# Patient Record
Sex: FEMALE | Race: WHITE | NOT HISPANIC OR LATINO | Employment: FULL TIME | URBAN - METROPOLITAN AREA
[De-identification: names, ages, dates, MRNs, and addresses within clinical notes are randomized per-mention and may not be internally consistent; named-entity substitution may affect disease eponyms.]

---

## 2017-01-20 ENCOUNTER — TELEPHONE (OUTPATIENT)
Dept: OBSTETRICS AND GYNECOLOGY | Facility: CLINIC | Age: 52
End: 2017-01-20

## 2017-01-23 ENCOUNTER — OFFICE VISIT (OUTPATIENT)
Dept: OBSTETRICS AND GYNECOLOGY | Facility: CLINIC | Age: 52
End: 2017-01-23

## 2017-01-23 VITALS
BODY MASS INDEX: 24.24 KG/M2 | WEIGHT: 142 LBS | SYSTOLIC BLOOD PRESSURE: 104 MMHG | HEIGHT: 64 IN | DIASTOLIC BLOOD PRESSURE: 72 MMHG

## 2017-01-23 DIAGNOSIS — N95.1 MENOPAUSAL SYMPTOMS: Primary | ICD-10-CM

## 2017-01-23 DIAGNOSIS — Z13.9 SCREENING: ICD-10-CM

## 2017-01-23 LAB
BILIRUB BLD-MCNC: NEGATIVE MG/DL
CLARITY, POC: CLEAR
COLOR UR: YELLOW
GLUCOSE UR STRIP-MCNC: NEGATIVE MG/DL
KETONES UR QL: NEGATIVE
LEUKOCYTE EST, POC: NEGATIVE
NITRITE UR-MCNC: NEGATIVE MG/ML
PH UR: 5 [PH] (ref 5–8)
PROT UR STRIP-MCNC: NEGATIVE MG/DL
RBC # UR STRIP: ABNORMAL /UL
SP GR UR: 1.02 (ref 1–1.03)
UROBILINOGEN UR QL: NORMAL

## 2017-01-23 PROCEDURE — 99213 OFFICE O/P EST LOW 20 MIN: CPT | Performed by: OBSTETRICS & GYNECOLOGY

## 2017-01-23 PROCEDURE — 81002 URINALYSIS NONAUTO W/O SCOPE: CPT | Performed by: OBSTETRICS & GYNECOLOGY

## 2017-01-23 NOTE — MR AVS SNAPSHOT
Desire Hernandez   2017 1:00 PM   Office Visit    Dept Phone:  [phone]   Encounter #:  [id number]    Provider:  Payal Cleary MD   Department:  [facility]                Your Full Care Plan              Your Updated Medication List          This list is accurate as of: 17  2:36 PM.  Always use your most recent med list.                BEYAZ 3-0.02-0.451 MG tablet   Generic drug:  Drospiren-Eth Estrad-Levomefol   Take 1 tablet by mouth daily.       HYDROcodone-acetaminophen 5-325 MG per tablet   Commonly known as:  NORCO       oxyCODONE-acetaminophen 5-325 MG per tablet   Commonly known as:  PERCOCET       tiZANidine 4 MG tablet   Commonly known as:  ZANAFLEX               We Performed the Following     POC Urinalysis Dipstick       You Were Diagnosed With        Codes Comments    Menopausal symptoms    -  Primary ICD-10-CM: N95.1  ICD-9-CM: 627.2     Screening     ICD-10-CM: Z13.9  ICD-9-CM: V82.9       Instructions     None    Patient Instructions History      Upcoming Appointments     Visit Type Date Time Department    GYN FOLLOW UP 2017  1:00 PM SARAH PATIÑO      [employer] Signup     [facility] [employer] allows you to send messages to your doctor, view your test results, renew your prescriptions, schedule appointments, and more. To sign up, go to [employer] and click on the Sign Up Now link in the New User? box. Enter your [employer] Activation Code exactly as it appears below along with the last four digits of your Social Security Number and your Date of Birth () to complete the sign-up process. If you do not sign up before the expiration date, you must request a new code.    [employer] Activation Code: [email or web address]  Expires: 2017  2:36 PM    If you have questions, you can email [email or web address] or call [phone] to talk to our [employer] staff. Remember, [employer] is NOT to be used for  "urgent needs. For medical emergencies, dial 911.               Other Info from Your Visit           Allergies     Sporanos [Itraconazole]        Reason for Visit     Follow-up pt states having rage, sadness, uncontrollable emotions, hot flashes, and nightsweats      Vital Signs     Blood Pressure Height Weight Last Menstrual Period Breastfeeding? Body Mass Index    104/72 64\" (162.6 cm) 142 lb (64.4 kg) 01/20/2017 (Exact Date) No 24.37 kg/m2    Smoking Status                   Never Smoker           Problems and Diagnoses Noted     Menopausal symptoms    -  Primary    Screening          Results     POC Urinalysis Dipstick      Component Value Standard Range & Units    Color Yellow Yellow, Straw, Dark Yellow, Marivel    Clarity, UA Clear Clear    Glucose, UA Negative Negative, 1000 mg/dL (3+) mg/dL    Bilirubin Negative Negative    Ketones, UA Negative Negative    Specific Gravity  1.020 1.005 - 1.030    Blood, UA Small Negative    pH, Urine 5.0 5.0 - 8.0    Protein, POC Negative Negative mg/dL    Urobilinogen, UA Normal Normal    Leukocytes Negative Negative    Nitrite, UA Negative Negative                    "

## 2017-01-23 NOTE — PROGRESS NOTES
Subjective   Desire Hernandez is a 51 y.o. female.     History of Present Illness  OB History      Para Term  AB TAB SAB Ectopic Multiple Living    3 2 2  1  1   2            Desire has been experiencing some fairly severe menopausal symptoms.  She is now 51 years old.  She's has night sweats.  She has hot flashes mainly at night as well.  She's having trouble sleeping.  She has menstrual headaches that start before her period begins and into the first few days of her new pack of oral contraceptives.  She's been on Beyaz for quite some time for heavy painful mensesas well as several other oral contraceptives including Tri-Cyclen Lo.  Her paper chart is not available and her epic chart only goes back to .  So I don't have the ability today to review all of her former oral contraceptives.  I'm managing this because her current [facility] [place] [facility] insurance will not cover the BeYaz without a prior authorization.  Her  has had a vasectomy.  She's had some episodes where she couldn't control her anger and had rage.  She felt depressed for a couple of weeks after that episode.  That has since resolved.  We discussed the menopausal transition symptoms and various treatments.  Right now her symptoms are manageable without any additional therapy.  We discussed stopping oral contraceptives and letting her naturally regulate for a time.  I'm in hopes that her menstrual headaches will stop when she stopped the oral contraceptives.  She may go back on them in a month or so.  We discussed hormone replacement therapy use, duration of therapy, and briefly types of therapy.  At this time her choosing not to begin hormone replacement therapy that I would have her monitor her menses and keep track of her symptoms.  Her other big complaint is weight gain.  She has gained 7 pounds since September.  The weight is all in her mid abdomen.  She is very frustrated by this.  We discussed eating plan such as trim healthy  "mama and whole .  I encouraged her to limit carbs and to exercise.      The following portions of the patient's history were reviewed and updated as appropriate: allergies, current medications, past family history, past medical history, past social history, past surgical history and problem list.  Past Medical History   Diagnosis Date   • ASCUS favor dysplasia      07; 08; 6/25/10; 12; 13; 8/21/15 (HPV negative)   • Blood type, Rh negative    • Bunion 2016     surgery   • Chlamydia      as a teen   • H/O nipple discharge      Bilateral diagnostic mammogram negative   • HSV-1 (herpes simplex virus 1) infection    • HSV-2 (herpes simplex virus 2) infection    • LGSIL (low grade squamous intraepithelial dysplasia) 2006   • Ovarian cyst      Left     Past Surgical History   Procedure Laterality Date   • Cervical conization, leep     • Colposcopy w/ biopsy / curettage  2006     benign   • Bunionectomy Right      Dr. Durbin   • D&c with suction  1998     10 week SAB   • Induced        years ago     Family History   Problem Relation Age of Onset   • No Known Problems Brother    • No Known Problems Sister    • No Known Problems Son        Review of Systems   Constitutional: Positive for unexpected weight change. Negative for fatigue.   Genitourinary: Positive for menstrual problem.   Psychiatric/Behavioral: Positive for agitation, dysphoric mood and sleep disturbance. The patient is nervous/anxious.      Vitals:    17 1329   BP: 104/72   Weight: 142 lb (64.4 kg)   Height: 64\" (162.6 cm)     Objective   Physical Exam   Constitutional: She is oriented to person, place, and time. She appears well-developed and well-nourished. No distress.   HENT:   Head: Atraumatic.   Pulmonary/Chest: Effort normal.   Neurological: She is alert and oriented to person, place, and time.   Skin: No pallor.   Psychiatric: She has a normal mood and affect. Her behavior is normal. "   Nursing note and vitals reviewed.          Ref Range & Units 2mo ago     TSH 0.270 - 4.200 mIU/mL 4.010   Resulting Agency  [facility]   Narrative   Performed at:  [address] [place] [address]  [address] [place], [place]  [id number]  : Wyatt Pierre MD, Phone:  [phone]      Specimen Collected: 11/04/16  1:50 PM Last Resulted: 11/07/16  8:09 PM                 Ref Range & Units 2mo ago     Free T4 0.93 - 1.70 ng/dL 1.28   Resulting Agency  [facility]   Narrative   Performed at:  [address] [place] [address]  [address] [place], [place]  [id number]  : Wyatt Pierre MD, Phone:  [phone]      Specimen Collected: 11/04/16  1:50 PM Last Resulted: 11/07/16  8:09 PM              Assessment/Plan   Desire was seen today for follow-up.    Diagnoses and all orders for this visit:    Menopausal symptoms    Screening  -     POC Urinalysis Dipstick    Discontinue oral contraceptives for the next month.  Pre-certify Joel, will need to request patient's paper chart from storage  Monitor menopausal symptoms and headaches  Consider hormone replacement therapy in the near future  I spent 15 minutes with this patient 90% of that time in counseling, answering questions, giving reassurance, and discussing therapy.    Payal Cleary MD

## 2017-09-06 ENCOUNTER — OFFICE VISIT (OUTPATIENT)
Dept: OBSTETRICS AND GYNECOLOGY | Facility: CLINIC | Age: 52
End: 2017-09-06

## 2017-09-06 VITALS
WEIGHT: 130 LBS | BODY MASS INDEX: 22.2 KG/M2 | DIASTOLIC BLOOD PRESSURE: 70 MMHG | SYSTOLIC BLOOD PRESSURE: 110 MMHG | HEIGHT: 64 IN

## 2017-09-06 DIAGNOSIS — Z01.419 WELL WOMAN EXAM WITH ROUTINE GYNECOLOGICAL EXAM: ICD-10-CM

## 2017-09-06 DIAGNOSIS — N63.0 BREAST LUMP: Primary | ICD-10-CM

## 2017-09-06 PROCEDURE — 99396 PREV VISIT EST AGE 40-64: CPT | Performed by: NURSE PRACTITIONER

## 2017-09-06 NOTE — PROGRESS NOTES
[facility]  Routine Annual Visit    2017    Patient: Desire Hernandez          MR#:[id number]      History of Present Illness    52 y.o. female  who presents for annual exam. Last annual with Dr. Cleary 2016. Pap negative. Pt doing well on beyaz with no complaints- has been on for years. Light menses on the OCP. Last mammogram 2016. She has not yet had screening colonoscopy but plans to discuss with PCP at her annual physical later this month.       Patient's last menstrual period was 2017 (exact date).  Obstetric History:  OB History      Para Term  AB TAB SAB Ectopic Multiple Living    3 2 2  1  1   2         Menstrual History:     Patient's last menstrual period was 2017 (exact date).       Sexual History:       ________________________________________  Patient Active Problem List   Diagnosis   • Achilles tendinitis of left lower extremity   • Strain of lumbar region   • Plantar fasciitis   • Trochanteric bursitis of right hip   • Menopausal symptoms       Past Medical History:   Diagnosis Date   • ASCUS favor dysplasia     07; 08; 6/25/10; 12; 13; 8/21/15 (HPV negative)   • Blood type, Rh negative    • ion     surgery   • Chlamydia     as a teen   • H/O nipple discharge     Bilateral diagnostic mammogram negative   • HSV-1 (herpes simplex virus 1) infection    • HSV-2 (herpes simplex virus 2) infection    • LGSIL (low grade squamous intraepithelial dysplasia) 2006   • Ovarian cyst     Left       Past Surgical History:   Procedure Laterality Date   • BUNIONECTOMY Right 2016    Dr. Durbin   • CERVICAL CONIZATION, LEEP     • COLPOSCOPY W/ BIOPSY / CURETTAGE  2006    benign   • D&C WITH SUCTION  1998    10 week SAB   • INDUCED       years ago       History   Smoking Status   • Never Smoker   Smokeless Tobacco   • Never Used       Family History   Problem Relation Age of Onset   • No  Known Problems Brother    • No Known Problems Sister    • No Known Problems Son    • Breast cancer Neg Hx    • Ovarian cancer Neg Hx    • Uterine cancer Neg Hx    • Colon cancer Neg Hx    • Melanoma Neg Hx        Prior to Admission medications    Medication Sig Start Date End Date Taking? Authorizing Provider   BEYAZ 3-0.02-0.451 MG tablet Take 1 tablet by mouth daily. 8/26/16  Yes Payal Cleary MD   HYDROcodone-acetaminophen (NORCO) 5-325 MG per tablet TK 1 TO 2 TS PO Q 4 TO 6 H PRN P 7/15/16 9/6/17  Historical Provider, MD   oxyCODONE-acetaminophen (PERCOCET) 5-325 MG per tablet TK 1 TO 2 TS PO Q 4 H PRN FOR PAIN 7/14/16 9/6/17  Historical Provider, MD   tiZANidine (ZANAFLEX) 4 MG tablet Take  by mouth Every 6 (Six) Hours As Needed. 7/17/14 9/6/17  Historical Provider, MD     ________________________________________      The following portions of the patient's history were reviewed and updated as appropriate: allergies, current medications, past family history, past medical history, past social history, past surgical history and problem list.    Review of Systems   Constitutional: Negative.    HENT: Negative.    Eyes: Negative for visual disturbance.   Respiratory: Negative for cough, shortness of breath and wheezing.    Cardiovascular: Negative for chest pain, palpitations and leg swelling.   Gastrointestinal: Negative for abdominal distention, abdominal pain, blood in stool, constipation, diarrhea, nausea and vomiting.   Endocrine: Negative for cold intolerance and heat intolerance.   Genitourinary: Negative for difficulty urinating, dyspareunia, dysuria, frequency, genital sores, hematuria, menstrual problem, pelvic pain, urgency, vaginal bleeding, vaginal discharge and vaginal pain.   Musculoskeletal: Negative.    Skin: Negative.    Neurological: Negative for dizziness, weakness, light-headedness, numbness and headaches.   Hematological: Negative.    Psychiatric/Behavioral: Negative.    Breasts:  "negative for lumps skin changes, dimpling, swelling, nipple changes/discharge bilaterally       Objective   Physical Exam    /70  Ht 64\" (162.6 cm)  Wt 130 lb (59 kg)  LMP 08/29/2017 (Exact Date)  Breastfeeding? No  BMI 22.31 kg/m2   BP Readings from Last 3 Encounters:   09/06/17 110/70   01/23/17 104/72   09/23/16 118/70      Wt Readings from Last 3 Encounters:   09/06/17 130 lb (59 kg)   01/23/17 142 lb (64.4 kg)   09/23/16 135 lb (61.2 kg)        BMI: Estimated body mass index is 22.31 kg/(m^2) as calculated from the following:    Height as of this encounter: 64\" (162.6 cm).    Weight as of this encounter: 130 lb (59 kg).      General:   alert, appears stated age and cooperative   Heart: regular rate and rhythm, S1, S2 normal, no murmur, click, rub or gallop   Lungs: clear to auscultation bilaterally   Abdomen: soft, non-tender, without masses or organomegaly   Breast: negative findings: normal in size and symmetry, normal contour with no evidence of flattening or dimpling, skin normal, nipples everted without rashes or discharge, no palpable axillary lymphadenopathy and positive findings: round, rubbery, mobile and non-tender nodule located on the left upper outer quadrant left breast   Vulva: normal   Vagina: normal mucosa   Cervix: no bleeding following Pap, no cervical motion tenderness and no lesions   Uterus: normal size, mobile, non-tender or normal shape and consistency   Adnexa: no mass, fullness, tenderness     Assessment:    1. Normal annual exam   2. Left breast lump- bilateral diagnostic mammogram arranged for pt  3. Perimenopause- counseled on risks vs benefits OCPs with increasing age. Recommend consult with Preethi Iverson to discuss transitioning off OCPs in near future. Desire is agreeable to this. Name and office number given to Desire.  4. Healthy lifestyle modifications discussed, counseled on self breast exams and bone health      Plan:    []  Rx:   [x]  Mammogram request made  [x]  " PAP done  []  Occult fecal blood test (Ins[facility])  []  Labs:   []  GC/Chl/TV  []  DEXA scan   []  Referral for colonoscopy:           ONOFRE Christina  9/6/2017 1:52 PM

## 2017-09-12 ENCOUNTER — TELEPHONE (OUTPATIENT)
Dept: OBSTETRICS AND GYNECOLOGY | Facility: CLINIC | Age: 52
End: 2017-09-12

## 2017-09-12 LAB
CYTOLOGIST CVX/VAG CYTO: NORMAL
CYTOLOGY CVX/VAG DOC THIN PREP: NORMAL
DX ICD CODE: NORMAL
HIV 1 & 2 AB SER-IMP: NORMAL
HPV I/H RISK 4 DNA CVX QL PROBE+SIG AMP: NEGATIVE
OTHER STN SPEC: NORMAL
PATH REPORT.FINAL DX SPEC: NORMAL
STAT OF ADQ CVX/VAG CYTO-IMP: NORMAL

## 2017-09-12 NOTE — TELEPHONE ENCOUNTER
----- Message from ONOFRE Jarvis sent at 9/12/2017  2:41 PM EDT -----  Please inform patient her pap smear returned negative (normal). Thank you.

## 2017-09-15 ENCOUNTER — HOSPITAL ENCOUNTER (OUTPATIENT)
Dept: MAMMOGRAPHY | Facility: HOSPITAL | Age: 52
Discharge: HOME | End: 2017-09-15
Admitting: NURSE PRACTITIONER

## 2017-09-15 ENCOUNTER — HOSPITAL ENCOUNTER (OUTPATIENT)
Dept: ULTRASOUND IMAGING | Facility: HOSPITAL | Age: 52
Discharge: HOME | End: 2017-09-15

## 2017-09-15 DIAGNOSIS — N63.0 BREAST LUMP: ICD-10-CM

## 2017-09-15 PROCEDURE — G0279 TOMOSYNTHESIS, MAMMO: HCPCS

## 2017-09-15 PROCEDURE — G0204 DX MAMMO INCL CAD BI: HCPCS

## 2017-09-15 PROCEDURE — 76642 ULTRASOUND BREAST LIMITED: CPT

## 2017-09-19 ENCOUNTER — TELEPHONE (OUTPATIENT)
Dept: OBSTETRICS AND GYNECOLOGY | Facility: CLINIC | Age: 52
End: 2017-09-19

## 2017-09-19 NOTE — TELEPHONE ENCOUNTER
Discussed denys's recent negative diagnostic mammogram and U/S results with her. She declines a referral to breast surgery for further evaluation. She denies feeling the area of concern and denies any pain/tenderness or associated symptoms. She knows she can call at any time for further evaluation.

## 2017-09-29 ENCOUNTER — OFFICE VISIT (OUTPATIENT)
Dept: SPORTS MEDICINE | Facility: CLINIC | Age: 52
End: 2017-09-29

## 2017-09-29 VITALS
WEIGHT: 130 LBS | TEMPERATURE: 98.6 F | OXYGEN SATURATION: 100 % | HEART RATE: 66 BPM | SYSTOLIC BLOOD PRESSURE: 114 MMHG | HEIGHT: 64 IN | BODY MASS INDEX: 22.2 KG/M2 | DIASTOLIC BLOOD PRESSURE: 70 MMHG

## 2017-09-29 DIAGNOSIS — Z00.00 ANNUAL PHYSICAL EXAM: Primary | ICD-10-CM

## 2017-09-29 PROCEDURE — 90715 TDAP VACCINE 7 YRS/> IM: CPT | Performed by: FAMILY MEDICINE

## 2017-09-29 PROCEDURE — 99396 PREV VISIT EST AGE 40-64: CPT | Performed by: FAMILY MEDICINE

## 2017-09-29 PROCEDURE — 90471 IMMUNIZATION ADMIN: CPT | Performed by: FAMILY MEDICINE

## 2017-09-29 NOTE — PROGRESS NOTES
"Desire Hernandez is here today for an annual physical exam.     Eating a healthy diet. Exercising routinely.     I have reviewed the patient's medical, family, and social history in detail and updated the computerized patient record.    Screening history:  Colonoscopy - due this year  Breast cancer, Pap/pelvic - per GYN. This past year had a mammo/US for palpable irregularity in L breast that resolved (testing normal). On OCPs for about 10 years due to premenopausal symptoms (more frequent and heavier menses) - initially did well but concern about climbing age and ocps.   Metabolic - last year    Health Maintenance   Topic Date Due   • TDAP/TD VACCINES (1 - Tdap) 06/09/1986   • PAP SMEAR  08/25/2016   • COLONOSCOPY  09/06/2017   • INFLUENZA VACCINE  11/01/2017 (Originally 8/1/2017)   • MAMMOGRAM  09/15/2019       Review of Systems   Constitutional: Negative for chills and fever.   Respiratory: Negative for shortness of breath.    Cardiovascular: Negative for chest pain.   Endocrine:        Hot flashes   Genitourinary: Positive for menstrual problem.   Psychiatric/Behavioral: Negative.        /70  Pulse 66  Temp 98.6 °F (37 °C)  Ht 64\" (162.6 cm)  Wt 130 lb (59 kg)  SpO2 100%  BMI 22.31 kg/m2     Physical Exam    Vital signs reviewed.  General appearance: No acute distress  Eyes: conjunctiva clear without erythema; pupils equally round and reactive  ENT: external ears and nose normal; hearing normal, oropharynx clear  Neck: supple; no thyromegaly  CV: normal rate and rhythm; no peripheral edema  Respiratory: normal respiratory effort; lungs clear to auscultation bilaterally  MSK: normal gait and station; no focal joint deformity or swelling  Skin: no rash or wounds; normal turgor  Neuro: cranial nerves 2-12 grossly intact; normal sensation to light touch  Psych: mood and affect normal; recent and remote memory intact    Desire was seen today for annual exam and breast problem.    Diagnoses and all orders " for this visit:    Annual physical exam  -     Cancel: Ambulatory Referral For Screening Colonoscopy  -     CBC & Differential  -     Comprehensive Metabolic Panel  -     Lipid Panel  -     Thyroid Cascade Profile  -     Vitamin D 25 Hydroxy  -     Ambulatory Referral For Screening Colonoscopy    Other orders  -     Tdap Vaccine Greater Than or Equal To 6yo IM    Encourage healthy diet and exercise.  Encourage patient to stay up to date on screening examinations as indicated based on age and risk factors.  Continue eval with GYN regarding perimenopausal symptoms, may need to consider endometrial ablation.   Will get flu shot through work.    EMR Dragon/Transcription disclaimer:    Much of this encounter note is an electronic transcription/translation of spoken language to printed text.  The electronic translation of spoken language may permit erroneous, or at times, nonsensical words or phrases to be inadvertently transcribed.  Although I have reviewed the note for such errors some may still exist.

## 2017-09-30 LAB
25(OH)D3+25(OH)D2 SERPL-MCNC: 57 NG/ML (ref 30–100)
ALBUMIN SERPL-MCNC: 4.4 G/DL (ref 3.5–5.2)
ALBUMIN/GLOB SERPL: 1.4 G/DL
ALP SERPL-CCNC: 62 U/L (ref 39–117)
ALT SERPL-CCNC: 11 U/L (ref 1–33)
AST SERPL-CCNC: 12 U/L (ref 1–32)
BASOPHILS # BLD AUTO: 0.03 10*3/MM3 (ref 0–0.2)
BASOPHILS NFR BLD AUTO: 0.4 % (ref 0–1.5)
BILIRUB SERPL-MCNC: 0.4 MG/DL (ref 0.1–1.2)
BUN SERPL-MCNC: 17 MG/DL (ref 6–20)
BUN/CREAT SERPL: 18.5 (ref 7–25)
CALCIUM SERPL-MCNC: 10 MG/DL (ref 8.6–10.5)
CHLORIDE SERPL-SCNC: 101 MMOL/L (ref 98–107)
CHOLEST SERPL-MCNC: 214 MG/DL (ref 0–200)
CO2 SERPL-SCNC: 25.9 MMOL/L (ref 22–29)
CREAT SERPL-MCNC: 0.92 MG/DL (ref 0.57–1)
EOSINOPHIL # BLD AUTO: 0.18 10*3/MM3 (ref 0–0.7)
EOSINOPHIL NFR BLD AUTO: 2.1 % (ref 0.3–6.2)
ERYTHROCYTE [DISTWIDTH] IN BLOOD BY AUTOMATED COUNT: 12.7 % (ref 11.7–13)
GLOBULIN SER CALC-MCNC: 3.1 GM/DL
GLUCOSE SERPL-MCNC: 82 MG/DL (ref 65–99)
HCT VFR BLD AUTO: 42.8 % (ref 35.6–45.5)
HDLC SERPL-MCNC: 105 MG/DL (ref 40–60)
HGB BLD-MCNC: 14.2 G/DL (ref 11.9–15.5)
IMM GRANULOCYTES # BLD: 0.02 10*3/MM3 (ref 0–0.03)
IMM GRANULOCYTES NFR BLD: 0.2 % (ref 0–0.5)
LDLC SERPL CALC-MCNC: 91 MG/DL (ref 0–100)
LYMPHOCYTES # BLD AUTO: 2.47 10*3/MM3 (ref 0.9–4.8)
LYMPHOCYTES NFR BLD AUTO: 29.1 % (ref 19.6–45.3)
MCH RBC QN AUTO: 31.6 PG (ref 26.9–32)
MCHC RBC AUTO-ENTMCNC: 33.2 G/DL (ref 32.4–36.3)
MCV RBC AUTO: 95.3 FL (ref 80.5–98.2)
MONOCYTES # BLD AUTO: 0.58 10*3/MM3 (ref 0.2–1.2)
MONOCYTES NFR BLD AUTO: 6.8 % (ref 5–12)
NEUTROPHILS # BLD AUTO: 5.22 10*3/MM3 (ref 1.9–8.1)
NEUTROPHILS NFR BLD AUTO: 61.4 % (ref 42.7–76)
PLATELET # BLD AUTO: 249 10*3/MM3 (ref 140–500)
POTASSIUM SERPL-SCNC: 4.7 MMOL/L (ref 3.5–5.2)
PROT SERPL-MCNC: 7.5 G/DL (ref 6–8.5)
RBC # BLD AUTO: 4.49 10*6/MM3 (ref 3.9–5.2)
SODIUM SERPL-SCNC: 140 MMOL/L (ref 136–145)
TRIGL SERPL-MCNC: 90 MG/DL (ref 0–150)
TSH SERPL DL<=0.005 MIU/L-ACNC: 2.85 UIU/ML (ref 0.45–4.5)
VLDLC SERPL CALC-MCNC: 18 MG/DL (ref 5–40)
WBC # BLD AUTO: 8.5 10*3/MM3 (ref 4.5–10.7)

## 2017-10-03 ENCOUNTER — TELEPHONE (OUTPATIENT)
Dept: SPORTS MEDICINE | Facility: CLINIC | Age: 52
End: 2017-10-03

## 2017-10-03 NOTE — TELEPHONE ENCOUNTER
----- Message from Osei Forrest MD sent at 10/2/2017  8:58 AM EDT -----  Physical labs all look good.  Keep up the good work.

## 2017-10-15 DIAGNOSIS — Z12.11 SCREENING FOR COLON CANCER: Primary | ICD-10-CM

## 2017-10-16 NOTE — H&P
Date: 10/15/2017     Patient: Desire Hernandez    : 1965   [id number]     CC:  Screening Colonoscopy    History:   The patient is a 52 y.o. female of Osei Forrest MD  who presents to discuss screening colonoscopy. The patient currently has no complaints.  Patient denies any history of nausea, abdominal pain, weight loss, change in bowel habits or rectal bleeding.  Patient denies melena, hematochezia or BRBPR.  No family history of ulcerative colitis, Crohn's disease, familial polyposis or colon cancer.    The following portions of the patient's history were reviewed and updated as appropriate: allergies, current medications, past family history, past medical history, past social history, past surgical history and problem list.    Past Medical History:   Diagnosis Date   • ASCUS favor dysplasia     07; 08; 6/25/10; 12; 13; 8/21/15 (HPV negative)   • Blood type, Rh negative    • Bunion 2016    surgery   • Chlamydia     as a teen   • H/O nipple discharge     Bilateral diagnostic mammogram negative   • HSV-1 (herpes simplex virus 1) infection    • HSV-2 (herpes simplex virus 2) infection    • LGSIL (low grade squamous intraepithelial dysplasia) 2006   • Ovarian cyst     Left      Past Surgical History:   Procedure Laterality Date   • BUNIONECTOMY Right 2016    Dr. Durbin   • CERVICAL CONIZATION, LEEP     • COLPOSCOPY W/ BIOPSY / CURETTAGE  2006    benign   • D&C WITH SUCTION  1998    10 week SAB   • INDUCED       years ago     Medications:   (Not in a hospital admission)  Allergies: Sporanos [itraconazole]   Social History:  Social History     Social History   • Marital status:      Spouse name: Lawrence   • Number of children: 2   • Years of education: N/A     Occupational History   •  [employer]     Social History Main Topics   • Smoking status: Never Smoker   • Smokeless tobacco: Never Used   • Alcohol use Yes      Comment: occ   •  Drug use: No   • Sexual activity: Yes     Partners: Male     Birth control/ protection: OCP     Other Topics Concern   • None     Social History Narrative    OB/GYN Patient since 1998 with Dr Cleary     to Lawrence Hernandez ion 7/4/14    Has a [place] terrier    Boys are Parrish and Jesus. Their dad is Gil Herzog.          Family History   Problem Relation Age of Onset   • No Known Problems Brother    • No Known Problems Sister    • No Known Problems Son    • Breast cancer Neg Hx    • Ovarian cancer Neg Hx    • Uterine cancer Neg Hx    • Colon cancer Neg Hx    • Melanoma Neg Hx         Review of Systems:   General: Patient reports good health  Eyes: No eye problems  Ears, nose, mouth and throat: No rhinitis, no hearing problems, no chronic cough  Cardiovascular/heart: Denies palpitations, syncope or chest pain  Respiratory/lung: Denies shortness of breath, hemoptysis, or dyspnea on exertion   Genital/urinary: No frequency, hematuria or dysuria  Hematological/lymphatic: Denies anemia or other problems  Musculoskeletal: No joint pain, no defects  Skin: No psoriasis or other skin issues  Neurological: No seizures or other neurological problems  Psychiatric: None  Endocrine: Negative  Gastro-intestinal: No constipation, no diarrhea, no melena, no hematochezia    Physical Examination:  General: Alert and oriented x3 in no acute distress  HEENT: Normal cephalic, atraumatic, PERRLA, EOMI, sclera anicteric, moist mucous membranes, neck is supple, no JVD, no carotid bruits, no thyromegaly no adenopathy  Chest: CTA and percussion  CVA: RRR, normal S1-S2, no murmurs, no gallops or rubs  Abdomen: Positive BS, soft, nondistended, nontender, no rebound, no guarding, no hernias, no organomegaly and no masses  Extremities: Full range of motion, no clubbing, no cyanosis or edema  Neurovascular: Grossly intact    Impression:  52 y.o. female for screening colonoscopy.    Plan:  Patient is presenting for screening colonoscopy.  I have  recommended that the patient undergo a screening colonoscopy in accordance of American Cancer Society's guidelines.  I have discussed this procedure in detail with the patient.  I have discussed the risks, benefits and alternatives.  I have discussed the risk of anesthesia, bleeding and perforation.  Patient understands these risks, benefits and alternatives and wishes to proceed.      Elizabeth Gray MD  General, Minimally Invasive and Endoscopic Surgery  [facility]    [address], [address]  [place], [place], [address]  P: [phone]  F: [phone]    CC: Osei Forrest MD

## 2017-11-08 RX ORDER — DROSPIRENONE/ETHINYL ESTRADIOL/LEVOMEFOLATE CALCIUM AND LEVOMEFOLATE CALCIUM 3-0.02(24)
1 KIT ORAL DAILY
Qty: 28 TABLET | Refills: 1 | Status: SHIPPED | OUTPATIENT
Start: 2017-11-08 | End: 2017-12-20 | Stop reason: HOSPADM

## 2017-11-16 ENCOUNTER — OFFICE VISIT (OUTPATIENT)
Dept: OBSTETRICS AND GYNECOLOGY | Facility: CLINIC | Age: 52
End: 2017-11-16

## 2017-11-16 VITALS
SYSTOLIC BLOOD PRESSURE: 120 MMHG | WEIGHT: 134 LBS | DIASTOLIC BLOOD PRESSURE: 74 MMHG | HEIGHT: 64 IN | BODY MASS INDEX: 22.88 KG/M2

## 2017-11-16 DIAGNOSIS — Z00.00 ENCOUNTER FOR ANNUAL GENERAL MEDICAL EXAMINATION WITHOUT ABNORMAL FINDINGS IN ADULT: Primary | ICD-10-CM

## 2017-11-16 PROBLEM — N63.20 LEFT BREAST MASS: Status: ACTIVE | Noted: 2017-11-16

## 2017-11-16 PROCEDURE — 99203 OFFICE O/P NEW LOW 30 MIN: CPT | Performed by: OBSTETRICS & GYNECOLOGY

## 2017-11-16 NOTE — PROGRESS NOTES
"PROBLEM VISIT    Chief Complaint: menometrorrhagia and dysmenorrhea and left breast mass      Desire Hernandez is a 52 y.o. patient who presents as a new pt and to get established. Pt is on Beyaz since age 42 due to heavy menstrual cycles and dysmenorrhea and lasting 10-12 days and irritability. Pt's  has had a vasectomy. Had her annual exam done in 9/2017 at Dr Cleary's office by NP. During the exam NP noticed a mass in the left breast. Had imaging with Dx MMG and Left breast US 9/2017 and it was negative. Tried getting off the pill in 1/2017 and cycles were terrible again.  Chief Complaint   Patient presents with   • Consult     New Patient   • Menopause             The following portions of the patient's history were reviewed and updated as appropriate: allergies, current medications and problem list.    Review of Systems   Constitutional: Negative for unexpected weight change.   Gastrointestinal: Negative for abdominal distention and abdominal pain.   Genitourinary: Positive for menstrual problem. Negative for pelvic pain and vaginal bleeding.       /74  Ht 64\" (162.6 cm)  Wt 134 lb (60.8 kg)  LMP 10/24/2017 (Approximate)  BMI 23 kg/m2    Physical Exam   Pulmonary/Chest: Right breast exhibits tenderness. Right breast exhibits no inverted nipple, no mass, no nipple discharge and no skin change. Left breast exhibits mass and tenderness. Left breast exhibits no inverted nipple, no nipple discharge and no skin change.   2-3cm at 2 oclock of left outer breast         Assessment/Plan   Desire was seen today for consult and menopause.    Diagnoses and all orders for this visit:    Encounter for annual general medical examination without abnormal findings in adult  -     Cancel: POC Urinalysis Dipstick  -     Cancel: Pap IG, HPV-hr - ThinPrep Vial, Cervix      51yo with menometrorrhagia, dysmenorrhea and left breast mass    1) Gyn HM: LPS 9/2017    2) Menometrorrhagia and dysmenorrhea: Being controlled " with [place] but pt doesn't want to be on OCPs any longer. She was dx with a left breast mass and it has made her uncomfortable to remain on hormone. TSH and CBC normal. Discussed fu for TVUS to evaluate uterus. Discussed proceeding with Mirena IUD or endometrial ablation for treatment of her symptoms. Pt has hx of BTL. She is interested in endometrial ablation. Since pt has been on OCPs for so long she may not need a preop EM biopsy- will await US to evaluate EM lining. Last time pt stopped OCPs was in 1/2017 and her cycles returned to heavy and painful.     3) Left breast mass: Dx MMG and US neg. PE reveals left breast mass. Will fu for another exam in 2 weeks after pt's cycle and if mass persists then will send to Dr Pinedo for evaluation.;.             Return in about 2 weeks (around 11/30/2017).      Sandie Hu, DO    11/20/2017  9:10 AM

## 2017-11-30 ENCOUNTER — OFFICE VISIT (OUTPATIENT)
Dept: OBSTETRICS AND GYNECOLOGY | Facility: CLINIC | Age: 52
End: 2017-11-30

## 2017-11-30 ENCOUNTER — PREP FOR SURGERY (OUTPATIENT)
Dept: OTHER | Facility: HOSPITAL | Age: 52
End: 2017-11-30

## 2017-11-30 ENCOUNTER — PROCEDURE VISIT (OUTPATIENT)
Dept: OBSTETRICS AND GYNECOLOGY | Facility: CLINIC | Age: 52
End: 2017-11-30

## 2017-11-30 VITALS
WEIGHT: 131 LBS | DIASTOLIC BLOOD PRESSURE: 70 MMHG | HEIGHT: 64 IN | SYSTOLIC BLOOD PRESSURE: 118 MMHG | BODY MASS INDEX: 22.36 KG/M2

## 2017-11-30 DIAGNOSIS — N92.6 IRREGULAR PERIODS: Primary | ICD-10-CM

## 2017-11-30 DIAGNOSIS — N92.1 MENOMETRORRHAGIA: Primary | ICD-10-CM

## 2017-11-30 DIAGNOSIS — Z13.9 SCREENING FOR CONDITION: Primary | ICD-10-CM

## 2017-11-30 DIAGNOSIS — N63.20 LEFT BREAST MASS: ICD-10-CM

## 2017-11-30 PROCEDURE — 76830 TRANSVAGINAL US NON-OB: CPT | Performed by: OBSTETRICS & GYNECOLOGY

## 2017-11-30 PROCEDURE — 99214 OFFICE O/P EST MOD 30 MIN: CPT | Performed by: OBSTETRICS & GYNECOLOGY

## 2017-11-30 RX ORDER — SODIUM CHLORIDE 0.9 % (FLUSH) 0.9 %
1-10 SYRINGE (ML) INJECTION AS NEEDED
Status: CANCELLED | OUTPATIENT
Start: 2017-12-20

## 2017-11-30 RX ORDER — SODIUM CHLORIDE 9 MG/ML
40 INJECTION, SOLUTION INTRAVENOUS AS NEEDED
Status: CANCELLED | OUTPATIENT
Start: 2017-12-20

## 2017-12-05 ENCOUNTER — TELEPHONE (OUTPATIENT)
Dept: OBSTETRICS AND GYNECOLOGY | Facility: CLINIC | Age: 52
End: 2017-12-05

## 2017-12-05 ENCOUNTER — TELEPHONE (OUTPATIENT)
Dept: SURGERY | Facility: CLINIC | Age: 52
End: 2017-12-05

## 2017-12-05 PROBLEM — N92.1 MENOMETRORRHAGIA: Status: ACTIVE | Noted: 2017-12-05

## 2017-12-05 NOTE — TELEPHONE ENCOUNTER
Patient is wanting a novasure ablation , has a pencil date of 12/20/17, are you ok with me scheudling? If so can you do orders? Never mind.... I see your order

## 2017-12-19 ENCOUNTER — ANESTHESIA EVENT (OUTPATIENT)
Dept: PERIOP | Facility: HOSPITAL | Age: 52
End: 2017-12-19

## 2017-12-20 ENCOUNTER — ANESTHESIA (OUTPATIENT)
Dept: PERIOP | Facility: HOSPITAL | Age: 52
End: 2017-12-20

## 2017-12-20 ENCOUNTER — HOSPITAL ENCOUNTER (OUTPATIENT)
Facility: HOSPITAL | Age: 52
Setting detail: HOSPITAL OUTPATIENT SURGERY
Discharge: HOME | End: 2017-12-20
Attending: OBSTETRICS & GYNECOLOGY | Admitting: OBSTETRICS & GYNECOLOGY

## 2017-12-20 VITALS
HEART RATE: 77 BPM | DIASTOLIC BLOOD PRESSURE: 75 MMHG | OXYGEN SATURATION: 98 % | RESPIRATION RATE: 15 BRPM | TEMPERATURE: 98 F | SYSTOLIC BLOOD PRESSURE: 116 MMHG | HEIGHT: 64 IN | BODY MASS INDEX: 22.67 KG/M2 | WEIGHT: 132.8 LBS

## 2017-12-20 DIAGNOSIS — N92.1 MENOMETRORRHAGIA: ICD-10-CM

## 2017-12-20 LAB — HCG SERPL QL: NEGATIVE

## 2017-12-20 PROCEDURE — 25010000002 PROPOFOL 10 MG/ML EMULSION: Performed by: NURSE ANESTHETIST, CERTIFIED REGISTERED

## 2017-12-20 PROCEDURE — 25010000002 DIPHENHYDRAMINE PER 50 MG: Performed by: NURSE ANESTHETIST, CERTIFIED REGISTERED

## 2017-12-20 PROCEDURE — 25010000002 FENTANYL CITRATE (PF) 100 MCG/2ML SOLUTION: Performed by: NURSE ANESTHETIST, CERTIFIED REGISTERED

## 2017-12-20 PROCEDURE — 58563 HYSTEROSCOPY ABLATION: CPT | Performed by: OBSTETRICS & GYNECOLOGY

## 2017-12-20 PROCEDURE — 25010000002 DEXAMETHASONE PER 1 MG: Performed by: NURSE ANESTHETIST, CERTIFIED REGISTERED

## 2017-12-20 PROCEDURE — 25010000002 ONDANSETRON PER 1 MG: Performed by: NURSE ANESTHETIST, CERTIFIED REGISTERED

## 2017-12-20 PROCEDURE — 25010000002 MIDAZOLAM PER 1 MG: Performed by: NURSE ANESTHETIST, CERTIFIED REGISTERED

## 2017-12-20 PROCEDURE — 84703 CHORIONIC GONADOTROPIN ASSAY: CPT | Performed by: OBSTETRICS & GYNECOLOGY

## 2017-12-20 PROCEDURE — 25010000002 KETOROLAC TROMETHAMINE PER 15 MG: Performed by: NURSE ANESTHETIST, CERTIFIED REGISTERED

## 2017-12-20 PROCEDURE — S0260 H&P FOR SURGERY: HCPCS | Performed by: OBSTETRICS & GYNECOLOGY

## 2017-12-20 RX ORDER — PROPOFOL 10 MG/ML
VIAL (ML) INTRAVENOUS AS NEEDED
Status: DISCONTINUED | OUTPATIENT
Start: 2017-12-20 | End: 2017-12-20 | Stop reason: SURG

## 2017-12-20 RX ORDER — ACETAMINOPHEN 650 MG/1
650 SUPPOSITORY RECTAL ONCE AS NEEDED
Status: DISCONTINUED | OUTPATIENT
Start: 2017-12-20 | End: 2017-12-20 | Stop reason: HOSPADM

## 2017-12-20 RX ORDER — ONDANSETRON 2 MG/ML
4 INJECTION INTRAMUSCULAR; INTRAVENOUS ONCE
Status: COMPLETED | OUTPATIENT
Start: 2017-12-20 | End: 2017-12-20

## 2017-12-20 RX ORDER — SCOLOPAMINE TRANSDERMAL SYSTEM 1 MG/1
1 PATCH, EXTENDED RELEASE TRANSDERMAL ONCE
Status: DISCONTINUED | OUTPATIENT
Start: 2017-12-20 | End: 2017-12-20 | Stop reason: HOSPADM

## 2017-12-20 RX ORDER — LIDOCAINE HYDROCHLORIDE 10 MG/ML
0.5 INJECTION, SOLUTION INFILTRATION; PERINEURAL ONCE AS NEEDED
Status: COMPLETED | OUTPATIENT
Start: 2017-12-20 | End: 2017-12-20

## 2017-12-20 RX ORDER — ONDANSETRON 2 MG/ML
4 INJECTION INTRAMUSCULAR; INTRAVENOUS ONCE AS NEEDED
Status: DISCONTINUED | OUTPATIENT
Start: 2017-12-20 | End: 2017-12-20 | Stop reason: HOSPADM

## 2017-12-20 RX ORDER — OXYCODONE HYDROCHLORIDE AND ACETAMINOPHEN 5; 325 MG/1; MG/1
1 TABLET ORAL ONCE AS NEEDED
Status: COMPLETED | OUTPATIENT
Start: 2017-12-20 | End: 2017-12-20

## 2017-12-20 RX ORDER — MIDAZOLAM HYDROCHLORIDE 1 MG/ML
2 INJECTION INTRAMUSCULAR; INTRAVENOUS
Status: DISCONTINUED | OUTPATIENT
Start: 2017-12-20 | End: 2017-12-20 | Stop reason: HOSPADM

## 2017-12-20 RX ORDER — LIDOCAINE HYDROCHLORIDE 20 MG/ML
INJECTION, SOLUTION INFILTRATION; PERINEURAL AS NEEDED
Status: DISCONTINUED | OUTPATIENT
Start: 2017-12-20 | End: 2017-12-20 | Stop reason: SURG

## 2017-12-20 RX ORDER — FAMOTIDINE 10 MG/ML
20 INJECTION, SOLUTION INTRAVENOUS ONCE
Status: COMPLETED | OUTPATIENT
Start: 2017-12-20 | End: 2017-12-20

## 2017-12-20 RX ORDER — SODIUM CHLORIDE 9 MG/ML
INJECTION, SOLUTION INTRAVENOUS AS NEEDED
Status: DISCONTINUED | OUTPATIENT
Start: 2017-12-20 | End: 2017-12-20 | Stop reason: HOSPADM

## 2017-12-20 RX ORDER — OXYCODONE HYDROCHLORIDE AND ACETAMINOPHEN 5; 325 MG/1; MG/1
1 TABLET ORAL EVERY 4 HOURS PRN
Qty: 15 TABLET | Refills: 0 | Status: SHIPPED | OUTPATIENT
Start: 2017-12-20 | End: 2018-10-05

## 2017-12-20 RX ORDER — KETOROLAC TROMETHAMINE 30 MG/ML
INJECTION, SOLUTION INTRAMUSCULAR; INTRAVENOUS AS NEEDED
Status: DISCONTINUED | OUTPATIENT
Start: 2017-12-20 | End: 2017-12-20 | Stop reason: SURG

## 2017-12-20 RX ORDER — DIPHENHYDRAMINE HYDROCHLORIDE 50 MG/ML
12.5 INJECTION INTRAMUSCULAR; INTRAVENOUS
Status: DISCONTINUED | OUTPATIENT
Start: 2017-12-20 | End: 2017-12-20 | Stop reason: HOSPADM

## 2017-12-20 RX ORDER — FENTANYL CITRATE 50 UG/ML
INJECTION, SOLUTION INTRAMUSCULAR; INTRAVENOUS AS NEEDED
Status: DISCONTINUED | OUTPATIENT
Start: 2017-12-20 | End: 2017-12-20 | Stop reason: SURG

## 2017-12-20 RX ORDER — DEXAMETHASONE SODIUM PHOSPHATE 4 MG/ML
INJECTION, SOLUTION INTRA-ARTICULAR; INTRALESIONAL; INTRAMUSCULAR; INTRAVENOUS; SOFT TISSUE AS NEEDED
Status: DISCONTINUED | OUTPATIENT
Start: 2017-12-20 | End: 2017-12-20 | Stop reason: SURG

## 2017-12-20 RX ORDER — IBUPROFEN 800 MG/1
800 TABLET ORAL EVERY 6 HOURS PRN
Qty: 30 TABLET | Refills: 0 | Status: SHIPPED | OUTPATIENT
Start: 2017-12-20 | End: 2018-10-05

## 2017-12-20 RX ORDER — GLYCOPYRROLATE 0.2 MG/ML
INJECTION INTRAMUSCULAR; INTRAVENOUS AS NEEDED
Status: DISCONTINUED | OUTPATIENT
Start: 2017-12-20 | End: 2017-12-20 | Stop reason: SURG

## 2017-12-20 RX ORDER — MIDAZOLAM HYDROCHLORIDE 1 MG/ML
1 INJECTION INTRAMUSCULAR; INTRAVENOUS
Status: DISCONTINUED | OUTPATIENT
Start: 2017-12-20 | End: 2017-12-20 | Stop reason: HOSPADM

## 2017-12-20 RX ORDER — MIDAZOLAM HYDROCHLORIDE 1 MG/ML
1 INJECTION INTRAMUSCULAR; INTRAVENOUS AS NEEDED
Status: DISCONTINUED | OUTPATIENT
Start: 2017-12-20 | End: 2017-12-20 | Stop reason: HOSPADM

## 2017-12-20 RX ORDER — ACETAMINOPHEN 325 MG/1
650 TABLET ORAL ONCE AS NEEDED
Status: DISCONTINUED | OUTPATIENT
Start: 2017-12-20 | End: 2017-12-20 | Stop reason: HOSPADM

## 2017-12-20 RX ORDER — SODIUM CHLORIDE, SODIUM LACTATE, POTASSIUM CHLORIDE, CALCIUM CHLORIDE 600; 310; 30; 20 MG/100ML; MG/100ML; MG/100ML; MG/100ML
1000 INJECTION, SOLUTION INTRAVENOUS CONTINUOUS PRN
Status: DISCONTINUED | OUTPATIENT
Start: 2017-12-20 | End: 2017-12-20 | Stop reason: HOSPADM

## 2017-12-20 RX ORDER — SODIUM CHLORIDE 9 MG/ML
40 INJECTION, SOLUTION INTRAVENOUS AS NEEDED
Status: DISCONTINUED | OUTPATIENT
Start: 2017-12-20 | End: 2017-12-20 | Stop reason: HOSPADM

## 2017-12-20 RX ORDER — DIPHENHYDRAMINE HYDROCHLORIDE 50 MG/ML
6.25 INJECTION INTRAMUSCULAR; INTRAVENOUS ONCE
Status: COMPLETED | OUTPATIENT
Start: 2017-12-20 | End: 2017-12-20

## 2017-12-20 RX ORDER — MEPERIDINE HYDROCHLORIDE 25 MG/ML
12.5 INJECTION INTRAMUSCULAR; INTRAVENOUS; SUBCUTANEOUS
Status: DISCONTINUED | OUTPATIENT
Start: 2017-12-20 | End: 2017-12-20 | Stop reason: HOSPADM

## 2017-12-20 RX ORDER — SODIUM CHLORIDE 0.9 % (FLUSH) 0.9 %
1-10 SYRINGE (ML) INJECTION AS NEEDED
Status: DISCONTINUED | OUTPATIENT
Start: 2017-12-20 | End: 2017-12-20 | Stop reason: HOSPADM

## 2017-12-20 RX ADMIN — KETOROLAC TROMETHAMINE 30 MG: 30 INJECTION INTRAMUSCULAR; INTRAVENOUS at 13:20

## 2017-12-20 RX ADMIN — MIDAZOLAM HYDROCHLORIDE 1 MG: 1 INJECTION, SOLUTION INTRAMUSCULAR; INTRAVENOUS at 12:28

## 2017-12-20 RX ADMIN — MIDAZOLAM HYDROCHLORIDE 1 MG: 1 INJECTION, SOLUTION INTRAMUSCULAR; INTRAVENOUS at 13:04

## 2017-12-20 RX ADMIN — DEXAMETHASONE SODIUM PHOSPHATE 8 MG: 4 INJECTION, SOLUTION INTRAMUSCULAR; INTRAVENOUS at 13:21

## 2017-12-20 RX ADMIN — ONDANSETRON 4 MG: 2 INJECTION, SOLUTION INTRAMUSCULAR; INTRAVENOUS at 12:27

## 2017-12-20 RX ADMIN — FAMOTIDINE 20 MG: 10 INJECTION, SOLUTION INTRAVENOUS at 12:27

## 2017-12-20 RX ADMIN — FENTANYL CITRATE 25 MCG: 50 INJECTION, SOLUTION INTRAMUSCULAR; INTRAVENOUS at 13:25

## 2017-12-20 RX ADMIN — SODIUM CHLORIDE, POTASSIUM CHLORIDE, SODIUM LACTATE AND CALCIUM CHLORIDE: 600; 310; 30; 20 INJECTION, SOLUTION INTRAVENOUS at 13:11

## 2017-12-20 RX ADMIN — FENTANYL CITRATE 50 MCG: 50 INJECTION, SOLUTION INTRAMUSCULAR; INTRAVENOUS at 13:13

## 2017-12-20 RX ADMIN — LIDOCAINE HYDROCHLORIDE 50 MG: 20 INJECTION, SOLUTION INFILTRATION; PERINEURAL at 13:13

## 2017-12-20 RX ADMIN — LIDOCAINE HYDROCHLORIDE 0.2 ML: 10 INJECTION, SOLUTION EPIDURAL; INFILTRATION; INTRACAUDAL; PERINEURAL at 11:50

## 2017-12-20 RX ADMIN — DIPHENHYDRAMINE HYDROCHLORIDE 6.25 MG: 50 INJECTION, SOLUTION INTRAMUSCULAR; INTRAVENOUS at 12:27

## 2017-12-20 RX ADMIN — SCOPALAMINE 1 PATCH: 1 PATCH, EXTENDED RELEASE TRANSDERMAL at 12:28

## 2017-12-20 RX ADMIN — SODIUM CHLORIDE, POTASSIUM CHLORIDE, SODIUM LACTATE AND CALCIUM CHLORIDE 1000 ML: 600; 310; 30; 20 INJECTION, SOLUTION INTRAVENOUS at 11:50

## 2017-12-20 RX ADMIN — FENTANYL CITRATE 25 MCG: 50 INJECTION, SOLUTION INTRAMUSCULAR; INTRAVENOUS at 13:40

## 2017-12-20 RX ADMIN — GLYCOPYRROLATE 0.1 MG: 0.2 INJECTION INTRAMUSCULAR; INTRAVENOUS at 13:13

## 2017-12-20 RX ADMIN — PROPOFOL 150 MG: 10 INJECTION, EMULSION INTRAVENOUS at 13:14

## 2017-12-20 RX ADMIN — OXYCODONE HYDROCHLORIDE AND ACETAMINOPHEN 1 TABLET: 5; 325 TABLET ORAL at 14:08

## 2017-12-20 NOTE — PLAN OF CARE
Problem: Perioperative Period (Adult)  Goal: Signs and Symptoms of Listed Potential Problems Will be Absent or Manageable (Perioperative Period)  Outcome: Ongoing (interventions implemented as appropriate)   12/20/17 1349   Perioperative Period   Problems Assessed (Perioperative Period) all   Problems Present (Perioperative Period) none

## 2017-12-20 NOTE — PLAN OF CARE
Problem: Patient Care Overview (Adult)  Goal: Adult Individualization and Mutuality  Outcome: Ongoing (interventions implemented as appropriate)   12/20/17 1208   Individualization   Patient Specific Preferences prefers to be called Desire

## 2017-12-20 NOTE — ANESTHESIA POSTPROCEDURE EVALUATION
Patient: Desire Hernandez    Procedure Summary     Date Anesthesia Start Anesthesia Stop Room / Location    12/20/17 1311 1347 [facility] / [facility]       Procedure Diagnosis Surgeon Provider    HYSTEROSCOPY WITH ENDOMETRIAL ABLATION; d&c (N/A Vagina) Menometrorrhagia  (Menometrorrhagia [N92.1]) DO Azam Maynard, JOSE          Anesthesia Type: general  Last vitals  BP   129/93 (12/20/17 1410)   Temp   97.7 °F (36.5 °C) (12/20/17 1345)   Pulse   78 (12/20/17 1410)   Resp   15 (12/20/17 1410)     SpO2   100 % (12/20/17 1410)     Post Anesthesia Care and Evaluation    Patient location during evaluation: bedside  Patient participation: complete - patient participated  Level of consciousness: awake and alert  Pain score: 0  Pain management: adequate  Airway patency: patent  Anesthetic complications: No anesthetic complications  PONV Status: none  Cardiovascular status: acceptable  Respiratory status: acceptable  Hydration status: acceptable

## 2017-12-20 NOTE — H&P
Patient Care Team:  Osei Forrest MD as PCP - General  Osei Forrest MD as PCP - Family Medicine    Chief complaint menometrorrhagia       HPI: 51 yo with history of menometrorrhagia and dysmenorrhea: Being controlled with Beyaz but pt doesn't want to be on OCPs any longer. She was dx with a left breast mass and it has made her uncomfortable to remain on hormone. TSH and CBC normal.  Last time pt stopped OCPs was in 2017 and her cycles returned to heavy and painful. She does not want a trial of being off OCPs again. TVUS today reveals EM lining .35mm and a 1.5 x 1.6 cm intramural fibroid. Normal ovaries with no adnexal masses. Due to the thin EM lining, a preop EM biopsy was not done and a D and C will be performed at time of procedure. Discussed w pt that she is very low risk for hyperplasia of endometrium due to the thin lining and being on OCPs. Procedure reviewed including pre/post/intra procedure. All questions answered.      PMH:   Past Medical History:   Diagnosis Date   • ASCUS favor dysplasia     07; 08; 6/25/10; 12; 13; 8/21/15 (HPV negative)   • Blood type, Rh negative    • Bunion 2016    surgery   • Chlamydia     as a teen   • H/O nipple discharge     Bilateral diagnostic mammogram negative   • HSV-1 (herpes simplex virus 1) infection    • HSV-2 (herpes simplex virus 2) infection    • LGSIL (low grade squamous intraepithelial dysplasia) 2006   • Ovarian cyst     Left   • PONV (postoperative nausea and vomiting)          PSH:   Past Surgical History:   Procedure Laterality Date   • BUNIONECTOMY Right 2016    Dr. Durbin   • CERVICAL CONIZATION, LEEP     • COLPOSCOPY W/ BIOPSY / CURETTAGE  2006    benign   • D&C WITH SUCTION  1998    10 week SAB   • INDUCED       years ago       SoHx:   Social History     Social History   • Marital status:      Spouse name: Lawrence   • Number of children: 2   • Years of education: N/A     Occupational  History   •  [employer]     Social History Main Topics   • Smoking status: Never Smoker   • Smokeless tobacco: Never Used   • Alcohol use Yes      Comment: occ   • Drug use: No   • Sexual activity: Yes     Partners: Male     Birth control/ protection: OCP     Other Topics Concern   • Not on file     Social History Narrative    OB/GYN Patient since  with Dr Cleary     to Lawrence Woodpkins ion 16    Has a [place] terrladan    Boys are Parrish and Jesus. Their dad is Gil Herzog.           FHx:   Family History   Problem Relation Age of Onset   • No Known Problems Brother    • No Known Problems Sister    • No Known Problems Son    • Breast cancer Neg Hx    • Ovarian cancer Neg Hx    • Uterine cancer Neg Hx    • Colon cancer Neg Hx    • Melanoma Neg Hx        PGyn Hx: UTD    POBHx:     Allergies: Review of patient's allergies indicates no known allergies.    Medications:   No current facility-administered medications on file prior to encounter.      Current Outpatient Prescriptions on File Prior to Encounter   Medication Sig Dispense Refill   • BEYAZ 3-0.02-0.451 MG tablet Take 1 tablet by mouth Daily. 28 tablet 1             Vital Signs  Temp:  [98.3 °F (36.8 °C)] 98.3 °F (36.8 °C)  Heart Rate:  [67] 67  Resp:  [18] 18  BP: (112)/(69) 112/69    Physical Exam:  General: NAD  Heart:: RRR  Lungs: clear  Abdomen: soft, NT,ND,no masses  Genitalia: deferred to OR  Extremities: no calf tenderness    Labs: HCG neg      Assessment/Plan: 51yo with history of menometrorrhagia and dysmenorrhea presents for hysteroscopy, D and C and endometrial ablation        I discussed the patients findings and my recommendations with patient and family.     Sandie Hu DO  17  12:48 PM

## 2017-12-20 NOTE — ANESTHESIA PROCEDURE NOTES
Airway  Urgency: elective    Airway not difficult    General Information and Staff    Patient location during procedure: OR  CRNA: JACKSON SANTOYO    Indications and Patient Condition  Indications for airway management: airway protection    Preoxygenated: yes  MILS maintained throughout  Mask difficulty assessment: 1 - vent by mask    Final Airway Details  Final airway type: supraglottic airway      Successful airway: classic  Size 3    Number of attempts at approach: 1    Additional Comments  To OR, monitors and O2 on. Smooth IV ind. - LMA inserted  x 1 attempt + BBS. Tape OU. Pressure points checked and padded

## 2017-12-20 NOTE — PLAN OF CARE
Problem: Patient Care Overview (Adult)  Goal: Plan of Care Review  Outcome: Ongoing (interventions implemented as appropriate)   12/20/17 1348   Coping/Psychosocial Response Interventions   Plan Of Care Reviewed With patient   Outcome Evaluation   Outcome Summary/Follow up Plan vss, waiiting to go to  2   Patient Care Overview   Progress improving

## 2017-12-20 NOTE — PLAN OF CARE
Problem: Patient Care Overview (Adult)  Goal: Plan of Care Review  Outcome: Ongoing (interventions implemented as appropriate)   12/20/17 1208   Coping/Psychosocial Response Interventions   Plan Of Care Reviewed With patient   Outcome Evaluation   Outcome Summary/Follow up Plan vss. no c/o. ready for procedure.     Goal: Adult Individualization and Mutuality  Outcome: Ongoing (interventions implemented as appropriate)      Problem: Perioperative Period (Adult)  Goal: Signs and Symptoms of Listed Potential Problems Will be Absent or Manageable (Perioperative Period)  Outcome: Ongoing (interventions implemented as appropriate)

## 2017-12-20 NOTE — OP NOTE
Subjective     Date of Service:  17  Time of Service:  1:43 PM    Surgical Staff: Surgeon(s) and Role:     * Sandie Hu DO - Primary   Additional Staff: none   Pre-operative diagnosis(es): Pre-Op Diagnosis Codes:     * Menometrorrhagia [N92.1]     Post-operative diagnosis(es): Post-Op Diagnosis Codes:     * Menometrorrhagia [N92.1]   Procedure(s): Procedure(s):  HYSTEROSCOPY WITH ENDOMETRIAL ABLATION; d&c     Antibiotics: none ordered on call to OR     Anesthesia: Type: General  ASA:  I     Objective      Operative findings: Normal appearing endometrial cavity with scant tissue   Specimens removed:   ID Type Source Tests Collected by Time Destination   A :  Tissue Endometrial Curettings TISSUE EXAM Sandie Hu DO 2017 1329       Fluid Intake: See anesthesia note   Output: Documented Output  Est. Blood Loss 1mL      I/O this shift:  In: -   Out: 200 [Urine:200]     Blood products used: No   Drains:        Implant Information: Nothing was implanted during the procedure   Complications: None apparent   Intraoperative consult(s):    Condition: stable   Disposition: to PACU and then admit to  home         Assessment/Plan     52-year-old  012 with a history of metromenorrhagia and dysmenorrhea presents for hysteroscopy, D&C and endometrial ablation.  Patient's symptoms have been controlled on B eyes for the last several years but she desires to stop birth control pills and desires a more definitive treatment for her bleeding.  After all risks, benefits, and alternatives of the procedure were reviewed, the patient was taken to the operating room and placed under general anesthesia.  The patient was placement dorsolithotomy position in yellowfin stirrups and prepped and draped in the normal sterile fashion.  A speculum was placed into the vagina and a single-tooth tenaculum was used to grasp the anterior lip the cervix.  The cervical os was dilated and the uterus was sounded.  The endometrial  cavity measured 5 cm.  The hysteroscope was inserted and the endometrial cavity was distended with normal saline.  A normal endometrial cavity was appreciated with scant endometrial tissue.  The patient did not have a preoperative endometrial biopsy due to her long-standing use of birth control pills and a thin endometrial lining noted on transvaginal ultrasound.  A D&C was performed to obtain endometrial tissue to rule out any small possibility of hyperplasia.  The small sample was sent to pathology for evaluation.  The hysteroscope was removed and the cervical os was further dilated.  The NovaSure system was placed into the endometrial cavity and the cavity width was measured at 2.5 cm.  The system was tested and a full therapy cycle of 1 minute and 24 seconds at a power 69 was performed without difficulty.  At this point the procedure was deemed over and all instruments removed from the patient's vagina.  Hemostasis was noted at tenaculum site.  There were no apparent complications.  Patient is in stable condition and postanesthesia recovery and will follow up with me in 2 weeks for continued postoperative care.

## 2017-12-20 NOTE — ANESTHESIA PREPROCEDURE EVALUATION
Anesthesia Evaluation     Patient summary reviewed and Nursing notes reviewed   history of anesthetic complications (motion sickness): PONV  NPO Solid Status: > 8 hours  NPO Liquid Status: > 8 hours     Airway   TM distance: >3 FB  Neck ROM: full  no difficulty expected  Dental - normal exam     Pulmonary - negative pulmonary ROS and normal exam    breath sounds clear to auscultation  Cardiovascular - negative cardio ROS and normal exam  Exercise tolerance: good (4-7 METS)    Rhythm: regular        Neuro/Psych  GI/Hepatic/Renal/Endo - negative ROS     Musculoskeletal (-) negative ROS    Abdominal    Substance History   (+) alcohol use (weekends),      OB/GYN          Other                                        Anesthesia Plan    ASA 1     general     intravenous induction   Anesthetic plan and risks discussed with patient.  Use of blood products discussed with patient  Consented to blood products.

## 2018-01-09 LAB
LAB AP CASE REPORT: NORMAL
Lab: NORMAL
PATH REPORT.FINAL DX SPEC: NORMAL

## 2018-01-18 ENCOUNTER — OFFICE VISIT (OUTPATIENT)
Dept: OBSTETRICS AND GYNECOLOGY | Facility: CLINIC | Age: 53
End: 2018-01-18

## 2018-01-18 VITALS — SYSTOLIC BLOOD PRESSURE: 122 MMHG | WEIGHT: 139 LBS | DIASTOLIC BLOOD PRESSURE: 80 MMHG | BODY MASS INDEX: 23.86 KG/M2

## 2018-01-18 DIAGNOSIS — N95.1 PERIMENOPAUSAL VASOMOTOR SYMPTOMS: Primary | ICD-10-CM

## 2018-01-18 PROCEDURE — 99213 OFFICE O/P EST LOW 20 MIN: CPT | Performed by: OBSTETRICS & GYNECOLOGY

## 2018-01-18 NOTE — PROGRESS NOTES
PROBLEM VISIT/POST OP    Chief Complaint: Vasomotor symptoms      Desire Hernandez is a 52 y.o. patient who presents for follow up after endometrial ablation on 12/20/17. Stopped her OCPs and has not had any bleeding. Pt had 3 weeks of vaginal discharge after procedure. Has appt with Dr pinedo on 2/12/18 for left breast mass. Complaining of vasomotor symptoms geno at night since stopping OCPs  Chief Complaint   Patient presents with   • Post-op             The following portions of the patient's history were reviewed and updated as appropriate: allergies, current medications and problem list.    Review of Systems   Endocrine: Positive for heat intolerance.   Genitourinary: Negative for pelvic pain, vaginal bleeding, vaginal discharge and vaginal pain.       /80  Wt 63 kg (139 lb)  BMI 23.86 kg/m2    Physical Exam   Constitutional: She is oriented to person, place, and time. She appears well-developed and well-nourished. No distress.   Neurological: She is alert and oriented to person, place, and time.   Skin: She is not diaphoretic.   Psychiatric: She has a normal mood and affect. Her behavior is normal. Thought content normal.   Vitals reviewed.        Assessment/Plan   Desire was seen today for post-op.    Diagnoses and all orders for this visit:    Perimenopausal vasomotor symptoms  -     Follicle Stimulating Hormone  -     Estradiol        53yo s/p endometrial ablation complaining of vasomotor symptoms    1) POD# 12/20/17. Path from D and C benign. Pt progressing well. No VB.    2) Left breast mass: Has an appt with Dr Pinedo 2/12/18 for evaluation and management    3) vasomotor symptoms: Most symptoms are at night. Pt is interested in non hormonal treatment options. Will start Brisdelle. Pt wants to know if she is in menopause. Check FSH and estradiol         Return in about 1 year (around 1/18/2019) for Annual physical.      Sandie Hu,     1/24/2018  12:46 PM

## 2018-01-22 ENCOUNTER — TELEPHONE (OUTPATIENT)
Dept: OBSTETRICS AND GYNECOLOGY | Facility: CLINIC | Age: 53
End: 2018-01-22

## 2018-01-23 RX ORDER — PAROXETINE 7.5 MG/1
7.5 CAPSULE ORAL NIGHTLY
Qty: 30 CAPSULE | Refills: 6 | Status: SHIPPED | OUTPATIENT
Start: 2018-01-23 | End: 2018-10-05

## 2018-01-23 NOTE — TELEPHONE ENCOUNTER
Please tell her that I have not forgotten her. I was hoping the rep would have brought meds by now. There are none at  either.

## 2018-01-24 RX ORDER — PAROXETINE 10 MG/1
10 TABLET, FILM COATED ORAL NIGHTLY
Qty: 30 TABLET | Refills: 6 | Status: SHIPPED | OUTPATIENT
Start: 2018-01-24 | End: 2018-08-24 | Stop reason: SDUPTHER

## 2018-02-12 ENCOUNTER — OFFICE VISIT (OUTPATIENT)
Dept: SURGERY | Facility: CLINIC | Age: 53
End: 2018-02-12

## 2018-02-12 VITALS
WEIGHT: 134 LBS | HEIGHT: 64 IN | HEART RATE: 64 BPM | OXYGEN SATURATION: 98 % | SYSTOLIC BLOOD PRESSURE: 118 MMHG | BODY MASS INDEX: 22.88 KG/M2 | DIASTOLIC BLOOD PRESSURE: 74 MMHG

## 2018-02-12 DIAGNOSIS — N63.0 LUMP OR MASS IN BREAST: Primary | ICD-10-CM

## 2018-02-12 PROCEDURE — 99242 OFF/OP CONSLTJ NEW/EST SF 20: CPT | Performed by: SURGERY

## 2018-02-12 NOTE — PROGRESS NOTES
Chief Complaint: Desire Rosales is a 52 y.o. female who was seen in consultation at the request of Sandie Hu DO  for breast mass left breast     History of Present Illness:  Patient presents with breast mass. Her NP from previous practice noted an area of glandular density lateral/upper outer LEFT breast in . Dr Hu later confirmed this area of density. She tells me that the area is tender only to palpation. It is less noticeable to her than when initially pointed out to her.   She noted no new masses, skin changes, nipple discharge, nipple changes prior to her most recent imaging.  Her most recent imaging includes the followin/04/15 [facility] [place]   BILATERAL DIAGNOSTIC MAMMOGRAM   BOBBYEMILIANOSUSANNAH URBAN.   HISTORY: Intermittently, bilateral clear nipple discharge, present for about six months. Mildly dense fibrogladular tissue. BI-RADS Category 1 Negative     16 [facility]   SCREENING MAMMO   DESIRE ROSALES.   Scattered fibrogladular densities. BI-RADS Category 1 Negative     09/15/17 [facility] [place]   BILATERAL DIAGNOSTIC MAMMO TOMOSYNTHESIS AND DIAGNOSTIC LEFT BREAST ULTRASOUND   DESIRE ROSALES.   Palpable mass in the lateral left breast. Scattered fibrogladular densities. ULTRASOUND: No suspicious ultrasound findings are identified. BI-RADS Category 1 Negative     She has not had a breast biopsy in the past.  She has her uterus and ovaries,and takes nor hormones.She had an ablation  and is uncertain of her menopausal status.   She has a history of 2 sexually transmitted diseases and has had a cervical cone.    Her family history includes the following: She has 2 sons, 1 sister, no MA, no PA, no breast or ovarian cancer in her family.  She is here for evaluation.    Review of Systems:  Review of Systems   Endocrine: Positive for hot flashes.   Neurological: Positive for headaches.   Psychiatric/Behavioral: Positive for sleep disturbance.   All other systems  reviewed and are negative.       Past Medical and Surgical History:  Breast Biopsy History:  Patient has not had a breast biopsy in the past.  Breast Cancer HIstory:  Patient does not have a past medical history of breast cancer.  Breast Operations, and year:  None   Obstetric/Gynecologic History:  Age menstrual periods began: 14  Patient does not menstruate, due to an ablation in the following year:   Number of pregnancies:4  Number of live births: 2  Number of abortions or miscarriages: 2 (1 , 1 miscarriage).   Age of delivery of first child: 34  Patient breast fed, for the following lenth of time: 2 weeks   Length of time taking birth control pills: age [age] and age [age]  Patient has never taken hormone replacement  Patient still has uterus and ovaries     Past Surgical History:   Procedure Laterality Date   • BUNIONECTOMY Right     Dr. Durbin   • CERVICAL CONIZATION, LEEP     • COLPOSCOPY W/ BIOPSY / CURETTAGE  2006    benign   • D&C WITH SUCTION  1998    10 week SAB   • INDUCED       years ago   • OR HYSTEROSCOPY,W/ENDOMETRIAL ABLATION N/A 2017    Procedure: HYSTEROSCOPY WITH ENDOMETRIAL ABLATION; d&c;  Surgeon: Sandie Hu DO;  Location: [facility];  Service: Obstetrics/Gynecology       Past Medical History:   Diagnosis Date   • ASCUS favor dysplasia     07; 08; 6/25/10; 12; 13; 8/21/15 (HPV negative)   • Blood type, Rh negative    • Bunion 2016    surgery   • Chlamydia     as a teen   • H/O nipple discharge     Bilateral diagnostic mammogram negative   • HSV-1 (herpes simplex virus 1) infection    • HSV-2 (herpes simplex virus 2) infection    • LGSIL (low grade squamous intraepithelial dysplasia) 2006   • Ovarian cyst     Left   • PONV (postoperative nausea and vomiting)        Prior Hospitalizations, other than for surgery or childbirth, and year:  None     Social History     Social History   • Marital status:       "Spouse name: Lawrence   • Number of children: 2   • Years of education: N/A     Occupational History   •  [employer]     Social History Main Topics   • Smoking status: Never Smoker   • Smokeless tobacco: Never Used   • Alcohol use Yes      Comment: occ   • Drug use: No   • Sexual activity: Yes     Partners: Male     Birth control/ protection: OCP     Other Topics Concern   • Not on file     Social History Narrative    OB/GYN Patient since 1998 with Dr Cleray     to Lawrence Hernandez ion 7/4/14    Has a [place] terrier    Boys are Parrish and Jesus. Their dad is Gli Herzog.         Patient is .  Patient is employed full time with the following occupation: Administrative   Patient drinks 1 servings of caffeine per day.    Family History:  Family History   Problem Relation Age of Onset   • No Known Problems Brother    • No Known Problems Sister    • No Known Problems Son    • Lung cancer Maternal Grandfather    • Lung cancer Paternal Uncle    • Breast cancer Neg Hx    • Ovarian cancer Neg Hx    • Uterine cancer Neg Hx    • Colon cancer Neg Hx    • Melanoma Neg Hx        Vital Signs:  /74  Pulse 64  Ht 162.6 cm (64\")  Wt 60.8 kg (134 lb)  SpO2 98%  BMI 23 kg/m2     Medications:    Current Outpatient Prescriptions:   •  PARoxetine (PAXIL) 10 MG tablet, Take 1 tablet by mouth Every Night., Disp: 30 tablet, Rfl: 6  •  ibuprofen (ADVIL,MOTRIN) 800 MG tablet, Take 1 tablet by mouth Every 6 (Six) Hours As Needed for Mild Pain ., Disp: 30 tablet, Rfl: 0  •  oxyCODONE-acetaminophen (PERCOCET) 5-325 MG per tablet, Take 1 tablet by mouth Every 4 (Four) Hours As Needed (1-2 tabs as needed for moderate to severe pain)., Disp: 15 tablet, Rfl: 0  •  PARoxetine Mesylate (BRISDELLE) 7.5 MG capsule, Take 7.5 mg by mouth Every Night., Disp: 30 capsule, Rfl: 6     Allergies:  No Known Allergies    Physical Examination:  /74  Pulse 64  Ht 162.6 cm (64\")  Wt 60.8 kg (134 lb)  SpO2 98%  BMI 23 " kg/m2  General Appearance:  Patient is in no distress.  She is well kept and has an average build.   Psychiatric:  Patient with appropriate mood and affect. Alert and oriented to self, time, and place.    Breast, RIGHT: medium- large sized, symmetric with the contralateral side.  Breast skin is without erythema, edema, rashes.  There are no visible abnormalities upon inspection during the arm-raising maneuver or with hands on hips in the sitting position. There is no nipple retraction, discharge or nipple/areolar skin changes.There are no masses palpable in the sitting or supine  Positions. Bilateral and symmetric in location is glandular thickening that is tender to palpation and located UOQ and true lateral outer ring.     Breast, LEFT: medium- large sized, symmetric with the contralateral side.  Breast skin is without erythema, edema, rashes.  There are no visible abnormalities upon inspection during the arm-raising maneuver or with hands on hips in the sitting position. There is no nipple retraction, discharge or nipple/areolar skin changes.There are no masses palpable in the sitting or supine positions.  Bilateral and symmetric in location is glandular thickening that is tender to palpation and located UOQ and true lateral outer ring.   On the LEFT, this is the region that was of concern back in .    Lymphatic:  There is no axillary, cervical, infraclavicular, or supraclavicular adenopathy bilaterally.  Eyes:  Pupils are round and reactive to light.  Cardiovascular:  Heart rate and rhythm are regular.  Respiratory:  Lungs are clear bilaterally with no crackles or wheezes in any lung field.  Gastrointestinal:  Abdomen is soft, nondistended, and nontender.     Musculoskeletal:  Good strength in all 4 extremities.   There is good range of motion in both shoulders.    Skin:  No new skin lesions or rashes on the skin excluding the breast (see breast exam above).        [facility]agin[facility]  [facility]  BILATERAL SCREENING MAMMO  EUGENE ROSALES  Scattered fibrogladular densities. BI-RADS Category 1    09/19/14 [facility]   BILATERAL SCREENING MAMMO   BOBBYEUGENE GAUTHIER.   Scattered fibrogladular densities. BI-RADS Category 1    09/04/15 [facility]   BILATERAL DIAGNOSTIC MAMMOGRAM   EUGENE ROSALES.   HISTORY: Intermittently, bilateral clear nipple discharge, present for about six months. Mildly dense fibrogladular tissue. BI-RADS Category 1 Negative     09/06/16 [facility]   SCREENING MAMMO   EUGENE ROSALES.   Scattered fibrogladular densities. BI-RADS Category 1 Negative     09/15/17 [facility]   BILATERAL DIAGNOSTIC MAMMO TOMOSYNTHESIS AND DIAGNOSTIC LEFT BREAST ULTRASOUND   EUGENE ROSALES.   Palpable mass in the lateral left breast. Scattered fibrogladular densities. ULTRASOUND: No suspicious ultrasound findings are identified. BI-RADS Category 1 Negative       Procedures:      Assessment:   Diagnosis Plan   1. Lump or mass in breast  Mammo Screening Digital Tomosynthesis Bilateral With CAD    US Breast Left Limited   LEFT upper outer quadrant and true lateral, outer ring. Glandular density without discrete margins and a symmetric area of density in the contralateral breast.  Normal imaging 9-2017 mammogram and US    Plan:  I Reviewed her history, examination, most recent imaging and reports together today.  I reassured her that for an area that she feels has become more subtle by history, is not masslike in terms of 3-dimensional margins on exam, has a symmetric counterpart in the contralateral breast, and has normal diagnostic imaging, that this is very unlikely to be anything worrisome.    Having said that I would like to see her back for a one-year follow-up in September 2018.  I will arrange for her bilateral screening mammogram with 3-D and a left breast ultrasound prior to seeing me back.  She can have this at [facility] which is where she works.  I asked her to  continue her self breast exam and to call in the interim with any concerns and we'd be happy to see her back sooner.          Christy Pinedo MD          Next Appointment:  Return for Next scheduled follow up, after imaging.      EMR Dragon/transcription disclaimer:    Much of this encounter note is an electronic transcription/translocation of spoken language to printed text.  The electronic translation of spoken language may permit erroneous, or at times, nonsensical words or phrases to be inadvertently transcribed.  Although I have reviewed the note from such areas, some may still exist.

## 2018-06-01 ENCOUNTER — TELEPHONE (OUTPATIENT)
Dept: SURGERY | Facility: CLINIC | Age: 53
End: 2018-06-01

## 2018-06-01 NOTE — TELEPHONE ENCOUNTER
Scheduled Bilateral Screen 3D Mammo/  Left Breast U/S  Kristine 9-17-18 @ 10:00    Return to see Dr AGARWAL 9-25-18 arrive 10:15    kdl

## 2018-08-30 RX ORDER — PAROXETINE 10 MG/1
TABLET, FILM COATED ORAL
Qty: 90 TABLET | Refills: 1 | Status: SHIPPED | OUTPATIENT
Start: 2018-08-30 | End: 2019-02-25 | Stop reason: SDUPTHER

## 2018-09-17 ENCOUNTER — HOSPITAL ENCOUNTER (OUTPATIENT)
Dept: MAMMOGRAPHY | Facility: HOSPITAL | Age: 53
Discharge: HOME | End: 2018-09-17
Admitting: SURGERY

## 2018-09-17 ENCOUNTER — HOSPITAL ENCOUNTER (OUTPATIENT)
Dept: ULTRASOUND IMAGING | Facility: HOSPITAL | Age: 53
Discharge: HOME | End: 2018-09-17

## 2018-09-17 DIAGNOSIS — N63.0 LUMP OR MASS IN BREAST: ICD-10-CM

## 2018-09-17 PROCEDURE — 77063 BREAST TOMOSYNTHESIS BI: CPT

## 2018-09-17 PROCEDURE — 76642 ULTRASOUND BREAST LIMITED: CPT

## 2018-09-17 PROCEDURE — 77067 SCR MAMMO BI INCL CAD: CPT

## 2018-09-18 ENCOUNTER — TELEPHONE (OUTPATIENT)
Dept: SURGERY | Facility: CLINIC | Age: 53
End: 2018-09-18

## 2018-09-18 NOTE — TELEPHONE ENCOUNTER
9-17-18 Bilateral screening mammogram with 3-D [facility] and left breast ultrasound: Scattered fiber glandular densities.  Ultrasound from 2 to 4:00 with no abnormal findings BI-RADS 1

## 2018-09-28 DIAGNOSIS — Z00.00 ANNUAL PHYSICAL EXAM: Primary | ICD-10-CM

## 2018-09-29 LAB
25(OH)D3+25(OH)D2 SERPL-MCNC: 49.3 NG/ML (ref 30–100)
ALBUMIN SERPL-MCNC: 4.6 G/DL (ref 3.5–5.2)
ALBUMIN/GLOB SERPL: 1.6 G/DL
ALP SERPL-CCNC: 84 U/L (ref 39–117)
ALT SERPL-CCNC: 14 U/L (ref 1–33)
AST SERPL-CCNC: 15 U/L (ref 1–32)
BASOPHILS # BLD AUTO: 0.01 10*3/MM3 (ref 0–0.2)
BASOPHILS NFR BLD AUTO: 0.2 % (ref 0–1.5)
BILIRUB SERPL-MCNC: 0.6 MG/DL (ref 0.1–1.2)
BUN SERPL-MCNC: 18 MG/DL (ref 6–20)
BUN/CREAT SERPL: 17.8 (ref 7–25)
CALCIUM SERPL-MCNC: 10.1 MG/DL (ref 8.6–10.5)
CHLORIDE SERPL-SCNC: 103 MMOL/L (ref 98–107)
CHOLEST SERPL-MCNC: 175 MG/DL (ref 0–200)
CO2 SERPL-SCNC: 27.5 MMOL/L (ref 22–29)
CREAT SERPL-MCNC: 1.01 MG/DL (ref 0.57–1)
EOSINOPHIL # BLD AUTO: 0.14 10*3/MM3 (ref 0–0.7)
EOSINOPHIL NFR BLD AUTO: 2.8 % (ref 0.3–6.2)
ERYTHROCYTE [DISTWIDTH] IN BLOOD BY AUTOMATED COUNT: 12.6 % (ref 11.7–13)
GLOBULIN SER CALC-MCNC: 2.8 GM/DL
GLUCOSE SERPL-MCNC: 86 MG/DL (ref 65–99)
HCT VFR BLD AUTO: 45.7 % (ref 35.6–45.5)
HDLC SERPL-MCNC: 84 MG/DL (ref 40–60)
HGB BLD-MCNC: 15 G/DL (ref 11.9–15.5)
IMM GRANULOCYTES # BLD: 0 10*3/MM3 (ref 0–0.03)
IMM GRANULOCYTES NFR BLD: 0 % (ref 0–0.5)
LDLC SERPL CALC-MCNC: 84 MG/DL (ref 0–100)
LYMPHOCYTES # BLD AUTO: 2.37 10*3/MM3 (ref 0.9–4.8)
LYMPHOCYTES NFR BLD AUTO: 47.2 % (ref 19.6–45.3)
MCH RBC QN AUTO: 30.3 PG (ref 26.9–32)
MCHC RBC AUTO-ENTMCNC: 32.8 G/DL (ref 32.4–36.3)
MCV RBC AUTO: 92.3 FL (ref 80.5–98.2)
MONOCYTES # BLD AUTO: 0.38 10*3/MM3 (ref 0.2–1.2)
MONOCYTES NFR BLD AUTO: 7.6 % (ref 5–12)
NEUTROPHILS # BLD AUTO: 2.12 10*3/MM3 (ref 1.9–8.1)
NEUTROPHILS NFR BLD AUTO: 42.2 % (ref 42.7–76)
PLATELET # BLD AUTO: 177 10*3/MM3 (ref 140–500)
POTASSIUM SERPL-SCNC: 4.7 MMOL/L (ref 3.5–5.2)
PROT SERPL-MCNC: 7.4 G/DL (ref 6–8.5)
RBC # BLD AUTO: 4.95 10*6/MM3 (ref 3.9–5.2)
SODIUM SERPL-SCNC: 142 MMOL/L (ref 136–145)
TRIGL SERPL-MCNC: 35 MG/DL (ref 0–150)
TSH SERPL DL<=0.005 MIU/L-ACNC: 2.88 UIU/ML (ref 0.45–4.5)
VLDLC SERPL CALC-MCNC: 7 MG/DL (ref 5–40)
WBC # BLD AUTO: 5.02 10*3/MM3 (ref 4.5–10.7)

## 2018-10-05 ENCOUNTER — OFFICE VISIT (OUTPATIENT)
Dept: SPORTS MEDICINE | Facility: CLINIC | Age: 53
End: 2018-10-05

## 2018-10-05 VITALS
TEMPERATURE: 98.2 F | DIASTOLIC BLOOD PRESSURE: 66 MMHG | HEART RATE: 57 BPM | HEIGHT: 64 IN | BODY MASS INDEX: 21.72 KG/M2 | OXYGEN SATURATION: 99 % | SYSTOLIC BLOOD PRESSURE: 112 MMHG | WEIGHT: 127.2 LBS | RESPIRATION RATE: 17 BRPM

## 2018-10-05 DIAGNOSIS — Z00.00 ANNUAL PHYSICAL EXAM: Primary | ICD-10-CM

## 2018-10-05 PROCEDURE — 99396 PREV VISIT EST AGE 40-64: CPT | Performed by: FAMILY MEDICINE

## 2018-10-05 NOTE — PROGRESS NOTES
"Desire Hernandez is here today for an annual physical exam.     Eating a healthy diet. Exercising routinely.     S/p uterine ablation with GYN this year. On paxil for menopausal symptoms working well. Had borderline mammogram this year, follow up normal.  Has not followed through with colonoscopy.   Recently having some headaches, thinks due to allergies. Better with some flonase.     Health Maintenance   Topic Date Due   • ZOSTER VACCINE (1 of 2) 06/09/2017   • COLONOSCOPY  09/06/2017   • INFLUENZA VACCINE  08/01/2018   • ANNUAL PHYSICAL  09/30/2018   • PAP SMEAR  09/07/2020   • MAMMOGRAM  09/17/2020   • TDAP/TD VACCINES (2 - Td) 09/29/2027       Review of Systems   Constitutional: Negative.    HENT: Positive for congestion.    Respiratory: Negative.    Cardiovascular: Negative.    Gastrointestinal: Negative.    Genitourinary: Negative.    Skin: Negative.    Neurological: Positive for headaches.   Psychiatric/Behavioral: Negative.        /66   Pulse 57   Temp 98.2 °F (36.8 °C) (Oral)   Resp 17   Ht 162.6 cm (64\")   Wt 57.7 kg (127 lb 3.2 oz)   SpO2 99%   BMI 21.83 kg/m²      Physical Exam    Vital signs reviewed.  General appearance: No acute distress  Eyes: conjunctiva clear without erythema; pupils equally round and reactive  ENT: external ears and nose normal; hearing normal, oropharynx clear  Neck: supple; no thyromegaly  CV: normal rate and rhythm; no peripheral edema  Respiratory: normal respiratory effort; lungs clear to auscultation bilaterally  MSK: normal gait and station; no focal joint deformity or swelling  Skin: no rash or wounds; normal turgor  Neuro: cranial nerves 2-12 grossly intact; normal sensation to light touch  Psych: mood and affect normal; recent and remote memory intact    Orders Only on 09/28/2018   Component Date Value Ref Range Status   • Glucose 09/28/2018 86  65 - 99 mg/dL Final   • BUN 09/28/2018 18  6 - 20 mg/dL Final   • Creatinine 09/28/2018 1.01* 0.57 - 1.00 mg/dL " Final   • eGFR Non  Am 09/28/2018 58* >60 mL/min/1.73 Final   • eGFR African Am 09/28/2018 70  >60 mL/min/1.73 Final   • BUN/Creatinine Ratio 09/28/2018 17.8  7.0 - 25.0 Final   • Sodium 09/28/2018 142  136 - 145 mmol/L Final   • Potassium 09/28/2018 4.7  3.5 - 5.2 mmol/L Final   • Chloride 09/28/2018 103  98 - 107 mmol/L Final   • Total CO2 09/28/2018 27.5  22.0 - 29.0 mmol/L Final   • Calcium 09/28/2018 10.1  8.6 - 10.5 mg/dL Final   • Total Protein 09/28/2018 7.4  6.0 - 8.5 g/dL Final   • Albumin 09/28/2018 4.60  3.50 - 5.20 g/dL Final   • Globulin 09/28/2018 2.8  gm/dL Final   • A/G Ratio 09/28/2018 1.6  g/dL Final   • Total Bilirubin 09/28/2018 0.6  0.1 - 1.2 mg/dL Final   • Alkaline Phosphatase 09/28/2018 84  39 - 117 U/L Final   • AST (SGOT) 09/28/2018 15  1 - 32 U/L Final   • ALT (SGPT) 09/28/2018 14  1 - 33 U/L Final   • Total Cholesterol 09/28/2018 175  0 - 200 mg/dL Final   • Triglycerides 09/28/2018 35  0 - 150 mg/dL Final   • HDL Cholesterol 09/28/2018 84* 40 - 60 mg/dL Final   • VLDL Cholesterol 09/28/2018 7  5 - 40 mg/dL Final   • LDL Cholesterol  09/28/2018 84  0 - 100 mg/dL Final   • TSH 09/28/2018 2.880  0.450 - 4.500 uIU/mL Final   • 25 Hydroxy, Vitamin D 09/28/2018 49.3  30.0 - 100.0 ng/ml Final    Comment: Reference Range for Total Vitamin D 25(OH)  Deficiency    <20.0 ng/mL  Insufficiency 21-29 ng/mL  Sufficiency    ng/mL  Toxicity      >100 ng/ml        • WBC 09/28/2018 5.02  4.50 - 10.70 10*3/mm3 Final   • RBC 09/28/2018 4.95  3.90 - 5.20 10*6/mm3 Final   • Hemoglobin 09/28/2018 15.0  11.9 - 15.5 g/dL Final   • Hematocrit 09/28/2018 45.7* 35.6 - 45.5 % Final   • MCV 09/28/2018 92.3  80.5 - 98.2 fL Final   • MCH 09/28/2018 30.3  26.9 - 32.0 pg Final   • MCHC 09/28/2018 32.8  32.4 - 36.3 g/dL Final   • RDW 09/28/2018 12.6  11.7 - 13.0 % Final   • Platelets 09/28/2018 177  140 - 500 10*3/mm3 Final   • Neutrophil Rel % 09/28/2018 42.2* 42.7 - 76.0 % Final   • Lymphocyte Rel %  09/28/2018 47.2* 19.6 - 45.3 % Final   • Monocyte Rel % 09/28/2018 7.6  5.0 - 12.0 % Final   • Eosinophil Rel % 09/28/2018 2.8  0.3 - 6.2 % Final   • Basophil Rel % 09/28/2018 0.2  0.0 - 1.5 % Final   • Neutrophils Absolute 09/28/2018 2.12  1.90 - 8.10 10*3/mm3 Final   • Lymphocytes Absolute 09/28/2018 2.37  0.90 - 4.80 10*3/mm3 Final   • Monocytes Absolute 09/28/2018 0.38  0.20 - 1.20 10*3/mm3 Final   • Eosinophils Absolute 09/28/2018 0.14  0.00 - 0.70 10*3/mm3 Final   • Basophils Absolute 09/28/2018 0.01  0.00 - 0.20 10*3/mm3 Final   • Immature Granulocyte Rel % 09/28/2018 0.0  0.0 - 0.5 % Final   • Immature Grans Absolute 09/28/2018 0.00  0.00 - 0.03 10*3/mm3 Final       Desire was seen today for annual exam.    Diagnoses and all orders for this visit:    Annual physical exam  -     Ambulatory Referral For Screening Colonoscopy        Encourage healthy diet and exercise.  Encourage patient to stay up to date on screening examinations as indicated based on age and risk factors.    EMR Dragon/Transcription disclaimer:    Much of this encounter note is an electronic transcription/translation of spoken language to printed text.  The electronic translation of spoken language may permit erroneous, or at times, nonsensical words or phrases to be inadvertently transcribed.  Although I have reviewed the note for such errors some may still exist.

## 2019-01-16 ENCOUNTER — TELEPHONE (OUTPATIENT)
Dept: OBSTETRICS AND GYNECOLOGY | Facility: CLINIC | Age: 54
End: 2019-01-16

## 2019-01-16 NOTE — TELEPHONE ENCOUNTER
Patient called stating she has been taking Paxil 10mg for almost a year for hot flashes. She feel likes it is not working as well. Wants to know if she should up the dosage, try something else or come in and see you

## 2019-01-17 ENCOUNTER — OFFICE VISIT (OUTPATIENT)
Dept: OBSTETRICS AND GYNECOLOGY | Facility: CLINIC | Age: 54
End: 2019-01-17

## 2019-01-17 VITALS
DIASTOLIC BLOOD PRESSURE: 60 MMHG | BODY MASS INDEX: 21.51 KG/M2 | WEIGHT: 126 LBS | SYSTOLIC BLOOD PRESSURE: 100 MMHG | HEIGHT: 64 IN

## 2019-01-17 DIAGNOSIS — N95.1 VASOMOTOR SYMPTOMS DUE TO MENOPAUSE: Primary | ICD-10-CM

## 2019-01-17 PROCEDURE — 99213 OFFICE O/P EST LOW 20 MIN: CPT | Performed by: OBSTETRICS & GYNECOLOGY

## 2019-01-17 NOTE — PROGRESS NOTES
"PROBLEM VISIT    Chief Complaint: vasomotor symptoms      Desire Hernandez is a 53 y.o. patient who presents complaining of increasing vasomotor symptoms. Pt has a hx of endometrial ablation and has had no bleeding. She is on Paxil 10mg po qhs and has started to notice that she is having increasing vasomotor symptoms. She notices them more at night. She has some during the day but they do not bother her much.  Chief Complaint   Patient presents with   • Consult   • Hot Flashes             The following portions of the patient's history were reviewed and updated as appropriate: allergies, current medications and problem list.    Review of Systems   Constitutional: Negative for unexpected weight change.   Endocrine: Positive for heat intolerance.   Genitourinary: Negative for vaginal bleeding, vaginal discharge and vaginal pain.       /60   Ht 162.6 cm (64\")   Wt 57.2 kg (126 lb)   BMI 21.63 kg/m²     Physical Exam   Constitutional: She is oriented to person, place, and time. She appears well-developed and well-nourished. No distress.   Neurological: She is alert and oriented to person, place, and time.   Skin: She is not diaphoretic.   Psychiatric: She has a normal mood and affect. Her behavior is normal. Judgment and thought content normal.   Vitals reviewed.        Assessment/Plan   Desire was seen today for consult and hot flashes.    Diagnoses and all orders for this visit:    Vasomotor symptoms due to menopause  -     Follicle Stimulating Hormone  -     Estradiol      52yo with vasomotor symptoms    1) Vasomotor symptoms: Pt was doing well on Paxil 10mg nightly until recently and now her vasomotor symptoms are worsening geno at night. She has a hx of an endometrial ablation. Check FSH and estradiol. Discussed starting Prometrium alone versus estradiol and Prometrium versus increasing Paxil. Pt plans to try and increase Paxil to see if this helps her. She will call for the higher script if she is happy " with her results. If she does not get any relief then she may want to try Prometrium alone. She is hesitant to start estrogen due to her hx of increased density in left breast- she has seen Dr Pinedo and has had normal imaging.    2) Gyn HM: LPS 9/2017. Last MMG 9/2018    3) Hx of endometrial ablation             No Follow-up on file.      Sandie Hu,     1/17/2019  10:42 AM

## 2019-01-18 LAB
ESTRADIOL SERPL-MCNC: <5 PG/ML
FSH SERPL-ACNC: 114.2 MIU/ML

## 2019-02-25 RX ORDER — PAROXETINE 10 MG/1
TABLET, FILM COATED ORAL
Qty: 90 TABLET | Refills: 1 | Status: SHIPPED | OUTPATIENT
Start: 2019-02-25 | End: 2019-08-31 | Stop reason: SDUPTHER

## 2019-04-27 ENCOUNTER — HOSPITAL ENCOUNTER (EMERGENCY)
Facility: HOSPITAL | Age: 54
Discharge: HOME | End: 2019-04-27
Attending: EMERGENCY MEDICINE | Admitting: EMERGENCY MEDICINE

## 2019-04-27 ENCOUNTER — APPOINTMENT (OUTPATIENT)
Dept: CT IMAGING | Facility: HOSPITAL | Age: 54
End: 2019-04-27

## 2019-04-27 VITALS
BODY MASS INDEX: 23.05 KG/M2 | SYSTOLIC BLOOD PRESSURE: 115 MMHG | HEART RATE: 63 BPM | OXYGEN SATURATION: 99 % | DIASTOLIC BLOOD PRESSURE: 76 MMHG | TEMPERATURE: 98.3 F | HEIGHT: 64 IN | RESPIRATION RATE: 18 BRPM | WEIGHT: 135 LBS

## 2019-04-27 DIAGNOSIS — R41.0 TRANSIENT CONFUSION: Primary | ICD-10-CM

## 2019-04-27 DIAGNOSIS — E87.6 HYPOKALEMIA: ICD-10-CM

## 2019-04-27 LAB
ALBUMIN SERPL-MCNC: 4.7 G/DL (ref 3.5–5.2)
ALBUMIN/GLOB SERPL: 1.6 G/DL
ALP SERPL-CCNC: 84 U/L (ref 39–117)
ALT SERPL W P-5'-P-CCNC: 17 U/L (ref 1–33)
AMPHET+METHAMPHET UR QL: NEGATIVE
AMPHETAMINES UR QL: NEGATIVE
ANION GAP SERPL CALCULATED.3IONS-SCNC: 10.3 MMOL/L
APAP SERPL-MCNC: <5 MCG/ML (ref 10–30)
AST SERPL-CCNC: 16 U/L (ref 1–32)
BACTERIA UR QL AUTO: ABNORMAL /HPF
BARBITURATES UR QL SCN: NEGATIVE
BASOPHILS # BLD AUTO: 0.04 10*3/MM3 (ref 0–0.2)
BASOPHILS NFR BLD AUTO: 0.5 % (ref 0–1.5)
BENZODIAZ UR QL SCN: NEGATIVE
BILIRUB SERPL-MCNC: 0.3 MG/DL (ref 0.2–1.2)
BILIRUB UR QL STRIP: NEGATIVE
BUN BLD-MCNC: 14 MG/DL (ref 6–20)
BUN/CREAT SERPL: 16.3 (ref 7–25)
BUPRENORPHINE SERPL-MCNC: NEGATIVE NG/ML
CALCIUM SPEC-SCNC: 9.6 MG/DL (ref 8.6–10.5)
CANNABINOIDS SERPL QL: NEGATIVE
CHLORIDE SERPL-SCNC: 98 MMOL/L (ref 98–107)
CLARITY UR: CLEAR
CO2 SERPL-SCNC: 30.7 MMOL/L (ref 22–29)
COCAINE UR QL: NEGATIVE
COLOR UR: YELLOW
CREAT BLD-MCNC: 0.86 MG/DL (ref 0.57–1)
DEPRECATED RDW RBC AUTO: 40.6 FL (ref 37–54)
EOSINOPHIL # BLD AUTO: 0.17 10*3/MM3 (ref 0–0.4)
EOSINOPHIL NFR BLD AUTO: 2.3 % (ref 0.3–6.2)
ERYTHROCYTE [DISTWIDTH] IN BLOOD BY AUTOMATED COUNT: 12.2 % (ref 12.3–15.4)
ETHANOL BLD-MCNC: <10 MG/DL (ref 0–10)
ETHANOL UR QL: <0.01 %
GFR SERPL CREATININE-BSD FRML MDRD: 70 ML/MIN/1.73
GLOBULIN UR ELPH-MCNC: 2.9 GM/DL
GLUCOSE BLD-MCNC: 94 MG/DL (ref 65–99)
GLUCOSE UR STRIP-MCNC: NEGATIVE MG/DL
HCT VFR BLD AUTO: 43.6 % (ref 34–46.6)
HGB BLD-MCNC: 14.6 G/DL (ref 12–15.9)
HGB UR QL STRIP.AUTO: ABNORMAL
HYALINE CASTS UR QL AUTO: ABNORMAL /LPF
IMM GRANULOCYTES # BLD AUTO: 0.01 10*3/MM3 (ref 0–0.05)
IMM GRANULOCYTES NFR BLD AUTO: 0.1 % (ref 0–0.5)
KETONES UR QL STRIP: NEGATIVE
LEUKOCYTE ESTERASE UR QL STRIP.AUTO: NEGATIVE
LYMPHOCYTES # BLD AUTO: 3.36 10*3/MM3 (ref 0.7–3.1)
LYMPHOCYTES NFR BLD AUTO: 45.2 % (ref 19.6–45.3)
MCH RBC QN AUTO: 30.4 PG (ref 26.6–33)
MCHC RBC AUTO-ENTMCNC: 33.5 G/DL (ref 31.5–35.7)
MCV RBC AUTO: 90.8 FL (ref 79–97)
METHADONE UR QL SCN: NEGATIVE
MONOCYTES # BLD AUTO: 0.41 10*3/MM3 (ref 0.1–0.9)
MONOCYTES NFR BLD AUTO: 5.5 % (ref 5–12)
NEUTROPHILS # BLD AUTO: 3.44 10*3/MM3 (ref 1.7–7)
NEUTROPHILS NFR BLD AUTO: 46.4 % (ref 42.7–76)
NITRITE UR QL STRIP: NEGATIVE
NRBC BLD AUTO-RTO: 0 /100 WBC (ref 0–0.2)
OPIATES UR QL: NEGATIVE
OXYCODONE UR QL SCN: NEGATIVE
PCP UR QL SCN: NEGATIVE
PH UR STRIP.AUTO: 7 [PH] (ref 4.5–8)
PLATELET # BLD AUTO: 192 10*3/MM3 (ref 140–450)
PMV BLD AUTO: 9.5 FL (ref 6–12)
POTASSIUM BLD-SCNC: 3.4 MMOL/L (ref 3.5–5.2)
PROPOXYPH UR QL: NEGATIVE
PROT SERPL-MCNC: 7.6 G/DL (ref 6–8.5)
PROT UR QL STRIP: NEGATIVE
RBC # BLD AUTO: 4.8 10*6/MM3 (ref 3.77–5.28)
RBC # UR: ABNORMAL /HPF
REF LAB TEST METHOD: ABNORMAL
SALICYLATES SERPL-MCNC: <3 MG/DL
SODIUM BLD-SCNC: 139 MMOL/L (ref 136–145)
SP GR UR STRIP: <=1.005 (ref 1–1.03)
SQUAMOUS #/AREA URNS HPF: ABNORMAL /HPF
TRICYCLICS UR QL SCN: NEGATIVE
UROBILINOGEN UR QL STRIP: ABNORMAL
WBC NRBC COR # BLD: 7.43 10*3/MM3 (ref 3.4–10.8)
WBC UR QL AUTO: ABNORMAL /HPF

## 2019-04-27 PROCEDURE — 70450 CT HEAD/BRAIN W/O DYE: CPT

## 2019-04-27 PROCEDURE — 80306 DRUG TEST PRSMV INSTRMNT: CPT | Performed by: EMERGENCY MEDICINE

## 2019-04-27 PROCEDURE — 85025 COMPLETE CBC W/AUTO DIFF WBC: CPT | Performed by: EMERGENCY MEDICINE

## 2019-04-27 PROCEDURE — 80307 DRUG TEST PRSMV CHEM ANLYZR: CPT | Performed by: EMERGENCY MEDICINE

## 2019-04-27 PROCEDURE — 81001 URINALYSIS AUTO W/SCOPE: CPT | Performed by: EMERGENCY MEDICINE

## 2019-04-27 PROCEDURE — 99284 EMERGENCY DEPT VISIT MOD MDM: CPT

## 2019-04-27 PROCEDURE — 80053 COMPREHEN METABOLIC PANEL: CPT | Performed by: EMERGENCY MEDICINE

## 2019-04-27 PROCEDURE — 99284 EMERGENCY DEPT VISIT MOD MDM: CPT | Performed by: EMERGENCY MEDICINE

## 2019-04-27 PROCEDURE — 93005 ELECTROCARDIOGRAM TRACING: CPT | Performed by: EMERGENCY MEDICINE

## 2019-04-27 PROCEDURE — 93010 ELECTROCARDIOGRAM REPORT: CPT | Performed by: INTERNAL MEDICINE

## 2019-04-27 PROCEDURE — 82962 GLUCOSE BLOOD TEST: CPT

## 2019-04-27 RX ORDER — SODIUM CHLORIDE 0.9 % (FLUSH) 0.9 %
10 SYRINGE (ML) INJECTION AS NEEDED
Status: DISCONTINUED | OUTPATIENT
Start: 2019-04-27 | End: 2019-04-27 | Stop reason: HOSPADM

## 2019-05-03 ENCOUNTER — OFFICE VISIT (OUTPATIENT)
Dept: SPORTS MEDICINE | Facility: CLINIC | Age: 54
End: 2019-05-03

## 2019-05-03 VITALS
OXYGEN SATURATION: 99 % | HEART RATE: 78 BPM | HEIGHT: 64 IN | WEIGHT: 132 LBS | BODY MASS INDEX: 22.53 KG/M2 | SYSTOLIC BLOOD PRESSURE: 108 MMHG | DIASTOLIC BLOOD PRESSURE: 58 MMHG

## 2019-05-03 DIAGNOSIS — R40.4 TRANSIENT ALTERATION OF AWARENESS: Primary | ICD-10-CM

## 2019-05-03 PROCEDURE — 99214 OFFICE O/P EST MOD 30 MIN: CPT | Performed by: FAMILY MEDICINE

## 2019-05-03 NOTE — PROGRESS NOTES
"Desire is a 53 y.o. year old female evaluation of a problem that is new to this examiner.    Chief Complaint   Patient presents with   • Follow-up     ER follow up; states that she couldn't process things  in her mind and had a bit of tachycardia.        History of Present Illness   HPI   F/u ER visit episode of confusion, blank stare to a coworker. Felt like her attention would come and go on and off, felt generally \"weird.\" Mild-moderate severity. No assoc symptoms like lightheaded, dizzy, or nauseated. Then felt as if she were impaired trying to drive home so she went to the ER.   All in all this lasted several hours. She felt like when she was in the ER she could do everything she was asked (for example neuro exam) but was slow and had to concentrate harder than usual. Notes her BP was higher than usual but still not severe (like 140/90), then felt like she returned to normal when her BP was back down to 120/80.    ER eval reviewed including notes, labs, CT -labs are all normal, CT scan normal, nonfocal exam.    I have reviewed the patient's medical, family, and social history in detail and updated the computerized patient record.    Review of Systems   Constitutional: Negative.    Respiratory: Negative.    Cardiovascular: Negative.    Neurological: Negative for headaches.       /58 (BP Location: Left arm, Patient Position: Sitting, Cuff Size: Adult)   Pulse 78   Ht 162.6 cm (64.02\")   Wt 59.9 kg (132 lb)   SpO2 99%   BMI 22.65 kg/m²      Physical Exam   Constitutional: She appears well-developed and well-nourished.   Neck: Normal carotid pulses present. Carotid bruit is not present.   Cardiovascular: Normal rate, regular rhythm and normal heart sounds.   Pulmonary/Chest: Effort normal.   Psychiatric: She has a normal mood and affect. Her behavior is normal. Judgment and thought content normal.   Vitals reviewed.        Current Outpatient Medications:   •  PARoxetine (PAXIL) 10 MG tablet, TAKE 1 " TABLET BY MOUTH IN THE EVENING, Disp: 90 tablet, Rfl: 1     Diagnoses and all orders for this visit:    Transient alteration of awareness  -     MRI brain wo contrast; Future       Overall appears to be resolving well.  I think we need to go ahead and get an MRI for more thorough evaluation.  May also need to consider ambulatory blood pressure monitoring or vascular work-up given her observations of elevated blood pressure at the time of her symptoms and resolution when her blood pressure returned to normal.      EMR Dragon/Transcription disclaimer:    Much of this encounter note is an electronic transcription/translation of spoken language to printed text.  The electronic translation of spoken language may permit erroneous, or at times, nonsensical words or phrases to be inadvertently transcribed.  Although I have reviewed the note for such errors some may still exist.

## 2019-05-10 ENCOUNTER — HOSPITAL ENCOUNTER (OUTPATIENT)
Dept: MRI IMAGING | Facility: HOSPITAL | Age: 54
Discharge: HOME | End: 2019-05-10
Admitting: FAMILY MEDICINE

## 2019-05-10 DIAGNOSIS — R40.4 TRANSIENT ALTERATION OF AWARENESS: ICD-10-CM

## 2019-05-10 PROCEDURE — 70551 MRI BRAIN STEM W/O DYE: CPT

## 2019-05-23 LAB — GLUCOSE BLDC GLUCOMTR-MCNC: 107 MG/DL (ref 70–130)

## 2019-07-01 ENCOUNTER — OFFICE VISIT (OUTPATIENT)
Dept: SPORTS MEDICINE | Facility: CLINIC | Age: 54
End: 2019-07-01

## 2019-07-01 VITALS
HEART RATE: 60 BPM | DIASTOLIC BLOOD PRESSURE: 72 MMHG | WEIGHT: 134 LBS | BODY MASS INDEX: 22.88 KG/M2 | SYSTOLIC BLOOD PRESSURE: 110 MMHG | HEIGHT: 64 IN | OXYGEN SATURATION: 99 %

## 2019-07-01 DIAGNOSIS — G56.31 RADIAL NEURITIS, RIGHT: Primary | ICD-10-CM

## 2019-07-01 PROCEDURE — 99214 OFFICE O/P EST MOD 30 MIN: CPT | Performed by: FAMILY MEDICINE

## 2019-07-01 NOTE — PROGRESS NOTES
"Desire is a 54 y.o. year old female evaluation of a problem that is new to this examiner.    Chief Complaint   Patient presents with   • Arm Pain     Right - maybe nerve damage - could be due to IV x 2 months - radiates up to shoulder and down to hand       History of Present Illness   HPI   Pain in R arm started when IV was placed 2 months ago in ER episode. Has not improved since. Aching pain, constant, radiates down to the hands with tingling in the fingers. Also rad up arm to the back. Gradually worsening    I have reviewed the patient's medical, family, and social history in detail and updated the computerized patient record.    Review of Systems   Constitutional: Negative.    Neurological: Negative for weakness.       /72   Pulse 60   Ht 162.6 cm (64.02\")   Wt 60.8 kg (134 lb)   SpO2 99%   BMI 22.99 kg/m²      Physical Exam   Constitutional: She is oriented to person, place, and time. She appears well-developed and well-nourished.   Musculoskeletal:   RUE: Normal appearance.  There is tenderness to palpation on the extensor muscle bellies with compression of the radial tunnel causing pain and tingling down to the hand.  There is no tenderness on either epicondyles.  Negative Tinel at the cubital tunnel.  Normal range of motion and strength.    Neurological: She is alert and oriented to person, place, and time.   Sensation intact light touch in the hand.  , opposition, abduction strength intact.   Skin: Skin is warm and dry. No rash noted.   Psychiatric: She has a normal mood and affect.   Vitals reviewed.        Current Outpatient Medications:   •  PARoxetine (PAXIL) 10 MG tablet, TAKE 1 TABLET BY MOUTH IN THE EVENING, Disp: 90 tablet, Rfl: 1     Diagnoses and all orders for this visit:    Radial neuritis, right  -     EMG & Nerve Conduction Test; Future    Suspect persistent neuropraxia from her IV placement or other causes neuritis.  Possible radial tunnel syndrome.        EMR " Dragon/Transcription disclaimer:    Much of this encounter note is an electronic transcription/translation of spoken language to printed text.  The electronic translation of spoken language may permit erroneous, or at times, nonsensical words or phrases to be inadvertently transcribed.  Although I have reviewed the note for such errors some may still exist.

## 2019-07-12 ENCOUNTER — HOSPITAL ENCOUNTER (OUTPATIENT)
Dept: NEUROLOGY | Facility: HOSPITAL | Age: 54
Discharge: HOME | End: 2019-07-12
Admitting: FAMILY MEDICINE

## 2019-07-12 DIAGNOSIS — G56.31 RADIAL NEURITIS, RIGHT: ICD-10-CM

## 2019-07-12 PROCEDURE — 95909 NRV CNDJ TST 5-6 STUDIES: CPT

## 2019-07-12 PROCEDURE — 95886 MUSC TEST DONE W/N TEST COMP: CPT | Performed by: PSYCHIATRY & NEUROLOGY

## 2019-07-12 PROCEDURE — 95886 MUSC TEST DONE W/N TEST COMP: CPT

## 2019-07-12 PROCEDURE — 95909 NRV CNDJ TST 5-6 STUDIES: CPT | Performed by: PSYCHIATRY & NEUROLOGY

## 2019-07-12 NOTE — PROCEDURES
EMG REPORT    Indication: Pain and numbness of the right upper extremity    Findings: Right median motor study showed normal distal latency velocity and amplitude.  Right ulnar motor study showed normal distal latency and amplitude.  Right radial motor study showed normal distal latency and amplitude.    Right median, ulnar, and radial sensory study showed normal latencies and amplitudes.    Concentric needle EMG of the right deltoid, triceps, brachioradialis, extensor digitorum, and first dorsal interosseous muscles showed no abnormality.    Impression: Normal EMG and nerve conduction studies of the right upper extremity with particular attention to the radial nerve.       Mk Salazar M.D.

## 2019-08-02 DIAGNOSIS — G56.31 RADIAL NEURITIS, RIGHT: Primary | ICD-10-CM

## 2019-08-30 ENCOUNTER — OFFICE VISIT (OUTPATIENT)
Dept: ORTHOPEDIC SURGERY | Facility: CLINIC | Age: 54
End: 2019-08-30

## 2019-08-30 VITALS
WEIGHT: 135 LBS | BODY MASS INDEX: 23.05 KG/M2 | DIASTOLIC BLOOD PRESSURE: 79 MMHG | HEART RATE: 62 BPM | HEIGHT: 64 IN | SYSTOLIC BLOOD PRESSURE: 121 MMHG

## 2019-08-30 DIAGNOSIS — R52 PAIN: Primary | ICD-10-CM

## 2019-08-30 DIAGNOSIS — M75.01 ADHESIVE CAPSULITIS OF RIGHT SHOULDER: ICD-10-CM

## 2019-08-30 DIAGNOSIS — M77.11 LATERAL EPICONDYLITIS OF RIGHT ELBOW: ICD-10-CM

## 2019-08-30 PROCEDURE — 99203 OFFICE O/P NEW LOW 30 MIN: CPT | Performed by: NURSE PRACTITIONER

## 2019-08-30 PROCEDURE — 20605 DRAIN/INJ JOINT/BURSA W/O US: CPT | Performed by: NURSE PRACTITIONER

## 2019-08-30 PROCEDURE — 73030 X-RAY EXAM OF SHOULDER: CPT | Performed by: NURSE PRACTITIONER

## 2019-08-30 PROCEDURE — 20610 DRAIN/INJ JOINT/BURSA W/O US: CPT | Performed by: NURSE PRACTITIONER

## 2019-08-30 RX ORDER — LIDOCAINE HYDROCHLORIDE 10 MG/ML
3 INJECTION, SOLUTION EPIDURAL; INFILTRATION; INTRACAUDAL; PERINEURAL
Status: COMPLETED | OUTPATIENT
Start: 2019-08-30 | End: 2019-08-30

## 2019-08-30 RX ORDER — LIDOCAINE HYDROCHLORIDE 10 MG/ML
4 INJECTION, SOLUTION EPIDURAL; INFILTRATION; INTRACAUDAL; PERINEURAL
Status: COMPLETED | OUTPATIENT
Start: 2019-08-30 | End: 2019-08-30

## 2019-08-30 RX ORDER — MELOXICAM 15 MG/1
15 TABLET ORAL DAILY
Qty: 30 TABLET | Refills: 0 | Status: SHIPPED | OUTPATIENT
Start: 2019-08-30 | End: 2019-11-19

## 2019-08-30 RX ORDER — TRIAMCINOLONE ACETONIDE 40 MG/ML
80 INJECTION, SUSPENSION INTRA-ARTICULAR; INTRAMUSCULAR
Status: COMPLETED | OUTPATIENT
Start: 2019-08-30 | End: 2019-08-30

## 2019-08-30 RX ORDER — TRIAMCINOLONE ACETONIDE 40 MG/ML
40 INJECTION, SUSPENSION INTRA-ARTICULAR; INTRAMUSCULAR
Status: COMPLETED | OUTPATIENT
Start: 2019-08-30 | End: 2019-08-30

## 2019-08-30 RX ORDER — OMEPRAZOLE 20 MG/1
20 TABLET, DELAYED RELEASE ORAL DAILY
Qty: 30 TABLET | Refills: 0 | Status: SHIPPED | OUTPATIENT
Start: 2019-08-30 | End: 2019-11-19

## 2019-08-30 RX ADMIN — TRIAMCINOLONE ACETONIDE 40 MG: 40 INJECTION, SUSPENSION INTRA-ARTICULAR; INTRAMUSCULAR at 11:14

## 2019-08-30 RX ADMIN — LIDOCAINE HYDROCHLORIDE 4 ML: 10 INJECTION, SOLUTION EPIDURAL; INFILTRATION; INTRACAUDAL; PERINEURAL at 11:13

## 2019-08-30 RX ADMIN — TRIAMCINOLONE ACETONIDE 80 MG: 40 INJECTION, SUSPENSION INTRA-ARTICULAR; INTRAMUSCULAR at 11:13

## 2019-08-30 RX ADMIN — LIDOCAINE HYDROCHLORIDE 3 ML: 10 INJECTION, SOLUTION EPIDURAL; INFILTRATION; INTRACAUDAL; PERINEURAL at 11:14

## 2019-08-30 NOTE — PROGRESS NOTES
Procedure   Large Joint Arthrocentesis: R glenohumeral  Date/Time: 8/30/2019 11:13 AM  Consent given by: patient  Site marked: site marked  Timeout: Immediately prior to procedure a time out was called to verify the correct patient, procedure, equipment, support staff and site/side marked as required   Supporting Documentation  Indications: pain   Procedure Details  Location: shoulder - R glenohumeral  Preparation: Patient was prepped and draped in the usual sterile fashion  Needle size: 22 G  Approach: anterior  Medications administered: 80 mg triamcinolone acetonide 40 MG/ML; 4 mL lidocaine PF 1% 1 %  Patient tolerance: patient tolerated the procedure well with no immediate complications    Medium Joint Arthrocentesis: R elbow  Date/Time: 8/30/2019 11:14 AM  Consent given by: patient  Site marked: site marked  Timeout: Immediately prior to procedure a time out was called to verify the correct patient, procedure, equipment, support staff and site/side marked as required   Supporting Documentation  Indications: pain   Procedure Details  Location: elbow - R elbow  Preparation: Patient was prepped and draped in the usual sterile fashion  Needle size: 22 G  Approach: LATERAL.  Medications administered: 3 mL lidocaine PF 1% 1 %; 40 mg triamcinolone acetonide 40 MG/ML  Patient tolerance: patient tolerated the procedure well with no immediate complications

## 2019-08-30 NOTE — PROGRESS NOTES
Subjective:     Patient ID: Desire Hernandez is a 54 y.o. female.    Chief Complaint:  Right shoulder pain  Right elbow pain  History of Present Illness  Desire Hernandez is a 54-year-old female who presents to clinic with a 4-month history of worsening pain at the right shoulder as well as pain at the right elbow.  She has completed EMG nerve conduction study test the right upper extremity imaging results are available in chart.  She has tried bracing which she was able to wear for approximately 1 week before it made symptoms worse.  She is experiencing numbness and tingling radiating down the right upper extremity.  Numbness and tingling radiating down to the third fourth and fifth digit of the right upper extremity.  She has tried Aleve over-the-counter which upset her stomach and then was tried on Vimovo which continues to upset her stomach.  She presented to [facility] ER in April 2019 IV was placed which is when she began experiencing all the symptoms.  She presents with concerns of decreased range of motion to the right shoulder as well as the pinpoint tenderness pain at the lateral epicondyle.  She denies previously receiving corticosteroid injections, denies previous MRI or CT x-ray of the shoulder.  She has tried wearing an elbow sleeve which did somewhat help with symptom relief.  Rates discomfort as 7 to an 8 out of a 10 aching throbbing in nature.  Increased pain noted with attempted range of motion of the shoulder including reaching out to the side reaching up above head and reaching across body.  She is also unable to reach behind her back which is interfering with her activities of daily living.  Denies pain radiating into her neck denies numbness or tingling radiating down from the neck but does experience tingling sensation as well as not associated numbness from the elbow.  Denies previous physical therapy.  Denies other concerns present at this time.     Social History      Occupational History     Employer: [employer]   Tobacco Use   • Smoking status: Never Smoker   • Smokeless tobacco: Never Used   Substance and Sexual Activity   • Alcohol use: Yes     Types: 1 Glasses of wine, 1 Standard drinks or equivalent per week     Comment: occ   • Drug use: No   • Sexual activity: Yes     Partners: Male     Birth control/protection: None      Past Medical History:   Diagnosis Date   • ADHD (attention deficit hyperactivity disorder)     back in high school   • Allergic     seasonal   • ASCUS favor dysplasia     07; 08; 6/25/10; 12; 13; 8/21/15 (HPV negative)   • Blood type, Rh negative    • Bunion 2016    surgery   • Chlamydia     as a teen   • H/O nipple discharge     Bilateral diagnostic mammogram negative   • HSV-1 (herpes simplex virus 1) infection    • HSV-2 (herpes simplex virus 2) infection    • LGSIL (low grade squamous intraepithelial dysplasia) 2006   • Ovarian cyst     Left   • PONV (postoperative nausea and vomiting)      Past Surgical History:   Procedure Laterality Date   • BUNIONECTOMY Right 2016    Dr. Durbin   • CERVICAL CONIZATION, LEEP     • COLPOSCOPY W/ BIOPSY / CURETTAGE  2006    benign   • D&C WITH SUCTION  1998    10 week SAB   • ENDOMETRIAL ABLATION  Dec. 2017   • INDUCED       years ago   • [place] HYSTEROSCOPY,W/ENDOMETRIAL ABLATION N/A 2017    Procedure: HYSTEROSCOPY WITH ENDOMETRIAL ABLATION; d&c;  Surgeon: Sandie Hu DO;  Location: [facility];  Service: Obstetrics/Gynecology       Family History   Problem Relation Age of Onset   • Hearing loss Father    • Hyperlipidemia Father         DX in his mid 50's   • No Known Problems Brother    • Arthritis Sister         RA   • No Known Problems Son    • Hearing loss Maternal Grandmother    • Hyperlipidemia Maternal Grandmother         DX in her late 70's   • Stroke Maternal Grandmother    • Lung cancer Maternal Grandfather    • Cancer Maternal  "Grandfather         lung cancer - smoker   • Lung cancer Paternal Uncle    • Cancer Paternal Uncle         lung cancer - smoker   • Breast cancer Neg Hx    • Ovarian cancer Neg Hx    • Uterine cancer Neg Hx    • Colon cancer Neg Hx    • Melanoma Neg Hx          Review of Systems   Constitutional: Negative for chills, diaphoresis and unexpected weight change.   HENT: Negative for hearing loss, nosebleeds, sore throat and tinnitus.    Eyes: Negative for pain and visual disturbance.   Respiratory: Negative for cough, shortness of breath and wheezing.    Cardiovascular: Negative for chest pain and palpitations.   Gastrointestinal: Negative for abdominal pain, diarrhea, nausea and vomiting.   Endocrine: Negative for cold intolerance, heat intolerance and polydipsia.   Genitourinary: Negative for difficulty urinating, dyspareunia and hematuria.   Musculoskeletal: Positive for myalgias. Negative for arthralgias.   Skin: Negative for rash and wound.   Allergic/Immunologic: Negative for environmental allergies.   Neurological: Negative for dizziness, syncope and numbness.   Hematological: Does not bruise/bleed easily.   Psychiatric/Behavioral: Negative for dysphoric mood and sleep disturbance. The patient is not nervous/anxious.            Objective:  Physical Exam    Vital signs reviewed.   General: No acute distress.  Eyes: conjunctiva clear; pupils equally round and reactive  ENT: external ears and nose atraumatic; oropharynx clear  CV: no peripheral edema  Resp: normal respiratory effort  Skin: no rashes or wounds; normal turgor  Psych: mood and affect appropriate; recent and remote memory intact    Vitals:    08/30/19 1014 08/30/19 1016   BP:  121/79   Pulse:  62   Weight: 61.2 kg (135 lb)    Height: 162.6 cm (64\")          08/30/19  1014   Weight: 61.2 kg (135 lb)     Body mass index is 23.17 kg/m².      Right Elbow Exam     Tenderness   The patient is experiencing tenderness in the lateral epicondyle.     Range of " Motion   Extension: 0   Flexion: 130   Pronation: 0     Muscle Strength   Pronation:  5/5   Supination:  5/5     Tests   Varus: negative  Valgus: negative  Tinel's sign (cubital tunnel): negative    Other   Erythema: absent  Scars: absent  Sensation: normal  Pulse: present    Comments:  Positive point tenderness lateral condyle      Right Shoulder Exam     Tenderness   The patient is experiencing tenderness in the acromion.    Range of Motion   External rotation: 40   Forward flexion: 150     Muscle Strength   Internal rotation: 4/5   External rotation: 4/5   Supraspinatus: 4/5   Subscapularis: 4/5   Biceps: 4/5     Tests   Drop arm: negative  Sulcus: absent    Other   Erythema: absent  Scars: absent  Sensation: normal  Pulse: present    Comments:  Internal rotation to side  Very limited range of motion unable to perform further testing secondary to pain she is experiencing: Range of motion did significantly improve after receiving corticosteroid injection anterior joint line  Negative Spurling's exam           Imaging:  Right Shoulder X-Ray  Indication: Pain  AP Internal and External Rotation views    Findings:  No fracture  No bony lesion  Normal soft tissues  Normal joint spaces    No prior studies were available for comparison.    Assessment:        1. Pain    2. Adhesive capsulitis of right shoulder           Plan:  1.  Discussed plan of care with patient.  Wishes to proceed with corticosteroid injection glenohumeral joint, right shoulder, anterior approach.  Long discussion with patient regarding home exercise regimen including range of motion and strengthening.  We will plan to have her follow-up with aggressive physical therapy of the right shoulder for adhesions.  2.  Long discussion with patient regarding the right elbow.  She does not wish to proceed with any surgery that has been offered she does wish to proceed with corticosteroid injection of the lateral epicondyle as well as a tennis elbow brace at  this time.  We will discontinue the Vimovo she is previously prescribed secondary to upset stomach start her on meloxicam with omeprazole once daily.  Encourage patient to eat prior to taking medication and discontinue for GI upset or any signs and symptoms of blood in stool.  Orders:  Orders Placed This Encounter   Procedures   • Large Joint Arthrocentesis: R glenohumeral   • Medium Joint Arthrocentesis: R elbow   • XR Shoulder 2+ View Right   • Ambulatory Referral to Physical Therapy       I ordered and reviewed the ROSA today.     Dictated utilizing Dragon dictation

## 2019-09-03 RX ORDER — PAROXETINE 10 MG/1
10 TABLET, FILM COATED ORAL EVERY EVENING
Qty: 90 TABLET | Refills: 1 | Status: SHIPPED | OUTPATIENT
Start: 2019-09-03 | End: 2019-11-19

## 2019-09-04 ENCOUNTER — HOSPITAL ENCOUNTER (OUTPATIENT)
Dept: PHYSICAL THERAPY | Facility: HOSPITAL | Age: 54
Setting detail: THERAPIES SERIES
Discharge: HOME | End: 2019-09-04

## 2019-09-04 DIAGNOSIS — M75.01 ADHESIVE CAPSULITIS OF RIGHT SHOULDER: Primary | ICD-10-CM

## 2019-09-04 PROCEDURE — 97161 PT EVAL LOW COMPLEX 20 MIN: CPT | Performed by: PHYSICAL THERAPIST

## 2019-09-04 RX ORDER — HYDROCODONE BITARTRATE AND ACETAMINOPHEN 5; 325 MG/1; MG/1
1 TABLET ORAL EVERY 6 HOURS PRN
Qty: 12 TABLET | Refills: 0 | Status: SHIPPED | OUTPATIENT
Start: 2019-09-04 | End: 2019-11-19

## 2019-09-05 NOTE — THERAPY EVALUATION
Outpatient Physical Therapy Ortho Initial Evaluation   Alisha Soto     Patient Name: Desrie Hernandez  : 1965  MRN: [id number]  Today's Date: 2019      Visit Date: 2019    Patient Active Problem List   Diagnosis   • Menopausal symptoms   • Left breast mass   • Menometrorrhagia   • Adhesive capsulitis of right shoulder        Past Medical History:   Diagnosis Date   • ADHD (attention deficit hyperactivity disorder)     back in high school   • Allergic     seasonal   • ASCUS favor dysplasia     07; 08; 6/25/10; 12; 13; 8/21/15 (HPV negative)   • Blood type, Rh negative    • Bunion     surgery   • Chlamydia     as a teen   • H/O nipple discharge     Bilateral diagnostic mammogram negative   • HSV-1 (herpes simplex virus 1) infection    • HSV-2 (herpes simplex virus 2) infection    • LGSIL (low grade squamous intraepithelial dysplasia) 2006   • Ovarian cyst     Left   • PONV (postoperative nausea and vomiting)         Past Surgical History:   Procedure Laterality Date   • BUNIONECTOMY Right     Dr. Durbin   • CERVICAL CONIZATION, LEEP     • COLPOSCOPY W/ BIOPSY / CURETTAGE  2006    benign   • D&C WITH SUCTION  1998    10 week SAB   • ENDOMETRIAL ABLATION  Dec. 2017   • INDUCED       years ago   • [place] HYSTEROSCOPY,W/ENDOMETRIAL ABLATION N/A 2017    Procedure: HYSTEROSCOPY WITH ENDOMETRIAL ABLATION; d&c;  Surgeon: Sandie Hu DO;  Location: [facility];  Service: Obstetrics/Gynecology       Visit Dx:     ICD-10-CM ICD-9-CM   1. Adhesive capsulitis of right shoulder M75.01 726.0         Patient History     Row Name 19 1300             History    Chief Complaint  Difficulty with daily activities;Pain;Joint stiffness  -GC      Type of Pain  Shoulder pain right  -GC      Date Current Problem(s) Began  19  -GC      Brief Description of Current Complaint  Pt states she was seen in the ER at [facility] [place] on  where she received  an IV in her right UE. She states that it must have hit a nerve as she developed right UE pain after. She has been seen by multiple specialists and has had multiple tests. She continued to have shoulder and UE pain. She was seen by ONOFRE James who diagnosed her with adhesive capsulitis and has referred her to therapy.  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  injection in shoulder and elbow  -      What clinical tests have you had for this problem?  X-ray;Nerve Conduction Test  -      Results of Clinical Tests  negative  -         Pain     Pain Location  Shoulder right  -GC      Pain at Present  1  -GC      Pain at Best  1  -GC      Pain at Worst  6  -GC      Pain Frequency  Constant/continuous  -      Pain Description  Aching;Sore;Tightness  -      What Performance Factors Make the Current Problem(s) WORSE?  Pt c/o pain when she tries to raise her right UE or when she makes quick movements  -      What Performance Factors Make the Current Problem(s) BETTER?  Pt feels best if she does not use her right UE  -      Difficulties with ADL's?  Pt has difficulty with dressing, bathing, grooming tasks  -         Daily Activities    Primary Language  English  -      Are you able to read  Yes  -      Are you able to write  Yes  -      How does patient learn best?  Listening  -      Teaching needs identified  Home Exercise Program;Management of Condition  -      Patient is concerned about/has problems with  Difficulty with self care (i.e. bathing, dressing, toileting:;Flexibility;Grasping objects lifting;Performing home management (household chores, shopping, care of  dependents);Performing job responsibilities/community activities (work, school,;Performing sports, recreation, and play activities;Reaching over head;Repetitive movements of the hand, arm, shoulder  -GC      Does patient have problems with the following?  None  -GC      Barriers to learning  None  -GC      Functional Status  bathing;dressing;grooming;mobility issues preventing performance of daily activities  -GC      Pt Participated in POC and Goals  Yes  -GC         Safety    Are you being hurt, hit, or frightened by anyone at home or in your life?  No  -GC      Are you being neglected by a caregiver  No  -GC        User Key  (r) = Recorded By, (t) = Taken By, (c) = Cosigned By    Initials Name Provider Type    GC Deny High, PT Physical Therapist          PT Ortho     Row Name 09/04/19 1300       Posture/Observations    Forward Head  Mild  -GC    Cervical Lordosis  Decreased  -GC    Thoracic Kyphosis  Normal  -GC    Rounded Shoulders  Bilateral:;Mild  -GC    Scapular Elevation  Bilateral:;Normal  -GC    Scapular winging  Bilateral:;Normal  -GC    Posture/Observations Comments  Pt does have mild atrophy of right deltoid   -GC       Shoulder Girdle Accessory Motions    Posterior glide of humerus  Right:;Hypomobile  -GC    Anterior glide of humerus  Right:;Hypomobile  -GC    Inferior glide of humerus  Right:;Hypomobile  -GC       Shoulder Girdle Palpation    Subacromial Space  Right:;Tender  -GC    Supraspinatus Insertion  Right:;Tender  -GC    AC Joint  Right:;Tender  -GC    Long Head of Biceps  Right:;Tender  -GC    Greater Tubercule  Right:;Tender  -GC       Right Upper Ext    Rt Shoulder Abduction AROM  58 degrees  -GC    Rt Shoulder Abduction PROM  91 degrees  -GC    Rt Shoulder Flexion AROM  86 degrees  -GC    Rt Shoulder Flexion PROM  98 degrees  -GC    Rt Shoulder External Rotation AROM  29 degrees  -GC    Rt Shoulder External Rotation PROM  33 degrees  -GC    Rt Shoulder Internal Rotation AROM  15  degrees  -GC    Rt Shoulder Internal Rotation PROM  18 degrees  -GC    Rt Elbow Extension/Flexion AROM  WFL  -GC    Rt Elbow Supination AROM  WFL  -GC    Rt Elbow Pronation AROM  WFL  -GC       MMT Right Upper Ext    Rt Shoulder Flexion MMT, Gross Movement  (4+/5) good plus  -GC    Rt Shoulder Extension MMT, Gross Movement  (5/5) normal  -GC    Rt Shoulder ABduction MMT, Gross Movement  (4/5) good  -GC    Rt Shoulder ADduction MMT, Gross Movement  (5/5) normal  -GC    Rt Shoulder Internal Rotation MMT, Gross Movement  (4+/5) good plus  -GC    Rt Shoulder External Rotation MMT, Gross Movement  (4/5) good  -GC    Rt Scapular Elevation MMT, Gross Movement  (5/5) normal  -GC    Rt Elbow Flexion MMT, Gross Movement:  (5/5) normal  -GC    Rt Elbow Extension MMT, Gross Movement:  (5/5) normal  -GC       Sensation    Light Touch  No apparent deficits  -GC      User Key  (r) = Recorded By, (t) = Taken By, (c) = Cosigned By    Initials Name Provider Type    GC Deny High, PT Physical Therapist                      Therapy Education  Given: HEP, Symptoms/condition management, Pain management  Program: New  How Provided: Verbal, Demonstration, Written  Provided to: Patient  Level of Understanding: Teach back education performed, Verbalized, Demonstrated     PT OP Goals     Row Name 09/04/19 1300          PT Short Term Goals    STG Date to Achieve  10/02/19  -     STG 1  Decrease right shoulder pain to 3-4/10 with activity.  -     STG 2  Increase right shoulder AROM to 130 degrees FLEX and ABD, 45 degrees ER and IR with testing.  -     STG 3  Pt will be independent with her HEP issued by this therapist.  -        Long Term Goals    LTG Date to Achieve  10/30/19  -     LTG 1  Decrease right shoulder pain to 0-1/10 with activity.  -     LTG 2  Increase right shoulder AROM to 160 degrees FLEX and ABD, 75 degrees ER and IR with testing.  -     LTG 3  Increase right shoulder girdle strength to 5/5 all planes with  testing.  -     LTG 4  Pt will be independent with all ADLs and have a Quick Dash score < 10.  -        Time Calculation    PT Goal Re-Cert Due Date  10/02/19  -       User Key  (r) = Recorded By, (t) = Taken By, (c) = Cosigned By    Initials Name Provider Type     Deny High, PT Physical Therapist          PT Assessment/Plan     Row Name 09/04/19 1300          PT Assessment    Functional Limitations  Limitation in home management;Limitations in community activities;Limitations in functional capacity and performance;Performance in leisure activities;Performance in self-care ADL;Performance in work activities  -     Impairments  Range of motion;Pain;Muscle strength;Joint mobility  -     Assessment Comments  Pt presents with an approximate 5 month history of right shoulder pain that she rates up to 6/10 with activity. She has decreased right shoulder AROM, decreased right shoulder joint play, decreased right shoulder girdle strength, and decreased function secodnary to the above.  -     Rehab Potential  Fair  -     Patient/caregiver participated in establishment of treatment plan and goals  Yes  -     Patient would benefit from skilled therapy intervention  Yes  -        PT Plan    PT Frequency  2x/week;3x/week  -     Predicted Duration of Therapy Intervention (Therapy Eval)  8 weeks  -     Planned CPT's?  PT EVAL LOW COMPLEXITY: 62632;PT THER PROC EA 15 MIN: 77350;PT MANUAL THERAPY EA 15 MIN: 03892;PT HOT OR COLD PACK TREAT MCARE;PT ELECTRICAL STIM UNATTEND: ;PT ULTRASOUND EA 15 MIN: 07837  -     PT Plan Comments  Pt is to continue her HEP 2x daily  -       User Key  (r) = Recorded By, (t) = Taken By, (c) = Cosigned By    Initials Name Provider Type     Deny High, PT Physical Therapist            OP Exercises     Row Name 09/04/19 1300             Exercise 1    Exercise Name 1  Cane stretch in standing for FLEX-ABD-ER  -      Cueing 1  Verbal;Demo  -      Reps 1  15x  each  -GC      Time 1  5 secs   -GC         Exercise 2    Exercise Name 2  IR stretch behind back with towel  -GC      Cueing 2  Verbal;Demo  -GC      Reps 2  15  -GC      Time 2  5 secs  -GC        User Key  (r) = Recorded By, (t) = Taken By, (c) = Cosigned By    Initials Name Provider Type    GC Deny High, PT Physical Therapist           Manual Rx (last 36 hours)      Manual Treatments     Row Name 09/04/19 1300             Manual Rx 1    Manual Rx 1 Location  right shoulder  -GC      Manual Rx 1 Type  GH joint mobilizations  -GC      Manual Rx 1 Grade  grade II  -GC      Manual Rx 1 Duration  5 min  -GC         Manual Rx 2    Manual Rx 2 Location  right shoulder  -GC      Manual Rx 2 Type  PROM in FLEX-ABD-ER-IR  -GC      Manual Rx 2 Duration  15 min  -GC        User Key  (r) = Recorded By, (t) = Taken By, (c) = Cosigned By    Initials Name Provider Type    GC Deny High, PT Physical Therapist                      Outcome Measure Options: Quick DASH  Quick DASH  Open a tight or new jar.: Severe Difficulty  Do heavy household chores (e.g., wash walls, wash floors): Severe Difficulty  Carry a shopping bag or briefcase: Moderate Difficulty  Wash your back: Unable  Use a knife to cut food: Moderate Difficulty  Recreational activities in which you take some force or impact through your arm, should or hand (e.g. golf, hammering, tennis, etc.): Severe Difficulty  During the past week, to what extent has your arm, shoulder, or hand problem interfered with your normal social activites with family, friends, neighbors or groups?: Quite a bit  During the past week, were you limited in your work or other regular daily activities as a result of your arm, shoulder or hand problem?: Moderately Limited  Arm, Shoulder, or hand pain: Severe  Tingling (pins and needles) in your arm, shoulder, or hand: Moderate  During the past week, how much difficulty have you had sleeping because of the pain in your arm, shoulder or  hand?: Severe Difficulty  Number of Questions Answered: 11  Quick DASH Score: 68.18         Time Calculation:     Start Time: 1300  Stop Time: 1402  Time Calculation (min): 62 min     Therapy Charges for Today     Code Description Service Date Service Provider Modifiers Qty    [id number]  PT EVAL LOW COMPLEXITY 2 9/4/2019 Deny High, PT GP 1          PT G-Codes  Outcome Measure Options: Quick DASH  Quick DASH Score: 68.18         Deny High, PT  9/5/2019

## 2019-09-06 ENCOUNTER — HOSPITAL ENCOUNTER (OUTPATIENT)
Dept: PHYSICAL THERAPY | Facility: HOSPITAL | Age: 54
Setting detail: THERAPIES SERIES
Discharge: HOME | End: 2019-09-06

## 2019-09-06 DIAGNOSIS — M75.01 ADHESIVE CAPSULITIS OF RIGHT SHOULDER: Primary | ICD-10-CM

## 2019-09-06 PROCEDURE — 97140 MANUAL THERAPY 1/> REGIONS: CPT | Performed by: PHYSICAL THERAPIST

## 2019-09-06 NOTE — THERAPY TREATMENT NOTE
Outpatient Physical Therapy Ortho Treatment Note   Alisha Soto     Patient Name: Desire Hernandez  : 1965  MRN: [id number]  Today's Date: 2019      Visit Date: 2019    Visit Dx:    ICD-10-CM ICD-9-CM   1. Adhesive capsulitis of right shoulder M75.01 726.0       Patient Active Problem List   Diagnosis   • Menopausal symptoms   • Left breast mass   • Menometrorrhagia   • Adhesive capsulitis of right shoulder        Past Medical History:   Diagnosis Date   • ADHD (attention deficit hyperactivity disorder)     back in high school   • Allergic     seasonal   • ASCUS favor dysplasia     07; 08; 6/25/10; 12; 13; 8/21/15 (HPV negative)   • Blood type, Rh negative    • Bunion     surgery   • Chlamydia     as a teen   • H/O nipple discharge     Bilateral diagnostic mammogram negative   • HSV-1 (herpes simplex virus 1) infection    • HSV-2 (herpes simplex virus 2) infection    • LGSIL (low grade squamous intraepithelial dysplasia) 2006   • Ovarian cyst     Left   • PONV (postoperative nausea and vomiting)         Past Surgical History:   Procedure Laterality Date   • BUNIONECTOMY Right 2016    Dr. Durbin   • CERVICAL CONIZATION, LEEP     • COLPOSCOPY W/ BIOPSY / CURETTAGE  2006    benign   • D&C WITH SUCTION  1998    10 week SAB   • ENDOMETRIAL ABLATION  Dec. 2017   • INDUCED       years ago   • [place] HYSTEROSCOPY,W/ENDOMETRIAL ABLATION N/A 2017    Procedure: HYSTEROSCOPY WITH ENDOMETRIAL ABLATION; d&c;  Surgeon: Sandie Hu DO;  Location: [facility] OR;  Service: Obstetrics/Gynecology       PT Ortho     Row Name 19 0945       Subjective Comments    Subjective Comments  Pt states her shoulder is pretty sore.  -GC    Row Name 19 1300       Posture/Observations    Forward Head  Mild  -GC    Cervical Lordosis  Decreased  -GC    Thoracic Kyphosis  Normal  -GC    Rounded Shoulders  Bilateral:;Mild  -GC    Scapular Elevation   Bilateral:;Normal  -GC    Scapular winging  Bilateral:;Normal  -GC    Posture/Observations Comments  Pt does have mild atrophy of right deltoid   -GC       Shoulder Girdle Accessory Motions    Posterior glide of humerus  Right:;Hypomobile  -GC    Anterior glide of humerus  Right:;Hypomobile  -GC    Inferior glide of humerus  Right:;Hypomobile  -GC       Shoulder Girdle Palpation    Subacromial Space  Right:;Tender  -GC    Supraspinatus Insertion  Right:;Tender  -GC    AC Joint  Right:;Tender  -GC    Long Head of Biceps  Right:;Tender  -GC    Greater Tubercule  Right:;Tender  -GC       Right Upper Ext    Rt Shoulder Abduction AROM  58 degrees  -GC    Rt Shoulder Abduction PROM  91 degrees  -GC    Rt Shoulder Flexion AROM  86 degrees  -GC    Rt Shoulder Flexion PROM  98 degrees  -GC    Rt Shoulder External Rotation AROM  29 degrees  -GC    Rt Shoulder External Rotation PROM  33 degrees  -GC    Rt Shoulder Internal Rotation AROM  15 degrees  -GC    Rt Shoulder Internal Rotation PROM  18 degrees  -GC    Rt Elbow Extension/Flexion AROM  WFL  -GC    Rt Elbow Supination AROM  WFL  -GC    Rt Elbow Pronation AROM  WFL  -GC       MMT Right Upper Ext    Rt Shoulder Flexion MMT, Gross Movement  (4+/5) good plus  -GC    Rt Shoulder Extension MMT, Gross Movement  (5/5) normal  -GC    Rt Shoulder ABduction MMT, Gross Movement  (4/5) good  -GC    Rt Shoulder ADduction MMT, Gross Movement  (5/5) normal  -GC    Rt Shoulder Internal Rotation MMT, Gross Movement  (4+/5) good plus  -GC    Rt Shoulder External Rotation MMT, Gross Movement  (4/5) good  -GC    Rt Scapular Elevation MMT, Gross Movement  (5/5) normal  -GC    Rt Elbow Flexion MMT, Gross Movement:  (5/5) normal  -GC    Rt Elbow Extension MMT, Gross Movement:  (5/5) normal  -GC       Sensation    Light Touch  No apparent deficits  -GC      User Key  (r) = Recorded By, (t) = Taken By, (c) = Cosigned By    Initials Name Provider Type    Deny Peralta PT Physical Therapist                       PT Assessment/Plan     Row Name 09/06/19 0945          PT Assessment    Assessment Comments  Pt is noted to have increased PROM noted with the stretching today.  -GC        PT Plan    PT Plan Comments  Pt is to continue her HEP 2x daily.  -GC       User Key  (r) = Recorded By, (t) = Taken By, (c) = Cosigned By    Initials Name Provider Type    GC Deny High, PT Physical Therapist            OP Exercises     Row Name 09/06/19 0945             Subjective Comments    Subjective Comments  Pt states her shoulder is pretty sore.  -GC         Exercise 1    Exercise Name 1  Cane stretch in standing for FLEX-ABD-ER  -GC      Cueing 1  Verbal;Demo  -GC      Reps 1  15x each  -GC      Time 1  5 secs   -GC         Exercise 2    Exercise Name 2  IR stretch behind back with towel  -GC      Cueing 2  Verbal;Demo  -GC      Reps 2  15  -GC      Time 2  5 secs  -GC         Exercise 3    Exercise Name 3  Pulley-FLEX  -GC      Cueing 3  Verbal  -GC      Time 3  5 min  -GC         Exercise 4    Exercise Name 4  Pulley-Scaption  -GC      Cueing 4  Verbal  -GC      Time 4  5 min  -GC        User Key  (r) = Recorded By, (t) = Taken By, (c) = Cosigned By    Initials Name Provider Type    GC Deny High, PT Physical Therapist                      Manual Rx (last 36 hours)      Manual Treatments     Row Name 09/06/19 0945             Manual Rx 1    Manual Rx 1 Location  right shoulder  -GC      Manual Rx 1 Type  GH joint mobilizations  -GC      Manual Rx 1 Grade  grade II  -GC      Manual Rx 1 Duration  5 min  -GC         Manual Rx 2    Manual Rx 2 Location  right shoulder  -GC      Manual Rx 2 Type  PROM in FLEX-ABD-ER-IR  -GC      Manual Rx 2 Duration  15 min  -GC        User Key  (r) = Recorded By, (t) = Taken By, (c) = Cosigned By    Initials Name Provider Type    GC Deny High, PT Physical Therapist                             Time Calculation:   Start Time: 0945  Stop Time: 1052  Time Calculation (min): 67  min  Therapy Charges for Today     Code Description Service Date Service Provider Modifiers Qty    22999482949 HC PT MANUAL THERAPY EA 15 MIN 9/6/2019 Deny High, PT GP 1                    Deny High, PT  9/6/2019

## 2019-09-09 ENCOUNTER — HOSPITAL ENCOUNTER (OUTPATIENT)
Dept: PHYSICAL THERAPY | Facility: HOSPITAL | Age: 54
Setting detail: THERAPIES SERIES
Discharge: HOME | End: 2019-09-09

## 2019-09-09 DIAGNOSIS — M75.01 ADHESIVE CAPSULITIS OF RIGHT SHOULDER: Primary | ICD-10-CM

## 2019-09-09 PROCEDURE — 97110 THERAPEUTIC EXERCISES: CPT | Performed by: PHYSICAL THERAPIST

## 2019-09-09 PROCEDURE — 97140 MANUAL THERAPY 1/> REGIONS: CPT | Performed by: PHYSICAL THERAPIST

## 2019-09-09 RX ORDER — METHYLPREDNISOLONE 4 MG/1
TABLET ORAL
Qty: 21 TABLET | Refills: 0 | Status: SHIPPED | OUTPATIENT
Start: 2019-09-09 | End: 2019-10-01

## 2019-09-10 ENCOUNTER — OFFICE VISIT (OUTPATIENT)
Dept: OBSTETRICS AND GYNECOLOGY | Facility: CLINIC | Age: 54
End: 2019-09-10

## 2019-09-10 VITALS
DIASTOLIC BLOOD PRESSURE: 70 MMHG | BODY MASS INDEX: 22.36 KG/M2 | SYSTOLIC BLOOD PRESSURE: 110 MMHG | WEIGHT: 131 LBS | HEIGHT: 64 IN

## 2019-09-10 DIAGNOSIS — Z11.51 ENCOUNTER FOR SCREENING FOR HUMAN PAPILLOMAVIRUS (HPV): ICD-10-CM

## 2019-09-10 DIAGNOSIS — Z01.419 WELL WOMAN EXAM: Primary | ICD-10-CM

## 2019-09-10 LAB
BILIRUB BLD-MCNC: NEGATIVE MG/DL
CLARITY, POC: CLEAR
COLOR UR: YELLOW
GLUCOSE UR STRIP-MCNC: NEGATIVE MG/DL
KETONES UR QL: NEGATIVE
LEUKOCYTE EST, POC: NEGATIVE
NITRITE UR-MCNC: NEGATIVE MG/ML
PH UR: 5 [PH] (ref 5–8)
PROT UR STRIP-MCNC: NEGATIVE MG/DL
RBC # UR STRIP: NEGATIVE /UL
SP GR UR: 1 (ref 1–1.03)
UROBILINOGEN UR QL: NORMAL

## 2019-09-10 PROCEDURE — 81002 URINALYSIS NONAUTO W/O SCOPE: CPT | Performed by: OBSTETRICS & GYNECOLOGY

## 2019-09-10 PROCEDURE — 99396 PREV VISIT EST AGE 40-64: CPT | Performed by: OBSTETRICS & GYNECOLOGY

## 2019-09-10 RX ORDER — PAROXETINE 10 MG/1
10 TABLET, FILM COATED ORAL EVERY MORNING
Qty: 90 TABLET | Refills: 3 | Status: SHIPPED | OUTPATIENT
Start: 2019-09-10 | End: 2020-03-08 | Stop reason: SDUPTHER

## 2019-09-10 NOTE — PROGRESS NOTES
GYN Annual Exam     CC- Here for annual exam.     Desire Hernandez is a 54 y.o. female who presents for annual well woman exam. LPS 2017 normal w neg Hr HPV. S/p endometrial ablation 2017. No vaginal bleeding. Using Paxil 10mg qam to control vasomotor sx. Pt still getting intermittent vasomotor symptoms. Pt has a hx of left breast pain noted in 2019 and possible mass on PE. Pt has since seen Dr Pinedo and had negative imaging with plans for routine future screening due 2019. Pt reports improved sx. 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 with placement of IV in ER    OB History      Para Term  AB Living    3 2 2   1 2    SAB TAB Ectopic Molar Multiple Live Births    1         2          Current contraception: tubal ligation  History of abnormal Pap smear: no  History of abnormal mammogram: no Had DX MMG and left breast US due to palpable mass and pain but imaging normal  Family history of uterine, colon or ovarian cancer: no  Family history of breast cancer: no    Health Maintenance   Topic Date Due   • COLONOSCOPY  2016   • ZOSTER VACCINE (1 of 2) 2017   • Annual Gynecologic Pelvic and Breast Exam  2018   • INFLUENZA VACCINE  2019   • ANNUAL PHYSICAL  10/06/2019   • PAP SMEAR  2020   • MAMMOGRAM  2020   • TDAP/TD VACCINES (2 - Td) 2027       Past Medical History:   Diagnosis Date   • ADHD (attention deficit hyperactivity disorder)     back in high school   • Allergic     seasonal   • ASCUS favor dysplasia     07; 08; 6/25/10; 12; 13; 8/21/15 (HPV negative)   • Blood type, Rh negative    • Bunion 2016    surgery   • Chlamydia     as a teen   • H/O nipple discharge     Bilateral diagnostic mammogram negative   • HSV-1 (herpes simplex virus 1) infection    • HSV-2 (herpes simplex virus 2) infection    • LGSIL (low grade squamous intraepithelial dysplasia) 2006   • Ovarian cyst      Left   • PONV (postoperative nausea and vomiting)        Past Surgical History:   Procedure Laterality Date   • BUNIONECTOMY Right 2016    Dr. Durbin   • CERVICAL CONIZATION, LEEP     • COLPOSCOPY W/ BIOPSY / CURETTAGE  2006    benign   • D&C WITH SUCTION  1998    10 week SAB   • ENDOMETRIAL ABLATION  Dec. 2017   • INDUCED       years ago   • [place] HYSTEROSCOPY,W/ENDOMETRIAL ABLATION N/A 2017    Procedure: HYSTEROSCOPY WITH ENDOMETRIAL ABLATION; d&c;  Surgeon: Sandie Hu DO;  Location: [facility];  Service: Obstetrics/Gynecology         Current Outpatient Medications:   •  HYDROcodone-acetaminophen (NORCO) 5-325 MG per tablet, Take 1 tablet by mouth Every 6 (Six) Hours As Needed for Moderate Pain  or Severe Pain ., Disp: 12 tablet, Rfl: 0  •  methylPREDNISolone (MEDROL, LADY,) 4 MG tablet, Use as directed by package instructions, Disp: 21 tablet, Rfl: 0  •  omeprazole OTC (PRILOSEC OTC) 20 MG EC tablet, Take 1 tablet by mouth Daily., Disp: 30 tablet, Rfl: 0  •  PARoxetine (PAXIL) 10 MG tablet, Take 1 tablet by mouth Every Evening., Disp: 90 tablet, Rfl: 1  •  meloxicam (MOBIC) 15 MG tablet, Take 1 tablet by mouth Daily., Disp: 30 tablet, Rfl: 0  •  PARoxetine (PAXIL) 10 MG tablet, Take 1 tablet by mouth Every Morning., Disp: 90 tablet, Rfl: 3    No Known Allergies    Social History     Tobacco Use   • Smoking status: Never Smoker   • Smokeless tobacco: Never Used   Substance Use Topics   • Alcohol use: Yes     Types: 1 Glasses of wine, 1 Standard drinks or equivalent per week     Comment: occ   • Drug use: No       Family History   Problem Relation Age of Onset   • Hearing loss Father    • Hyperlipidemia Father         DX in his mid 50's   • No Known Problems Brother    • Arthritis Sister         RA   • No Known Problems Son    • Hearing loss Maternal Grandmother    • Hyperlipidemia Maternal Grandmother         DX in her late 70's   • Stroke Maternal Grandmother    • Lung cancer  "Maternal Grandfather    • Cancer Maternal Grandfather         lung cancer - smoker   • Lung cancer Paternal Uncle    • Cancer Paternal Uncle         lung cancer - smoker   • Breast cancer Neg Hx    • Ovarian cancer Neg Hx    • Uterine cancer Neg Hx    • Colon cancer Neg Hx    • Melanoma Neg Hx        Review of Systems   Constitutional: Negative for activity change, appetite change and unexpected weight change.   Gastrointestinal: Negative for abdominal distention, abdominal pain, anal bleeding, blood in stool, constipation and diarrhea.   Genitourinary: Negative for dyspareunia, menstrual problem, pelvic pain, vaginal bleeding, vaginal discharge and vaginal pain.       /70   Ht 162.6 cm (64\")   Wt 59.4 kg (131 lb)   BMI 22.49 kg/m²     Physical Exam   Constitutional: She is oriented to person, place, and time. She appears well-developed and well-nourished.   HENT:   Mouth/Throat: Normal dentition. No dental caries.   Cardiovascular: Normal rate and regular rhythm.   Pulmonary/Chest: Effort normal and breath sounds normal. Right breast exhibits no inverted nipple, no mass, no nipple discharge, no skin change and no tenderness. Left breast exhibits no inverted nipple, no mass, no nipple discharge, no skin change and no tenderness.   Abdominal: Soft. She exhibits no distension and no mass. There is no tenderness.   Genitourinary: There is no rash, tenderness or lesion on the right labia. There is no rash, tenderness or lesion on the left labia. Uterus is not deviated, not enlarged, not fixed and not tender. Cervix exhibits no motion tenderness, no discharge and no friability. Right adnexum displays no mass, no tenderness and no fullness. Left adnexum displays no mass, no tenderness and no fullness. No tenderness or bleeding in the vagina. No vaginal discharge found.   Neurological: She is alert and oriented to person, place, and time.   Psychiatric: She has a normal mood and affect. Her behavior is normal. " Judgment and thought content normal.   Vitals reviewed.         Assessment/Plan    1) GYN HM: Check pap smear. SBE demonstrated and encouraged. Check MMG. Needs colonoscopy  2) : No PMPB s/p endometrial ablation. Refills of Paxil 10mg po qam.  3) Bone health - Weight bearing exercise, dietary calcium recommendations and vitamin D reviewed.   4) Diet and Exercise discussed  5) Smoking Status: nonsmoker  6) Social: having PT for frozen right shoulder after IV in ER gave her nerve damage.  7) hx of left breast pain and mass noted on PE: Was seen by Dr Pinedo. Had negative imaging. PE unremarkable. Improved sx per pt. Due for routine imaging 9/2019.  8) Follow up prn and 1 year       Diagnoses and all orders for this visit:    Well woman exam  -     Pap IG, HPV-hr  -     POC Urinalysis Dipstick  -     Mammo Screening Bilateral With CAD; Future    Encounter for screening for human papillomavirus (HPV)  -     Pap IG, HPV-hr    Other orders  -     PARoxetine (PAXIL) 10 MG tablet; Take 1 tablet by mouth Every Morning.        Sandie Hu DO  9/10/2019  7:01 PM

## 2019-09-11 ENCOUNTER — APPOINTMENT (OUTPATIENT)
Dept: PHYSICAL THERAPY | Facility: HOSPITAL | Age: 54
End: 2019-09-11

## 2019-09-12 ENCOUNTER — HOSPITAL ENCOUNTER (OUTPATIENT)
Dept: PHYSICAL THERAPY | Facility: HOSPITAL | Age: 54
Setting detail: THERAPIES SERIES
Discharge: HOME | End: 2019-09-12

## 2019-09-12 DIAGNOSIS — M75.01 ADHESIVE CAPSULITIS OF RIGHT SHOULDER: Primary | ICD-10-CM

## 2019-09-12 PROCEDURE — 97140 MANUAL THERAPY 1/> REGIONS: CPT | Performed by: PHYSICAL THERAPIST

## 2019-09-12 PROCEDURE — 97110 THERAPEUTIC EXERCISES: CPT | Performed by: PHYSICAL THERAPIST

## 2019-09-12 NOTE — THERAPY TREATMENT NOTE
Outpatient Physical Therapy Ortho Treatment Note   [place]     Patient Name: Desire Hernandez  : 1965  MRN: [id number]  Today's Date: 2019      Visit Date: 2019    Visit Dx:    ICD-10-CM ICD-9-CM   1. Adhesive capsulitis of right shoulder M75.01 726.0       Patient Active Problem List   Diagnosis   • Menopausal symptoms   • Left breast mass   • Menometrorrhagia   • Adhesive capsulitis of right shoulder        Past Medical History:   Diagnosis Date   • ADHD (attention deficit hyperactivity disorder)     back in high school   • Allergic     seasonal   • ASCUS favor dysplasia     07; 08; 6/25/10; 12; 13; 8/21/15 (HPV negative)   • Blood type, Rh negative    • Bunion     surgery   • Chlamydia     as a teen   • H/O nipple discharge     Bilateral diagnostic mammogram negative   • HSV-1 (herpes simplex virus 1) infection    • HSV-2 (herpes simplex virus 2) infection    • LGSIL (low grade squamous intraepithelial dysplasia) 2006   • Ovarian cyst     Left   • PONV (postoperative nausea and vomiting)         Past Surgical History:   Procedure Laterality Date   • BUNIONECTOMY Right 2016    Dr. Durbin   • CERVICAL CONIZATION, LEEP     • COLPOSCOPY W/ BIOPSY / CURETTAGE  2006    benign   • D&C WITH SUCTION  1998    10 week SAB   • ENDOMETRIAL ABLATION  Dec. 2017   • INDUCED       years ago   • [place] HYSTEROSCOPY,W/ENDOMETRIAL ABLATION N/A 2017    Procedure: HYSTEROSCOPY WITH ENDOMETRIAL ABLATION; d&c;  Surgeon: Sandie Hu DO;  Location: [facility];  Service: Obstetrics/Gynecology       PT Ortho     Row Name 19 6600       Subjective Comments    Subjective Comments  Pt states her shoulder is not as painful when she moves it quickly.  -GC       Right Upper Ext    Rt Shoulder Abduction AROM  77 degrees  -GC    Rt Shoulder Flexion AROM  104 degrees  -GC      User Key  (r) = Recorded By, (t) = Taken By, (c) = Cosigned By    Initials  Name Provider Type    GC Deny High, PT Physical Therapist                      PT Assessment/Plan     Row Name 09/12/19 1230          PT Assessment    Assessment Comments  Pt is showing some improvements in her shoulder ROM.  -GC        PT Plan    PT Plan Comments  Pt is to continue her HEP 2x daily.  -GC       User Key  (r) = Recorded By, (t) = Taken By, (c) = Cosigned By    Initials Name Provider Type    GC Deny High, PT Physical Therapist          Modalities     Row Name 09/12/19 1230             Moist Heat    MH Applied  Yes  -GC      Location  right hsoulder with pt supine and roll under knees  -GC      Rx Minutes  10 mins  -GC      MH Prior to Rx  Yes  -GC        User Key  (r) = Recorded By, (t) = Taken By, (c) = Cosigned By    Initials Name Provider Type    GC Deny High, PT Physical Therapist        OP Exercises     Row Name 09/12/19 1230             Subjective Comments    Subjective Comments  Pt states her shoulder is not as painful when she moves it quickly.  -GC         Exercise 1    Exercise Name 1  Cane stretch in standing for FLEX-ABD-ER  -GC      Cueing 1  Verbal;Demo  -GC      Reps 1  15x each  -GC      Time 1  5 secs   -GC         Exercise 2    Exercise Name 2  IR stretch behind back with towel  -GC      Cueing 2  Verbal;Demo  -GC      Reps 2  15  -GC      Time 2  5 secs  -GC         Exercise 3    Exercise Name 3  Pulley-FLEX  -GC      Cueing 3  Verbal  -GC      Time 3  5 min  -GC         Exercise 4    Exercise Name 4  Pulley-Scaption  -GC      Cueing 4  Verbal  -GC      Time 4  5 min  -GC        User Key  (r) = Recorded By, (t) = Taken By, (c) = Cosigned By    Initials Name Provider Type    GC Deny High, PT Physical Therapist                      Manual Rx (last 36 hours)      Manual Treatments     Row Name 09/12/19 1230             Manual Rx 1    Manual Rx 1 Location  right shoulder  -GC      Manual Rx 1 Type  GH joint mobilizations  -GC      Manual Rx 1 Grade  grade II  -GC       Manual Rx 1 Duration  5 min  -GC         Manual Rx 2    Manual Rx 2 Location  right shoulder  -GC      Manual Rx 2 Type  PROM in FLEX-ABD-ER-IR  -GC      Manual Rx 2 Duration  15 min  -GC        User Key  (r) = Recorded By, (t) = Taken By, (c) = Cosigned By    Initials Name Provider Type    GC Deny High, PT Physical Therapist                             Time Calculation:   Start Time: 1230  Stop Time: 1330  Time Calculation (min): 60 min  Therapy Charges for Today     Code Description Service Date Service Provider Modifiers Qty    05765608591  PT MANUAL THERAPY EA 15 MIN 9/12/2019 Deny High, PT GP 1    11173837379 HC PT THER PROC EA 15 MIN 9/12/2019 Deny High, PT GP 1                    Deny High PT  9/12/2019

## 2019-09-13 ENCOUNTER — TELEPHONE (OUTPATIENT)
Dept: ORTHOPEDIC SURGERY | Facility: CLINIC | Age: 54
End: 2019-09-13

## 2019-09-13 LAB
CYTOLOGIST CVX/VAG CYTO: NORMAL
CYTOLOGY CVX/VAG DOC CYTO: NORMAL
CYTOLOGY CVX/VAG DOC THIN PREP: NORMAL
DX ICD CODE: NORMAL
HIV 1 & 2 AB SER-IMP: NORMAL
HPV I/H RISK 1 DNA CVX QL PROBE+SIG AMP: NEGATIVE
OTHER STN SPEC: NORMAL
STAT OF ADQ CVX/VAG CYTO-IMP: NORMAL

## 2019-09-16 ENCOUNTER — HOSPITAL ENCOUNTER (OUTPATIENT)
Dept: PHYSICAL THERAPY | Facility: HOSPITAL | Age: 54
Setting detail: THERAPIES SERIES
Discharge: HOME | End: 2019-09-16

## 2019-09-16 DIAGNOSIS — M75.01 ADHESIVE CAPSULITIS OF RIGHT SHOULDER: Primary | ICD-10-CM

## 2019-09-16 PROCEDURE — 97110 THERAPEUTIC EXERCISES: CPT | Performed by: PHYSICAL THERAPIST

## 2019-09-16 PROCEDURE — 97140 MANUAL THERAPY 1/> REGIONS: CPT | Performed by: PHYSICAL THERAPIST

## 2019-09-18 ENCOUNTER — HOSPITAL ENCOUNTER (OUTPATIENT)
Dept: PHYSICAL THERAPY | Facility: HOSPITAL | Age: 54
Setting detail: THERAPIES SERIES
Discharge: HOME | End: 2019-09-18

## 2019-09-18 DIAGNOSIS — M75.01 ADHESIVE CAPSULITIS OF RIGHT SHOULDER: Primary | ICD-10-CM

## 2019-09-18 PROCEDURE — 97110 THERAPEUTIC EXERCISES: CPT | Performed by: PHYSICAL THERAPIST

## 2019-09-18 PROCEDURE — 97140 MANUAL THERAPY 1/> REGIONS: CPT | Performed by: PHYSICAL THERAPIST

## 2019-09-18 NOTE — THERAPY TREATMENT NOTE
Outpatient Physical Therapy Ortho Treatment Note   Alisha Soto     Patient Name: Desire Hernandez  : 1965  MRN: [id number]  Today's Date: 2019      Visit Date: 2019    Visit Dx:    ICD-10-CM ICD-9-CM   1. Adhesive capsulitis of right shoulder M75.01 726.0       Patient Active Problem List   Diagnosis   • Menopausal symptoms   • Left breast mass   • Menometrorrhagia   • Adhesive capsulitis of right shoulder        Past Medical History:   Diagnosis Date   • ADHD (attention deficit hyperactivity disorder)     back in high school   • Allergic     seasonal   • ASCUS favor dysplasia     07; 08; 6/25/10; 12; 13; 8/21/15 (HPV negative)   • Blood type, Rh negative    • Bunion     surgery   • Chlamydia     as a teen   • H/O nipple discharge     Bilateral diagnostic mammogram negative   • HSV-1 (herpes simplex virus 1) infection    • HSV-2 (herpes simplex virus 2) infection    • LGSIL (low grade squamous intraepithelial dysplasia) 2006   • Ovarian cyst     Left   • PONV (postoperative nausea and vomiting)         Past Surgical History:   Procedure Laterality Date   • BUNIONECTOMY Right 2016    Dr. Durbin   • CERVICAL CONIZATION, LEEP     • COLPOSCOPY W/ BIOPSY / CURETTAGE  2006    benign   • D&C WITH SUCTION  1998    10 week SAB   • ENDOMETRIAL ABLATION  Dec. 2017   • INDUCED       years ago   • [place] HYSTEROSCOPY,W/ENDOMETRIAL ABLATION N/A 2017    Procedure: HYSTEROSCOPY WITH ENDOMETRIAL ABLATION; d&c;  Surgeon: Sandie Hu DO;  Location: [facility] OR;  Service: Obstetrics/Gynecology       PT Ortho     Row Name 19 1230       Subjective Comments    Subjective Comments  Pt states she may see a smidgen of improvement in her shoulder use.  -GC    Row Name 19 1230       Subjective Comments    Subjective Comments  Pt states she is tired of her shoulder hurting.  -GC      User Key  (r) = Recorded By, (t) = Taken By, (c) = Cosigned By     Initials Name Provider Type    GC Deny High, PT Physical Therapist                      PT Assessment/Plan     Row Name 09/18/19 1230          PT Assessment    Assessment Comments  Pt does show some imprved shoulder ROM with the manual therapy today.  -GC        PT Plan    PT Plan Comments  Pt is to continue her HEP 2-3x daily.  -GC       User Key  (r) = Recorded By, (t) = Taken By, (c) = Cosigned By    Initials Name Provider Type    GC Deny High, PT Physical Therapist          Modalities     Row Name 09/18/19 1230             Moist Heat    MH Applied  Yes  -GC      Location  right hsoulder with pt supine and roll under knees  -GC      Rx Minutes  10 mins  -GC      MH Prior to Rx  Yes  -GC        User Key  (r) = Recorded By, (t) = Taken By, (c) = Cosigned By    Initials Name Provider Type    GC Deny High, PT Physical Therapist        OP Exercises     Row Name 09/18/19 1230             Subjective Comments    Subjective Comments  Pt states she may see a smidgen of improvement in her shoulder use.  -GC         Exercise 1    Exercise Name 1  Cane stretch in standing for FLEX-ABD-ER  -GC      Cueing 1  Verbal;Demo  -GC      Reps 1  15x each  -GC      Time 1  5 secs   -GC         Exercise 2    Exercise Name 2  IR stretch behind back with towel  -GC      Cueing 2  Verbal;Demo  -GC      Reps 2  15  -GC      Time 2  5 secs  -GC         Exercise 3    Exercise Name 3  Pulley-FLEX  -GC      Cueing 3  Verbal  -GC      Time 3  5 min  -GC         Exercise 4    Exercise Name 4  Pulley-Scaption  -GC      Cueing 4  Verbal  -GC      Time 4  5 min  -GC        User Key  (r) = Recorded By, (t) = Taken By, (c) = Cosigned By    Initials Name Provider Type    GC Deny High, PT Physical Therapist                      Manual Rx (last 36 hours)      Manual Treatments     Row Name 09/18/19 1230             Manual Rx 1    Manual Rx 1 Location  right shoulder  -GC      Manual Rx 1 Type  GH joint mobilizations  -GC       Manual Rx 1 Grade  grade II  -GC      Manual Rx 1 Duration  5 min  -GC         Manual Rx 2    Manual Rx 2 Location  right shoulder  -GC      Manual Rx 2 Type  PROM in FLEX-ABD-ER-IR  -GC      Manual Rx 2 Duration  15 min  -GC        User Key  (r) = Recorded By, (t) = Taken By, (c) = Cosigned By    Initials Name Provider Type    GC Deny High, PT Physical Therapist                             Time Calculation:   Start Time: 1230  Stop Time: 1333  Time Calculation (min): 63 min  Therapy Charges for Today     Code Description Service Date Service Provider Modifiers Qty    58693478077  PT MANUAL THERAPY EA 15 MIN 9/18/2019 Deny High, PT GP 1    [id number]  PT THER PROC EA 15 MIN 9/18/2019 Deny High, PT GP 1                    Deny High PT  9/18/2019

## 2019-09-19 ENCOUNTER — HOSPITAL ENCOUNTER (OUTPATIENT)
Dept: MAMMOGRAPHY | Facility: HOSPITAL | Age: 54
Discharge: HOME | End: 2019-09-19
Admitting: OBSTETRICS & GYNECOLOGY

## 2019-09-19 DIAGNOSIS — Z01.419 WELL WOMAN EXAM: ICD-10-CM

## 2019-09-19 PROCEDURE — 77067 SCR MAMMO BI INCL CAD: CPT

## 2019-09-19 PROCEDURE — 77063 BREAST TOMOSYNTHESIS BI: CPT

## 2019-09-23 ENCOUNTER — HOSPITAL ENCOUNTER (OUTPATIENT)
Dept: PHYSICAL THERAPY | Facility: HOSPITAL | Age: 54
Setting detail: THERAPIES SERIES
Discharge: HOME | End: 2019-09-23

## 2019-09-23 ENCOUNTER — HOSPITAL ENCOUNTER (OUTPATIENT)
Dept: MRI IMAGING | Facility: HOSPITAL | Age: 54
Discharge: HOME | End: 2019-09-23
Admitting: NURSE PRACTITIONER

## 2019-09-23 DIAGNOSIS — M75.01 ADHESIVE CAPSULITIS OF RIGHT SHOULDER: ICD-10-CM

## 2019-09-23 DIAGNOSIS — M75.01 ADHESIVE CAPSULITIS OF RIGHT SHOULDER: Primary | ICD-10-CM

## 2019-09-23 PROCEDURE — 97140 MANUAL THERAPY 1/> REGIONS: CPT | Performed by: PHYSICAL THERAPIST

## 2019-09-23 PROCEDURE — 73221 MRI JOINT UPR EXTREM W/O DYE: CPT

## 2019-09-23 NOTE — THERAPY TREATMENT NOTE
Outpatient Physical Therapy Ortho Treatment Note   [place]     Patient Name: Desire Hernandez  : 1965  MRN: [id number]  Today's Date: 2019      Visit Date: 2019    Visit Dx:    ICD-10-CM ICD-9-CM   1. Adhesive capsulitis of right shoulder M75.01 726.0       Patient Active Problem List   Diagnosis   • Menopausal symptoms   • Left breast mass   • Menometrorrhagia   • Adhesive capsulitis of right shoulder        Past Medical History:   Diagnosis Date   • ADHD (attention deficit hyperactivity disorder)     back in high school   • Allergic     seasonal   • ASCUS favor dysplasia     07; 08; 6/25/10; 12; 13; 8/21/15 (HPV negative)   • Blood type, Rh negative    • Bunion     surgery   • Chlamydia     as a teen   • H/O nipple discharge     Bilateral diagnostic mammogram negative   • HSV-1 (herpes simplex virus 1) infection    • HSV-2 (herpes simplex virus 2) infection    • LGSIL (low grade squamous intraepithelial dysplasia) 2006   • Ovarian cyst     Left   • PONV (postoperative nausea and vomiting)         Past Surgical History:   Procedure Laterality Date   • BUNIONECTOMY Right 2016    Dr. Durbin   • CERVICAL CONIZATION, LEEP     • COLPOSCOPY W/ BIOPSY / CURETTAGE  2006    benign   • D&C WITH SUCTION  1998    10 week SAB   • ENDOMETRIAL ABLATION  Dec. 2017   • INDUCED       years ago   • [place] HYSTEROSCOPY,W/ENDOMETRIAL ABLATION N/A 2017    Procedure: HYSTEROSCOPY WITH ENDOMETRIAL ABLATION; d&c;  Surgeon: Sandie Hu DO;  Location: [facility];  Service: Obstetrics/Gynecology       PT Ortho     Row Name 19 4845       Subjective Comments    Subjective Comments  Pt states she is seeing some small gains in her range of motion, especially when reaching up her back.  -BIN      User Key  (r) = Recorded By, (t) = Taken By, (c) = Cosigned By    Initials Name Provider Type    Deny Peralta, PT Physical Therapist                       PT Assessment/Plan     Row Name 09/23/19 1235          PT Assessment    Assessment Comments  Pt is showing increased shoulder ROM both functioinally and with her stretches.  -GC        PT Plan    PT Plan Comments  Pt is to continue her HEP daily.  -GC       User Key  (r) = Recorded By, (t) = Taken By, (c) = Cosigned By    Initials Name Provider Type    GC Deny High, PT Physical Therapist          Modalities     Row Name 09/23/19 1235             Moist Heat    MH Applied  Yes  -GC      Location  right hsoulder with pt supine and roll under knees  -GC      Rx Minutes  10 mins  -GC      MH Prior to Rx  Yes  -GC        User Key  (r) = Recorded By, (t) = Taken By, (c) = Cosigned By    Initials Name Provider Type    GC Deny High, PT Physical Therapist        OP Exercises     Row Name 09/23/19 1236             Subjective Comments    Subjective Comments  Pt states she is seeing some small gains in her range of motion, especially when reaching up her back.  -GC         Exercise 1    Exercise Name 1  Cane stretch in standing for FLEX-ABD-ER  -GC      Cueing 1  Verbal;Demo  -GC      Reps 1  15x each  -GC      Time 1  5 secs   -GC         Exercise 2    Exercise Name 2  IR stretch behind back with towel  -GC      Cueing 2  Verbal;Demo  -GC      Reps 2  15  -GC      Time 2  5 secs  -GC         Exercise 3    Exercise Name 3  Pulley-FLEX  -GC      Cueing 3  Verbal  -GC      Time 3  5 min  -GC         Exercise 4    Exercise Name 4  Pulley-Scaption  -GC      Cueing 4  Verbal  -GC      Time 4  5 min  -GC        User Key  (r) = Recorded By, (t) = Taken By, (c) = Cosigned By    Initials Name Provider Type    GC Deny High, PT Physical Therapist                      Manual Rx (last 36 hours)      Manual Treatments     Row Name 09/23/19 1238             Manual Rx 1    Manual Rx 1 Location  right shoulder  -GC      Manual Rx 1 Type  GH joint mobilizations  -GC      Manual Rx 1 Grade  grade II  -GC       Manual Rx 1 Duration  5 min  -GC         Manual Rx 2    Manual Rx 2 Location  right shoulder  -GC      Manual Rx 2 Type  PROM in FLEX-ABD-ER-IR  -GC      Manual Rx 2 Duration  15 min  -GC        User Key  (r) = Recorded By, (t) = Taken By, (c) = Cosigned By    Initials Name Provider Type     Deny High, PT Physical Therapist                             Time Calculation:   Start Time: 1235  Stop Time: 1322  Time Calculation (min): 47 min  Therapy Charges for Today     Code Description Service Date Service Provider Modifiers Qty    [id number]  PT MANUAL THERAPY EA 15 MIN 9/23/2019 Deny High, PT GP 1                    Deny High, PT  9/23/2019

## 2019-09-26 ENCOUNTER — HOSPITAL ENCOUNTER (OUTPATIENT)
Dept: PHYSICAL THERAPY | Facility: HOSPITAL | Age: 54
Setting detail: THERAPIES SERIES
Discharge: HOME | End: 2019-09-26

## 2019-09-26 DIAGNOSIS — M75.01 ADHESIVE CAPSULITIS OF RIGHT SHOULDER: Primary | ICD-10-CM

## 2019-09-26 PROCEDURE — 97140 MANUAL THERAPY 1/> REGIONS: CPT | Performed by: PHYSICAL THERAPIST

## 2019-09-26 PROCEDURE — 97110 THERAPEUTIC EXERCISES: CPT | Performed by: PHYSICAL THERAPIST

## 2019-09-26 NOTE — THERAPY TREATMENT NOTE
Outpatient Physical Therapy Ortho Treatment Note   [place]     Patient Name: Desire Hernandez  : 1965  MRN: [id number]  Today's Date: 2019      Visit Date: 2019    Visit Dx:    ICD-10-CM ICD-9-CM   1. Adhesive capsulitis of right shoulder M75.01 726.0       Patient Active Problem List   Diagnosis   • Menopausal symptoms   • Left breast mass   • Menometrorrhagia   • Adhesive capsulitis of right shoulder        Past Medical History:   Diagnosis Date   • ADHD (attention deficit hyperactivity disorder)     back in high school   • Allergic     seasonal   • ASCUS favor dysplasia     07; 08; 6/25/10; 12; 13; 8/21/15 (HPV negative)   • Blood type, Rh negative    • Bunion     surgery   • Chlamydia     as a teen   • H/O nipple discharge     Bilateral diagnostic mammogram negative   • HSV-1 (herpes simplex virus 1) infection    • HSV-2 (herpes simplex virus 2) infection    • LGSIL (low grade squamous intraepithelial dysplasia) 2006   • Ovarian cyst     Left   • PONV (postoperative nausea and vomiting)         Past Surgical History:   Procedure Laterality Date   • BUNIONECTOMY Right 2016    Dr. Durbin   • CERVICAL CONIZATION, LEEP     • COLPOSCOPY W/ BIOPSY / CURETTAGE  2006    benign   • D&C WITH SUCTION  1998    10 week SAB   • ENDOMETRIAL ABLATION  Dec. 2017   • INDUCED       years ago   • [place] HYSTEROSCOPY,W/ENDOMETRIAL ABLATION N/A 2017    Procedure: HYSTEROSCOPY WITH ENDOMETRIAL ABLATION; d&c;  Surgeon: Sandie Hu DO;  Location: [facility] OR;  Service: Obstetrics/Gynecology       PT Ortho     Row Name 19 3563       Subjective Comments    Subjective Comments  Pt states her shoulder is still really stiff.  -BIN      User Key  (r) = Recorded By, (t) = Taken By, (c) = Cosigned By    Initials Name Provider Type    Deny Peralta PT Physical Therapist                      PT Assessment/Plan     Row Name 19 2349           PT Assessment    Assessment Comments  Pt continues to make slow gains in her shoulder ROM.  -GC        PT Plan    PT Plan Comments  Pt is to continue her HEP 2x daily.  -GC       User Key  (r) = Recorded By, (t) = Taken By, (c) = Cosigned By    Initials Name Provider Type    GC Deny High, PT Physical Therapist          Modalities     Row Name 09/26/19 1430             Moist Heat    MH Applied  Yes  -GC      Location  right shoulder with pt supine and roll under knees  -GC      Rx Minutes  10 mins  -GC      MH Prior to Rx  Yes  -GC        User Key  (r) = Recorded By, (t) = Taken By, (c) = Cosigned By    Initials Name Provider Type    GC Deny High, PT Physical Therapist        OP Exercises     Row Name 09/26/19 1430             Subjective Comments    Subjective Comments  Pt states her shoulder is still really stiff.  -GC         Exercise 1    Exercise Name 1  Cane stretch in standing for FLEX-ABD-ER  -GC      Cueing 1  Verbal;Demo  -GC      Reps 1  15x each  -GC      Time 1  5 secs   -GC         Exercise 2    Exercise Name 2  IR stretch behind back with towel  -GC      Cueing 2  Verbal;Demo  -GC      Reps 2  15  -GC      Time 2  5 secs  -GC         Exercise 3    Exercise Name 3  Pulley-FLEX  -GC      Cueing 3  Verbal  -GC      Time 3  5 min  -GC         Exercise 4    Exercise Name 4  Pulley-Scaption  -GC      Cueing 4  Verbal  -GC      Time 4  5 min  -GC        User Key  (r) = Recorded By, (t) = Taken By, (c) = Cosigned By    Initials Name Provider Type    GC Deny High, PT Physical Therapist                      Manual Rx (last 36 hours)      Manual Treatments     Row Name 09/26/19 1430             Manual Rx 1    Manual Rx 1 Location  right shoulder  -GC      Manual Rx 1 Type  GH joint mobilizations  -GC      Manual Rx 1 Grade  grade II  -GC      Manual Rx 1 Duration  5 min  -GC         Manual Rx 2    Manual Rx 2 Location  right shoulder  -GC      Manual Rx 2 Type  PROM in FLEX-ABD-ER-IR  -GC       Manual Rx 2 Duration  15 min  -        User Key  (r) = Recorded By, (t) = Taken By, (c) = Cosigned By    Initials Name Provider Type    GC Deny High, PT Physical Therapist                             Time Calculation:   Start Time: 1430  Stop Time: 1526  Time Calculation (min): 56 min  Therapy Charges for Today     Code Description Service Date Service Provider Modifiers Qty    21562543591  PT MANUAL THERAPY EA 15 MIN 9/26/2019 Deny High, PT GP 1    [id number]  PT THER PROC EA 15 MIN 9/26/2019 Deny High, PT GP 1                    Deny High PT  9/26/2019

## 2019-09-30 ENCOUNTER — HOSPITAL ENCOUNTER (OUTPATIENT)
Dept: PHYSICAL THERAPY | Facility: HOSPITAL | Age: 54
Setting detail: THERAPIES SERIES
Discharge: HOME | End: 2019-09-30

## 2019-09-30 DIAGNOSIS — M75.01 ADHESIVE CAPSULITIS OF RIGHT SHOULDER: Primary | ICD-10-CM

## 2019-09-30 PROCEDURE — 97110 THERAPEUTIC EXERCISES: CPT | Performed by: PHYSICAL THERAPIST

## 2019-09-30 PROCEDURE — 97140 MANUAL THERAPY 1/> REGIONS: CPT | Performed by: PHYSICAL THERAPIST

## 2019-10-01 ENCOUNTER — OFFICE VISIT (OUTPATIENT)
Dept: ORTHOPEDIC SURGERY | Facility: CLINIC | Age: 54
End: 2019-10-01

## 2019-10-01 VITALS — BODY MASS INDEX: 23.05 KG/M2 | HEIGHT: 64 IN | WEIGHT: 135 LBS

## 2019-10-01 DIAGNOSIS — M75.01 ADHESIVE CAPSULITIS OF RIGHT SHOULDER: Primary | ICD-10-CM

## 2019-10-01 PROCEDURE — 99212 OFFICE O/P EST SF 10 MIN: CPT | Performed by: NURSE PRACTITIONER

## 2019-10-01 NOTE — PROGRESS NOTES
Subjective:     Patient ID: Desire Hernandez is a 54 y.o. female.    Chief Complaint:  Follow-up adhesive capsulitis, right shouder  History of Present Illness  Desire Hernandez presents back to clinic for one month follow-up right shoulder. She has continued with physical therapy, progressing very little. She has continued meloxicam has recently began noticing rash with medication.  Denies presence of numbness/tingling right upper extremity. Continues to have difficulty and restricted motion with lifting up above head, abduction, external rotation and internal rotation with right upper extremity. Denies all other concerns present at this time.     Social History     Occupational History     Employer: [employer]   Tobacco Use   • Smoking status: Never Smoker   • Smokeless tobacco: Never Used   Substance and Sexual Activity   • Alcohol use: Yes     Types: 1 Glasses of wine, 1 Standard drinks or equivalent per week     Comment: occ   • Drug use: No   • Sexual activity: Yes     Partners: Male     Birth control/protection: None      Past Medical History:   Diagnosis Date   • ADHD (attention deficit hyperactivity disorder)     back in high school   • Allergic     seasonal   • ASCUS favor dysplasia     6/4/07; 6/5/08; 6/25/10; 6/27/12; 7/29/13; 8/21/15 (HPV negative)   • Blood type, Rh negative    • Bunion 2016    surgery   • Chlamydia     as a teen   • H/O nipple discharge 9/415    Bilateral diagnostic mammogram negative   • HSV-1 (herpes simplex virus 1) infection    • HSV-2 (herpes simplex virus 2) infection 1993   • LGSIL (low grade squamous intraepithelial dysplasia) 05/26/2006   • Ovarian cyst     Left   • PONV (postoperative nausea and vomiting)      Past Surgical History:   Procedure Laterality Date   • BUNIONECTOMY Right 2016    Dr. Durbin   • CERVICAL CONIZATION, LEEP  1996   • COLPOSCOPY W/ BIOPSY / CURETTAGE  07/17/2006    benign   • D&C WITH SUCTION  06/17/1998    10 week SAB   • ENDOMETRIAL  ABLATION  Dec. 2017   • INDUCED       years ago   • [place] HYSTEROSCOPY,W/ENDOMETRIAL ABLATION N/A 2017    Procedure: HYSTEROSCOPY WITH ENDOMETRIAL ABLATION; d&c;  Surgeon: Sandie Hu DO;  Location: [facility];  Service: Obstetrics/Gynecology       Family History   Problem Relation Age of Onset   • Hearing loss Father    • Hyperlipidemia Father         DX in his mid 50's   • No Known Problems Brother    • Arthritis Sister         RA   • No Known Problems Son    • Hearing loss Maternal Grandmother    • Hyperlipidemia Maternal Grandmother         DX in her late 70's   • Stroke Maternal Grandmother    • Lung cancer Maternal Grandfather    • Cancer Maternal Grandfather         lung cancer - smoker   • Lung cancer Paternal Uncle    • Cancer Paternal Uncle         lung cancer - smoker   • Breast cancer Neg Hx    • Ovarian cancer Neg Hx    • Uterine cancer Neg Hx    • Colon cancer Neg Hx    • Melanoma Neg Hx          Review of Systems   Constitutional: Negative for chills, diaphoresis and unexpected weight change.   HENT: Negative for hearing loss, nosebleeds, sore throat and tinnitus.    Eyes: Negative for pain and visual disturbance.   Respiratory: Negative for cough, shortness of breath and wheezing.    Cardiovascular: Negative for chest pain and palpitations.   Gastrointestinal: Negative for abdominal pain, diarrhea, nausea and vomiting.   Endocrine: Negative for cold intolerance, heat intolerance and polydipsia.   Genitourinary: Negative for difficulty urinating, dyspareunia and hematuria.   Musculoskeletal: Positive for joint swelling and myalgias. Negative for arthralgias.   Skin: Negative for rash and wound.   Allergic/Immunologic: Negative for environmental allergies.   Neurological: Negative for dizziness, syncope and numbness.   Hematological: Does not bruise/bleed easily.   Psychiatric/Behavioral: Negative for dysphoric mood and sleep disturbance. The patient is not nervous/anxious.         "    Objective:  Physical Exam    General: No acute distress.  Eyes: conjunctiva clear; pupils equally round and reactive  ENT: external ears and nose atraumatic; oropharynx clear  CV: no peripheral edema  Resp: normal respiratory effort  Skin: no rashes or wounds; normal turgor  Psych: mood and affect appropriate; recent and remote memory intact    Vitals:    10/01/19 1541   Weight: 61.2 kg (135 lb)   Height: 162.6 cm (64\")         10/01/19  1541   Weight: 61.2 kg (135 lb)     Body mass index is 23.17 kg/m².      Ortho Exam     FF: 121 degrees  ER: 33 degrees  Positive sensation light touch all distributions right upper extremity  Flex/extend elbow 0 degrees-120 degrees  2+ distal radial pulse    Assessment:        1. Adhesive capsulitis of right shoulder           Plan:  1. Discussed plan of care with patient. She is ready to proceed with right shoulder manipulation with Dr. Liu. Will call her with appointment. Patient verbalized understanding of all information and agrees with plan of care. Denies all other concerns present at this time.     Orders:  No orders of the defined types were placed in this encounter.    Dictated utilizing Dragon dictation   "

## 2019-11-13 ENCOUNTER — LAB (OUTPATIENT)
Dept: SPORTS MEDICINE | Facility: CLINIC | Age: 54
End: 2019-11-13

## 2019-11-13 DIAGNOSIS — Z00.00 ANNUAL PHYSICAL EXAM: Primary | ICD-10-CM

## 2019-11-14 LAB
ALBUMIN SERPL-MCNC: 4.7 G/DL (ref 3.5–5.2)
ALBUMIN/GLOB SERPL: 1.9 G/DL
ALP SERPL-CCNC: 77 U/L (ref 39–117)
ALT SERPL-CCNC: 16 U/L (ref 1–33)
APPEARANCE UR: CLEAR
AST SERPL-CCNC: 16 U/L (ref 1–32)
BACTERIA #/AREA URNS HPF: ABNORMAL /HPF
BASOPHILS # BLD AUTO: 0.04 10*3/MM3 (ref 0–0.2)
BASOPHILS NFR BLD AUTO: 0.6 % (ref 0–1.5)
BILIRUB SERPL-MCNC: 0.4 MG/DL (ref 0.2–1.2)
BILIRUB UR QL STRIP: NEGATIVE
BUN SERPL-MCNC: 16 MG/DL (ref 6–20)
BUN/CREAT SERPL: 16.2 (ref 7–25)
CALCIUM SERPL-MCNC: 9.8 MG/DL (ref 8.6–10.5)
CASTS URNS MICRO: ABNORMAL
CHLORIDE SERPL-SCNC: 102 MMOL/L (ref 98–107)
CHOLEST SERPL-MCNC: 220 MG/DL (ref 0–200)
CO2 SERPL-SCNC: 27.2 MMOL/L (ref 22–29)
COLOR UR: YELLOW
CREAT SERPL-MCNC: 0.99 MG/DL (ref 0.57–1)
EOSINOPHIL # BLD AUTO: 0.11 10*3/MM3 (ref 0–0.4)
EOSINOPHIL NFR BLD AUTO: 1.8 % (ref 0.3–6.2)
EPI CELLS #/AREA URNS HPF: ABNORMAL /HPF
ERYTHROCYTE [DISTWIDTH] IN BLOOD BY AUTOMATED COUNT: 12.9 % (ref 12.3–15.4)
GLOBULIN SER CALC-MCNC: 2.5 GM/DL
GLUCOSE SERPL-MCNC: 92 MG/DL (ref 65–99)
GLUCOSE UR QL: NEGATIVE
HCT VFR BLD AUTO: 42.4 % (ref 34–46.6)
HDLC SERPL-MCNC: 99 MG/DL (ref 40–60)
HGB BLD-MCNC: 14.1 G/DL (ref 12–15.9)
HGB UR QL STRIP: ABNORMAL
IMM GRANULOCYTES # BLD AUTO: 0.02 10*3/MM3 (ref 0–0.05)
IMM GRANULOCYTES NFR BLD AUTO: 0.3 % (ref 0–0.5)
KETONES UR QL STRIP: NEGATIVE
LDLC SERPL CALC-MCNC: 112 MG/DL (ref 0–100)
LDLC/HDLC SERPL: 1.13 {RATIO}
LEUKOCYTE ESTERASE UR QL STRIP: NEGATIVE
LYMPHOCYTES # BLD AUTO: 2.3 10*3/MM3 (ref 0.7–3.1)
LYMPHOCYTES NFR BLD AUTO: 36.6 % (ref 19.6–45.3)
MCH RBC QN AUTO: 30.7 PG (ref 26.6–33)
MCHC RBC AUTO-ENTMCNC: 33.3 G/DL (ref 31.5–35.7)
MCV RBC AUTO: 92.4 FL (ref 79–97)
MONOCYTES # BLD AUTO: 0.43 10*3/MM3 (ref 0.1–0.9)
MONOCYTES NFR BLD AUTO: 6.8 % (ref 5–12)
NEUTROPHILS # BLD AUTO: 3.38 10*3/MM3 (ref 1.7–7)
NEUTROPHILS NFR BLD AUTO: 53.9 % (ref 42.7–76)
NITRITE UR QL STRIP: NEGATIVE
NRBC BLD AUTO-RTO: 0 /100 WBC (ref 0–0.2)
PH UR STRIP: 5.5 [PH] (ref 5–8)
PLATELET # BLD AUTO: 236 10*3/MM3 (ref 140–450)
POTASSIUM SERPL-SCNC: 4.6 MMOL/L (ref 3.5–5.2)
PROT SERPL-MCNC: 7.2 G/DL (ref 6–8.5)
PROT UR QL STRIP: NEGATIVE
RBC # BLD AUTO: 4.59 10*6/MM3 (ref 3.77–5.28)
RBC #/AREA URNS HPF: ABNORMAL /HPF
SODIUM SERPL-SCNC: 141 MMOL/L (ref 136–145)
SP GR UR: 1.02 (ref 1–1.03)
TRIGL SERPL-MCNC: 45 MG/DL (ref 0–150)
TSH SERPL DL<=0.005 MIU/L-ACNC: 2.55 UIU/ML (ref 0.45–4.5)
UROBILINOGEN UR STRIP-MCNC: ABNORMAL MG/DL
VLDLC SERPL CALC-MCNC: 9 MG/DL
WBC # BLD AUTO: 6.28 10*3/MM3 (ref 3.4–10.8)
WBC #/AREA URNS HPF: ABNORMAL /HPF

## 2019-11-19 ENCOUNTER — OFFICE VISIT (OUTPATIENT)
Dept: SPORTS MEDICINE | Facility: CLINIC | Age: 54
End: 2019-11-19

## 2019-11-19 ENCOUNTER — RESULTS ENCOUNTER (OUTPATIENT)
Dept: SPORTS MEDICINE | Facility: CLINIC | Age: 54
End: 2019-11-19

## 2019-11-19 VITALS
BODY MASS INDEX: 22.53 KG/M2 | OXYGEN SATURATION: 98 % | SYSTOLIC BLOOD PRESSURE: 100 MMHG | DIASTOLIC BLOOD PRESSURE: 64 MMHG | WEIGHT: 132 LBS | HEIGHT: 64 IN | HEART RATE: 62 BPM

## 2019-11-19 DIAGNOSIS — Z00.00 ANNUAL PHYSICAL EXAM: ICD-10-CM

## 2019-11-19 DIAGNOSIS — Z00.00 ANNUAL PHYSICAL EXAM: Primary | ICD-10-CM

## 2019-11-19 PROCEDURE — 99396 PREV VISIT EST AGE 40-64: CPT | Performed by: FAMILY MEDICINE

## 2019-11-19 RX ORDER — TIZANIDINE 2 MG/1
2 TABLET ORAL EVERY 8 HOURS PRN
Qty: 30 TABLET | Refills: 1 | Status: SHIPPED | OUTPATIENT
Start: 2019-11-19 | End: 2021-06-17 | Stop reason: SDUPTHER

## 2019-11-19 NOTE — PROGRESS NOTES
"Desire Hernandez is here today for an annual physical exam.     Eating a healthy diet. Exercising routinely.   Still struggling with R shoulder and arm issues. Req refill tizanidine for prn use.     PHQ-2 Depression Screening  Little interest or pleasure in doing things? 0   Feeling down, depressed, or hopeless? 0   PHQ-2 Total Score 0         Health Maintenance   Topic Date Due   • COLONOSCOPY  08/25/2016   • ZOSTER VACCINE (1 of 2) 06/09/2017   • INFLUENZA VACCINE  08/01/2019   • PT PLAN OF CARE  12/04/2019   • Annual Gynecologic Pelvic and Breast Exam  09/11/2020   • ANNUAL PHYSICAL  11/20/2020   • MAMMOGRAM  09/19/2021   • PAP SMEAR  09/10/2022   • TDAP/TD VACCINES (2 - Td) 09/29/2027       Review of Systems   Constitutional: Negative.    Respiratory: Negative.    Cardiovascular: Negative.    Musculoskeletal: Positive for arthralgias and myalgias.       /64   Pulse 62   Ht 162.6 cm (64.02\")   Wt 59.9 kg (132 lb)   SpO2 98%   BMI 22.65 kg/m²      Physical Exam    Vital signs reviewed.  General appearance: No acute distress  Eyes: conjunctiva clear without erythema; pupils equally round and reactive  ENT: external ears and nose normal; hearing normal, oropharynx clear  Neck: supple; no thyromegaly  CV: normal rate and rhythm; no peripheral edema  Respiratory: normal respiratory effort; lungs clear to auscultation bilaterally  MSK: normal gait and station; no focal joint deformity or swelling  Skin: no rash or wounds; normal turgor  Neuro: cranial nerves 2-12 grossly intact; normal sensation to light touch  Psych: mood and affect normal; recent and remote memory intact    Lab on 11/13/2019   Component Date Value Ref Range Status   • WBC 11/13/2019 6.28  3.40 - 10.80 10*3/mm3 Final   • RBC 11/13/2019 4.59  3.77 - 5.28 10*6/mm3 Final   • Hemoglobin 11/13/2019 14.1  12.0 - 15.9 g/dL Final   • Hematocrit 11/13/2019 42.4  34.0 - 46.6 % Final   • MCV 11/13/2019 92.4  79.0 - 97.0 fL Final   • MCH 11/13/2019 " 30.7  26.6 - 33.0 pg Final   • MCHC 11/13/2019 33.3  31.5 - 35.7 g/dL Final   • RDW 11/13/2019 12.9  12.3 - 15.4 % Final   • Platelets 11/13/2019 236  140 - 450 10*3/mm3 Final   • Neutrophil Rel % 11/13/2019 53.9  42.7 - 76.0 % Final   • Lymphocyte Rel % 11/13/2019 36.6  19.6 - 45.3 % Final   • Monocyte Rel % 11/13/2019 6.8  5.0 - 12.0 % Final   • Eosinophil Rel % 11/13/2019 1.8  0.3 - 6.2 % Final   • Basophil Rel % 11/13/2019 0.6  0.0 - 1.5 % Final   • Neutrophils Absolute 11/13/2019 3.38  1.70 - 7.00 10*3/mm3 Final   • Lymphocytes Absolute 11/13/2019 2.30  0.70 - 3.10 10*3/mm3 Final   • Monocytes Absolute 11/13/2019 0.43  0.10 - 0.90 10*3/mm3 Final   • Eosinophils Absolute 11/13/2019 0.11  0.00 - 0.40 10*3/mm3 Final   • Basophils Absolute 11/13/2019 0.04  0.00 - 0.20 10*3/mm3 Final   • Immature Granulocyte Rel % 11/13/2019 0.3  0.0 - 0.5 % Final   • Immature Grans Absolute 11/13/2019 0.02  0.00 - 0.05 10*3/mm3 Final   • nRBC 11/13/2019 0.0  0.0 - 0.2 /100 WBC Final   • Glucose 11/13/2019 92  65 - 99 mg/dL Final   • BUN 11/13/2019 16  6 - 20 mg/dL Final   • Creatinine 11/13/2019 0.99  0.57 - 1.00 mg/dL Final   • eGFR Non  Am 11/13/2019 59* >60 mL/min/1.73 Final   • eGFR African Am 11/13/2019 71  >60 mL/min/1.73 Final   • BUN/Creatinine Ratio 11/13/2019 16.2  7.0 - 25.0 Final   • Sodium 11/13/2019 141  136 - 145 mmol/L Final   • Potassium 11/13/2019 4.6  3.5 - 5.2 mmol/L Final   • Chloride 11/13/2019 102  98 - 107 mmol/L Final   • Total CO2 11/13/2019 27.2  22.0 - 29.0 mmol/L Final   • Calcium 11/13/2019 9.8  8.6 - 10.5 mg/dL Final   • Total Protein 11/13/2019 7.2  6.0 - 8.5 g/dL Final   • Albumin 11/13/2019 4.70  3.50 - 5.20 g/dL Final   • Globulin 11/13/2019 2.5  gm/dL Final   • A/G Ratio 11/13/2019 1.9  g/dL Final   • Total Bilirubin 11/13/2019 0.4  0.2 - 1.2 mg/dL Final   • Alkaline Phosphatase 11/13/2019 77  39 - 117 U/L Final   • AST (SGOT) 11/13/2019 16  1 - 32 U/L Final   • ALT (SGPT) 11/13/2019 16  1  - 33 U/L Final   • Total Cholesterol 11/13/2019 220* 0 - 200 mg/dL Final   • Triglycerides 11/13/2019 45  0 - 150 mg/dL Final   • HDL Cholesterol 11/13/2019 99* 40 - 60 mg/dL Final   • VLDL Cholesterol 11/13/2019 9  mg/dL Final   • LDL Cholesterol  11/13/2019 112* 0 - 100 mg/dL Final   • LDL/HDL Ratio 11/13/2019 1.13   Final   • Specific Gravity, UA 11/13/2019 1.020  1.005 - 1.030 Final   • pH, UA 11/13/2019 5.5  5.0 - 8.0 Final   • Color, UA 11/13/2019 Yellow   Final    REFERENCE RANGE: Yellow, Straw   • Appearance, UA 11/13/2019 Clear  Clear Final   • Leukocytes, UA 11/13/2019 Negative  Negative Final   • Protein 11/13/2019 Negative  Negative Final   • Glucose, UA 11/13/2019 Negative  Negative Final   • Ketones 11/13/2019 Negative  Negative Final   • Blood, UA 11/13/2019 Trace* Negative Final   • Bilirubin, UA 11/13/2019 Negative  Negative Final   • Urobilinogen, UA 11/13/2019 Comment   Final    Comment: 0.2 E.U./dL  REFERENCE RANGE: 0.2 - 1.0 E.U./dL     • Nitrite, UA 11/13/2019 Negative  Negative Final   • TSH 11/13/2019 2.550  0.450 - 4.500 uIU/mL Final   • WBC, UA 11/13/2019 0-2  /HPF Final    REFERENCE RANGE: None Seen, 0-2   • RBC, UA 11/13/2019 0-2  /HPF Final    REFERENCE RANGE: None Seen, 0-2   • Epithelial Cells (non renal) 11/13/2019 0-2  /HPF Final    REFERENCE RANGE: None Seen, 0-2   • Cast Type 11/13/2019 Comment   Final    HYALINE CASTS, UA            None Seen        /LPF       None Seen  N   • Bacteria, UA 11/13/2019 2+* None Seen /HPF Final         Current Outpatient Medications:   •  PARoxetine (PAXIL) 10 MG tablet, Take 1 tablet by mouth Every Morning., Disp: 90 tablet, Rfl: 3    Desire was seen today for annual exam.    Diagnoses and all orders for this visit:    Annual physical exam  -     Cologuard - Stool, Per Rectum; Future        Encourage healthy diet and exercise.  Encourage patient to stay up to date on screening examinations as indicated based on age and risk factors.  Reviewed mild  high lipids, rec diet changes.   Discussed colon cancer screening.  Explained to the patient that colonoscopy remains the gold standard for colon cancer screening, it is the clear standard for patients with increased risk.  For patients at low/average risk, Cologuard screening every 3 years is a viable secondary option.  For this to be an option, the patient must agree to follow-up with colonoscopy for any positive results.     EMR Dragon/Transcription disclaimer:    Much of this encounter note is an electronic transcription/translation of spoken language to printed text.  The electronic translation of spoken language may permit erroneous, or at times, nonsensical words or phrases to be inadvertently transcribed.  Although I have reviewed the note for such errors some may still exist.

## 2019-12-09 ENCOUNTER — OFFICE VISIT (OUTPATIENT)
Dept: ORTHOPEDIC SURGERY | Facility: CLINIC | Age: 54
End: 2019-12-09

## 2019-12-09 VITALS — HEIGHT: 64 IN | WEIGHT: 130 LBS | BODY MASS INDEX: 22.2 KG/M2

## 2019-12-09 DIAGNOSIS — M75.01 ADHESIVE CAPSULITIS OF RIGHT SHOULDER: Primary | ICD-10-CM

## 2019-12-09 PROCEDURE — 99204 OFFICE O/P NEW MOD 45 MIN: CPT | Performed by: ORTHOPAEDIC SURGERY

## 2019-12-09 RX ORDER — MELOXICAM 7.5 MG/1
15 TABLET ORAL ONCE
Status: CANCELLED | OUTPATIENT
Start: 2019-12-09 | End: 2019-12-09

## 2019-12-09 RX ORDER — ACETAMINOPHEN 325 MG/1
1000 TABLET ORAL ONCE
Status: CANCELLED | OUTPATIENT
Start: 2019-12-09 | End: 2019-12-09

## 2019-12-09 RX ORDER — PREGABALIN 150 MG/1
150 CAPSULE ORAL ONCE
Status: CANCELLED | OUTPATIENT
Start: 2019-12-09 | End: 2019-12-09

## 2019-12-09 NOTE — PROGRESS NOTES
Subjective:     Patient ID: Desire Hernandez is a 54 y.o. female.    Chief Complaint:  Right shoulder pain and limited motion, new issue to examiner  History of Present Illness  Desire Hernandez presents to clinic today for evaluation of right shoulder limitation of motion and significant pain that is been present since the end of April 2019.  Patient had an IV placed in the emergency department at that time and had some residual issues with pain and irritation of her right anterior elbow.  Secondary to this, she states that she limited her motion of her shoulder and then started to notice over the subsequent several months that she was getting increasing stiffness and pain to her shoulder.  She has worked with physical therapy and has had an intra-articular injection to her right shoulder with minimal improvement and persistent stiffness particular on forward flexion and external rotation.  Rates associated pain as 4-5 out of 10, describes as aching and stabbing in nature, denies radiation of pain, denies associated numbness or tingling right upper extremity.  Denies prior issues with stiffness.  She is right-hand dominant.  Denies history of diabetes.     Social History     Occupational History     Employer: [employer]   Tobacco Use   • Smoking status: Never Smoker   • Smokeless tobacco: Never Used   Substance and Sexual Activity   • Alcohol use: Yes     Types: 1 Glasses of wine, 1 Standard drinks or equivalent per week     Comment: occ   • Drug use: No   • Sexual activity: Yes     Partners: Male     Birth control/protection: None      Past Medical History:   Diagnosis Date   • ADHD (attention deficit hyperactivity disorder)     back in high school   • Allergic     seasonal   • ASCUS favor dysplasia     6/4/07; 6/5/08; 6/25/10; 6/27/12; 7/29/13; 8/21/15 (HPV negative)   • Blood type, Rh negative    • Bunion 2016    surgery   • Chlamydia     as a teen   • H/O nipple discharge 9/415    Bilateral  diagnostic mammogram negative   • HSV-1 (herpes simplex virus 1) infection    • HSV-2 (herpes simplex virus 2) infection    • LGSIL (low grade squamous intraepithelial dysplasia) 2006   • Ovarian cyst     Left   • PONV (postoperative nausea and vomiting)      Past Surgical History:   Procedure Laterality Date   • BUNIONECTOMY Right 2016    Dr. Durbin   • CERVICAL CONIZATION, LEEP     • COLPOSCOPY W/ BIOPSY / CURETTAGE  2006    benign   • D&C WITH SUCTION  1998    10 week SAB   • ENDOMETRIAL ABLATION  Dec. 2017   • INDUCED       years ago   • CO HYSTEROSCOPY,W/ENDOMETRIAL ABLATION N/A 2017    Procedure: HYSTEROSCOPY WITH ENDOMETRIAL ABLATION; d&c;  Surgeon: Sandie Hu DO;  Location: [facility];  Service: Obstetrics/Gynecology       Family History   Problem Relation Age of Onset   • Hearing loss Father    • Hyperlipidemia Father         DX in his mid 50's   • No Known Problems Brother    • Arthritis Sister         RA   • No Known Problems Son    • Hearing loss Maternal Grandmother    • Hyperlipidemia Maternal Grandmother         DX in her late 70's   • Stroke Maternal Grandmother    • Lung cancer Maternal Grandfather    • Cancer Maternal Grandfather         lung cancer - smoker   • Lung cancer Paternal Uncle    • Cancer Paternal Uncle         lung cancer - smoker   • Breast cancer Neg Hx    • Ovarian cancer Neg Hx    • Uterine cancer Neg Hx    • Colon cancer Neg Hx    • Melanoma Neg Hx          Review of Systems   Constitutional: Negative for chills, diaphoresis, fever and unexpected weight change.   HENT: Negative for hearing loss, nosebleeds, sore throat and tinnitus.    Eyes: Negative for pain and visual disturbance.   Respiratory: Negative for cough, shortness of breath and wheezing.    Cardiovascular: Negative for chest pain and palpitations.   Gastrointestinal: Negative for abdominal pain, diarrhea, nausea and vomiting.   Endocrine: Negative for cold intolerance, heat  "intolerance and polydipsia.   Genitourinary: Negative for difficulty urinating, dysuria and hematuria.   Musculoskeletal: Positive for arthralgias and myalgias. Negative for joint swelling.   Skin: Negative for rash and wound.   Allergic/Immunologic: Negative for environmental allergies.   Neurological: Negative for dizziness, syncope and numbness.   Hematological: Does not bruise/bleed easily.   Psychiatric/Behavioral: Negative for dysphoric mood and sleep disturbance. The patient is not nervous/anxious.            Objective:  Vitals:    12/09/19 0834   Weight: 59 kg (130 lb)   Height: 162.6 cm (64\")         12/09/19 0834   Weight: 59 kg (130 lb)     Body mass index is 22.31 kg/m².  Physical Exam    Vital signs reviewed.   General: No acute distress, alert and oriented  Eyes: conjunctiva clear; pupils equally round and reactive  ENT: external ears and nose atraumatic; oropharynx clear  CV: no peripheral edema  Resp: normal respiratory effort  Skin: no rashes or wounds; normal turgor  Psych: mood and affect appropriate; recent and remote memory intact          Ortho Exam     Neck -  negative midline or paraspinal tenderness              negative Spurling exam      right shoulder-  Tenderness  located anterior, lateral  FF-   Active- 110    Passive- 120   Strength- 4/5  ER-      Active- 10   Passive 15   Strength- 4+/5  IR        L5    Strength- 4+/5  Bear hug sign-negative  Empty can test- negative    Drop arm test- negative  Ext rotation lag sign- negative    Neer's sign- positive  Amaral- positive    Cross arm test- negative  Tenderness over AC joint- negative    Yergason- negative  Speeds- negative  O'briens- negative    Brisk cap refill to all digits, palpable radial pulse    Positive sensation to light touch palmar, dorsal aspects of small and index fingers and anatomic snuffbox right hand      Imaging:  Review of prior MRI right shoulder from September 2019 including review of images as well as radiology " report indicates no evidence of full-thickness or partial-thickness rotator cuff tear, pericapsular edema could be consistent with adhesive capsulitis, no evidence of SLAP lesion, no evidence of focal chondral defect.  No comparison MRI noted.    Assessment:        1. Adhesive capsulitis of right shoulder           Plan:          1. Discussed treatment options at length with patient at today's visit.  She has failed conservative treatments including physical therapy, intra-articular injection, anti-inflammatory medications, and home exercise program.  Given persistent stiffness as well as pain she does wish to proceed with surgical treatment at this time.  Plan will be for right shoulder manipulation under anesthesia, arthroscopic lysis of adhesions, possible subacromial decompression, and all associated procedures. I reviewed risks benefits and alternatives the procedure with risks including not limited to neurovascular damage, bleeding, infection, chronic pain, loss of motion, weakness, stiffness, instability, DVT, pulmonary embolus, death, stroke, complex regional pain syndrome, myocardial infarction, need for additional procedures. She understood all these had all questions answered.  Patient verbally consented to proceed with surgery.  No guarantees were given regarding results of surgery.  We will have patient medically optimized by primary care physician if necessary and proceed with surgery at next available date.  Patient denies history of DVT, cardiac issues. Denies history of diabetes.       Desire Hernandez was in agreement with plan and had all questions answered.     Orders:  Orders Placed This Encounter   Procedures   • Basic metabolic panel   • Protime-INR   • APTT   • NPO After Midnight   • Follow Anesthesia Guidelines / Standing Orders   • Provide instructions to patient regarding NPO status   • Chlorhexidine Skin Prep - Educate and Review With Patient   • CBC and Differential        Medications:  No orders of the defined types were placed in this encounter.      Followup:  No follow-ups on file.    Desire was seen today for pain.    Diagnoses and all orders for this visit:    Adhesive capsulitis of right shoulder  -     Case Request; Standing  -     CBC and Differential; Future  -     Basic metabolic panel; Future  -     Protime-INR; Future  -     APTT; Future  -     Case Request    Other orders  -     Follow Anesthesia Guidelines / Standing Orders; Future  -     Provide instructions to patient regarding NPO status; Future  -     Chlorhexidine Skin Prep - Educate and Review With Patient; Future  -     NPO After Midnight          Dictated utilizing Dragon dictation

## 2020-01-06 ENCOUNTER — APPOINTMENT (OUTPATIENT)
Dept: PREADMISSION TESTING | Facility: HOSPITAL | Age: 55
End: 2020-01-06

## 2020-01-06 VITALS
DIASTOLIC BLOOD PRESSURE: 61 MMHG | OXYGEN SATURATION: 100 % | WEIGHT: 135.8 LBS | BODY MASS INDEX: 23.18 KG/M2 | HEART RATE: 64 BPM | SYSTOLIC BLOOD PRESSURE: 117 MMHG | RESPIRATION RATE: 16 BRPM | HEIGHT: 64 IN

## 2020-01-06 DIAGNOSIS — M75.01 ADHESIVE CAPSULITIS OF RIGHT SHOULDER: ICD-10-CM

## 2020-01-06 LAB
ANION GAP SERPL CALCULATED.3IONS-SCNC: 13.2 MMOL/L (ref 5–15)
APTT PPP: 33.6 SECONDS (ref 24.3–38.1)
BASOPHILS # BLD AUTO: 0.01 10*3/MM3 (ref 0–0.2)
BASOPHILS NFR BLD AUTO: 0.2 % (ref 0–1.5)
BUN BLD-MCNC: 16 MG/DL (ref 6–20)
BUN/CREAT SERPL: 19.5 (ref 7–25)
CALCIUM SPEC-SCNC: 9.3 MG/DL (ref 8.6–10.5)
CHLORIDE SERPL-SCNC: 101 MMOL/L (ref 98–107)
CO2 SERPL-SCNC: 25.8 MMOL/L (ref 22–29)
CREAT BLD-MCNC: 0.82 MG/DL (ref 0.57–1)
DEPRECATED RDW RBC AUTO: 42.2 FL (ref 37–54)
EOSINOPHIL # BLD AUTO: 0.07 10*3/MM3 (ref 0–0.4)
EOSINOPHIL NFR BLD AUTO: 1.1 % (ref 0.3–6.2)
ERYTHROCYTE [DISTWIDTH] IN BLOOD BY AUTOMATED COUNT: 12.1 % (ref 12.3–15.4)
GFR SERPL CREATININE-BSD FRML MDRD: 73 ML/MIN/1.73
GLUCOSE BLD-MCNC: 106 MG/DL (ref 65–99)
HCT VFR BLD AUTO: 43 % (ref 34–46.6)
HGB BLD-MCNC: 13.9 G/DL (ref 12–15.9)
IMM GRANULOCYTES # BLD AUTO: 0 10*3/MM3 (ref 0–0.05)
IMM GRANULOCYTES NFR BLD AUTO: 0 % (ref 0–0.5)
INR PPP: 0.92 (ref 0.9–1.1)
LYMPHOCYTES # BLD AUTO: 2.33 10*3/MM3 (ref 0.7–3.1)
LYMPHOCYTES NFR BLD AUTO: 36.5 % (ref 19.6–45.3)
MCH RBC QN AUTO: 30.5 PG (ref 26.6–33)
MCHC RBC AUTO-ENTMCNC: 32.3 G/DL (ref 31.5–35.7)
MCV RBC AUTO: 94.5 FL (ref 79–97)
MONOCYTES # BLD AUTO: 0.33 10*3/MM3 (ref 0.1–0.9)
MONOCYTES NFR BLD AUTO: 5.2 % (ref 5–12)
NEUTROPHILS # BLD AUTO: 3.64 10*3/MM3 (ref 1.7–7)
NEUTROPHILS NFR BLD AUTO: 57 % (ref 42.7–76)
PLATELET # BLD AUTO: 218 10*3/MM3 (ref 140–450)
PMV BLD AUTO: 9.4 FL (ref 6–12)
POTASSIUM BLD-SCNC: 4.1 MMOL/L (ref 3.5–5.2)
PROTHROMBIN TIME: 12 SECONDS (ref 12.1–15)
RBC # BLD AUTO: 4.55 10*6/MM3 (ref 3.77–5.28)
SODIUM BLD-SCNC: 140 MMOL/L (ref 136–145)
WBC NRBC COR # BLD: 6.38 10*3/MM3 (ref 3.4–10.8)

## 2020-01-06 PROCEDURE — 85025 COMPLETE CBC W/AUTO DIFF WBC: CPT | Performed by: ORTHOPAEDIC SURGERY

## 2020-01-06 PROCEDURE — 85730 THROMBOPLASTIN TIME PARTIAL: CPT | Performed by: ORTHOPAEDIC SURGERY

## 2020-01-06 PROCEDURE — 36415 COLL VENOUS BLD VENIPUNCTURE: CPT

## 2020-01-06 PROCEDURE — 85610 PROTHROMBIN TIME: CPT | Performed by: ORTHOPAEDIC SURGERY

## 2020-01-06 PROCEDURE — 80048 BASIC METABOLIC PNL TOTAL CA: CPT | Performed by: ORTHOPAEDIC SURGERY

## 2020-01-06 NOTE — DISCHARGE INSTRUCTIONS
TO STOP CLEARS/GATORADE 2 HOURS PRIOR TO ARRIVAL TIME    PRE-ADMISSION TESTING INSTRUCTIONS FOR ADULTS    Take these medications the morning of surgery with a small sip of water: NONE      No aspirin, advil, aleve, ibuprofen, naproxen, diet pills, decongestants, or herbal/vitamins for a week prior to surgery.    General Instructions:    • Do not eat solid food after midnight the night before surgery.  No gum, mints, or hard candy after midnight the night before surgery.  • You may drink clear liquids the day of surgery up until 2 hours before your arrival time.  • Clear liquids are liquids you can see through. Nothing RED in color.    Plain water    Sports drinks  Sodas     Gelatin (Jell-O)  Fruit juices without pulp such as white grape juice and apple juice  Popsicles that contain no fruit or yogurt  Tea or coffee (no cream or milk added)    • It is beneficial for you to have a clear drink that contains carbohydrates just before you leave your house and before your fasting time begins.  We suggest a 20 ounce bottle of Gatorade or Powerade for non-diabetic patients or a 20 ounce bottle of G2 or Powerade Zero for diabetic patients.     • Patients who avoid smoking, chewing tobacco and alcohol for 4 weeks prior to surgery have a reduced risk of post-operative complications.  If at all possible, quit smoking as many days before surgery as you can.    • Do not smoke, use chewing tobacco or drink alcohol the day of surgery    • Bring your C-PAP/ BI-PAP machine if you use one.  • Wear clean comfortable clothes and socks.  • Do not wear contact lenses, lotion, deodorant, or make-up.  Bring a case for your glasses if applicable. You may brush your teeth the morning of surgery.  • You may wear dentures/partials, do not put adhesive/glue on them.  • Bring crutches or walker if applicable.  • Leave all other jewelry and valuables at home.      Preventing a Surgical Site Infection:    • Shower the night before and on the  morning of surgery using the chlorhexidine soap you were given.  Use a clean washcloth with the soap.  Place clean sheets on your bed after showering the night before surgery. Do not use the CHG soap on your hair, face, or private areas. Wash your body gently for five (5) minutes. Do not scrub your skin.  Dry with a clean towel and dress in clean clothing.    • Do not shave the surgical area for 10 days-2 weeks prior to surgery  because the razor can irritate skin and make it easier to develop an infection.  • Make sure you, your family, and all healthcare providers clean their hands with soap and water or an alcohol based hand  before caring for you or your wound.      Day of surgery:    Your surgeon’s office will advise you of your arrival time for the day of surgery.    Upon arrival, a Pre-op nurse and Anesthesia provider will review your health history, obtain vital signs, and answer questions you may have.  The only belongings needed at this time will be your home medications and if applicable your C-PAP/BI-PAP machine.  If you are staying overnight your family can leave the rest of your belongings in the car and bring them to your room later.  A Pre-op nurse will start an IV and you may receive medication in preparation for surgery, including something to help you relax.  Your family will be able to see you in the Pre-op area.  While you are in surgery your family should notify the waiting room  if they leave the waiting room area and provide a contact phone number.    IF you have any questions, you can call the Pre-Admission Department at [phone] or your surgeon's office.  Notify your surgeon if  you become sick, have a fever, productive cough, or cannot be here the day of surgery    Please be aware that surgery does come with discomfort.  We want to make every effort to control your discomfort so please discuss any uncontrolled symptoms with your nurse.   Your doctor will most  likely have prescribed pain medications.      If you are going home after surgery, you will receive individualized written care instructions before being discharged.  A responsible adult (over the age of 18) must drive you to and from the hospital on the day of your surgery and stay with you for 24 hours after anesthesia.    If you are staying overnight following surgery, you will be transported to your hospital room following the recovery period.  [facility] has all private rooms.    You may receive a survey regarding the care you received. Your feedback is very important and will be used to collect the necessary data to help us to continue to provide excellent care.     Deductibles and co-payments are collected on the day of service. Please be prepared to pay the required co-pay, deductible or deposit on the day of service as defined by your plan.

## 2020-01-07 ENCOUNTER — OFFICE VISIT (OUTPATIENT)
Dept: SPORTS MEDICINE | Facility: CLINIC | Age: 55
End: 2020-01-07

## 2020-01-07 VITALS
OXYGEN SATURATION: 96 % | HEIGHT: 64 IN | BODY MASS INDEX: 23.05 KG/M2 | SYSTOLIC BLOOD PRESSURE: 112 MMHG | HEART RATE: 60 BPM | WEIGHT: 135 LBS | DIASTOLIC BLOOD PRESSURE: 66 MMHG

## 2020-01-07 DIAGNOSIS — Z01.818 PRE-OP EVALUATION: ICD-10-CM

## 2020-01-07 DIAGNOSIS — M75.01 ADHESIVE CAPSULITIS OF RIGHT SHOULDER: Primary | ICD-10-CM

## 2020-01-07 PROBLEM — N92.1 MENOMETRORRHAGIA: Status: RESOLVED | Noted: 2017-12-05 | Resolved: 2020-01-07

## 2020-01-07 PROCEDURE — 99396 PREV VISIT EST AGE 40-64: CPT | Performed by: FAMILY MEDICINE

## 2020-01-07 NOTE — PROGRESS NOTES
"Desire is a 54 y.o. year old female     Chief Complaint   Patient presents with   • Surgical clearence / shoulder surgery       History of Present Illness   HPI   F/u R shoulder - surgery clearance. Overall feeling well other than right arm - no CP, SOLIZ, etc. Continues to have daily aching shoulder pain assoc with stiffness.     I have reviewed the patient's medical, family, and social history in detail and updated the computerized patient record.    Review of Systems   Constitutional: Negative.    Respiratory: Negative.    Cardiovascular: Negative.        /66   Pulse 60   Ht 162.6 cm (64.02\")   Wt 61.2 kg (135 lb)   SpO2 96%   BMI 23.16 kg/m²      Physical Exam   Constitutional: She is oriented to person, place, and time. She appears well-developed and well-nourished.   Neck: Normal range of motion.   Cardiovascular: Normal rate, regular rhythm and normal heart sounds.   Pulmonary/Chest: Effort normal and breath sounds normal.   Neurological: She is alert and oriented to person, place, and time.   Skin: Skin is warm and dry.   Psychiatric: She has a normal mood and affect.   Vitals reviewed.        Current Outpatient Medications:   •  CBD (cannabidiol) oral oil, Take 15 drops by mouth Every Night., Disp: , Rfl:   •  PARoxetine (PAXIL) 10 MG tablet, Take 1 tablet by mouth Every Morning. (Patient taking differently: Take 10 mg by mouth Every Night.), Disp: 90 tablet, Rfl: 3  •  tiZANidine (ZANAFLEX) 2 MG tablet, Take 1 tablet by mouth Every 8 (Eight) Hours As Needed for Muscle Spasms., Disp: 30 tablet, Rfl: 1     Diagnoses and all orders for this visit:    Adhesive capsulitis of right shoulder    Pre-op evaluation    Reviewed labs - CBC, coags, BMP all normal. Reviewed normal EKG tracing April 2019. No cardiovascular symptoms or limitations.   Overall low risk for surgery, I believe she can proceed as planned.        EMR Dragon/Transcription disclaimer:    Much of this encounter note is an electronic " transcription/translation of spoken language to printed text.  The electronic translation of spoken language may permit erroneous, or at times, nonsensical words or phrases to be inadvertently transcribed.  Although I have reviewed the note for such errors some may still exist.

## 2020-01-10 ENCOUNTER — ANESTHESIA EVENT (OUTPATIENT)
Dept: PERIOP | Facility: HOSPITAL | Age: 55
End: 2020-01-10

## 2020-01-13 ENCOUNTER — ANESTHESIA (OUTPATIENT)
Dept: PERIOP | Facility: HOSPITAL | Age: 55
End: 2020-01-13

## 2020-01-13 ENCOUNTER — HOSPITAL ENCOUNTER (OUTPATIENT)
Facility: HOSPITAL | Age: 55
Setting detail: HOSPITAL OUTPATIENT SURGERY
Discharge: HOME | End: 2020-01-13
Attending: ORTHOPAEDIC SURGERY | Admitting: ORTHOPAEDIC SURGERY

## 2020-01-13 ENCOUNTER — APPOINTMENT (OUTPATIENT)
Dept: GENERAL RADIOLOGY | Facility: HOSPITAL | Age: 55
End: 2020-01-13

## 2020-01-13 VITALS
WEIGHT: 132 LBS | BODY MASS INDEX: 22.65 KG/M2 | OXYGEN SATURATION: 99 % | HEART RATE: 58 BPM | SYSTOLIC BLOOD PRESSURE: 133 MMHG | RESPIRATION RATE: 15 BRPM | TEMPERATURE: 97.9 F | DIASTOLIC BLOOD PRESSURE: 81 MMHG

## 2020-01-13 DIAGNOSIS — M75.01 ADHESIVE CAPSULITIS OF RIGHT SHOULDER: ICD-10-CM

## 2020-01-13 PROCEDURE — 25010000002 EPINEPHRINE PER 0.1 MG: Performed by: ORTHOPAEDIC SURGERY

## 2020-01-13 PROCEDURE — 25010000002 DEXAMETHASONE PER 1 MG: Performed by: NURSE ANESTHETIST, CERTIFIED REGISTERED

## 2020-01-13 PROCEDURE — 25010000002 ONDANSETRON PER 1 MG: Performed by: NURSE ANESTHETIST, CERTIFIED REGISTERED

## 2020-01-13 PROCEDURE — 25010000002 ROPIVACAINE PER 1 MG: Performed by: NURSE ANESTHETIST, CERTIFIED REGISTERED

## 2020-01-13 PROCEDURE — 73020 X-RAY EXAM OF SHOULDER: CPT

## 2020-01-13 PROCEDURE — 25010000002 PROPOFOL 10 MG/ML EMULSION: Performed by: NURSE ANESTHETIST, CERTIFIED REGISTERED

## 2020-01-13 PROCEDURE — 29823 SHO ARTHRS SRG XTNSV DBRDMT: CPT | Performed by: ORTHOPAEDIC SURGERY

## 2020-01-13 PROCEDURE — 25010000003 CEFAZOLIN SODIUM-DEXTROSE 2-3 GM-%(50ML) RECONSTITUTED SOLUTION: Performed by: ORTHOPAEDIC SURGERY

## 2020-01-13 PROCEDURE — 25010000002 KETOROLAC TROMETHAMINE PER 15 MG: Performed by: NURSE ANESTHETIST, CERTIFIED REGISTERED

## 2020-01-13 PROCEDURE — 25010000002 MIDAZOLAM PER 1MG: Performed by: NURSE ANESTHETIST, CERTIFIED REGISTERED

## 2020-01-13 RX ORDER — FAMOTIDINE 20 MG/1
20 TABLET, FILM COATED ORAL
Status: COMPLETED | OUTPATIENT
Start: 2020-01-13 | End: 2020-01-13

## 2020-01-13 RX ORDER — GLYCOPYRROLATE 0.2 MG/ML
0.1 INJECTION INTRAMUSCULAR; INTRAVENOUS
Status: COMPLETED | OUTPATIENT
Start: 2020-01-13 | End: 2020-01-13

## 2020-01-13 RX ORDER — SODIUM CHLORIDE, SODIUM LACTATE, POTASSIUM CHLORIDE, CALCIUM CHLORIDE 600; 310; 30; 20 MG/100ML; MG/100ML; MG/100ML; MG/100ML
9 INJECTION, SOLUTION INTRAVENOUS CONTINUOUS
Status: DISCONTINUED | OUTPATIENT
Start: 2020-01-13 | End: 2020-01-13 | Stop reason: HOSPADM

## 2020-01-13 RX ORDER — MIDAZOLAM HYDROCHLORIDE 1 MG/ML
1 INJECTION INTRAMUSCULAR; INTRAVENOUS
Status: DISCONTINUED | OUTPATIENT
Start: 2020-01-13 | End: 2020-01-13 | Stop reason: HOSPADM

## 2020-01-13 RX ORDER — ONDANSETRON 2 MG/ML
4 INJECTION INTRAMUSCULAR; INTRAVENOUS ONCE AS NEEDED
Status: COMPLETED | OUTPATIENT
Start: 2020-01-13 | End: 2020-01-13

## 2020-01-13 RX ORDER — ROPIVACAINE HYDROCHLORIDE 5 MG/ML
INJECTION, SOLUTION EPIDURAL; INFILTRATION; PERINEURAL
Status: COMPLETED | OUTPATIENT
Start: 2020-01-13 | End: 2020-01-13

## 2020-01-13 RX ORDER — SODIUM CHLORIDE, SODIUM LACTATE, POTASSIUM CHLORIDE, CALCIUM CHLORIDE 600; 310; 30; 20 MG/100ML; MG/100ML; MG/100ML; MG/100ML
100 INJECTION, SOLUTION INTRAVENOUS CONTINUOUS
Status: DISCONTINUED | OUTPATIENT
Start: 2020-01-13 | End: 2020-01-13 | Stop reason: HOSPADM

## 2020-01-13 RX ORDER — HYDROCODONE BITARTRATE AND ACETAMINOPHEN 7.5; 325 MG/1; MG/1
1-2 TABLET ORAL EVERY 4 HOURS PRN
Qty: 30 TABLET | Refills: 0 | Status: SHIPPED | OUTPATIENT
Start: 2020-01-13 | End: 2020-01-29 | Stop reason: SDUPTHER

## 2020-01-13 RX ORDER — LIDOCAINE HYDROCHLORIDE AND EPINEPHRINE 10; 10 MG/ML; UG/ML
INJECTION, SOLUTION INFILTRATION; PERINEURAL AS NEEDED
Status: DISCONTINUED | OUTPATIENT
Start: 2020-01-13 | End: 2020-01-13 | Stop reason: HOSPADM

## 2020-01-13 RX ORDER — MELOXICAM 7.5 MG/1
15 TABLET ORAL ONCE
Status: COMPLETED | OUTPATIENT
Start: 2020-01-13 | End: 2020-01-13

## 2020-01-13 RX ORDER — DEXAMETHASONE SODIUM PHOSPHATE 4 MG/ML
8 INJECTION, SOLUTION INTRA-ARTICULAR; INTRALESIONAL; INTRAMUSCULAR; INTRAVENOUS; SOFT TISSUE ONCE AS NEEDED
Status: COMPLETED | OUTPATIENT
Start: 2020-01-13 | End: 2020-01-13

## 2020-01-13 RX ORDER — PREGABALIN 75 MG/1
150 CAPSULE ORAL ONCE
Status: COMPLETED | OUTPATIENT
Start: 2020-01-13 | End: 2020-01-13

## 2020-01-13 RX ORDER — ONDANSETRON 4 MG/1
4 TABLET, FILM COATED ORAL EVERY 8 HOURS PRN
Qty: 30 TABLET | Refills: 0 | Status: SHIPPED | OUTPATIENT
Start: 2020-01-13 | End: 2020-04-06

## 2020-01-13 RX ORDER — LIDOCAINE HYDROCHLORIDE 20 MG/ML
INJECTION, SOLUTION INFILTRATION; PERINEURAL AS NEEDED
Status: DISCONTINUED | OUTPATIENT
Start: 2020-01-13 | End: 2020-01-13 | Stop reason: SURG

## 2020-01-13 RX ORDER — PROPOFOL 10 MG/ML
VIAL (ML) INTRAVENOUS AS NEEDED
Status: DISCONTINUED | OUTPATIENT
Start: 2020-01-13 | End: 2020-01-13 | Stop reason: SURG

## 2020-01-13 RX ORDER — HYDROMORPHONE HYDROCHLORIDE 1 MG/ML
0.5 INJECTION, SOLUTION INTRAMUSCULAR; INTRAVENOUS; SUBCUTANEOUS AS NEEDED
Status: DISCONTINUED | OUTPATIENT
Start: 2020-01-13 | End: 2020-01-13 | Stop reason: HOSPADM

## 2020-01-13 RX ORDER — MIDAZOLAM HYDROCHLORIDE 1 MG/ML
2 INJECTION INTRAMUSCULAR; INTRAVENOUS
Status: DISCONTINUED | OUTPATIENT
Start: 2020-01-13 | End: 2020-01-13 | Stop reason: HOSPADM

## 2020-01-13 RX ORDER — OXYCODONE AND ACETAMINOPHEN 7.5; 325 MG/1; MG/1
2 TABLET ORAL ONCE AS NEEDED
Status: DISCONTINUED | OUTPATIENT
Start: 2020-01-13 | End: 2020-01-13 | Stop reason: HOSPADM

## 2020-01-13 RX ORDER — ACETAMINOPHEN 500 MG
1000 TABLET ORAL ONCE
Status: COMPLETED | OUTPATIENT
Start: 2020-01-13 | End: 2020-01-13

## 2020-01-13 RX ORDER — SODIUM CHLORIDE 9 MG/ML
INJECTION, SOLUTION INTRAVENOUS AS NEEDED
Status: DISCONTINUED | OUTPATIENT
Start: 2020-01-13 | End: 2020-01-13 | Stop reason: HOSPADM

## 2020-01-13 RX ORDER — KETOROLAC TROMETHAMINE 30 MG/ML
INJECTION, SOLUTION INTRAMUSCULAR; INTRAVENOUS AS NEEDED
Status: DISCONTINUED | OUTPATIENT
Start: 2020-01-13 | End: 2020-01-13 | Stop reason: SURG

## 2020-01-13 RX ORDER — SENNOSIDES 8.6 MG
1 TABLET ORAL NIGHTLY
Qty: 20 TABLET | Refills: 0 | Status: SHIPPED | OUTPATIENT
Start: 2020-01-13 | End: 2020-04-06

## 2020-01-13 RX ORDER — LIDOCAINE HYDROCHLORIDE 10 MG/ML
0.5 INJECTION, SOLUTION EPIDURAL; INFILTRATION; INTRACAUDAL; PERINEURAL ONCE AS NEEDED
Status: COMPLETED | OUTPATIENT
Start: 2020-01-13 | End: 2020-01-13

## 2020-01-13 RX ORDER — CEFAZOLIN SODIUM 2 G/50ML
2 SOLUTION INTRAVENOUS ONCE
Status: COMPLETED | OUTPATIENT
Start: 2020-01-13 | End: 2020-01-13

## 2020-01-13 RX ORDER — ONDANSETRON 2 MG/ML
4 INJECTION INTRAMUSCULAR; INTRAVENOUS ONCE AS NEEDED
Status: DISCONTINUED | OUTPATIENT
Start: 2020-01-13 | End: 2020-01-13 | Stop reason: HOSPADM

## 2020-01-13 RX ORDER — SODIUM CHLORIDE 0.9 % (FLUSH) 0.9 %
10 SYRINGE (ML) INJECTION EVERY 12 HOURS SCHEDULED
Status: DISCONTINUED | OUTPATIENT
Start: 2020-01-13 | End: 2020-01-13 | Stop reason: HOSPADM

## 2020-01-13 RX ORDER — HYDROMORPHONE HYDROCHLORIDE 1 MG/ML
1 INJECTION, SOLUTION INTRAMUSCULAR; INTRAVENOUS; SUBCUTANEOUS AS NEEDED
Status: DISCONTINUED | OUTPATIENT
Start: 2020-01-13 | End: 2020-01-13 | Stop reason: HOSPADM

## 2020-01-13 RX ORDER — SODIUM CHLORIDE 9 MG/ML
40 INJECTION, SOLUTION INTRAVENOUS AS NEEDED
Status: DISCONTINUED | OUTPATIENT
Start: 2020-01-13 | End: 2020-01-13 | Stop reason: HOSPADM

## 2020-01-13 RX ORDER — OXYCODONE HYDROCHLORIDE AND ACETAMINOPHEN 5; 325 MG/1; MG/1
1 TABLET ORAL ONCE AS NEEDED
Status: DISCONTINUED | OUTPATIENT
Start: 2020-01-13 | End: 2020-01-13 | Stop reason: HOSPADM

## 2020-01-13 RX ORDER — SODIUM CHLORIDE 0.9 % (FLUSH) 0.9 %
10 SYRINGE (ML) INJECTION AS NEEDED
Status: DISCONTINUED | OUTPATIENT
Start: 2020-01-13 | End: 2020-01-13 | Stop reason: HOSPADM

## 2020-01-13 RX ADMIN — ACETAMINOPHEN 1000 MG: 500 TABLET, FILM COATED ORAL at 10:12

## 2020-01-13 RX ADMIN — CEFAZOLIN SODIUM 2 G: 2 SOLUTION INTRAVENOUS at 11:34

## 2020-01-13 RX ADMIN — PREGABALIN 150 MG: 75 CAPSULE ORAL at 10:12

## 2020-01-13 RX ADMIN — ROPIVACAINE HYDROCHLORIDE 20 ML: 5 INJECTION, SOLUTION EPIDURAL; INFILTRATION; PERINEURAL at 10:52

## 2020-01-13 RX ADMIN — MIDAZOLAM HYDROCHLORIDE 2 MG: 1 INJECTION, SOLUTION INTRAMUSCULAR; INTRAVENOUS at 10:46

## 2020-01-13 RX ADMIN — MELOXICAM 15 MG: 7.5 TABLET ORAL at 10:12

## 2020-01-13 RX ADMIN — PROPOFOL 150 MG: 10 INJECTION, EMULSION INTRAVENOUS at 11:35

## 2020-01-13 RX ADMIN — ONDANSETRON 4 MG: 2 INJECTION, SOLUTION INTRAMUSCULAR; INTRAVENOUS at 10:45

## 2020-01-13 RX ADMIN — DEXAMETHASONE SODIUM PHOSPHATE 8 MG: 4 INJECTION, SOLUTION INTRA-ARTICULAR; INTRALESIONAL; INTRAMUSCULAR; INTRAVENOUS; SOFT TISSUE at 10:45

## 2020-01-13 RX ADMIN — GLYCOPYRROLATE 0.1 MG: 0.2 INJECTION INTRAMUSCULAR; INTRAVENOUS at 10:45

## 2020-01-13 RX ADMIN — FAMOTIDINE 20 MG: 10 INJECTION INTRAVENOUS at 10:45

## 2020-01-13 RX ADMIN — LIDOCAINE HYDROCHLORIDE 0.5 ML: 10 INJECTION, SOLUTION EPIDURAL; INFILTRATION; INTRACAUDAL; PERINEURAL at 10:05

## 2020-01-13 RX ADMIN — SODIUM CHLORIDE, POTASSIUM CHLORIDE, SODIUM LACTATE AND CALCIUM CHLORIDE 9 ML/HR: 600; 310; 30; 20 INJECTION, SOLUTION INTRAVENOUS at 10:05

## 2020-01-13 RX ADMIN — KETOROLAC TROMETHAMINE 30 MG: 30 INJECTION INTRAMUSCULAR; INTRAVENOUS at 12:33

## 2020-01-13 RX ADMIN — LIDOCAINE HYDROCHLORIDE 100 MG: 20 INJECTION, SOLUTION INFILTRATION; PERINEURAL at 11:35

## 2020-01-13 NOTE — ANESTHESIA PROCEDURE NOTES
Peripheral Block    Pre-sedation assessment completed: 1/13/2020 10:48 AM    Patient location during procedure: pre-op  Start time: 1/13/2020 10:49 AM  Stop time: 1/13/2020 10:52 AM  Reason for block: post-op pain management  Preanesthetic Checklist  Completed: patient identified, site marked, surgical consent, pre-op evaluation, timeout performed, IV checked, risks and benefits discussed and monitors and equipment checked  Prep:  Pt Position: supine  Sterile barriers:cap, gloves and mask  Prep: ChloraPrep  Patient monitoring: blood pressure monitoring, continuous pulse oximetry and EKG  Procedure  Sedation:yes  Performed under: local infiltration  Guidance:ultrasound guided and nerve stimulator  Images:still images obtained, printed/placed on chart    Laterality:right  Block Type:interscalene  Injection Technique:single-shot  Needle Type:echogenic and short-bevel  Needle Gauge:22 G  Resistance on Injection: none    Medications Used: ropivacaine (NAROPIN) injection 0.5 %, 20 mL  Med admintered at 1/13/2020 10:52 AM      Post Assessment  Injection Assessment: negative aspiration for heme, no paresthesia on injection and incremental injection  Patient Tolerance:comfortable throughout block  Complications:no

## 2020-01-13 NOTE — ANESTHESIA POSTPROCEDURE EVALUATION
Patient: Desire Hernandez    Procedure Summary     Date:  01/13/20 Room / Location:   [facility] /  [facility]    Anesthesia Start:  1128 Anesthesia Stop:  1254    Procedure:  SHOULDER ARTHROSCOPY, manipulation under anesthesia, lysis of adhesions, subacromial decompression (Right Shoulder) Diagnosis:       Adhesive capsulitis of right shoulder      (Adhesive capsulitis of right shoulder [M75.01])    Surgeon:  Stalin Liu MD Provider:  Margie Charlton CRNA    Anesthesia Type:  general with block ASA Status:  1          Anesthesia Type: general with block    Vitals  Vitals Value Taken Time   /83 1/13/2020  1:25 PM   Temp 97.2 °F (36.2 °C) 1/13/2020  1:20 PM   Pulse 63 1/13/2020  1:28 PM   Resp 16 1/13/2020  1:20 PM   SpO2 97 % 1/13/2020  1:29 PM   Vitals shown include unvalidated device data.        Post Anesthesia Care and Evaluation    Patient location during evaluation: bedside  Patient participation: complete - patient participated  Level of consciousness: awake  Pain score: 0  Pain management: adequate  Airway patency: patent  Anesthetic complications: No anesthetic complications  PONV Status: none  Cardiovascular status: acceptable  Respiratory status: acceptable  Hydration status: acceptable

## 2020-01-13 NOTE — ANESTHESIA PREPROCEDURE EVALUATION
Anesthesia Evaluation     Patient summary reviewed and Nursing notes reviewed   history of anesthetic complications: PONV  NPO Solid Status: > 8 hours  NPO Liquid Status: > 2 hours           Airway   Mallampati: II  TM distance: >3 FB  Neck ROM: full  No difficulty expected  Dental - normal exam     Pulmonary - negative pulmonary ROS and normal exam   Cardiovascular - normal exam  Exercise tolerance: excellent (>7 METS)    ECG reviewed  Rhythm: regular  Rate: normal      ROS comment: Vaso / vagal syndrome    Neuro/Psych  (+) numbness (R arm),     GI/Hepatic/Renal/Endo - negative ROS     Musculoskeletal         ROS comment: R shoulder pain,   Abdominal  - normal exam   Substance History   (+) alcohol use,      OB/GYN negative ob/gyn ROS         Other   arthritis,                      Anesthesia Plan    ASA 1     general with block       Anesthetic plan, all risks, benefits, and alternatives have been provided, discussed and informed consent has been obtained with: patient.  Use of blood products discussed with patient  Consented to blood products.

## 2020-01-13 NOTE — ANESTHESIA PROCEDURE NOTES
Airway  Urgency: elective    Date/Time: 1/13/2020 11:36 AM  Airway not difficult    General Information and Staff    Patient location during procedure: OR  CRNA: Margie Charlton CRNA    Indications and Patient Condition  Indications for airway management: airway protection    Preoxygenated: yes  MILS maintained throughout  Mask difficulty assessment: 0 - not attempted    Final Airway Details  Final airway type: supraglottic airway      Successful airway: unique  Size 3    Number of attempts at approach: 1  Assessment: lips, teeth, and gum same as pre-op and atraumatic intubation

## 2020-01-13 NOTE — OP NOTE
Date of Operation: 1/13/2020     PREOPERATIVE DIAGNOSIS:  1. Right shoulder adhesive capsulitis    POSTOPERATIVE DIAGNOSIS:    1. Right shoulder adhesive capsulitis    PROCEDURE PERFORMED:   1.   Right shoulder manipulation under anesthesia with arthroscopy and extensive debridement of glenohumeral and subacromial spaces     SURGEON: Stalin Liu MD     ASSISTANT: Reigal     ANESTHESIA: General endotracheal anesthesia with regional block.       ESTIMATED BLOOD LOSS:  minimal     URINE OUTPUT: Not recorded.       FLUIDS: Per anesthesia.       COMPLICATIONS: None.       SPECIMENS: None.       DRAINS: None.      IMPLANTS: none     ARTHROSCOPIC FINDINGS:   1.  Glenoid-no significant articular cartilage pathology  2.  Humeral head-no significant articular cartilage pathology  3.  Labrum-mild degenerative fraying anterior labrum  4.  Biceps tendon-intact  5.  Rotator cuff-intact  6.  Axillary pouch-free loose bodies  7.  Subacromial space-moderately thickened subacromial bursa, no evidence of bursal sided tear or significant degenerative change to AC joint     INDICATIONS FOR PROCEDURE: Patient is a pleasant 54 y.o. who has had significant limitation and use of right shoulder with significant limitation range of motion as well as associated pain with failure of conservative treatments. I discussed treatment options available to the patient and patient wished to proceed with surgical treatment. I explained details of the procedure, as well as the risks, benefits, and alternatives as documented on history and physical, and the patient had all questions answered prior to signing the operative consent form. No guarantees were given in regard to results of the surgery.       DESCRIPTION OF PROCEDURE: The patient was seen, evaluated, and cleared for surgery by anesthesia. Admitted in the preoperative holding area. The operative site was marked, consent was reviewed, history and physical was updated, and preoperative labs  were reviewed. A regional block was then placed per anesthesia. The patient was then taken to the operating room and placed in a supine position on a beachchair table. After successful intubation per anesthesia, facemask was placed securing head at this point time with the neck in normal anatomic position.     A formal timeout was completed, including confirmation of History and Physical, operative consent, surgical site, patient identification number, and preoperative antibiotic administration.      Attention was then turned to manipulation under anesthesia with short lever arm manipulation achieved through the upper arm in a forward flexed position with improvement in range of motion from pre-manipulation forward flexion of 140 degrees to postmanipulation forward flexion of 180 degrees.  Pre-manipulation external rotation was noted to 30 degrees, postmanipulation external rotation to 80 degrees.  Audible release of adhesions was noted in both planes of motion.    Patient was then elevated up into a seated position with neck maintained in normal anatomic alignment. All bony prominences were well-padded and patient was secured to the table with a waist strap. The right  upper extremity was then sterilely prepped and draped in a standard fashion.       Attention was then turned to creation of posterior portal with a 11 blade followed by insertion of blunt trocar and cannula.  Scope was inserted at this point time.  Anterior portal was then made in the rotator interval with spinal needle using outside in technique. Cannula was then inserted over a trocar and diagnostic arthroscopic exam was carried out at this point in time with findings as noted above.     Attention was then turned to debridement of glenohumeral joint space including debridement of the anterior labral fraying as well as debridement of the rotator interval where redundant adhesive tissue was noted at this time.          Attention was then turned to  the subacromial space where the scope was inserted through the posterior portal, cannula inserted through the anterior portal and subacromial space was evaluated at this point in time.  Debridement of subacromial bursitis was completed with shaver as well as ablation wand at this point time with no evidence of bursal sided rotator cuff tear or laxity in the rotator cuff tendons to suggest intrasubstance tear.      Fluid was evacuated with suction and arthroscopic instruments were removed at this point time.     Attention was then turned to closure the wounds with 3-0 nylon in interrupted fashion.  Wounds were dressed with Xeroform gauze, 4 x 4, Tegaderm, ABD pad, Medipore tape and patient was placed in a sling with abduction pillow to the left side.     At the end of the procedure, all lap, needle, and sponge counts were correct x2. The patient had brisk capillary refill to all digits of the left upper extremity. Compartments were soft and easily compressible at the end of the procedure.      A 1 view x-ray was obtained of the right shoulder given tearing of small piece of the plastic cannula used through the anterior portal and the subacromial space.  The piece was retrieved and noted to completely fill the defect noted in the plastic cannula.  However according to OR protocol we were mandated to obtain a 1 view x-ray of the right shoulder which was reviewed by me with no evidence of retained foreign body.     DISPOSITION: The patient was extubated per anesthesia and taken to the recovery room in stable condition. Will follow up in office in 1 week for wound check. Results discussed immediately after procedure with family and all questions were answered at that time.       REHAB:  We will get patient started into physical therapy today versus tomorrow and continuing daily for 1 to 2 weeks to maintain range of motion from manipulation and lysis of adhesions.

## 2020-01-13 NOTE — H&P
Orthopedic H&P    Patient ID: Desire Hernandez is a 54 y.o. female.     Chief Complaint:  Right shoulder pain and limited motion, adhesive capsulitis    History of Present Illness  Desire Hernandez presents  for surgical treatment of right shoulder adhesive capsulitis with limitation of motion and significant pain that is been present since the end of April 2019.  Patient had an IV placed in the emergency department at that time and had some residual issues with pain and irritation of her right anterior elbow.  Secondary to this, she states that she limited her motion of her shoulder and then started to notice over the subsequent several months that she was getting increasing stiffness and pain to her shoulder.  She has worked with physical therapy and has had an intra-articular injection to her right shoulder with minimal improvement and persistent stiffness particular on forward flexion and external rotation.  Rates associated pain as 4-5 out of 10, describes as aching and stabbing in nature, denies radiation of pain, denies associated numbness or tingling right upper extremity.  Denies prior issues with stiffness.  She is right-hand dominant.  Denies history of diabetes.    No current facility-administered medications on file prior to encounter.      Current Outpatient Medications on File Prior to Encounter   Medication Sig Dispense Refill   • CBD (cannabidiol) oral oil Take 15 drops by mouth Every Night.     • PARoxetine (PAXIL) 10 MG tablet Take 1 tablet by mouth Every Morning. (Patient taking differently: Take 10 mg by mouth Every Night.) 90 tablet 3   • tiZANidine (ZANAFLEX) 2 MG tablet Take 1 tablet by mouth Every 8 (Eight) Hours As Needed for Muscle Spasms. 30 tablet 1     No Known Allergies       Social History            Occupational History       Employer: [employer]   Tobacco Use   • Smoking status: Never Smoker   • Smokeless tobacco: Never Used   Substance and Sexual Activity   •  Alcohol use: Yes       Types: 1 Glasses of wine, 1 Standard drinks or equivalent per week       Comment: occ   • Drug use: No   • Sexual activity: Yes       Partners: Male       Birth control/protection: None      Medical History        Past Medical History:   Diagnosis Date   • ADHD (attention deficit hyperactivity disorder)       back in high school   • Allergic       seasonal   • ASCUS favor dysplasia       07; 08; 6/25/10; 12; 13; 8/21/15 (HPV negative)   • Blood type, Rh negative     • Bunion 2016     surgery   • Chlamydia       as a teen   • H/O nipple discharge      Bilateral diagnostic mammogram negative   • HSV-1 (herpes simplex virus 1) infection     • HSV-2 (herpes simplex virus 2) infection    • LGSIL (low grade squamous intraepithelial dysplasia) 2006   • Ovarian cyst       Left   • PONV (postoperative nausea and vomiting)           Surgical History         Past Surgical History:   Procedure Laterality Date   • BUNIONECTOMY Right 2016     Dr. Durbin   • CERVICAL CONIZATION, LEEP      • COLPOSCOPY W/ BIOPSY / CURETTAGE   2006     benign   • D&C WITH SUCTION   1998     10 week SAB   • ENDOMETRIAL ABLATION   Dec. 2017   • INDUCED          years ago   • [place] HYSTEROSCOPY,W/ENDOMETRIAL ABLATION N/A 2017     Procedure: HYSTEROSCOPY WITH ENDOMETRIAL ABLATION; d&c;  Surgeon: Sandie Hu DO;  Location: [facility];  Service: Obstetrics/Gynecology            Family History   Problem Relation Age of Onset   • Hearing loss Father     • Hyperlipidemia Father           DX in his mid 50's   • No Known Problems Brother     • Arthritis Sister           RA   • No Known Problems Son     • Hearing loss Maternal Grandmother     • Hyperlipidemia Maternal Grandmother           DX in her late 70's   • Stroke Maternal Grandmother     • Lung cancer Maternal Grandfather     • Cancer Maternal Grandfather           lung cancer - smoker   • Lung cancer Paternal Uncle      • Cancer Paternal Uncle           lung cancer - smoker   • Breast cancer Neg Hx     • Ovarian cancer Neg Hx     • Uterine cancer Neg Hx     • Colon cancer Neg Hx     • Melanoma Neg Hx              Review of Systems   Constitutional: Negative for chills, diaphoresis, fever and unexpected weight change.   HENT: Negative for hearing loss, nosebleeds, sore throat and tinnitus.    Eyes: Negative for pain and visual disturbance.   Respiratory: Negative for cough, shortness of breath and wheezing.    Cardiovascular: Negative for chest pain and palpitations.   Gastrointestinal: Negative for abdominal pain, diarrhea, nausea and vomiting.   Endocrine: Negative for cold intolerance, heat intolerance and polydipsia.   Genitourinary: Negative for difficulty urinating, dysuria and hematuria.   Musculoskeletal: Positive for arthralgias and myalgias. Negative for joint swelling.   Skin: Negative for rash and wound.   Allergic/Immunologic: Negative for environmental allergies.   Neurological: Negative for dizziness, syncope and numbness.   Hematological: Does not bruise/bleed easily.   Psychiatric/Behavioral: Negative for dysphoric mood and sleep disturbance. The patient is not nervous/anxious.                Objective:  Vitals:    01/13/20 1001   BP: 128/87   BP Location: Left arm   Patient Position: Lying   Pulse: 64   Resp: 16   Temp: 97.9 °F (36.6 °C)   TempSrc: Oral   SpO2: 99%   Weight: 59.9 kg (132 lb)          Body mass index is 22.31 kg/m².  Physical Exam     Vital signs reviewed.   General: No acute distress, alert and oriented  Eyes: conjunctiva clear; pupils equally round and reactive  ENT: external ears and nose atraumatic; oropharynx clear  CV: no peripheral edema  Resp: normal respiratory effort  Skin: no rashes or wounds; normal turgor  Psych: mood and affect appropriate; recent and remote memory intact     Objective     Ortho Exam      Neck -  negative midline or paraspinal tenderness              negative  Spurling exam                           right shoulder-  Tenderness  located anterior, lateral  FF-       Active- 110               Passive- 120              Strength- 4/5  ER-      Active- 10              Passive 15              Strength- 4+/5  IR        L5               Strength- 4+/5  Bear hug sign-negative  Empty can test- negative     Drop arm test- negative  Ext rotation lag sign- negative     Neer's sign- positive  Amaral- positive     Cross arm test- negative  Tenderness over AC joint- negative     Yergason- negative  Speeds- negative  O'briens- negative     Brisk cap refill to all digits, palpable radial pulse     Positive sensation to light touch palmar, dorsal aspects of small and index fingers and anatomic snuffbox right hand        Imaging:  Review of prior MRI right shoulder from September 2019 including review of images as well as radiology report indicates no evidence of full-thickness or partial-thickness rotator cuff tear, pericapsular edema could be consistent with adhesive capsulitis, no evidence of SLAP lesion, no evidence of focal chondral defect.  No comparison MRI noted.     Assessment:        Assessment       1. Adhesive capsulitis of right shoulder             Plan:        Plan          1. Discussed treatment options at length with patient- she has failed conservative treatments including physical therapy, intra-articular injection, anti-inflammatory medications, and home exercise program.  Given persistent stiffness as well as pain she does wish to proceed with surgical treatment at this time.  2. Plan will be for right shoulder manipulation under anesthesia, arthroscopic lysis of adhesions, possible subacromial decompression, and all associated procedures. I reviewed risks benefits and alternatives the procedure with risks including not limited to neurovascular damage, bleeding, infection, chronic pain, loss of motion, weakness, stiffness, instability, DVT, pulmonary embolus, death,  stroke, complex regional pain syndrome, myocardial infarction, need for additional procedures. She understood all these had all questions answered.  Patient consented to proceed with surgery.  No guarantees were given regarding results of surgery.    3. Patient denies history of DVT, cardiac issues. Denies history of diabetes.      Dictated utilizing Dragon dictation

## 2020-01-13 NOTE — THERAPY EVALUATION
Acute Care - Physical Therapy Initial Evaluation   Alisha Soto     Patient Name: Desire Hernandez  : 1965  MRN: [id number]  Today's Date: 2020         Referring Physician: Dr. Liu      Admit Date: 2020    Visit Dx:     ICD-10-CM ICD-9-CM   1. Adhesive capsulitis of right shoulder M75.01 726.0     Patient Active Problem List   Diagnosis   • Menopausal symptoms   • Left breast mass   • Adhesive capsulitis of right shoulder     Past Medical History:   Diagnosis Date   • ADHD (attention deficit hyperactivity disorder)     back in high school   • Allergic     seasonal   • ASCUS favor dysplasia     07; 08; 6/25/10; 12; 13; 8/21/15 (HPV negative)   • Blood type, Rh negative    • Bunion 2016    surgery   • Chlamydia     as a teen   • H/O nipple discharge     Bilateral diagnostic mammogram negative   • HSV-1 (herpes simplex virus 1) infection    • HSV-2 (herpes simplex virus 2) infection    • LGSIL (low grade squamous intraepithelial dysplasia) 2006   • Ovarian cyst     Left   • PONV (postoperative nausea and vomiting)    • Right shoulder pain     SCHEDULED FOR SX     Past Surgical History:   Procedure Laterality Date   • BUNIONECTOMY Right 2016    Dr. Durbin   • CERVICAL CONIZATION, LEEP     • COLPOSCOPY W/ BIOPSY / CURETTAGE  2006    benign   • D&C WITH SUCTION  1998    10 week SAB   • ENDOMETRIAL ABLATION  Dec. 2017   • INDUCED       years ago   • [place] HYSTEROSCOPY,W/ENDOMETRIAL ABLATION N/A 2017    Procedure: HYSTEROSCOPY WITH ENDOMETRIAL ABLATION; d&c;  Surgeon: Sandie Hu DO;  Location: [facility];  Service: Obstetrics/Gynecology        PT ASSESSMENT (last 12 hours)      Physical Therapy Evaluation     Row Name 20 1500          PT Evaluation Time/Intention    Subjective Information  complains of;numbness  -GC     Document Type  evaluation  -GC     Mode of Treatment  physical therapy  -     Row Name 20 1500           General Information    Patient Profile Reviewed?  yes  -     Referring Physician  Dr. Liu  -     Patient Observations  cooperative;agree to therapy  -     Patient/Family Observations  Pt ween with her  in the room  -     General Observations of Patient  Pt seen in PACU following closed manipulation with arthroscopic debridement of her right shoulder earlier today.   -     Prior Level of Function  independent:  -GC     Risks Reviewed  patient:;spouse/S.O.:;increased discomfort  -GC     Benefits Reviewed  patient:;spouse/S.O.:;improve function;increase independence  -     Barriers to Rehab  none identified  -     Row Name 01/13/20 1500          General ROM    GENERAL ROM COMMENTS  Pt received PROM of her right shoulder x15 each in FLEX-ABD-ER-IR. Able to go through near full PROM all planes with the stretching.  -     Row Name 01/13/20 1500          MMT (Manual Muscle Testing)    General MMT Comments  No MMT due to anesthesia effects  -     Row Name             Wound 01/13/20 1044 Right shoulder Incision    Wound - Properties Group Date first assessed: 01/13/20  -TG Time first assessed: 1044  -TG Present on Hospital Admission: N  -TG Side: Right  -TG Location: shoulder  -TG Primary Wound Type: Incision  -TG Additional Comments: xeroform, 4x4s, suresite, ABD, tape, immobilizer  -TG    Row Name 01/13/20 1500          Physical Therapy Clinical Impression    PT Diagnosis (PT Clinical Impression)  Pt seen 1x only for PROM. Will be discharged home this afternoon and will return for daily therapy thereafter.  -     Row Name 01/13/20 1500          Physical Therapy Goals    Additional Documentation  -- No goals set as pt seen 1x only  -     Row Name 01/13/20 1500          Positioning and Restraints    Pre-Treatment Position  in bed  -     Post Treatment Position  bed  -GC     In Bed  notified nsg;with family/caregiver;side rails up x2  -       User Key  (r) = Recorded By, (t) = Taken By, (c)  = Cosigned By    Initials Name Provider Type    GC Deny High, PT Physical Therapist    TG Maragrita Mccall, RN Registered Nurse          PT Recommendation and Plan  Anticipated Discharge Disposition (PT): home  Outcome Summary/Treatment Plan (PT)  Anticipated Discharge Disposition (PT): home     Time Calculation:                Deny High, PT  1/13/2020

## 2020-01-14 ENCOUNTER — HOSPITAL ENCOUNTER (OUTPATIENT)
Dept: PHYSICAL THERAPY | Facility: HOSPITAL | Age: 55
Setting detail: THERAPIES SERIES
Discharge: HOME | End: 2020-01-14

## 2020-01-14 DIAGNOSIS — M75.01 ADHESIVE CAPSULITIS OF RIGHT SHOULDER: Primary | ICD-10-CM

## 2020-01-14 PROCEDURE — 97161 PT EVAL LOW COMPLEX 20 MIN: CPT | Performed by: PHYSICAL THERAPIST

## 2020-01-14 NOTE — THERAPY EVALUATION
Outpatient Physical Therapy Ortho Initial Evaluation   Alisha Soto     Patient Name: Desire Hernandez  : 1965  MRN: [id number]  Today's Date: 2020      Visit Date: 2020    Patient Active Problem List   Diagnosis   • Menopausal symptoms   • Left breast mass   • Adhesive capsulitis of right shoulder        Past Medical History:   Diagnosis Date   • ADHD (attention deficit hyperactivity disorder)     back in high school   • Allergic     seasonal   • ASCUS favor dysplasia     07; 08; 6/25/10; 12; 13; 8/21/15 (HPV negative)   • Blood type, Rh negative    • Bunion 2016    surgery   • Chlamydia     as a teen   • H/O nipple discharge     Bilateral diagnostic mammogram negative   • HSV-1 (herpes simplex virus 1) infection    • HSV-2 (herpes simplex virus 2) infection    • LGSIL (low grade squamous intraepithelial dysplasia) 2006   • Ovarian cyst     Left   • PONV (postoperative nausea and vomiting)    • Right shoulder pain     SCHEDULED FOR SX        Past Surgical History:   Procedure Laterality Date   • BUNIONECTOMY Right 2016    Dr. Durbin   • CERVICAL CONIZATION, LEEP     • COLPOSCOPY W/ BIOPSY / CURETTAGE  2006    benign   • D&C WITH SUCTION  1998    10 week SAB   • ENDOMETRIAL ABLATION  Dec. 2017   • INDUCED       years ago   • ID HYSTEROSCOPY,W/ENDOMETRIAL ABLATION N/A 2017    Procedure: HYSTEROSCOPY WITH ENDOMETRIAL ABLATION; d&c;  Surgeon: Sandie Hu DO;  Location: [facility] OR;  Service: Obstetrics/Gynecology   • SHOULDER ARTHROSCOPY Right 2020    Procedure: Right shoulder manipulation under anesthesia with arthroscopy and extensive debridement of glenohumeral and subacromial spaces;  Surgeon: Stalin Liu MD;  Location: [facility] OR;  Service: Orthopedics       Visit Dx:     ICD-10-CM ICD-9-CM   1. Adhesive capsulitis of right shoulder M75.01 726.0         Patient History     Row Name 20 0930             History     Chief Complaint  Difficulty with daily activities;Pain;Muscle weakness  -      Type of Pain  Shoulder pain right shoulder  -GC      Date Current Problem(s) Began  01/13/20  -      Brief Description of Current Complaint  Pt states she was seen in the ER at [facility] on 4/27 where she received an IV in her right UE. She states that it must have hit a nerve as she developed right UE pain after. She has been seen by multiple specialists and has had multiple tests. She continued to have shoulder and UE pain. She was seen by ONOFRE James who diagnosed her with adhesive capsulitis and has referred her to therapy. She did not respond to conservative therapy and underwent a manipulation with arthroscopic debridement yesterday by Dr. Liu. She received PROM of her right shoulder while in the PACU yesterday. She is now referred for daily therapy for the next 1-2 weeks.  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  Pt c/o pain is she tries to move her right shoulder to far  -      What Performance Factors Make the Current Problem(s) BETTER?  Pt feels best if she does not use her right UE  -GC      Is your sleep disturbed?  Yes  -GC      Difficulties with ADL's?  Pt has difficulty with dressing, bathing, grooming tasks  -         Daily Activities    Primary Language  English  -      Are you able to read  Yes  -GC      Are you able to write  Yes  -GC      How  does patient learn best?  Listening  -GC      Teaching needs identified  Home Exercise Program;Management of Condition  -GC      Patient is concerned about/has problems with  Difficulty with self care (i.e. bathing, dressing, toileting:;Flexibility;Grasping objects lifting;Performing home management (household chores, shopping, care of dependents);Performing job responsibilities/community activities (work, school,;Performing sports, recreation, and play activities;Reaching over head;Repetitive movements of the hand, arm, shoulder  -GC      Does patient have problems with the following?  None  -GC      Barriers to learning  None  -GC      Functional Status  bathing;dressing;grooming;mobility issues preventing performance of daily activities  -GC      Pt Participated in POC and Goals  Yes  -GC         Safety    Are you being hurt, hit, or frightened by anyone at home or in your life?  No  -GC      Are you being neglected by a caregiver  No  -GC        User Key  (r) = Recorded By, (t) = Taken By, (c) = Cosigned By    Initials Name Provider Type    GC Deny High, PT Physical Therapist          PT Ortho     Row Name 01/14/20 1983       Posture/Observations    Posture/Observations Comments  Pt initially seen with right UE in sling. Pt still has post-op dressing in place. She does have mild atrophy of deltoid and derek-scapular musculature.  -GC       Shoulder Girdle Accessory Motions    Posterior glide of humerus  Right:;Hypomobile  -GC    Anterior glide of humerus  Right:;Hypomobile  -GC    Inferior glide of humerus  Right:;Hypomobile  -GC       Right Upper Ext    Rt Shoulder Abduction AROM  116 degrees  -GC    Rt Shoulder Flexion AROM  141 degrees  -GC    Rt Shoulder External Rotation AROM  80 degrees  -GC    Rt Shoulder Internal Rotation AROM  34 degrees  -GC       MMT Right Upper Ext    Rt Shoulder Flexion MMT, Gross Movement  (3+/5) fair plus  -GC    Rt Shoulder Extension MMT, Gross Movement  (4+/5) good plus  -GC     Rt Shoulder ABduction MMT, Gross Movement  (3+/5) fair plus  -GC    Rt Shoulder ADduction MMT, Gross Movement  (4+/5) good plus  -GC    Rt Shoulder Internal Rotation MMT, Gross Movement  (4/5) good  -GC    Rt Shoulder External Rotation MMT, Gross Movement  (3/5) fair  -GC    Rt Scapular Elevation MMT, Gross Movement  (5/5) normal  -GC    Rt Elbow Flexion MMT, Gross Movement:  (4+/5) good plus  -GC    Rt Elbow Extension MMT, Gross Movement:  (4+/5) good plus  -GC    Rt Upper Extremity Comments   Scaption strength is 3/5  -GC       Sensation    Light Touch  No apparent deficits  -      User Key  (r) = Recorded By, (t) = Taken By, (c) = Cosigned By    Initials Name Provider Type    GC Deny High, PT Physical Therapist                      Therapy Education  Given: HEP, Symptoms/condition management  Program: New  How Provided: Verbal, Demonstration  Provided to: Patient, Caregiver  Level of Understanding: Teach back education performed, Verbalized, Demonstrated     PT OP Goals     Row Name 01/14/20 0930          PT Short Term Goals    STG Date to Achieve  01/28/20  -     STG 1  Decrease right shoulder pain to 1-2/10 with activity.  -     STG 2  Increase right shoulder AROM to 160 degrees FLEX, 145 degrees ABD, 85 degrees ER, and 70 degrees IR with testing.  -     STG 3  Increase right shoulder girdle strength to at least 4/5 all planes with testing  -     STG 4  Pt will be independent with her HEP issued by this therapist.  -        Long Term Goals    LTG Date to Achieve  02/11/20  -     LTG 1  Decrease right shoulder pain to 0-1/10 with activity.  -     LTG 2  Increase right shoulder AROM to 170 degrees FLEX, 170 degrees ABD, 90 degrees ER, and 90 degrees IR with testing.  -     LTG 3  Increase right shoulder girdle strength to at least 4+/5 all planes with testing  -     LTG 4  Pt will be independent with all ADLS and have a Quick Dash score < 10.  -        Time Calculation    PT Goal  Re-Cert Due Date  02/11/20  -       User Key  (r) = Recorded By, (t) = Taken By, (c) = Cosigned By    Initials Name Provider Type     Deny High, PT Physical Therapist          PT Assessment/Plan     Row Name 01/14/20 0930          PT Assessment    Functional Limitations  Limitation in home management;Limitations in community activities;Limitations in functional capacity and performance;Performance in leisure activities;Performance in self-care ADL;Performance in work activities  -     Impairments  Range of motion;Pain;Muscle strength;Joint mobility  -     Assessment Comments  Pt presents one day s/p right shoulder manipulation with arthroscopic debridement of the right shoulder. She has pain rated up to 5/10 with activity. She has decreased shoulder ROM, decreased shoulder ROM, decreased shoulder girdle strength, and decreased function secondary to the above.  -     Rehab Potential  Good  -GC     Patient/caregiver participated in establishment of treatment plan and goals  Yes  -GC     Patient would benefit from skilled therapy intervention  Yes  -GC        PT Plan    PT Frequency  3x/week;5x/week  -     Predicted Duration of Therapy Intervention (Therapy Eval)  4 weeks  -     Planned CPT's?  PT EVAL LOW COMPLEXITY: 03609;PT THER PROC EA 15 MIN: 35495;PT MANUAL THERAPY EA 15 MIN: 29404;PT HOT OR COLD PACK TREAT MCARE;PT ELECTRICAL STIM UNATTEND:   -     PT Plan Comments  Pt is to continue her HEP daily.  -       User Key  (r) = Recorded By, (t) = Taken By, (c) = Cosigned By    Initials Name Provider Type     Deny High PT Physical Therapist          Modalities     Row Name 01/14/20 0930             Moist Heat    MH Applied  Yes  -GC      Location  right shoulder with pt supine and roll under knees  -GC      Rx Minutes  10 mins  -GC      MH Prior to Rx  Yes  -GC        User Key  (r) = Recorded By, (t) = Taken By, (c) = Cosigned By    Initials Name Provider Type     Deny High  PT Physical Therapist        OP Exercises     Row Name 01/14/20 0930             Exercise 1    Exercise Name 1  Cane stretch-FLEX  -GC      Cueing 1  Verbal;Demo  -GC      Reps 1  15  -GC      Time 1  5 secs  -GC         Exercise 2    Exercise Name 2  Cane stretch-ABD  -GC      Cueing 2  Verbal;Demo  -GC      Reps 2  15  -GC      Time 2  5 secs  -GC         Exercise 3    Exercise Name 3  Cane stretch-ER  -GC      Cueing 3  Verbal;Demo  -GC      Reps 3  15  -GC      Time 3  5 secs  -GC         Exercise 4    Exercise Name 4  IR circles behind back  -GC      Cueing 4  Verbal;Demo  -GC      Reps 4  20x CW/CCW  -GC         Exercise 5    Exercise Name 5  Sleeper stretch  -GC      Cueing 5  Verbal;Tactile  -GC      Reps 5  10  -GC      Time 5  10 secs  -GC        User Key  (r) = Recorded By, (t) = Taken By, (c) = Cosigned By    Initials Name Provider Type    GC Deny High, PT Physical Therapist           Manual Rx (last 36 hours)      Manual Treatments     Row Name 01/14/20 0930             Manual Rx 1    Manual Rx 1 Location  right shoulder  -GC      Manual Rx 1 Type  GH joint mobilizations  -GC      Manual Rx 1 Grade  grade II  -GC      Manual Rx 1 Duration  5 min  -GC         Manual Rx 2    Manual Rx 2 Location  right shoulder  -GC      Manual Rx 2 Type  PROM in FLEX-ABD-ER-IR  -GC      Manual Rx 2 Duration  15 min  -GC        User Key  (r) = Recorded By, (t) = Taken By, (c) = Cosigned By    Initials Name Provider Type    GC Deny High, PT Physical Therapist                      Outcome Measure Options: Quick DASH  Quick DASH  Open a tight or new jar.: Mild Difficulty  Do heavy household chores (e.g., wash walls, wash floors): Mild Difficulty  Carry a shopping bag or briefcase: No Difficulty  Wash your back: Unable  Use a knife to cut food: Mild Difficulty  Recreational activities in which you take some force or impact through your arm, should or hand (e.g. golf, hammering, tennis, etc.): Unable  During the past  week, to what extent has your arm, shoulder, or hand problem interfered with your normal social activites with family, friends, neighbors or groups?: Quite a bit  During the past week, were you limited in your work or other regular daily activities as a result of your arm, shoulder or hand problem?: Very limited  Arm, Shoulder, or hand pain: Mild  Tingling (pins and needles) in your arm, shoulder, or hand: Mild  During the past week, how much difficulty have you had sleeping because of the pain in your arm, shoulder or hand?: Moderate Difficiculty  Number of Questions Answered: 11  Quick DASH Score: 47.73         Time Calculation:     Start Time: 0930  Stop Time: 1032  Time Calculation (min): 62 min     Therapy Charges for Today     Code Description Service Date Service Provider Modifiers Qty    85065178715  PT EVAL LOW COMPLEXITY 2 1/14/2020 Deny High, PT GP 1          PT G-Codes  Outcome Measure Options: Quick DASH  Quick DASH Score: 47.73         Deny High, PT  1/14/2020

## 2020-01-15 ENCOUNTER — HOSPITAL ENCOUNTER (OUTPATIENT)
Dept: PHYSICAL THERAPY | Facility: HOSPITAL | Age: 55
Setting detail: THERAPIES SERIES
Discharge: HOME | End: 2020-01-15

## 2020-01-15 DIAGNOSIS — M75.01 ADHESIVE CAPSULITIS OF RIGHT SHOULDER: Primary | ICD-10-CM

## 2020-01-15 PROCEDURE — 97140 MANUAL THERAPY 1/> REGIONS: CPT | Performed by: PHYSICAL THERAPIST

## 2020-01-15 PROCEDURE — 97110 THERAPEUTIC EXERCISES: CPT | Performed by: PHYSICAL THERAPIST

## 2020-01-15 NOTE — THERAPY TREATMENT NOTE
Outpatient Physical Therapy Ortho Treatment Note   Alisha Soto     Patient Name: Desire Hernandez  : 1965  MRN: [id number]  Today's Date: 1/15/2020      Visit Date: 01/15/2020    Visit Dx:    ICD-10-CM ICD-9-CM   1. Adhesive capsulitis of right shoulder M75.01 726.0       Patient Active Problem List   Diagnosis   • Menopausal symptoms   • Left breast mass   • Adhesive capsulitis of right shoulder        Past Medical History:   Diagnosis Date   • ADHD (attention deficit hyperactivity disorder)     back in high school   • Allergic     seasonal   • ASCUS favor dysplasia     07; 08; 6/25/10; 12; 13; 8/21/15 (HPV negative)   • Blood type, Rh negative    • Bunion     surgery   • Chlamydia     as a teen   • H/O nipple discharge     Bilateral diagnostic mammogram negative   • HSV-1 (herpes simplex virus 1) infection    • HSV-2 (herpes simplex virus 2) infection    • LGSIL (low grade squamous intraepithelial dysplasia) 2006   • Ovarian cyst     Left   • PONV (postoperative nausea and vomiting)    • Right shoulder pain     SCHEDULED FOR SX        Past Surgical History:   Procedure Laterality Date   • BUNIONECTOMY Right 2016    Dr. Durbin   • CERVICAL CONIZATION, LEEP     • COLPOSCOPY W/ BIOPSY / CURETTAGE  2006    benign   • D&C WITH SUCTION  1998    10 week SAB   • ENDOMETRIAL ABLATION  Dec. 2017   • INDUCED       years ago   • [place] HYSTEROSCOPY,W/ENDOMETRIAL ABLATION N/A 2017    Procedure: HYSTEROSCOPY WITH ENDOMETRIAL ABLATION; d&c;  Surgeon: Sandie Hu DO;  Location: [facility];  Service: Obstetrics/Gynecology   • SHOULDER ARTHROSCOPY Right 2020    Procedure: Right shoulder manipulation under anesthesia with arthroscopy and extensive debridement of glenohumeral and subacromial spaces;  Surgeon: Stalin Liu MD;  Location: [facility];  Service: Orthopedics       PT Ortho     Row Name 01/15/20 0920       Subjective Comments     Subjective Comments  Pt states her shoulder seems to have tightened up some.  -GC      User Key  (r) = Recorded By, (t) = Taken By, (c) = Cosigned By    Initials Name Provider Type    Deny Peralta, PT Physical Therapist                      PT Assessment/Plan     Row Name 01/15/20 0920          PT Assessment    Assessment Comments  Pt was noted to have increased tightness before, during, and after stretching and we were not able to get her through full range this morning.  -GC        PT Plan    PT Plan Comments  Pt is to continue her HEP 3x daily.  -GC       User Key  (r) = Recorded By, (t) = Taken By, (c) = Cosigned By    Initials Name Provider Type    Deny Peralta, PT Physical Therapist          Modalities     Row Name 01/15/20 0920             Moist Heat    MH Applied  Yes  -GC      Location  right shoulder with pt supine and roll under knees  -GC      Rx Minutes  10 mins  -GC      MH Prior to Rx  Yes  -GC        User Key  (r) = Recorded By, (t) = Taken By, (c) = Cosigned By    Initials Name Provider Type    Deny Peralta, PT Physical Therapist        OP Exercises     Row Name 01/15/20 0920             Subjective Comments    Subjective Comments  Pt states her shoulder seems to have tightened up some.  -GC         Exercise 1    Exercise Name 1  Cane stretch-FLEX  -GC      Cueing 1  Verbal;Demo  -GC      Reps 1  15  -GC      Time 1  5 secs  -GC         Exercise 2    Exercise Name 2  Cane stretch-ABD  -GC      Cueing 2  Verbal;Demo  -GC      Reps 2  15  -GC      Time 2  5 secs  -GC         Exercise 3    Exercise Name 3  Cane stretch-ER  -GC      Cueing 3  Verbal;Demo  -GC      Reps 3  15  -GC      Time 3  5 secs  -GC         Exercise 4    Exercise Name 4  IR circles behind back  -GC      Cueing 4  Verbal;Demo  -GC      Reps 4  20x CW/CCW  -GC         Exercise 5    Exercise Name 5  Sleeper stretch  -GC      Cueing 5  Verbal;Tactile  -GC      Reps 5  10  -GC      Time 5  10 secs  -GC         Exercise 6     Exercise Name 6  Pulley-FLEX  -GC      Cueing 6  Verbal;Demo  -GC      Time 6  5 min  -GC         Exercise 7    Exercise Name 7  Pulley-Scaption  -GC      Cueing 7  Verbal;Demo  -GC      Time 7  5 min  -GC         Exercise 8    Exercise Name 8  Shoulder ER vs theraband  -GC      Cueing 8  Verbal;Demo  -GC      Reps 8  25  -GC      Time 8  red  -GC         Exercise 9    Exercise Name 9  Shoulder IR vs theraband  -GC      Cueing 9  Verbal;Demo  -GC      Reps 9  25  -GC      Time 9  red  -GC        User Key  (r) = Recorded By, (t) = Taken By, (c) = Cosigned By    Initials Name Provider Type    GC Deny High, PT Physical Therapist                      Manual Rx (last 36 hours)      Manual Treatments     Row Name 01/15/20 0920             Manual Rx 1    Manual Rx 1 Location  right shoulder  -GC      Manual Rx 1 Type  GH joint mobilizations  -GC      Manual Rx 1 Grade  grade II  -GC      Manual Rx 1 Duration  5 min  -GC         Manual Rx 2    Manual Rx 2 Location  right shoulder  -GC      Manual Rx 2 Type  PROM in FLEX-ABD-ER-IR  -GC      Manual Rx 2 Duration  15 min  -GC        User Key  (r) = Recorded By, (t) = Taken By, (c) = Cosigned By    Initials Name Provider Type     Deny High, PT Physical Therapist                             Time Calculation:   Start Time: 0920  Stop Time: 1027  Time Calculation (min): 67 min  Therapy Charges for Today     Code Description Service Date Service Provider Modifiers Qty    86903097973 HC PT MANUAL THERAPY EA 15 MIN 1/15/2020 Deny High, PT GP 1    09689051495 HC PT THER PROC EA 15 MIN 1/15/2020 Deny High, PT GP 1                    Deny High PT  1/15/2020

## 2020-01-16 ENCOUNTER — HOSPITAL ENCOUNTER (OUTPATIENT)
Dept: PHYSICAL THERAPY | Facility: HOSPITAL | Age: 55
Setting detail: THERAPIES SERIES
Discharge: HOME | End: 2020-01-16

## 2020-01-16 DIAGNOSIS — M75.01 ADHESIVE CAPSULITIS OF RIGHT SHOULDER: Primary | ICD-10-CM

## 2020-01-16 PROCEDURE — 97140 MANUAL THERAPY 1/> REGIONS: CPT | Performed by: PHYSICAL THERAPIST

## 2020-01-16 PROCEDURE — 97110 THERAPEUTIC EXERCISES: CPT | Performed by: PHYSICAL THERAPIST

## 2020-01-16 NOTE — THERAPY TREATMENT NOTE
Outpatient Physical Therapy Ortho Treatment Note   Alisha Soto     Patient Name: Desire Hernandez  : 1965  MRN: [id number]  Today's Date: 2020      Visit Date: 2020    Visit Dx:    ICD-10-CM ICD-9-CM   1. Adhesive capsulitis of right shoulder M75.01 726.0       Patient Active Problem List   Diagnosis   • Menopausal symptoms   • Left breast mass   • Adhesive capsulitis of right shoulder        Past Medical History:   Diagnosis Date   • ADHD (attention deficit hyperactivity disorder)     back in high school   • Allergic     seasonal   • ASCUS favor dysplasia     07; 08; 6/25/10; 12; 13; 8/21/15 (HPV negative)   • Blood type, Rh negative    • Bunion 2016    surgery   • Chlamydia     as a teen   • H/O nipple discharge     Bilateral diagnostic mammogram negative   • HSV-1 (herpes simplex virus 1) infection    • HSV-2 (herpes simplex virus 2) infection    • LGSIL (low grade squamous intraepithelial dysplasia) 2006   • Ovarian cyst     Left   • PONV (postoperative nausea and vomiting)    • Right shoulder pain     SCHEDULED FOR SX        Past Surgical History:   Procedure Laterality Date   • BUNIONECTOMY Right 2016    Dr. Durbin   • CERVICAL CONIZATION, LEEP     • COLPOSCOPY W/ BIOPSY / CURETTAGE  2006    benign   • D&C WITH SUCTION  1998    10 week SAB   • ENDOMETRIAL ABLATION  Dec. 2017   • INDUCED       years ago   • [place] HYSTEROSCOPY,W/ENDOMETRIAL ABLATION N/A 2017    Procedure: HYSTEROSCOPY WITH ENDOMETRIAL ABLATION; d&c;  Surgeon: Sandie Hu DO;  Location: [facility];  Service: Obstetrics/Gynecology   • SHOULDER ARTHROSCOPY Right 2020    Procedure: Right shoulder manipulation under anesthesia with arthroscopy and extensive debridement of glenohumeral and subacromial spaces;  Surgeon: Stalin Liu MD;  Location: [facility];  Service: Orthopedics                       PT Assessment/Plan     Row Name 20 1100           PT Assessment    Assessment Comments  While the pt c/o increaesd tightness, we were able to stretch her to near normal range with some pain.  -GC        PT Plan    PT Plan Comments  Pt is to continue her HEP daily with daily therapy.  -GC       User Key  (r) = Recorded By, (t) = Taken By, (c) = Cosigned By    Initials Name Provider Type    GC Deny High, PT Physical Therapist          Modalities     Row Name 01/16/20 1100             Moist Heat    Location  right shoulder with pt supine and roll under knees  -GC      Rx Minutes  10 mins  -GC      MH Prior to Rx  Yes  -GC        User Key  (r) = Recorded By, (t) = Taken By, (c) = Cosigned By    Initials Name Provider Type    GC Deny High, PT Physical Therapist        OP Exercises     Row Name 01/16/20 1100             Subjective Comments    Subjective Comments  Pt states her shoulder is much tighter this morning than yesterday.  -GC         Exercise 1    Exercise Name 1  Cane stretch-FLEX  -GC      Cueing 1  Verbal;Demo  -GC      Reps 1  15  -GC      Time 1  5 secs  -GC         Exercise 2    Exercise Name 2  Cane stretch-ABD  -GC      Cueing 2  Verbal;Demo  -GC      Reps 2  15  -GC      Time 2  5 secs  -GC         Exercise 3    Exercise Name 3  Cane stretch-ER  -GC      Cueing 3  Verbal;Demo  -GC      Reps 3  15  -GC      Time 3  5 secs  -GC         Exercise 4    Exercise Name 4  IR circles behind back  -GC      Cueing 4  Verbal;Demo  -GC      Reps 4  20x CW/CCW  -GC         Exercise 5    Exercise Name 5  Sleeper stretch  -GC      Cueing 5  Verbal;Tactile  -GC      Reps 5  10  -GC      Time 5  10 secs  -GC         Exercise 6    Exercise Name 6  Pulley-FLEX  -GC      Cueing 6  Verbal;Demo  -GC      Time 6  5 min  -GC         Exercise 7    Exercise Name 7  Pulley-Scaption  -GC      Cueing 7  Verbal;Demo  -GC      Time 7  5 min  -GC         Exercise 8    Exercise Name 8  Shoulder ER vs theraband  -GC      Cueing 8  Verbal;Demo  -GC      Reps 8  25  -GC      Time  8  red  -GC         Exercise 9    Exercise Name 9  Shoulder IR vs theraband  -GC      Cueing 9  Verbal;Demo  -GC      Reps 9  25  -GC      Time 9  red  -GC        User Key  (r) = Recorded By, (t) = Taken By, (c) = Cosigned By    Initials Name Provider Type    GC Deny High, PT Physical Therapist                      Manual Rx (last 36 hours)      Manual Treatments     Row Name 01/16/20 1100             Manual Rx 1    Manual Rx 1 Location  right shoulder  -GC      Manual Rx 1 Type  GH joint mobilizations  -GC      Manual Rx 1 Grade  grade II  -GC      Manual Rx 1 Duration  5 min  -GC         Manual Rx 2    Manual Rx 2 Location  right shoulder  -GC      Manual Rx 2 Type  PROM in FLEX-ABD-ER-IR  -GC      Manual Rx 2 Duration  15 min  -GC        User Key  (r) = Recorded By, (t) = Taken By, (c) = Cosigned By    Initials Name Provider Type    GC Deny High, PT Physical Therapist                             Time Calculation:   Start Time: 1100  Stop Time: 1157  Time Calculation (min): 57 min  Therapy Charges for Today     Code Description Service Date Service Provider Modifiers Qty    66267767887 HC PT MANUAL THERAPY EA 15 MIN 1/16/2020 Deny High, PT GP 1    28543071996 HC PT THER PROC EA 15 MIN 1/16/2020 Deny High, PT GP 1                    Deny High PT  1/16/2020

## 2020-01-17 ENCOUNTER — HOSPITAL ENCOUNTER (OUTPATIENT)
Dept: PHYSICAL THERAPY | Facility: HOSPITAL | Age: 55
Setting detail: THERAPIES SERIES
Discharge: HOME | End: 2020-01-17

## 2020-01-17 DIAGNOSIS — M75.01 ADHESIVE CAPSULITIS OF RIGHT SHOULDER: Primary | ICD-10-CM

## 2020-01-17 PROCEDURE — 97140 MANUAL THERAPY 1/> REGIONS: CPT | Performed by: PHYSICAL THERAPIST

## 2020-01-17 PROCEDURE — 97110 THERAPEUTIC EXERCISES: CPT | Performed by: PHYSICAL THERAPIST

## 2020-01-17 NOTE — THERAPY TREATMENT NOTE
Outpatient Physical Therapy Ortho Treatment Note   Alisha Soto     Patient Name: Desire Hernandez  : 1965  MRN: [id number]  Today's Date: 2020      Visit Date: 2020    Visit Dx:    ICD-10-CM ICD-9-CM   1. Adhesive capsulitis of right shoulder M75.01 726.0       Patient Active Problem List   Diagnosis   • Menopausal symptoms   • Left breast mass   • Adhesive capsulitis of right shoulder        Past Medical History:   Diagnosis Date   • ADHD (attention deficit hyperactivity disorder)     back in high school   • Allergic     seasonal   • ASCUS favor dysplasia     07; 08; 6/25/10; 12; 13; 8/21/15 (HPV negative)   • Blood type, Rh negative    • Bunion 2016    surgery   • Chlamydia     as a teen   • H/O nipple discharge     Bilateral diagnostic mammogram negative   • HSV-1 (herpes simplex virus 1) infection    • HSV-2 (herpes simplex virus 2) infection    • LGSIL (low grade squamous intraepithelial dysplasia) 2006   • Ovarian cyst     Left   • PONV (postoperative nausea and vomiting)    • Right shoulder pain     SCHEDULED FOR SX        Past Surgical History:   Procedure Laterality Date   • BUNIONECTOMY Right 2016    Dr. Durbin   • CERVICAL CONIZATION, LEEP     • COLPOSCOPY W/ BIOPSY / CURETTAGE  2006    benign   • D&C WITH SUCTION  1998    10 week SAB   • ENDOMETRIAL ABLATION  Dec. 2017   • INDUCED       years ago   • [place] HYSTEROSCOPY,W/ENDOMETRIAL ABLATION N/A 2017    Procedure: HYSTEROSCOPY WITH ENDOMETRIAL ABLATION; d&c;  Surgeon: Sandie Hu DO;  Location: [facility];  Service: Obstetrics/Gynecology   • SHOULDER ARTHROSCOPY Right 2020    Procedure: Right shoulder manipulation under anesthesia with arthroscopy and extensive debridement of glenohumeral and subacromial spaces;  Surgeon: Stalin Liu MD;  Location: [facility];  Service: Orthopedics       PT Ortho     Row Name 20 1035       Subjective Comments     Subjective Comments  Pt states her shoulder is still very sore and stiff.  -GC      User Key  (r) = Recorded By, (t) = Taken By, (c) = Cosigned By    Initials Name Provider Type    Deny Peralta, PT Physical Therapist                      PT Assessment/Plan     Row Name 01/17/20 1035          PT Assessment    Assessment Comments  Pt is still having quite a bit of discomfort with her stretching, but we are able to still get her to near full PROM.  -GC        PT Plan    PT Plan Comments  Pt is to continue her HEP daily with therapy daily next week.  -GC       User Key  (r) = Recorded By, (t) = Taken By, (c) = Cosigned By    Initials Name Provider Type    Deny Peralta, PT Physical Therapist          Modalities     Row Name 01/17/20 1035             Moist Heat    MH Applied  Yes  -GC      Location  right shoulder with pt supine and roll under knees  -GC      Rx Minutes  10 mins  -GC      MH Prior to Rx  Yes  -GC        User Key  (r) = Recorded By, (t) = Taken By, (c) = Cosigned By    Initials Name Provider Type    Deny Peralta, PT Physical Therapist        OP Exercises     Row Name 01/17/20 1035             Subjective Comments    Subjective Comments  Pt states her shoulder is still very sore and stiff.  -GC         Exercise 1    Exercise Name 1  Cane stretch-FLEX  -GC      Cueing 1  Verbal;Demo  -GC      Reps 1  15  -GC      Time 1  5 secs  -GC         Exercise 2    Exercise Name 2  Cane stretch-ABD  -GC      Cueing 2  Verbal;Demo  -GC      Reps 2  15  -GC      Time 2  5 secs  -GC         Exercise 3    Exercise Name 3  Cane stretch-ER  -GC      Cueing 3  Verbal;Demo  -GC      Reps 3  15  -GC      Time 3  5 secs  -GC         Exercise 4    Exercise Name 4  IR circles behind back  -GC      Cueing 4  Verbal;Demo  -GC      Reps 4  20x CW/CCW  -GC         Exercise 5    Exercise Name 5  Sleeper stretch  -GC      Cueing 5  Verbal;Tactile  -GC      Reps 5  10  -GC      Time 5  10 secs  -GC         Exercise 6     Exercise Name 6  Pulley-FLEX  -GC      Cueing 6  Verbal;Demo  -GC      Time 6  5 min  -GC         Exercise 7    Exercise Name 7  Pulley-Scaption  -GC      Cueing 7  Verbal;Demo  -GC      Time 7  5 min  -GC         Exercise 8    Exercise Name 8  Shoulder ER vs theraband  -GC      Cueing 8  Verbal;Demo  -GC      Reps 8  25  -GC      Time 8  red  -GC         Exercise 9    Exercise Name 9  Shoulder IR vs theraband  -GC      Cueing 9  Verbal;Demo  -GC      Reps 9  25  -GC      Time 9  red  -GC        User Key  (r) = Recorded By, (t) = Taken By, (c) = Cosigned By    Initials Name Provider Type    GC Deny High, PT Physical Therapist                      Manual Rx (last 36 hours)      Manual Treatments     Row Name 01/17/20 1035             Manual Rx 1    Manual Rx 1 Location  right shoulder  -GC      Manual Rx 1 Type  GH joint mobilizations  -GC      Manual Rx 1 Grade  grade II  -GC      Manual Rx 1 Duration  5 min  -GC         Manual Rx 2    Manual Rx 2 Location  right shoulder  -GC      Manual Rx 2 Type  PROM in FLEX-ABD-ER-IR  -GC      Manual Rx 2 Duration  15 min  -GC        User Key  (r) = Recorded By, (t) = Taken By, (c) = Cosigned By    Initials Name Provider Type     eDny High, PT Physical Therapist                             Time Calculation:   Start Time: 1035  Stop Time: 1132  Time Calculation (min): 57 min  Therapy Charges for Today     Code Description Service Date Service Provider Modifiers Qty    44042680480 HC PT MANUAL THERAPY EA 15 MIN 1/17/2020 Deny High, PT GP 1    84634561594 HC PT THER PROC EA 15 MIN 1/17/2020 Deny High, PT GP 1                    Deny High PT  1/17/2020

## 2020-01-20 ENCOUNTER — HOSPITAL ENCOUNTER (OUTPATIENT)
Dept: PHYSICAL THERAPY | Facility: HOSPITAL | Age: 55
Setting detail: THERAPIES SERIES
Discharge: HOME | End: 2020-01-20

## 2020-01-20 DIAGNOSIS — M75.01 ADHESIVE CAPSULITIS OF RIGHT SHOULDER: Primary | ICD-10-CM

## 2020-01-20 PROCEDURE — 97110 THERAPEUTIC EXERCISES: CPT | Performed by: PHYSICAL THERAPIST

## 2020-01-20 PROCEDURE — 97140 MANUAL THERAPY 1/> REGIONS: CPT | Performed by: PHYSICAL THERAPIST

## 2020-01-20 NOTE — THERAPY TREATMENT NOTE
Outpatient Physical Therapy Ortho Treatment Note   Alisha Soto     Patient Name: Desire Hernandez  : 1965  MRN: [id number]  Today's Date: 2020      Visit Date: 2020    Visit Dx:    ICD-10-CM ICD-9-CM   1. Adhesive capsulitis of right shoulder M75.01 726.0       Patient Active Problem List   Diagnosis   • Menopausal symptoms   • Left breast mass   • Adhesive capsulitis of right shoulder        Past Medical History:   Diagnosis Date   • ADHD (attention deficit hyperactivity disorder)     back in high school   • Allergic     seasonal   • ASCUS favor dysplasia     07; 08; 6/25/10; 12; 13; 8/21/15 (HPV negative)   • Blood type, Rh negative    • Bunion 2016    surgery   • Chlamydia     as a teen   • H/O nipple discharge     Bilateral diagnostic mammogram negative   • HSV-1 (herpes simplex virus 1) infection    • HSV-2 (herpes simplex virus 2) infection    • LGSIL (low grade squamous intraepithelial dysplasia) 2006   • Ovarian cyst     Left   • PONV (postoperative nausea and vomiting)    • Right shoulder pain     SCHEDULED FOR SX        Past Surgical History:   Procedure Laterality Date   • BUNIONECTOMY Right 2016    Dr. Durbin   • CERVICAL CONIZATION, LEEP     • COLPOSCOPY W/ BIOPSY / CURETTAGE  2006    benign   • D&C WITH SUCTION  1998    10 week SAB   • ENDOMETRIAL ABLATION  Dec. 2017   • INDUCED       years ago   • [place] HYSTEROSCOPY,W/ENDOMETRIAL ABLATION N/A 2017    Procedure: HYSTEROSCOPY WITH ENDOMETRIAL ABLATION; d&c;  Surgeon: Sandie Hu DO;  Location: [facility];  Service: Obstetrics/Gynecology   • SHOULDER ARTHROSCOPY Right 2020    Procedure: Right shoulder manipulation under anesthesia with arthroscopy and extensive debridement of glenohumeral and subacromial spaces;  Surgeon: Stalin Liu MD;  Location: [facility];  Service: Orthopedics       PT Ortho     Row Name 20 1130       Subjective Comments     Subjective Comments  Pt states her shoulder feels terrible.  -GC      User Key  (r) = Recorded By, (t) = Taken By, (c) = Cosigned By    Initials Name Provider Type    Deny Peralta, PT Physical Therapist                      PT Assessment/Plan     Row Name 01/20/20 1130          PT Assessment    Assessment Comments  Able to stretch pt to near full PROM today. Her AROM is still liimted as observed with her exercises and use of UE in clinic.  -GC        PT Plan    PT Plan Comments  Pt has MD follow up tomorrow. Will continue as advised.  -GC       User Key  (r) = Recorded By, (t) = Taken By, (c) = Cosigned By    Initials Name Provider Type    Deny Peralta, PT Physical Therapist          Modalities     Row Name 01/20/20 1130             Moist Heat    MH Applied  Yes  -GC      Location  right shoulder with pt sitting and right UE abducted on table  -GC      Rx Minutes  10 mins  -GC      MH Prior to Rx  Yes  -GC        User Key  (r) = Recorded By, (t) = Taken By, (c) = Cosigned By    Initials Name Provider Type    Deny Peralta, PT Physical Therapist        OP Exercises     Row Name 01/20/20 1130             Subjective Comments    Subjective Comments  Pt states her shoulder feels terrible.  -GC         Exercise 1    Exercise Name 1  Cane stretch-FLEX  -GC      Cueing 1  Verbal;Demo  -GC      Reps 1  15  -GC      Time 1  5 secs  -GC         Exercise 2    Exercise Name 2  Cane stretch-ABD  -GC      Cueing 2  Verbal;Demo  -GC      Reps 2  15  -GC      Time 2  5 secs  -GC         Exercise 3    Exercise Name 3  Cane stretch-ER  -GC      Cueing 3  Verbal;Demo  -GC      Reps 3  15  -GC      Time 3  5 secs  -GC         Exercise 4    Exercise Name 4  IR circles behind back  -GC      Cueing 4  Verbal;Demo  -GC      Reps 4  20x CW/CCW  -GC         Exercise 5    Exercise Name 5  Sleeper stretch  -GC      Cueing 5  Verbal;Tactile  -GC      Reps 5  10  -GC      Time 5  10 secs  -GC         Exercise 6    Exercise Name 6   Pulley-FLEX  -GC      Cueing 6  Verbal;Demo  -GC      Time 6  5 min  -GC         Exercise 7    Exercise Name 7  Pulley-Scaption  -GC      Cueing 7  Verbal;Demo  -GC      Time 7  5 min  -GC         Exercise 8    Exercise Name 8  Shoulder ER vs theraband  -GC      Cueing 8  Verbal;Demo  -GC      Reps 8  25  -GC      Time 8  red  -GC         Exercise 9    Exercise Name 9  Shoulder IR vs theraband  -GC      Cueing 9  Verbal;Demo  -GC      Reps 9  25  -GC      Time 9  red  -GC        User Key  (r) = Recorded By, (t) = Taken By, (c) = Cosigned By    Initials Name Provider Type     Deny High, PT Physical Therapist                      Manual Rx (last 36 hours)      Manual Treatments     Row Name 01/20/20 1130             Manual Rx 1    Manual Rx 1 Location  right shoulder  -GC      Manual Rx 1 Type  GH joint mobilizations  -GC      Manual Rx 1 Grade  grade II  -GC      Manual Rx 1 Duration  5 min  -GC         Manual Rx 2    Manual Rx 2 Location  right shoulder  -GC      Manual Rx 2 Type  PROM in FLEX-ABD-ER-IR  -GC      Manual Rx 2 Duration  15 min  -GC        User Key  (r) = Recorded By, (t) = Taken By, (c) = Cosigned By    Initials Name Provider Type     Deny High, PT Physical Therapist                             Time Calculation:   Start Time: 1130  Stop Time: 1242  Time Calculation (min): 72 min  Therapy Charges for Today     Code Description Service Date Service Provider Modifiers Qty    91095598960  PT MANUAL THERAPY EA 15 MIN 1/20/2020 Deny High, PT GP 1    50387796372 HC PT THER PROC EA 15 MIN 1/20/2020 Deny High, PT GP 1                    Deny High PT  1/20/2020

## 2020-01-21 ENCOUNTER — HOSPITAL ENCOUNTER (OUTPATIENT)
Dept: PHYSICAL THERAPY | Facility: HOSPITAL | Age: 55
Setting detail: THERAPIES SERIES
Discharge: HOME | End: 2020-01-21

## 2020-01-21 ENCOUNTER — OFFICE VISIT (OUTPATIENT)
Dept: ORTHOPEDIC SURGERY | Facility: CLINIC | Age: 55
End: 2020-01-21

## 2020-01-21 VITALS — WEIGHT: 135 LBS | BODY MASS INDEX: 23.05 KG/M2 | HEIGHT: 64 IN

## 2020-01-21 DIAGNOSIS — M75.01 ADHESIVE CAPSULITIS OF RIGHT SHOULDER: ICD-10-CM

## 2020-01-21 DIAGNOSIS — M75.01 ADHESIVE CAPSULITIS OF RIGHT SHOULDER: Primary | ICD-10-CM

## 2020-01-21 DIAGNOSIS — Z98.890 STATUS POST ARTHROSCOPY OF SHOULDER: Primary | ICD-10-CM

## 2020-01-21 PROCEDURE — 97140 MANUAL THERAPY 1/> REGIONS: CPT | Performed by: PHYSICAL THERAPIST

## 2020-01-21 PROCEDURE — 97110 THERAPEUTIC EXERCISES: CPT | Performed by: PHYSICAL THERAPIST

## 2020-01-21 PROCEDURE — 99024 POSTOP FOLLOW-UP VISIT: CPT | Performed by: NURSE PRACTITIONER

## 2020-01-21 NOTE — THERAPY TREATMENT NOTE
Outpatient Physical Therapy Ortho Treatment Note   Alisha Soto     Patient Name: Desire Hernandez  : 1965  MRN: [id number]  Today's Date: 2020      Visit Date: 2020    Visit Dx:    ICD-10-CM ICD-9-CM   1. Adhesive capsulitis of right shoulder M75.01 726.0       Patient Active Problem List   Diagnosis   • Menopausal symptoms   • Left breast mass   • Adhesive capsulitis of right shoulder        Past Medical History:   Diagnosis Date   • ADHD (attention deficit hyperactivity disorder)     back in high school   • Allergic     seasonal   • ASCUS favor dysplasia     07; 08; 6/25/10; 12; 13; 8/21/15 (HPV negative)   • Blood type, Rh negative    • Bunion     surgery   • Chlamydia     as a teen   • H/O nipple discharge     Bilateral diagnostic mammogram negative   • HSV-1 (herpes simplex virus 1) infection    • HSV-2 (herpes simplex virus 2) infection    • LGSIL (low grade squamous intraepithelial dysplasia) 2006   • Ovarian cyst     Left   • PONV (postoperative nausea and vomiting)    • Right shoulder pain     SCHEDULED FOR SX        Past Surgical History:   Procedure Laterality Date   • BUNIONECTOMY Right 2016    Dr. Durbin   • CERVICAL CONIZATION, LEEP     • COLPOSCOPY W/ BIOPSY / CURETTAGE  2006    benign   • D&C WITH SUCTION  1998    10 week SAB   • ENDOMETRIAL ABLATION  Dec. 2017   • INDUCED       years ago   • [place] HYSTEROSCOPY,W/ENDOMETRIAL ABLATION N/A 2017    Procedure: HYSTEROSCOPY WITH ENDOMETRIAL ABLATION; d&c;  Surgeon: Sandie Hu DO;  Location: [facility];  Service: Obstetrics/Gynecology   • SHOULDER ARTHROSCOPY Right 2020    Procedure: Right shoulder manipulation under anesthesia with arthroscopy and extensive debridement of glenohumeral and subacromial spaces;  Surgeon: Stalin Liu MD;  Location: [facility];  Service: Orthopedics       PT Ortho     Row Name 20 0945       Subjective Comments     Subjective Comments  Pt states she just had her stitches removed and the nurse practitioner was happy with her progress.   -GC      User Key  (r) = Recorded By, (t) = Taken By, (c) = Cosigned By    Initials Name Provider Type    Deny Peralta, PT Physical Therapist                      PT Assessment/Plan     Row Name 01/21/20 0942          PT Assessment    Assessment Comments  Pt is doing well with improving shoulder ROM noted with her stretches and exercises.  -GC        PT Plan    PT Plan Comments  Pt is to continue her HEP daily.  -GC       User Key  (r) = Recorded By, (t) = Taken By, (c) = Cosigned By    Initials Name Provider Type    GC Dney High, PT Physical Therapist          Modalities     Row Name 01/21/20 0987             Moist Heat    MH Applied  Yes  -GC      Location  right shoulder with pt sitting and right UE abducted on table  -GC      Rx Minutes  10 mins  -GC      MH Prior to Rx  Yes  -GC        User Key  (r) = Recorded By, (t) = Taken By, (c) = Cosigned By    Initials Name Provider Type    Deny Peralta, PT Physical Therapist        OP Exercises     Row Name 01/21/20 0994             Subjective Comments    Subjective Comments  Pt states she just had her stitches removed and the nurse practitioner was happy with her progress.   -GC         Exercise 1    Exercise Name 1  Cane stretch-FLEX  -GC      Cueing 1  Verbal;Demo  -GC      Reps 1  15  -GC      Time 1  5 secs  -GC         Exercise 2    Exercise Name 2  Cane stretch-ABD  -GC      Cueing 2  Verbal;Demo  -GC      Reps 2  15  -GC      Time 2  5 secs  -GC         Exercise 3    Exercise Name 3  Cane stretch-ER  -GC      Cueing 3  Verbal;Demo  -GC      Reps 3  15  -GC      Time 3  5 secs  -GC         Exercise 4    Exercise Name 4  IR circles behind back  -GC      Cueing 4  Verbal;Demo  -GC      Reps 4  20x CW/CCW  -GC         Exercise 5    Exercise Name 5  Sleeper stretch  -GC      Cueing 5  Verbal;Tactile  -GC      Reps 5  10  -GC       Time 5  10 secs  -GC         Exercise 6    Exercise Name 6  Pulley-FLEX  -GC      Cueing 6  Verbal;Demo  -GC      Time 6  5 min  -GC         Exercise 7    Exercise Name 7  Pulley-Scaption  -GC      Cueing 7  Verbal;Demo  -GC      Time 7  5 min  -GC         Exercise 8    Exercise Name 8  Shoulder ER vs theraband  -GC      Cueing 8  Verbal;Demo  -GC      Reps 8  25  -GC      Time 8  red  -GC         Exercise 9    Exercise Name 9  Shoulder IR vs theraband  -GC      Cueing 9  Verbal;Demo  -GC      Reps 9  25  -GC      Time 9  red  -GC         Exercise 10    Exercise Name 10  Scaption Down  -GC      Cueing 10  Verbal;Demo  -GC      Reps 10  25  -GC         Exercise 11    Exercise Name 11  Scaption Up  -GC      Cueing 11  Verbal;Demo  -GC      Reps 11  25  -GC         Exercise 12    Exercise Name 12  Shoulder Rows vs theraband  -GC      Cueing 12  Verbal;Demo  -GC      Reps 12  25  -GC         Exercise 13    Exercise Name 13  Shoulder EXT vs theraband  -GC      Cueing 13  Verbal;Demo  -GC      Reps 13  25  -GC        User Key  (r) = Recorded By, (t) = Taken By, (c) = Cosigned By    Initials Name Provider Type    GC Deny High, PT Physical Therapist                      Manual Rx (last 36 hours)      Manual Treatments     Row Name 01/21/20 0945             Manual Rx 1    Manual Rx 1 Location  right shoulder  -GC      Manual Rx 1 Type  GH joint mobilizations  -GC      Manual Rx 1 Grade  grade II  -GC      Manual Rx 1 Duration  5 min  -GC         Manual Rx 2    Manual Rx 2 Location  right shoulder  -GC      Manual Rx 2 Type  PROM in FLEX-ABD-ER-IR  -GC      Manual Rx 2 Duration  15 min  -GC        User Key  (r) = Recorded By, (t) = Taken By, (c) = Cosigned By    Initials Name Provider Type    GC Deny High, PT Physical Therapist                             Time Calculation:   Start Time: 0945  Stop Time: 1052  Time Calculation (min): 67 min  Therapy Charges for Today     Code Description Service Date Service Provider  Modifiers Qty    89283452323  PT MANUAL THERAPY EA 15 MIN 1/21/2020 Deny High, PT GP 1    62973699720 HC PT THER PROC EA 15 MIN 1/21/2020 Deny High, PT GP 1                    Deny High, PT  1/21/2020

## 2020-01-21 NOTE — PROGRESS NOTES
CC: Follow-up right shoulder manipulation under anesthesia with arthroscopy and extensive debridement glenohumeral and subacromial spaces    Interval history: Desire Hernandez returns to clinic for follow-up right shoulder.  She has continued with physical therapy for range of motion and started strengthening exercises.  Pain worse now than prior to surgery however range of motion has significantly improved.  She is continued again with range of motion exercises at home has continued with medication for pain tolerating well.  She is experiencing tingling sensation to the first and second digit of the right hand denies any significant numbness, is also noted soreness at the elbow and forearm.  Denies other concerns present this time.    Exam:  Right upper extremity examined  Passive forward flexion 170 degrees, external rotation passively 40 degrees with 3 out of 5 strength  Internal rotation to side  Empty can test 3 out of 5 strength   strength 4 out of 5  Brisk cap refill all digits right hand  Positive sensation light touch all distributions right upper extremity  Negative calf tenderness, negative Homans sign bilaterally  Incisions clean dry and intact, sutures in place, negative erythema, drainage  Scattered ecchymosis at shoulder to mid humerus    Assessment: Status post shoulder manipulation under anesthesia with arthroscopy and extensive debridement glenohumeral subacromial spaces  Plan:  1.  Discussed plan of care with patient.  Sutures removed Steri-Strips replaced encouraged to avoid submerging down into water for the next 3 weeks.  Encouraged to continue with physical therapy, home strengthening exercises which will be provided to her by physical therapy and continue with range of motion.  We will plan to have her follow-up with Dr. Liu in 2 weeks to reevaluate.  Encouraged to call between now and that time with any questions or concerns she may have.  Encouragement provided we will plan to  follow-up with her 2 weeks again with Dr. Liu as discussed.  She verbalized understanding of all information agrees with plan of care.  Denies other concerns present this time.  Did discuss with her to continue DVT prophylaxis until completed.

## 2020-01-22 ENCOUNTER — HOSPITAL ENCOUNTER (OUTPATIENT)
Dept: PHYSICAL THERAPY | Facility: HOSPITAL | Age: 55
Setting detail: THERAPIES SERIES
Discharge: HOME | End: 2020-01-22

## 2020-01-22 DIAGNOSIS — M75.01 ADHESIVE CAPSULITIS OF RIGHT SHOULDER: Primary | ICD-10-CM

## 2020-01-22 PROCEDURE — 97110 THERAPEUTIC EXERCISES: CPT | Performed by: PHYSICAL THERAPIST

## 2020-01-22 PROCEDURE — 97140 MANUAL THERAPY 1/> REGIONS: CPT | Performed by: PHYSICAL THERAPIST

## 2020-01-22 NOTE — THERAPY TREATMENT NOTE
Outpatient Physical Therapy Ortho Treatment Note   Alisha Soto     Patient Name: Desire Hernandez  : 1965  MRN: [id number]  Today's Date: 2020      Visit Date: 2020    Visit Dx:    ICD-10-CM ICD-9-CM   1. Adhesive capsulitis of right shoulder M75.01 726.0       Patient Active Problem List   Diagnosis   • Menopausal symptoms   • Left breast mass   • Adhesive capsulitis of right shoulder   • Status post arthroscopy of shoulder, extensive debridement glenohumeral and subacromial spaces, DOS 2020        Past Medical History:   Diagnosis Date   • ADHD (attention deficit hyperactivity disorder)     back in high school   • Allergic     seasonal   • ASCUS favor dysplasia     07; 08; 6/25/10; 12; 13; 8/21/15 (HPV negative)   • Blood type, Rh negative    • Bunion 2016    surgery   • Chlamydia     as a teen   • H/O nipple discharge     Bilateral diagnostic mammogram negative   • HSV-1 (herpes simplex virus 1) infection    • HSV-2 (herpes simplex virus 2) infection    • LGSIL (low grade squamous intraepithelial dysplasia) 2006   • Ovarian cyst     Left   • PONV (postoperative nausea and vomiting)    • Right shoulder pain     SCHEDULED FOR SX        Past Surgical History:   Procedure Laterality Date   • BUNIONECTOMY Right 2016    Dr. Durbin   • CERVICAL CONIZATION, MOE     • COLPOSCOPY W/ BIOPSY / CURETTAGE  2006    benign   • D&C WITH SUCTION  1998    10 week SAB   • ENDOMETRIAL ABLATION  Dec. 2017   • INDUCED       years ago   • [place] HYSTEROSCOPY,W/ENDOMETRIAL ABLATION N/A 2017    Procedure: HYSTEROSCOPY WITH ENDOMETRIAL ABLATION; d&c;  Surgeon: Sandie Hu DO;  Location: [facility];  Service: Obstetrics/Gynecology   • SHOULDER ARTHROSCOPY Right 2020    Procedure: Right shoulder manipulation under anesthesia with arthroscopy and extensive debridement of glenohumeral and subacromial spaces;  Surgeon: Stalin Liu MD;   Location: [facility];  Service: Orthopedics       PT Ortho     Row Name 01/22/20 0930       Subjective Comments    Subjective Comments  Pt states her shoulder is still pretty sore.  -GC      User Key  (r) = Recorded By, (t) = Taken By, (c) = Cosigned By    Initials Name Provider Type    Deny Peralta, PT Physical Therapist                      PT Assessment/Plan     Row Name 01/22/20 0930          PT Assessment    Assessment Comments  Pt is showing some increase in her AROM. She tolerates aggressive stretching fair.  -GC        PT Plan    PT Plan Comments  Pt is to continue her HEP daily with therapy daily this week.  -GC       User Key  (r) = Recorded By, (t) = Taken By, (c) = Cosigned By    Initials Name Provider Type    Deny Peralta, PT Physical Therapist          Modalities     Row Name 01/22/20 0930             Moist Heat    Location  right shoulder with pt sitting and right UE abducted on table  -GC      Rx Minutes  10 mins  -GC      MH Prior to Rx  Yes  -GC        User Key  (r) = Recorded By, (t) = Taken By, (c) = Cosigned By    Initials Name Provider Type    Deny Peralta, PT Physical Therapist        OP Exercises     Row Name 01/22/20 0930             Subjective Comments    Subjective Comments  Pt states her shoulder is still pretty sore.  -GC         Exercise 1    Exercise Name 1  Cane stretch-FLEX  -GC      Cueing 1  Verbal;Demo  -GC      Reps 1  15  -GC      Time 1  5 secs  -GC         Exercise 2    Exercise Name 2  Cane stretch-ABD  -GC      Cueing 2  Verbal;Demo  -GC      Reps 2  15  -GC      Time 2  5 secs  -GC         Exercise 3    Exercise Name 3  Cane stretch-ER  -GC      Cueing 3  Verbal;Demo  -GC      Reps 3  15  -GC      Time 3  5 secs  -GC         Exercise 4    Exercise Name 4  IR circles behind back  -GC      Cueing 4  Verbal;Demo  -GC      Reps 4  20x CW/CCW  -GC         Exercise 5    Exercise Name 5  Sleeper stretch  -GC      Cueing 5  Verbal;Tactile  -GC      Reps 5  10  -GC       Time 5  10 secs  -GC         Exercise 6    Exercise Name 6  Pulley-FLEX  -GC      Cueing 6  Verbal;Demo  -GC      Time 6  5 min  -GC         Exercise 7    Exercise Name 7  Pulley-Scaption  -GC      Cueing 7  Verbal;Demo  -GC      Time 7  5 min  -GC         Exercise 8    Exercise Name 8  Shoulder ER vs theraband  -GC      Cueing 8  Verbal;Demo  -GC      Reps 8  25  -GC      Time 8  red  -GC         Exercise 9    Exercise Name 9  Shoulder IR vs theraband  -GC      Cueing 9  Verbal;Demo  -GC      Reps 9  25  -GC      Time 9  red  -GC         Exercise 10    Exercise Name 10  Scaption Down  -GC      Cueing 10  Verbal;Demo  -GC      Reps 10  25  -GC         Exercise 11    Exercise Name 11  Scaption Up  -GC      Cueing 11  Verbal;Demo  -GC      Reps 11  25  -GC         Exercise 12    Exercise Name 12  Shoulder Rows vs theraband  -GC      Cueing 12  Verbal;Demo  -GC      Reps 12  25  -GC      Time 12  red  -GC         Exercise 13    Exercise Name 13  Shoulder EXT vs theraband  -GC      Cueing 13  Verbal;Demo  -GC      Reps 13  25  -GC      Time 13  red  -GC        User Key  (r) = Recorded By, (t) = Taken By, (c) = Cosigned By    Initials Name Provider Type    GC Deny High, PT Physical Therapist                      Manual Rx (last 36 hours)      Manual Treatments     Row Name 01/22/20 0930             Manual Rx 1    Manual Rx 1 Location  right shoulder  -GC      Manual Rx 1 Type  GH joint mobilizations  -GC      Manual Rx 1 Grade  grade II  -GC      Manual Rx 1 Duration  5 min  -GC         Manual Rx 2    Manual Rx 2 Location  right shoulder  -GC      Manual Rx 2 Type  PROM in FLEX-ABD-ER-IR  -GC      Manual Rx 2 Duration  15 min  -GC        User Key  (r) = Recorded By, (t) = Taken By, (c) = Cosigned By    Initials Name Provider Type    GC Deny High, PT Physical Therapist                             Time Calculation:   Start Time: 0930  Stop Time: 1040  Time Calculation (min): 70 min  Therapy Charges for Today      Code Description Service Date Service Provider Modifiers Qty    79089578891  PT MANUAL THERAPY EA 15 MIN 1/22/2020 Deny High, PT GP 1    81387622730 HC PT THER PROC EA 15 MIN 1/22/2020 Deny High, PT GP 1                    Deny High, PT  1/22/2020

## 2020-01-23 ENCOUNTER — HOSPITAL ENCOUNTER (OUTPATIENT)
Dept: PHYSICAL THERAPY | Facility: HOSPITAL | Age: 55
Setting detail: THERAPIES SERIES
Discharge: HOME | End: 2020-01-23

## 2020-01-23 DIAGNOSIS — M75.01 ADHESIVE CAPSULITIS OF RIGHT SHOULDER: Primary | ICD-10-CM

## 2020-01-23 PROCEDURE — 97110 THERAPEUTIC EXERCISES: CPT

## 2020-01-23 PROCEDURE — 97140 MANUAL THERAPY 1/> REGIONS: CPT

## 2020-01-23 NOTE — THERAPY TREATMENT NOTE
Outpatient Physical Therapy Ortho Treatment Note   Alisha Soto     Patient Name: Desire Hernandez  : 1965  MRN: [id number]  Today's Date: 2020      Visit Date: 2020    Visit Dx:    ICD-10-CM ICD-9-CM   1. Adhesive capsulitis of right shoulder M75.01 726.0       Patient Active Problem List   Diagnosis   • Menopausal symptoms   • Left breast mass   • Adhesive capsulitis of right shoulder   • Status post arthroscopy of shoulder, extensive debridement glenohumeral and subacromial spaces, DOS 2020        Past Medical History:   Diagnosis Date   • ADHD (attention deficit hyperactivity disorder)     back in high school   • Allergic     seasonal   • ASCUS favor dysplasia     07; 08; 6/25/10; 12; 13; 8/21/15 (HPV negative)   • Blood type, Rh negative    • Bunion 2016    surgery   • Chlamydia     as a teen   • H/O nipple discharge     Bilateral diagnostic mammogram negative   • HSV-1 (herpes simplex virus 1) infection    • HSV-2 (herpes simplex virus 2) infection    • LGSIL (low grade squamous intraepithelial dysplasia) 2006   • Ovarian cyst     Left   • PONV (postoperative nausea and vomiting)    • Right shoulder pain     SCHEDULED FOR SX        Past Surgical History:   Procedure Laterality Date   • BUNIONECTOMY Right 2016    Dr. Durbin   • CERVICAL CONIZATION, MOE     • COLPOSCOPY W/ BIOPSY / CURETTAGE  2006    benign   • D&C WITH SUCTION  1998    10 week SAB   • ENDOMETRIAL ABLATION  Dec. 2017   • INDUCED       years ago   • [place] HYSTEROSCOPY,W/ENDOMETRIAL ABLATION N/A 2017    Procedure: HYSTEROSCOPY WITH ENDOMETRIAL ABLATION; d&c;  Surgeon: Sandie Hu DO;  Location: [facility];  Service: Obstetrics/Gynecology   • SHOULDER ARTHROSCOPY Right 2020    Procedure: Right shoulder manipulation under anesthesia with arthroscopy and extensive debridement of glenohumeral and subacromial spaces;  Surgeon: Stalin Liu MD;   Location: [facility];  Service: Orthopedics       PT Ortho     Row Name 01/23/20 1000       Subjective Comments    Subjective Comments  Pt reports her shoulder feels really tight; states she is constantly doing things to try and stretch/move her arm  -       Subjective Pain    Subjective Pain Comment  rates pain 6/10 at rest prior to taking pain meds this morning  -      User Key  (r) = Recorded By, (t) = Taken By, (c) = Cosigned By    Initials Name Provider Type    Izaiah Carroll PTA Physical Therapy Assistant                      PT Assessment/Plan     Row Name 01/23/20 1542          PT Assessment    Assessment Comments  Pt continues to have tightness limiting ROM but improving; good tolerance to progression of reps with strength ex's  -        PT Plan    PT Plan Comments  Cont per POC  -       User Key  (r) = Recorded By, (t) = Taken By, (c) = Cosigned By    Initials Name Provider Type    Izaiah Carroll PTA Physical Therapy Assistant          Modalities     Row Name 01/23/20 1000             Moist Heat    Location  right shoulder with pt sitting and right UE abducted on table  -      Rx Minutes  10 mins  -      MH Prior to Rx  Yes  -        User Key  (r) = Recorded By, (t) = Taken By, (c) = Cosigned By    Initials Name Provider Type    Izaiah Carroll PTA Physical Therapy Assistant        OP Exercises     Row Name 01/23/20 1000             Subjective Comments    Subjective Comments  Pt reports her shoulder feels really tight; states she is constantly doing things to try and stretch/move her arm  -         Subjective Pain    Subjective Pain Comment  rates pain 6/10 at rest prior to taking pain meds this morning  -         Exercise 1    Exercise Name 1  Cane stretch-FLEX  -MH      Cueing 1  Verbal  -MH      Reps 1  15  -      Time 1  5 secs  -         Exercise 2    Exercise Name 2  Cane stretch-ABD  -MH      Cueing 2  Verbal  -MH      Reps 2  15  -MH      Time 2  5 secs  -          Exercise 3    Exercise Name 3  Cane stretch-ER  -MH      Cueing 3  Verbal  -MH      Reps 3  15  -MH      Time 3  5 secs  -MH         Exercise 4    Exercise Name 4  IR circles behind back  -MH      Cueing 4  Verbal  -MH      Reps 4  20x CW/CCW  -MH         Exercise 5    Exercise Name 5  Sleeper stretch  -MH      Cueing 5  Verbal;Tactile  -MH      Reps 5  10  -MH      Time 5  10 secs  -MH         Exercise 6    Exercise Name 6  Pulley-FLEX  -MH      Cueing 6  Verbal;Demo  -MH      Time 6  5 min  -MH         Exercise 7    Exercise Name 7  Pulley-Scaption  -MH      Cueing 7  Verbal;Demo  -MH      Time 7  5 min  -MH         Exercise 8    Exercise Name 8  Shoulder ER vs theraband  -MH      Cueing 8  Verbal  -MH      Reps 8  30  -MH      Time 8  red  -MH         Exercise 9    Exercise Name 9  Shoulder IR vs theraband  -MH      Cueing 9  Verbal  -MH      Reps 9  30  -MH      Time 9  red  -MH         Exercise 10    Exercise Name 10  Scaption Down  -MH      Cueing 10  Verbal  -MH      Sets 10  2  -MH      Reps 10  15  -MH         Exercise 11    Exercise Name 11  Scaption Up  -MH      Cueing 11  Verbal  -MH      Sets 11  2  -MH      Reps 11  15  -MH         Exercise 12    Exercise Name 12  Shoulder Rows vs theraband  -MH      Cueing 12  Verbal;Demo;Tactile  -MH      Reps 12  30  -MH      Time 12  red  -MH         Exercise 13    Exercise Name 13  Shoulder EXT vs theraband  -MH      Cueing 13  Verbal;Demo;Tactile  -MH      Reps 13  30  -MH      Time 13  red  -MH        User Key  (r) = Recorded By, (t) = Taken By, (c) = Cosigned By    Initials Name Provider Type    Izaiah Carroll, WARREN Physical Therapy Assistant                      Manual Rx (last 36 hours)      Manual Treatments     Row Name 01/23/20 1000             Manual Rx 1    Manual Rx 1 Location  right shoulder  -MH      Manual Rx 1 Type  GH joint mobilizations  -MH      Manual Rx 1 Grade  grade II  -MH      Manual Rx 1 Duration  5 min  -MH         Manual Rx 2     Manual Rx 2 Location  right shoulder  -      Manual Rx 2 Type  PROM in FLEX-ABD-ER-IR  -MH      Manual Rx 2 Duration  15 min  -        User Key  (r) = Recorded By, (t) = Taken By, (c) = Cosigned By    Initials Name Provider Type    Izaiah Carroll PTA Physical Therapy Assistant              Therapy Education  Given: HEP  Program: Reinforced  How Provided: Verbal, Demonstration  Provided to: Patient  Level of Understanding: Teach back education performed, Verbalized, Demonstrated              Time Calculation:   Start Time: 0938  Stop Time: 1115  Time Calculation (min): 97 min  Therapy Charges for Today     Code Description Service Date Service Provider Modifiers Qty    43960748069 HC PT MANUAL THERAPY EA 15 MIN 1/23/2020 Izaiah Abarca PTA GP 1    12821661964 HC PT THER PROC EA 15 MIN 1/23/2020 Izaiah Abarca PTA GP 1                    Izaiah Abarca PTA  1/23/2020

## 2020-01-24 ENCOUNTER — HOSPITAL ENCOUNTER (OUTPATIENT)
Dept: PHYSICAL THERAPY | Facility: HOSPITAL | Age: 55
Setting detail: THERAPIES SERIES
Discharge: HOME | End: 2020-01-24

## 2020-01-24 DIAGNOSIS — M75.01 ADHESIVE CAPSULITIS OF RIGHT SHOULDER: Primary | ICD-10-CM

## 2020-01-24 PROCEDURE — 97110 THERAPEUTIC EXERCISES: CPT

## 2020-01-24 PROCEDURE — 97140 MANUAL THERAPY 1/> REGIONS: CPT

## 2020-01-24 NOTE — THERAPY TREATMENT NOTE
Outpatient Physical Therapy Ortho Treatment Note   Alisha Soto     Patient Name: Desire Hernandez  : 1965  MRN: [id number]  Today's Date: 2020      Visit Date: 2020    Visit Dx:    ICD-10-CM ICD-9-CM   1. Adhesive capsulitis of right shoulder M75.01 726.0       Patient Active Problem List   Diagnosis   • Menopausal symptoms   • Left breast mass   • Adhesive capsulitis of right shoulder   • Status post arthroscopy of shoulder, extensive debridement glenohumeral and subacromial spaces, DOS 2020        Past Medical History:   Diagnosis Date   • ADHD (attention deficit hyperactivity disorder)     back in high school   • Allergic     seasonal   • ASCUS favor dysplasia     07; 08; 6/25/10; 12; 13; 8/21/15 (HPV negative)   • Blood type, Rh negative    • Bunion 2016    surgery   • Chlamydia     as a teen   • H/O nipple discharge     Bilateral diagnostic mammogram negative   • HSV-1 (herpes simplex virus 1) infection    • HSV-2 (herpes simplex virus 2) infection    • LGSIL (low grade squamous intraepithelial dysplasia) 2006   • Ovarian cyst     Left   • PONV (postoperative nausea and vomiting)    • Right shoulder pain     SCHEDULED FOR SX        Past Surgical History:   Procedure Laterality Date   • BUNIONECTOMY Right 2016    Dr. Durbin   • CERVICAL CONIZATION, MOE     • COLPOSCOPY W/ BIOPSY / CURETTAGE  2006    benign   • D&C WITH SUCTION  1998    10 week SAB   • ENDOMETRIAL ABLATION  Dec. 2017   • INDUCED       years ago   • CT HYSTEROSCOPY,W/ENDOMETRIAL ABLATION N/A 2017    Procedure: HYSTEROSCOPY WITH ENDOMETRIAL ABLATION; d&c;  Surgeon: Sandie Hu DO;  Location: [facility];  Service: Obstetrics/Gynecology   • SHOULDER ARTHROSCOPY Right 2020    Procedure: Right shoulder manipulation under anesthesia with arthroscopy and extensive debridement of glenohumeral and subacromial spaces;  Surgeon: Stalin Liu MD;   Location: [facility] OR;  Service: Orthopedics       PT Ortho     Row Name 01/24/20 1030       Subjective Comments    Subjective Comments  pt reports increased sorensess UT area, feels it's from increased reps of band ex's; continued tightness  -MH      User Key  (r) = Recorded By, (t) = Taken By, (c) = Cosigned By    Initials Name Provider Type    Izaiah Carroll PTA Physical Therapy Assistant                      PT Assessment/Plan     Row Name 01/24/20 1327          PT Assessment    Assessment Comments  ranges continue to progress with PROM;  improved tolerance strength ex's today  -MH        PT Plan    PT Plan Comments  Cont per POC  -MH       User Key  (r) = Recorded By, (t) = Taken By, (c) = Cosigned By    Initials Name Provider Type    Izaiah Carroll PTA Physical Therapy Assistant          Modalities     Row Name 01/24/20 1030             Moist Heat    Location  right shoulder with pt sitting and right UE abducted on table  -MH      Rx Minutes  10 mins  -MH      MH Prior to Rx  Yes  -MH        User Key  (r) = Recorded By, (t) = Taken By, (c) = Cosigned By    Initials Name Provider Type    Izaiah Carroll PTA Physical Therapy Assistant        OP Exercises     Row Name 01/24/20 1030             Subjective Comments    Subjective Comments  pt reports increased sorensess UT area, feels it's from increased reps of band ex's; continued tightness  -MH         Exercise 1    Exercise Name 1  Cane stretch-FLEX  -MH      Cueing 1  Verbal  -MH      Reps 1  15  -MH      Time 1  5 secs  -MH         Exercise 2    Exercise Name 2  Cane stretch-ABD  -MH      Cueing 2  Verbal  -MH      Reps 2  15  -MH      Time 2  5 secs  -MH         Exercise 3    Exercise Name 3  Cane stretch-ER  -MH      Cueing 3  Verbal  -MH      Reps 3  15  -MH      Time 3  5 secs  -MH         Exercise 4    Exercise Name 4  IR circles behind back  -MH      Cueing 4  Verbal  -MH      Reps 4  20x CW/CCW  -MH         Exercise 5    Exercise Name 5   Sleeper stretch  -MH      Cueing 5  Verbal;Tactile  -MH      Reps 5  10  -MH      Time 5  10 secs  -MH         Exercise 6    Exercise Name 6  Pulley-FLEX  -MH      Cueing 6  Verbal;Demo  -MH      Time 6  5 min  -MH         Exercise 7    Exercise Name 7  Pulley-Scaption  -MH      Cueing 7  Verbal;Demo  -MH      Time 7  5 min  -MH         Exercise 8    Exercise Name 8  Shoulder ER vs theraband  -MH      Cueing 8  Verbal  -MH      Reps 8  30  -MH      Time 8  red  -MH         Exercise 9    Exercise Name 9  Shoulder IR vs theraband  -MH      Cueing 9  Verbal  -MH      Reps 9  30  -MH      Time 9  red  -MH         Exercise 10    Exercise Name 10  Scaption Down  -MH      Cueing 10  Verbal  -MH      Sets 10  2  -MH      Reps 10  15  -MH         Exercise 11    Exercise Name 11  Scaption Up  -MH      Cueing 11  Verbal  -MH      Sets 11  2  -MH      Reps 11  15  -MH         Exercise 12    Exercise Name 12  Shoulder Rows vs theraband  -MH      Cueing 12  Verbal;Demo;Tactile  -MH      Reps 12  30  -MH      Time 12  red  -MH         Exercise 13    Exercise Name 13  Shoulder EXT vs theraband  -MH      Cueing 13  Verbal;Demo;Tactile  -MH      Reps 13  30  -MH      Time 13  red  -MH        User Key  (r) = Recorded By, (t) = Taken By, (c) = Cosigned By    Initials Name Provider Type     Izaiah Abarca, WARREN Physical Therapy Assistant                      Manual Rx (last 36 hours)      Manual Treatments     Row Name 01/24/20 1030             Manual Rx 1    Manual Rx 1 Location  right shoulder  -MH      Manual Rx 1 Type  GH joint mobilizations  -MH      Manual Rx 1 Grade  grade II  -MH      Manual Rx 1 Duration  5 min  -MH         Manual Rx 2    Manual Rx 2 Location  right shoulder  -MH      Manual Rx 2 Type  PROM in FLEX-ABD-ER-IR  -MH      Manual Rx 2 Duration  15 min  -MH        User Key  (r) = Recorded By, (t) = Taken By, (c) = Cosigned By    Initials Name Provider Type     Izaiah Abarca, WARREN Physical Therapy Assistant               Therapy Education  Given: HEP, Symptoms/condition management  Program: Reinforced  How Provided: Verbal  Provided to: Patient  Level of Understanding: Teach back education performed, Verbalized, Demonstrated              Time Calculation:   Start Time: 1039  Stop Time: 1210  Time Calculation (min): 91 min  Therapy Charges for Today     Code Description Service Date Service Provider Modifiers Qty    27449397470 HC PT MANUAL THERAPY EA 15 MIN 1/24/2020 Izaiah Abarca PTA GP 1    51679313787 HC PT THER PROC EA 15 MIN 1/24/2020 Izaiah Abarca PTA GP 1                    Izaiah Abarca PTA  1/24/2020

## 2020-01-27 ENCOUNTER — HOSPITAL ENCOUNTER (OUTPATIENT)
Dept: PHYSICAL THERAPY | Facility: HOSPITAL | Age: 55
Setting detail: THERAPIES SERIES
Discharge: HOME | End: 2020-01-27

## 2020-01-27 DIAGNOSIS — M75.01 ADHESIVE CAPSULITIS OF RIGHT SHOULDER: Primary | ICD-10-CM

## 2020-01-27 PROCEDURE — 97140 MANUAL THERAPY 1/> REGIONS: CPT

## 2020-01-27 PROCEDURE — 97110 THERAPEUTIC EXERCISES: CPT

## 2020-01-27 NOTE — THERAPY TREATMENT NOTE
Outpatient Physical Therapy Ortho Treatment Note   Alisha Soto     Patient Name: Desire Hernandez  : 1965  MRN: [id number]  Today's Date: 2020      Visit Date: 2020    Visit Dx:    ICD-10-CM ICD-9-CM   1. Adhesive capsulitis of right shoulder M75.01 726.0       Patient Active Problem List   Diagnosis   • Menopausal symptoms   • Left breast mass   • Adhesive capsulitis of right shoulder   • Status post arthroscopy of shoulder, extensive debridement glenohumeral and subacromial spaces, DOS 2020        Past Medical History:   Diagnosis Date   • ADHD (attention deficit hyperactivity disorder)     back in high school   • Allergic     seasonal   • ASCUS favor dysplasia     07; 08; 6/25/10; 12; 13; 8/21/15 (HPV negative)   • Blood type, Rh negative    • Bunion 2016    surgery   • Chlamydia     as a teen   • H/O nipple discharge     Bilateral diagnostic mammogram negative   • HSV-1 (herpes simplex virus 1) infection    • HSV-2 (herpes simplex virus 2) infection    • LGSIL (low grade squamous intraepithelial dysplasia) 2006   • Ovarian cyst     Left   • PONV (postoperative nausea and vomiting)    • Right shoulder pain     SCHEDULED FOR SX        Past Surgical History:   Procedure Laterality Date   • BUNIONECTOMY Right 2016    Dr. Durbin   • CERVICAL CONIZATION, MOE     • COLPOSCOPY W/ BIOPSY / CURETTAGE  2006    benign   • D&C WITH SUCTION  1998    10 week SAB   • ENDOMETRIAL ABLATION  Dec. 2017   • INDUCED       years ago   • [place] HYSTEROSCOPY,W/ENDOMETRIAL ABLATION N/A 2017    Procedure: HYSTEROSCOPY WITH ENDOMETRIAL ABLATION; d&c;  Surgeon: Sandie Hu DO;  Location: [facility];  Service: Obstetrics/Gynecology   • SHOULDER ARTHROSCOPY Right 2020    Procedure: Right shoulder manipulation under anesthesia with arthroscopy and extensive debridement of glenohumeral and subacromial spaces;  Surgeon: Stalin Liu MD;   Location: [facility] OR;  Service: Orthopedics                       PT Assessment/Plan     Row Name 01/27/20 1230          PT Assessment    Assessment Comments  Pt with improved tolerance with passive stretching, continues to make good progress with ROM  -KM        PT Plan    PT Plan Comments  Continue per POC  -KM       User Key  (r) = Recorded By, (t) = Taken By, (c) = Cosigned By    Initials Name Provider Type    Rosangela Jackson PTA Physical Therapy Assistant          Modalities     Row Name 01/27/20 1230             Moist Heat    MH Applied  Yes  -KM      Location  right shoulder with pt sitting and right UE abducted on table  -KM      Rx Minutes  10 mins  -KM      MH Prior to Rx  Yes  -KM        User Key  (r) = Recorded By, (t) = Taken By, (c) = Cosigned By    Initials Name Provider Type    Rosangela Jackson PTA Physical Therapy Assistant        OP Exercises     Row Name 01/27/20 1230             Subjective Comments    Subjective Comments  Pt states her shoulder is doing well and has been compliant with HEP.   -KM         Exercise 1    Exercise Name 1  Cane stretch-FLEX  -KM      Cueing 1  Verbal  -KM      Reps 1  15  -KM      Time 1  5 secs  -KM         Exercise 2    Exercise Name 2  Cane stretch-ABD  -KM      Cueing 2  Verbal  -KM      Reps 2  15  -KM      Time 2  5 secs  -KM         Exercise 3    Exercise Name 3  Cane stretch-ER  -KM      Cueing 3  Verbal  -KM      Reps 3  15  -KM      Time 3  5 secs  -KM         Exercise 4    Exercise Name 4  IR circles behind back  -KM      Cueing 4  Verbal  -KM      Reps 4  20x CW/CCW  -KM         Exercise 5    Exercise Name 5  Sleeper stretch  -KM      Cueing 5  Verbal;Tactile  -KM      Reps 5  10  -KM      Time 5  10 secs  -KM         Exercise 6    Exercise Name 6  Pulley-FLEX  -KM      Cueing 6  Verbal;Demo  -KM      Time 6  5 min  -KM         Exercise 7    Exercise Name 7  Pulley-Scaption  -KM      Cueing 7  Verbal;Demo  -KM      Time 7  5 min  -KM         Exercise 8     Exercise Name 8  Shoulder ER vs theraband  -KM      Cueing 8  Verbal  -KM      Reps 8  30  -KM      Time 8  red  -KM         Exercise 9    Exercise Name 9  Shoulder IR vs theraband  -KM      Cueing 9  Verbal  -KM      Reps 9  30  -KM      Time 9  red  -KM         Exercise 10    Exercise Name 10  Scaption Down  -KM      Cueing 10  Verbal  -KM      Sets 10  2  -KM      Reps 10  15  -KM         Exercise 11    Exercise Name 11  Scaption Up  -KM      Cueing 11  Verbal  -KM      Sets 11  2  -KM      Reps 11  15  -KM         Exercise 12    Exercise Name 12  Shoulder Rows vs theraband  -KM      Cueing 12  Verbal;Demo;Tactile  -KM      Reps 12  30  -KM      Time 12  red  -KM         Exercise 13    Exercise Name 13  Shoulder EXT vs theraband  -KM      Cueing 13  Verbal;Demo;Tactile  -KM      Reps 13  30  -KM      Time 13  red  -KM        User Key  (r) = Recorded By, (t) = Taken By, (c) = Cosigned By    Initials Name Provider Type    Rosangela Jackson PTA Physical Therapy Assistant                                          Time Calculation:   Start Time: 1230  Stop Time: 1325  Time Calculation (min): 55 min  Therapy Charges for Today     Code Description Service Date Service Provider Modifiers Qty    42555554655 HC PT MANUAL THERAPY EA 15 MIN 1/27/2020 Rosangela Quarles PTA GP 1    89960770483 HC PT THER PROC EA 15 MIN 1/27/2020 Rosangela Quarles PTA GP 1                    Rosangela Quarles PTA  1/27/2020

## 2020-01-29 ENCOUNTER — HOSPITAL ENCOUNTER (OUTPATIENT)
Dept: PHYSICAL THERAPY | Facility: HOSPITAL | Age: 55
Setting detail: THERAPIES SERIES
Discharge: HOME | End: 2020-01-29

## 2020-01-29 DIAGNOSIS — M75.01 ADHESIVE CAPSULITIS OF RIGHT SHOULDER: ICD-10-CM

## 2020-01-29 DIAGNOSIS — M75.01 ADHESIVE CAPSULITIS OF RIGHT SHOULDER: Primary | ICD-10-CM

## 2020-01-29 PROCEDURE — 97140 MANUAL THERAPY 1/> REGIONS: CPT | Performed by: PHYSICAL THERAPIST

## 2020-01-29 PROCEDURE — 97110 THERAPEUTIC EXERCISES: CPT | Performed by: PHYSICAL THERAPIST

## 2020-01-29 RX ORDER — HYDROCODONE BITARTRATE AND ACETAMINOPHEN 7.5; 325 MG/1; MG/1
1-2 TABLET ORAL EVERY 4 HOURS PRN
Qty: 30 TABLET | Refills: 0 | Status: SHIPPED | OUTPATIENT
Start: 2020-01-29 | End: 2020-07-21

## 2020-01-29 NOTE — THERAPY TREATMENT NOTE
Outpatient Physical Therapy Ortho Treatment Note   Alisha Soto     Patient Name: Desire Hernandez  : 1965  MRN: [id number]  Today's Date: 2020      Visit Date: 2020    Visit Dx:    ICD-10-CM ICD-9-CM   1. Adhesive capsulitis of right shoulder M75.01 726.0       Patient Active Problem List   Diagnosis   • Menopausal symptoms   • Left breast mass   • Adhesive capsulitis of right shoulder   • Status post arthroscopy of shoulder, extensive debridement glenohumeral and subacromial spaces, DOS 2020        Past Medical History:   Diagnosis Date   • ADHD (attention deficit hyperactivity disorder)     back in high school   • Allergic     seasonal   • ASCUS favor dysplasia     07; 08; 6/25/10; 12; 13; 8/21/15 (HPV negative)   • Blood type, Rh negative    • Bunion 2016    surgery   • Chlamydia     as a teen   • H/O nipple discharge     Bilateral diagnostic mammogram negative   • HSV-1 (herpes simplex virus 1) infection    • HSV-2 (herpes simplex virus 2) infection    • LGSIL (low grade squamous intraepithelial dysplasia) 2006   • Ovarian cyst     Left   • PONV (postoperative nausea and vomiting)    • Right shoulder pain     SCHEDULED FOR SX        Past Surgical History:   Procedure Laterality Date   • BUNIONECTOMY Right 2016    Dr. Durbin   • CERVICAL CONIZATION, MOE     • COLPOSCOPY W/ BIOPSY / CURETTAGE  2006    benign   • D&C WITH SUCTION  1998    10 week SAB   • ENDOMETRIAL ABLATION  Dec. 2017   • INDUCED       years ago   • [place] HYSTEROSCOPY,W/ENDOMETRIAL ABLATION N/A 2017    Procedure: HYSTEROSCOPY WITH ENDOMETRIAL ABLATION; d&c;  Surgeon: Sandie Hu DO;  Location: [facility];  Service: Obstetrics/Gynecology   • SHOULDER ARTHROSCOPY Right 2020    Procedure: Right shoulder manipulation under anesthesia with arthroscopy and extensive debridement of glenohumeral and subacromial spaces;  Surgeon: Stalin iLu MD;   Location: [facility];  Service: Orthopedics       PT Ortho     Row Name 01/29/20 0930       Subjective Comments    Subjective Comments  Pt c/o pain in her shoulder that seems to radiate into her upper arm. She does think it is easier to move her arm.  -GC      User Key  (r) = Recorded By, (t) = Taken By, (c) = Cosigned By    Initials Name Provider Type    Deny Peralta, PT Physical Therapist                      PT Assessment/Plan     Row Name 01/29/20 0930          PT Assessment    Assessment Comments  Pt is showing improved shoulder ROM in terms of total degrees and in terms of ease to get her to end range.  -GC        PT Plan    PT Plan Comments  Pt is to continue her HEP daily with therapy 3x weekly.  -GC       User Key  (r) = Recorded By, (t) = Taken By, (c) = Cosigned By    Initials Name Provider Type    Deny Peralta, PT Physical Therapist          Modalities     Row Name 01/29/20 0930             Moist Heat    MH Applied  Yes  -GC      Location  right shoulder with pt sitting and right UE abducted on table  -GC      Rx Minutes  10 mins  -GC      MH Prior to Rx  Yes  -GC        User Key  (r) = Recorded By, (t) = Taken By, (c) = Cosigned By    Initials Name Provider Type    Deny Peralta, PT Physical Therapist        OP Exercises     Row Name 01/29/20 0930             Subjective Comments    Subjective Comments  Pt c/o pain in her shoulder that seems to radiate into her upper arm. She does think it is easier to move her arm.  -GC         Exercise 1    Exercise Name 1  Cane stretch-FLEX  -GC      Cueing 1  Verbal  -GC      Reps 1  15  -GC      Time 1  5 secs  -GC         Exercise 2    Exercise Name 2  Cane stretch-ABD  -GC      Cueing 2  Verbal  -GC      Reps 2  15  -GC      Time 2  5 secs  -GC         Exercise 3    Exercise Name 3  Cane stretch-ER  -GC      Cueing 3  Verbal  -GC      Reps 3  15  -GC      Time 3  5 secs  -GC         Exercise 4    Exercise Name 4  IR circles behind back  -GC       Cueing 4  Verbal  -GC      Reps 4  20x CW/CCW  -GC         Exercise 5    Exercise Name 5  Sleeper stretch  -GC      Cueing 5  Verbal;Tactile  -GC      Reps 5  10  -GC      Time 5  10 secs  -GC         Exercise 6    Exercise Name 6  Pulley-FLEX  -GC      Cueing 6  Verbal;Demo  -GC      Time 6  5 min  -GC         Exercise 7    Exercise Name 7  Pulley-Scaption  -GC      Cueing 7  Verbal;Demo  -GC      Time 7  5 min  -GC         Exercise 8    Exercise Name 8  Shoulder ER vs theraband  -GC      Cueing 8  Verbal  -GC      Reps 8  30  -GC      Time 8  red  -GC         Exercise 9    Exercise Name 9  Shoulder IR vs theraband  -GC      Cueing 9  Verbal  -GC      Reps 9  30  -GC      Time 9  red  -GC         Exercise 10    Exercise Name 10  Scaption Down  -GC      Cueing 10  Verbal  -GC      Sets 10  2  -GC      Reps 10  15  -GC         Exercise 11    Exercise Name 11  Scaption Up  -GC      Cueing 11  Verbal  -GC      Sets 11  2  -GC      Reps 11  15  -GC         Exercise 12    Exercise Name 12  Shoulder Rows vs theraband  -GC      Cueing 12  Verbal;Demo;Tactile  -GC      Reps 12  30  -GC      Time 12  red  -GC         Exercise 13    Exercise Name 13  Shoulder EXT vs theraband  -GC      Cueing 13  Verbal;Demo;Tactile  -GC      Reps 13  30  -GC      Time 13  red  -GC        User Key  (r) = Recorded By, (t) = Taken By, (c) = Cosigned By    Initials Name Provider Type    GC Deny High PT Physical Therapist                      Manual Rx (last 36 hours)      Manual Treatments     Row Name 01/29/20 0930             Manual Rx 1    Manual Rx 1 Location  right shoulder  -GC      Manual Rx 1 Type  GH joint mobilizations  -GC      Manual Rx 1 Grade  grade II  -GC      Manual Rx 1 Duration  5 min  -GC         Manual Rx 2    Manual Rx 2 Location  right shoulder  -GC      Manual Rx 2 Type  PROM in FLEX-ABD-ER-IR  -GC      Manual Rx 2 Duration  15 min  -GC        User Key  (r) = Recorded By, (t) = Taken By, (c) = Cosigned By     Initials Name Provider Type     Deny High, PT Physical Therapist                             Time Calculation:   Start Time: 0930  Stop Time: 1042  Time Calculation (min): 72 min  Therapy Charges for Today     Code Description Service Date Service Provider Modifiers Qty    12373285618  PT MANUAL THERAPY EA 15 MIN 1/29/2020 Deny High, PT GP 1    [id number]  PT THER PROC EA 15 MIN 1/29/2020 Deny High, PT GP 1                    Deny High PT  1/29/2020

## 2020-01-31 ENCOUNTER — HOSPITAL ENCOUNTER (OUTPATIENT)
Dept: PHYSICAL THERAPY | Facility: HOSPITAL | Age: 55
Setting detail: THERAPIES SERIES
Discharge: HOME | End: 2020-01-31

## 2020-01-31 DIAGNOSIS — M75.01 ADHESIVE CAPSULITIS OF RIGHT SHOULDER: Primary | ICD-10-CM

## 2020-01-31 PROCEDURE — 97140 MANUAL THERAPY 1/> REGIONS: CPT

## 2020-01-31 PROCEDURE — 97110 THERAPEUTIC EXERCISES: CPT

## 2020-02-03 ENCOUNTER — HOSPITAL ENCOUNTER (OUTPATIENT)
Dept: PHYSICAL THERAPY | Facility: HOSPITAL | Age: 55
Setting detail: THERAPIES SERIES
Discharge: HOME | End: 2020-02-03

## 2020-02-03 DIAGNOSIS — M75.01 ADHESIVE CAPSULITIS OF RIGHT SHOULDER: Primary | ICD-10-CM

## 2020-02-03 PROCEDURE — 97110 THERAPEUTIC EXERCISES: CPT

## 2020-02-03 PROCEDURE — 97140 MANUAL THERAPY 1/> REGIONS: CPT

## 2020-02-03 NOTE — THERAPY TREATMENT NOTE
Outpatient Physical Therapy Ortho Treatment Note   Alisha Soto     Patient Name: Desire Hernandez  : 1965  MRN: [id number]  Today's Date: 2/3/2020      Visit Date: 2020    Visit Dx:    ICD-10-CM ICD-9-CM   1. Adhesive capsulitis of right shoulder M75.01 726.0       Patient Active Problem List   Diagnosis   • Menopausal symptoms   • Left breast mass   • Adhesive capsulitis of right shoulder   • Status post arthroscopy of shoulder, extensive debridement glenohumeral and subacromial spaces, DOS 2020        Past Medical History:   Diagnosis Date   • ADHD (attention deficit hyperactivity disorder)     back in high school   • Allergic     seasonal   • ASCUS favor dysplasia     07; 08; 6/25/10; 12; 13; 8/21/15 (HPV negative)   • Blood type, Rh negative    • Bunion 2016    surgery   • Chlamydia     as a teen   • H/O nipple discharge     Bilateral diagnostic mammogram negative   • HSV-1 (herpes simplex virus 1) infection    • HSV-2 (herpes simplex virus 2) infection    • LGSIL (low grade squamous intraepithelial dysplasia) 2006   • Ovarian cyst     Left   • PONV (postoperative nausea and vomiting)    • Right shoulder pain     SCHEDULED FOR SX        Past Surgical History:   Procedure Laterality Date   • BUNIONECTOMY Right 2016    Dr. Durbin   • CERVICAL CONIZATION, MOE     • COLPOSCOPY W/ BIOPSY / CURETTAGE  2006    benign   • D&C WITH SUCTION  1998    10 week SAB   • ENDOMETRIAL ABLATION  Dec. 2017   • INDUCED       years ago   • [place] HYSTEROSCOPY,W/ENDOMETRIAL ABLATION N/A 2017    Procedure: HYSTEROSCOPY WITH ENDOMETRIAL ABLATION; d&c;  Surgeon: Sandie Hu DO;  Location: [facility];  Service: Obstetrics/Gynecology   • SHOULDER ARTHROSCOPY Right 2020    Procedure: Right shoulder manipulation under anesthesia with arthroscopy and extensive debridement of glenohumeral and subacromial spaces;  Surgeon: Stalin Liu MD;   Location: [facility] OR;  Service: Orthopedics       PT Ortho     Row Name 02/03/20 0900       Subjective Comments    Subjective Comments  Pt reports her elbow has started bothering her again - aching and keeping her up at night; did find some relief tennis elbow strap   -MH       Right Upper Ext    Rt Shoulder Abduction AROM  150  -MH    Rt Shoulder Abduction PROM  180  -MH    Rt Shoulder Flexion AROM  160  -MH    Rt Shoulder Flexion PROM  175  -MH    Rt Shoulder External Rotation AROM  45  -MH    Rt Shoulder External Rotation PROM  90  -MH    Rt Shoulder Internal Rotation AROM  50  -MH    Rt Shoulder Internal Rotation PROM  80  -MH      User Key  (r) = Recorded By, (t) = Taken By, (c) = Cosigned By    Initials Name Provider Type    Izaiah Carroll PTA Physical Therapy Assistant                      PT Assessment/Plan     Row Name 02/03/20 1121          PT Assessment    Assessment Comments  pt has progressed well with PROM and demonstrates improvement with AROM; pt continues with complaints of shoulder joint feeling tight especially with IR  -MH        PT Plan    PT Plan Comments  Pt to MD 2/04/2020 - cont per POC and as MD advises  -       User Key  (r) = Recorded By, (t) = Taken By, (c) = Cosigned By    Initials Name Provider Type    Izaiah Carroll PTA Physical Therapy Assistant          Modalities     Row Name 02/03/20 0900             Moist Heat    Location  right shoulder with pt sitting and right UE abducted on table  -      Rx Minutes  10 mins  -      MH Prior to Rx  Yes  -        User Key  (r) = Recorded By, (t) = Taken By, (c) = Cosigned By    Initials Name Provider Type    Izaiah Carroll PTA Physical Therapy Assistant        OP Exercises     Row Name 02/03/20 0900             Subjective Comments    Subjective Comments  Pt reports her elbow has started bothering her again - aching and keeping her up at night; did find some relief tennis elbow strap   -MH         Exercise 1    Exercise  Name 1  Cane stretch-FLEX  -MH      Cueing 1  Verbal  -MH      Reps 1  15  -MH      Time 1  5 secs  -MH         Exercise 2    Exercise Name 2  Cane stretch-ABD  -MH      Cueing 2  Verbal  -MH      Reps 2  15  -MH      Time 2  5 secs  -MH         Exercise 3    Exercise Name 3  Cane stretch-ER  -MH      Cueing 3  Verbal  -MH      Reps 3  15  -MH      Time 3  5 secs  -MH         Exercise 4    Exercise Name 4  IR circles behind back  -MH      Cueing 4  Verbal  -MH      Reps 4  20x CW/CCW  -MH         Exercise 5    Exercise Name 5  Sleeper stretch  -MH      Cueing 5  Verbal;Tactile  -MH      Reps 5  10  -MH      Time 5  10 secs  -MH         Exercise 6    Exercise Name 6  Pulley-FLEX  -MH      Cueing 6  Verbal;Demo  -MH      Time 6  5 min  -MH         Exercise 7    Exercise Name 7  Pulley-Scaption  -MH      Cueing 7  Verbal;Demo  -MH      Time 7  5 min  -MH         Exercise 8    Exercise Name 8  Shoulder ER vs theraband  -MH      Cueing 8  Verbal  -MH      Reps 8  20  -MH      Time 8  green  -MH         Exercise 9    Exercise Name 9  Shoulder IR vs theraband  -MH      Cueing 9  Verbal  -MH      Reps 9  20  -MH      Time 9  green  -MH         Exercise 10    Exercise Name 10  Scaption Down  -MH      Cueing 10  Verbal  -MH      Sets 10  2  -MH      Reps 10  15  -MH         Exercise 11    Exercise Name 11  Scaption Up  -MH      Cueing 11  Verbal  -MH      Sets 11  2  -MH      Reps 11  15  -MH         Exercise 12    Exercise Name 12  Shoulder Rows vs theraband  -MH      Cueing 12  Verbal;Demo;Tactile  -MH      Reps 12  20  -MH      Time 12  green  -MH         Exercise 13    Exercise Name 13  Shoulder EXT vs theraband  -MH      Cueing 13  Verbal  -MH      Reps 13  20  -MH      Time 13  green  -MH         Exercise 14    Exercise Name 14  Towel IR stretch  -MH      Cueing 14  Verbal;Tactile;Demo  -MH      Reps 14  5  -MH      Time 14  10 sec  -MH        User Key  (r) = Recorded By, (t) = Taken By, (c) = Cosigned By    Initials  Name Provider Type     Izaiah Abarca PTA Physical Therapy Assistant                           Therapy Education  Given: HEP, Symptoms/condition management  Program: New, Reinforced  How Provided: Verbal, Demonstration  Provided to: Patient  Level of Understanding: Teach back education performed, Verbalized, Demonstrated              Time Calculation:   Start Time: 0905  Stop Time: 1030  Time Calculation (min): 85 min  Therapy Charges for Today     Code Description Service Date Service Provider Modifiers Qty    65675697400 HC PT MANUAL THERAPY EA 15 MIN 2/3/2020 Izaiah Abarca PTA GP 1    70453082062 HC PT THER PROC EA 15 MIN 2/3/2020 Izaiah Abarca PTA GP 1                    Izaiah Abarca PTA  2/3/2020

## 2020-02-04 ENCOUNTER — OFFICE VISIT (OUTPATIENT)
Dept: ORTHOPEDIC SURGERY | Facility: CLINIC | Age: 55
End: 2020-02-04

## 2020-02-04 DIAGNOSIS — Z98.890 STATUS POST ARTHROSCOPY OF SHOULDER: Primary | ICD-10-CM

## 2020-02-04 DIAGNOSIS — M75.01 ADHESIVE CAPSULITIS OF RIGHT SHOULDER: ICD-10-CM

## 2020-02-04 PROCEDURE — 99024 POSTOP FOLLOW-UP VISIT: CPT | Performed by: ORTHOPAEDIC SURGERY

## 2020-02-04 RX ORDER — PREDNISONE 10 MG/1
TABLET ORAL
Qty: 40 TABLET | Refills: 0 | Status: SHIPPED | OUTPATIENT
Start: 2020-02-04 | End: 2020-02-25

## 2020-02-04 NOTE — PROGRESS NOTES
CC: follow-up status post right shoulder manipulation under anesthesia with arthroscopy and extensive debridement of glenohumeral and subacromial spaces DOS: 1/13/2020    Interval History: Patient returns to clinic today stating she has some residual pain localized anterolateral with some radiation down into her arm. She continues to work with physical therapy. She only takes her pain medication before a physical therapy sessions. She also complains of increased pain over the lateral aspect of her elbow with increased therapy over the past several weeks. Her pain is improved with wearing a forearm strap. She denies any associated numbness and tingling.     Exam:  right shoulder-    FF-   Active- 165   ER-      Active- 35  IR        To T5     Brisk cap refill to all digits, palpable radial pulse    Positive sensation to light touch palmar, dorsal aspects of small and index fingers and anatomic snuffbox right hand    Impression: status post right shoulder manipulation under anesthesia with arthroscopy and extensive debridement of glenohumeral and subacromial spaces    Plan:  1. Patient had all questions answered today and was in agreement with treatment plan.  2. Continue working with physical therapy.   3. Prescribed prednisone taper today.   4. Continue wearing forearm strap with activity.   5. Follow-up in 4 weeks. If her symptoms have not improved we may consider a cortisone injection.    By signing my name here, I Niurka Elliott, attest that all documentation on 02/04/20 at 2:35 PM has been prepared under the direction and in the presence of Dr. Stalin Liu.    I, Dr. Stalin Liu, personally performed the services described in this documentation, as scribed by Niurka Elliott, in my presence, and it is both accurate and complete.

## 2020-02-05 ENCOUNTER — HOSPITAL ENCOUNTER (OUTPATIENT)
Dept: PHYSICAL THERAPY | Facility: HOSPITAL | Age: 55
Setting detail: THERAPIES SERIES
Discharge: HOME | End: 2020-02-05

## 2020-02-05 DIAGNOSIS — M75.01 ADHESIVE CAPSULITIS OF RIGHT SHOULDER: Primary | ICD-10-CM

## 2020-02-05 PROCEDURE — 97110 THERAPEUTIC EXERCISES: CPT

## 2020-02-05 PROCEDURE — 97140 MANUAL THERAPY 1/> REGIONS: CPT

## 2020-02-05 NOTE — THERAPY TREATMENT NOTE
Outpatient Physical Therapy Ortho Treatment Note   Alisha Soto     Patient Name: Desire Hernandez  : 1965  MRN: [id number]  Today's Date: 2020      Visit Date: 2020    Visit Dx:    ICD-10-CM ICD-9-CM   1. Adhesive capsulitis of right shoulder M75.01 726.0       Patient Active Problem List   Diagnosis   • Menopausal symptoms   • Left breast mass   • Adhesive capsulitis of right shoulder   • Status post arthroscopy of shoulder, extensive debridement glenohumeral and subacromial spaces, DOS 2020        Past Medical History:   Diagnosis Date   • ADHD (attention deficit hyperactivity disorder)     back in high school   • Allergic     seasonal   • ASCUS favor dysplasia     07; 08; 6/25/10; 12; 13; 8/21/15 (HPV negative)   • Blood type, Rh negative    • Bunion 2016    surgery   • Chlamydia     as a teen   • H/O nipple discharge     Bilateral diagnostic mammogram negative   • HSV-1 (herpes simplex virus 1) infection    • HSV-2 (herpes simplex virus 2) infection    • LGSIL (low grade squamous intraepithelial dysplasia) 2006   • Ovarian cyst     Left   • PONV (postoperative nausea and vomiting)    • Right shoulder pain     SCHEDULED FOR SX        Past Surgical History:   Procedure Laterality Date   • BUNIONECTOMY Right 2016    Dr. Durbin   • CERVICAL CONIZATION, MOE     • COLPOSCOPY W/ BIOPSY / CURETTAGE  2006    benign   • D&C WITH SUCTION  1998    10 week SAB   • ENDOMETRIAL ABLATION  Dec. 2017   • INDUCED       years ago   • [place] HYSTEROSCOPY,W/ENDOMETRIAL ABLATION N/A 2017    Procedure: HYSTEROSCOPY WITH ENDOMETRIAL ABLATION; d&c;  Surgeon: Sandie Hu DO;  Location: [facility];  Service: Obstetrics/Gynecology   • SHOULDER ARTHROSCOPY Right 2020    Procedure: Right shoulder manipulation under anesthesia with arthroscopy and extensive debridement of glenohumeral and subacromial spaces;  Surgeon: Stalin Liu MD;   Location: [facility] OR;  Service: Orthopedics       PT Ortho     Row Name 02/05/20 0900       Subjective Comments    Subjective Comments  Pt states MD is pleased with her progress so far but did start her on a steroid pack; pt reports her shoulder feels tight today  -MH      User Key  (r) = Recorded By, (t) = Taken By, (c) = Cosigned By    Initials Name Provider Type    Izaiah Carroll PTA Physical Therapy Assistant                      PT Assessment/Plan     Row Name 02/05/20 1124          PT Assessment    Assessment Comments  pt continues to progress well with ROM and tolerance to strengthening; continues to have most restriction reports with IR  -MH        PT Plan    PT Plan Comments  Cont PT per MD  -MH       User Key  (r) = Recorded By, (t) = Taken By, (c) = Cosigned By    Initials Name Provider Type    Izaiah Carroll PTA Physical Therapy Assistant          Modalities     Row Name 02/05/20 0900             Moist Heat    Location  right shoulder with pt sitting and right UE abducted on table  -MH      Rx Minutes  12 mins  -MH      MH Prior to Rx  Yes  -MH        User Key  (r) = Recorded By, (t) = Taken By, (c) = Cosigned By    Initials Name Provider Type    Izaiah Carroll PTA Physical Therapy Assistant        OP Exercises     Row Name 02/05/20 0900             Subjective Comments    Subjective Comments  Pt states MD is pleased with her progress so far but did start her on a steroid pack; pt reports her shoulder feels tight today  -MH         Exercise 1    Exercise Name 1  Cane stretch-FLEX  -MH      Cueing 1  Verbal  -MH      Reps 1  15  -MH      Time 1  5 secs  -MH         Exercise 2    Exercise Name 2  Cane stretch-ABD  -MH      Cueing 2  Verbal  -MH      Reps 2  15  -MH      Time 2  5 secs  -MH         Exercise 3    Exercise Name 3  Cane stretch-ER  -MH      Cueing 3  Verbal  -MH      Reps 3  15  -MH      Time 3  5 secs  -MH         Exercise 4    Exercise Name 4  IR circles behind back  -MH       Cueing 4  Verbal  -MH      Reps 4  20x CW/CCW  -MH         Exercise 5    Exercise Name 5  Sleeper stretch  -MH      Cueing 5  Verbal;Tactile  -MH      Reps 5  10  -MH      Time 5  10 secs  -MH         Exercise 6    Exercise Name 6  Pulley-FLEX  -MH      Cueing 6  Verbal;Demo  -MH      Time 6  5 min  -MH         Exercise 7    Exercise Name 7  Pulley-Scaption  -MH      Cueing 7  Verbal;Demo  -MH      Time 7  5 min  -MH         Exercise 8    Exercise Name 8  Shoulder ER vs theraband  -MH      Cueing 8  Verbal  -MH      Reps 8  25  -MH      Time 8  green  -MH         Exercise 9    Exercise Name 9  Shoulder IR vs theraband  -MH      Cueing 9  Verbal  -MH      Reps 9  25  -MH      Time 9  green  -MH         Exercise 10    Exercise Name 10  Scaption Down  -MH      Cueing 10  Verbal  -MH      Sets 10  3  -MH      Reps 10  10  -MH      Time 10  1#  -MH         Exercise 11    Exercise Name 11  Scaption Up  -MH      Cueing 11  Verbal  -MH      Sets 11  3  -MH      Reps 11  10  -MH      Time 11  1#  -MH         Exercise 12    Exercise Name 12  Shoulder Rows vs theraband  -MH      Cueing 12  Verbal  -MH      Reps 12  25  -MH      Time 12  green  -MH         Exercise 13    Exercise Name 13  Shoulder EXT vs theraband  -MH      Cueing 13  Verbal  -MH      Reps 13  25  -MH      Time 13  green  -MH         Exercise 14    Exercise Name 14  Towel IR stretch  -MH      Cueing 14  Verbal  -MH      Reps 14  5  -MH      Time 14  10 sec  -MH        User Key  (r) = Recorded By, (t) = Taken By, (c) = Cosigned By    Initials Name Provider Type    Izaiah Carroll, PTA Physical Therapy Assistant                           Therapy Education  Education Details: pt to add 1# to scaption ex's at home - may use bottle of water or canned soup/veg if does not have 1# wt  Given: HEP  Program: Reinforced  How Provided: Verbal  Provided to: Patient  Level of Understanding: Teach back education performed, Demonstrated, Verbalized              Time  Calculation:   Start Time: 0905  Stop Time: 1038  Time Calculation (min): 93 min  Therapy Charges for Today     Code Description Service Date Service Provider Modifiers Qty    24129347707 HC PT MANUAL THERAPY EA 15 MIN 2/5/2020 Izaiah Abarca PTA GP 1    54545500452 HC PT THER PROC EA 15 MIN 2/5/2020 Izaiah Abarca PTA GP 1                    Izaiah Abarca PTA  2/5/2020

## 2020-02-07 ENCOUNTER — HOSPITAL ENCOUNTER (OUTPATIENT)
Dept: PHYSICAL THERAPY | Facility: HOSPITAL | Age: 55
Setting detail: THERAPIES SERIES
Discharge: HOME | End: 2020-02-07

## 2020-02-07 DIAGNOSIS — M75.01 ADHESIVE CAPSULITIS OF RIGHT SHOULDER: Primary | ICD-10-CM

## 2020-02-07 PROCEDURE — 97110 THERAPEUTIC EXERCISES: CPT

## 2020-02-07 PROCEDURE — 97140 MANUAL THERAPY 1/> REGIONS: CPT

## 2020-02-07 NOTE — THERAPY TREATMENT NOTE
Outpatient Physical Therapy Ortho Treatment Note   Alisha Soto     Patient Name: Desire Hernandez  : 1965  MRN: [id number]  Today's Date: 2020      Visit Date: 2020    Visit Dx:    ICD-10-CM ICD-9-CM   1. Adhesive capsulitis of right shoulder M75.01 726.0       Patient Active Problem List   Diagnosis   • Menopausal symptoms   • Left breast mass   • Adhesive capsulitis of right shoulder   • Status post arthroscopy of shoulder, extensive debridement glenohumeral and subacromial spaces, DOS 2020        Past Medical History:   Diagnosis Date   • ADHD (attention deficit hyperactivity disorder)     back in high school   • Allergic     seasonal   • ASCUS favor dysplasia     07; 08; 6/25/10; 12; 13; 8/21/15 (HPV negative)   • Blood type, Rh negative    • Bunion 2016    surgery   • Chlamydia     as a teen   • H/O nipple discharge     Bilateral diagnostic mammogram negative   • HSV-1 (herpes simplex virus 1) infection    • HSV-2 (herpes simplex virus 2) infection    • LGSIL (low grade squamous intraepithelial dysplasia) 2006   • Ovarian cyst     Left   • PONV (postoperative nausea and vomiting)    • Right shoulder pain     SCHEDULED FOR SX        Past Surgical History:   Procedure Laterality Date   • BUNIONECTOMY Right 2016    Dr. Durbin   • CERVICAL CONIZATION, MOE     • COLPOSCOPY W/ BIOPSY / CURETTAGE  2006    benign   • D&C WITH SUCTION  1998    10 week SAB   • ENDOMETRIAL ABLATION  Dec. 2017   • INDUCED       years ago   • [place] HYSTEROSCOPY,W/ENDOMETRIAL ABLATION N/A 2017    Procedure: HYSTEROSCOPY WITH ENDOMETRIAL ABLATION; d&c;  Surgeon: Sandie Hu DO;  Location: [facility];  Service: Obstetrics/Gynecology   • SHOULDER ARTHROSCOPY Right 2020    Procedure: Right shoulder manipulation under anesthesia with arthroscopy and extensive debridement of glenohumeral and subacromial spaces;  Surgeon: Stalin Liu MD;   Location: [facility] OR;  Service: Orthopedics       PT Ortho     Row Name 02/07/20 0900       Subjective Comments    Subjective Comments  pt feels like the steroid is helping - did not feel the need to take pain meds this morning prior to therapy; also reporting decreased symptoms at the elbow as well  -      User Key  (r) = Recorded By, (t) = Taken By, (c) = Cosigned By    Initials Name Provider Type    Izaiah Carroll PTA Physical Therapy Assistant                      PT Assessment/Plan     Row Name 02/07/20 1127          PT Assessment    Assessment Comments  pt tolerated PROM well and progression of reps well despite not having pain meds prior to session  -        PT Plan    PT Plan Comments  Cont per POC, progressing as tolerated  -       User Key  (r) = Recorded By, (t) = Taken By, (c) = Cosigned By    Initials Name Provider Type    Izaiah Carroll PTA Physical Therapy Assistant          Modalities     Row Name 02/07/20 0900             Moist Heat    Location  right shoulder with pt sitting and right UE abducted on table  -      Rx Minutes  12 mins  -      MH Prior to Rx  Yes  -        User Key  (r) = Recorded By, (t) = Taken By, (c) = Cosigned By    Initials Name Provider Type    Izaiah Carroll PTA Physical Therapy Assistant        OP Exercises     Row Name 02/07/20 0900             Subjective Comments    Subjective Comments  pt feels like the steroid is helping - did not feel the need to take pain meds this morning prior to therapy; also reporting decreased symptoms at the elbow as well  -         Exercise 1    Exercise Name 1  Cane stretch-FLEX  -MH      Cueing 1  Verbal  -MH      Reps 1  15  -MH      Time 1  5 secs  -MH         Exercise 2    Exercise Name 2  Cane stretch-ABD  -MH      Cueing 2  Verbal  -MH      Reps 2  15  -MH      Time 2  5 secs  -MH         Exercise 3    Exercise Name 3  Cane stretch-ER  -MH      Cueing 3  Verbal  -MH      Reps 3  15  -MH      Time 3  5 secs  -MH          Exercise 4    Exercise Name 4  IR circles behind back  -MH      Cueing 4  Verbal  -MH      Reps 4  20x CW/CCW  -MH         Exercise 5    Exercise Name 5  Sleeper stretch  -MH      Cueing 5  Verbal;Tactile  -MH      Reps 5  10  -MH      Time 5  10 secs  -MH         Exercise 6    Exercise Name 6  Pulley-FLEX  -MH      Cueing 6  Verbal;Demo  -MH      Time 6  5 min  -MH         Exercise 7    Exercise Name 7  Pulley-Scaption  -MH      Cueing 7  Verbal;Demo  -MH      Time 7  5 min  -MH         Exercise 8    Exercise Name 8  Shoulder ER vs theraband  -MH      Cueing 8  Verbal  -MH      Sets 8  2  -MH      Reps 8  20  -MH      Time 8  green  -MH         Exercise 9    Exercise Name 9  Shoulder IR vs theraband  -MH      Cueing 9  Verbal  -MH      Sets 9  2  -MH      Reps 9  20  -MH      Time 9  green  -MH         Exercise 10    Exercise Name 10  Scaption Down  -MH      Cueing 10  Verbal  -MH      Sets 10  2  -MH      Reps 10  20  -MH      Time 10  1#  -MH         Exercise 11    Exercise Name 11  Scaption Up  -MH      Cueing 11  Verbal  -MH      Sets 11  2  -MH      Reps 11  20  -MH      Time 11  1#  -MH         Exercise 12    Exercise Name 12  Shoulder Rows vs theraband  -MH      Cueing 12  Verbal  -MH      Reps 12   2 x 20 reps  -MH      Time 12  green  -MH         Exercise 13    Exercise Name 13  Shoulder EXT vs theraband  -MH      Cueing 13  Verbal  -MH      Sets 13  2  -MH      Reps 13  20  -MH      Time 13  green  -MH         Exercise 14    Exercise Name 14  Towel IR stretch  -MH      Cueing 14  Verbal  -MH      Reps 14  5  -MH      Time 14  10 sec  -MH        User Key  (r) = Recorded By, (t) = Taken By, (c) = Cosigned By    Initials Name Provider Type    Izaiah Carroll, PTA Physical Therapy Assistant                      Manual Rx (last 36 hours)      Manual Treatments     Row Name 02/07/20 0900             Manual Rx 1    Manual Rx 1 Location  right shoulder  -MH      Manual Rx 1 Type  GH joint mobilizations   -MH      Manual Rx 1 Grade  grade II  -MH      Manual Rx 1 Duration  5 min  -MH         Manual Rx 2    Manual Rx 2 Location  right shoulder  -MH      Manual Rx 2 Type  PROM in FLEX-ABD-ER-IR  -MH      Manual Rx 2 Duration  15 min  -MH        User Key  (r) = Recorded By, (t) = Taken By, (c) = Cosigned By    Initials Name Provider Type    Izaiah Carroll PTA Physical Therapy Assistant              Therapy Education  Given: HEP, Symptoms/condition management  Program: Reinforced  How Provided: Verbal  Provided to: Patient  Level of Understanding: Teach back education performed, Verbalized, Demonstrated              Time Calculation:      Therapy Charges for Today     Code Description Service Date Service Provider Modifiers Qty    36587230346 HC PT MANUAL THERAPY EA 15 MIN 2/7/2020 Izaiah Abarca PTA GP 1    70698686184 HC PT THER PROC EA 15 MIN 2/7/2020 Izaiah Abarca PTA GP 1                    Izaiah Abarca PTA  2/7/2020

## 2020-02-10 ENCOUNTER — HOSPITAL ENCOUNTER (OUTPATIENT)
Dept: PHYSICAL THERAPY | Facility: HOSPITAL | Age: 55
Setting detail: THERAPIES SERIES
Discharge: HOME | End: 2020-02-10

## 2020-02-10 DIAGNOSIS — M75.01 ADHESIVE CAPSULITIS OF RIGHT SHOULDER: Primary | ICD-10-CM

## 2020-02-10 PROCEDURE — 97140 MANUAL THERAPY 1/> REGIONS: CPT | Performed by: PHYSICAL THERAPIST

## 2020-02-10 PROCEDURE — 97110 THERAPEUTIC EXERCISES: CPT | Performed by: PHYSICAL THERAPIST

## 2020-02-10 NOTE — THERAPY TREATMENT NOTE
Outpatient Physical Therapy Ortho Treatment Note   Alisha Soto     Patient Name: Desire Hernandez  : 1965  MRN: [id number]  Today's Date: 2/10/2020      Visit Date: 02/10/2020    Visit Dx:    ICD-10-CM ICD-9-CM   1. Adhesive capsulitis of right shoulder M75.01 726.0       Patient Active Problem List   Diagnosis   • Menopausal symptoms   • Left breast mass   • Adhesive capsulitis of right shoulder   • Status post arthroscopy of shoulder, extensive debridement glenohumeral and subacromial spaces, DOS 2020        Past Medical History:   Diagnosis Date   • ADHD (attention deficit hyperactivity disorder)     back in high school   • Allergic     seasonal   • ASCUS favor dysplasia     07; 08; 6/25/10; 12; 13; 8/21/15 (HPV negative)   • Blood type, Rh negative    • Bunion 2016    surgery   • Chlamydia     as a teen   • H/O nipple discharge     Bilateral diagnostic mammogram negative   • HSV-1 (herpes simplex virus 1) infection    • HSV-2 (herpes simplex virus 2) infection    • LGSIL (low grade squamous intraepithelial dysplasia) 2006   • Ovarian cyst     Left   • PONV (postoperative nausea and vomiting)    • Right shoulder pain     SCHEDULED FOR SX        Past Surgical History:   Procedure Laterality Date   • BUNIONECTOMY Right 2016    Dr. Durbin   • CERVICAL CONIZATION, MOE     • COLPOSCOPY W/ BIOPSY / CURETTAGE  2006    benign   • D&C WITH SUCTION  1998    10 week SAB   • ENDOMETRIAL ABLATION  Dec. 2017   • INDUCED       years ago   • [place] HYSTEROSCOPY,W/ENDOMETRIAL ABLATION N/A 2017    Procedure: HYSTEROSCOPY WITH ENDOMETRIAL ABLATION; d&c;  Surgeon: Sandie Hu DO;  Location: [facility];  Service: Obstetrics/Gynecology   • SHOULDER ARTHROSCOPY Right 2020    Procedure: Right shoulder manipulation under anesthesia with arthroscopy and extensive debridement of glenohumeral and subacromial spaces;  Surgeon: Stalin Liu MD;   Location: [facility];  Service: Orthopedics       PT Ortho     Row Name 02/10/20 1230       Subjective Comments    Subjective Comments  Pt states her shoulder is feeling better. She says that the reaching up is just about normal, but she still feels limited reaching behind her back.  -GC      User Key  (r) = Recorded By, (t) = Taken By, (c) = Cosigned By    Initials Name Provider Type    GC Deny High, PT Physical Therapist                      PT Assessment/Plan     Row Name 02/10/20 1230          PT Assessment    Assessment Comments  Pt is showing increasing functional range of her right shoulder.  -GC        PT Plan    PT Plan Comments  Pt is to continue her HEP daily.  -GC       User Key  (r) = Recorded By, (t) = Taken By, (c) = Cosigned By    Initials Name Provider Type    Deny Peralta, PT Physical Therapist          Modalities     Row Name 02/10/20 1230             Moist Heat    MH Applied  Yes  -GC      Location  right shoulder with pt sitting and right UE abducted on table  -GC      Rx Minutes  12 mins  -GC      MH Prior to Rx  Yes  -GC        User Key  (r) = Recorded By, (t) = Taken By, (c) = Cosigned By    Initials Name Provider Type    Deny Peralta, PT Physical Therapist        OP Exercises     Row Name 02/10/20 1230             Subjective Comments    Subjective Comments  Pt states her shoulder is feeling better. She says that the reaching up is just about normal, but she still feels limited reaching behind her back.  -GC         Exercise 1    Exercise Name 1  Cane stretch-FLEX  -GC      Cueing 1  Verbal  -GC      Reps 1  15  -GC      Time 1  5 secs  -GC         Exercise 2    Exercise Name 2  Cane stretch-ABD  -GC      Cueing 2  Verbal  -GC      Reps 2  15  -GC      Time 2  5 secs  -GC         Exercise 3    Exercise Name 3  Cane stretch-ER  -GC      Cueing 3  Verbal  -GC      Reps 3  15  -GC      Time 3  5 secs  -GC         Exercise 4    Exercise Name 4  IR circles behind back  -GC       Cueing 4  Verbal  -GC      Reps 4  20x CW/CCW  -GC         Exercise 5    Exercise Name 5  Sleeper stretch  -GC      Cueing 5  Verbal;Tactile  -GC      Reps 5  10  -GC      Time 5  10 secs  -GC         Exercise 6    Exercise Name 6  Pulley-FLEX  -GC      Cueing 6  Verbal;Demo  -GC      Time 6  5 min  -GC         Exercise 7    Exercise Name 7  Pulley-Scaption  -GC      Cueing 7  Verbal;Demo  -GC      Time 7  5 min  -GC         Exercise 8    Exercise Name 8  Shoulder ER vs theraband  -GC      Cueing 8  Verbal  -GC      Sets 8  2  -GC      Reps 8  20  -GC      Time 8  green  -GC         Exercise 9    Exercise Name 9  Shoulder IR vs theraband  -GC      Cueing 9  Verbal  -GC      Sets 9  2  -GC      Reps 9  20  -GC      Time 9  green  -GC         Exercise 10    Exercise Name 10  Scaption Down  -GC      Cueing 10  Verbal  -GC      Sets 10  2  -GC      Reps 10  20  -GC      Time 10  1#  -GC         Exercise 11    Exercise Name 11  Scaption Up  -GC      Cueing 11  Verbal  -GC      Sets 11  2  -GC      Reps 11  20  -GC      Time 11  1#  -GC         Exercise 12    Exercise Name 12  Shoulder Rows vs theraband  -GC      Cueing 12  Verbal  -GC      Reps 12   2 x 20 reps  -GC      Time 12  green  -GC         Exercise 13    Exercise Name 13  Shoulder EXT vs theraband  -GC      Cueing 13  Verbal  -GC      Sets 13  2  -GC      Reps 13  20  -GC      Time 13  green  -GC         Exercise 14    Exercise Name 14  Towel IR stretch  -GC      Cueing 14  Verbal  -GC      Reps 14  5  -GC      Time 14  10 sec  -GC        User Key  (r) = Recorded By, (t) = Taken By, (c) = Cosigned By    Initials Name Provider Type    GC Deny High PT Physical Therapist                      Manual Rx (last 36 hours)      Manual Treatments     Row Name 02/10/20 1230             Manual Rx 1    Manual Rx 1 Location  right shoulder  -GC      Manual Rx 1 Type  GH joint mobilizations  -GC      Manual Rx 1 Grade  grade II  -GC      Manual Rx 1 Duration  5 min  -GC          Manual Rx 2    Manual Rx 2 Location  right shoulder  -GC      Manual Rx 2 Type  PROM in FLEX-ABD-ER-IR  -GC      Manual Rx 2 Duration  15 min  -GC        User Key  (r) = Recorded By, (t) = Taken By, (c) = Cosigned By    Initials Name Provider Type    GC Deny High, PT Physical Therapist                             Time Calculation:   Start Time: 1230  Stop Time: 1337  Time Calculation (min): 67 min  Therapy Charges for Today     Code Description Service Date Service Provider Modifiers Qty    06640127680  PT MANUAL THERAPY EA 15 MIN 2/10/2020 Deny High, PT GP 1    [id number]  PT THER PROC EA 15 MIN 2/10/2020 Deny High, PT GP 1                    Deny High PT  2/10/2020

## 2020-02-12 ENCOUNTER — HOSPITAL ENCOUNTER (OUTPATIENT)
Dept: PHYSICAL THERAPY | Facility: HOSPITAL | Age: 55
Setting detail: THERAPIES SERIES
Discharge: HOME | End: 2020-02-12

## 2020-02-12 DIAGNOSIS — M75.01 ADHESIVE CAPSULITIS OF RIGHT SHOULDER: Primary | ICD-10-CM

## 2020-02-12 PROCEDURE — 97110 THERAPEUTIC EXERCISES: CPT

## 2020-02-12 PROCEDURE — 97140 MANUAL THERAPY 1/> REGIONS: CPT

## 2020-02-12 NOTE — THERAPY TREATMENT NOTE
Outpatient Physical Therapy Ortho Treatment Note   Alisha Soto     Patient Name: Desire Hernandez  : 1965  MRN: [id number]  Today's Date: 2020      Visit Date: 2020    Visit Dx:    ICD-10-CM ICD-9-CM   1. Adhesive capsulitis of right shoulder M75.01 726.0       Patient Active Problem List   Diagnosis   • Menopausal symptoms   • Left breast mass   • Adhesive capsulitis of right shoulder   • Status post arthroscopy of shoulder, extensive debridement glenohumeral and subacromial spaces, DOS 2020        Past Medical History:   Diagnosis Date   • ADHD (attention deficit hyperactivity disorder)     back in high school   • Allergic     seasonal   • ASCUS favor dysplasia     07; 08; 6/25/10; 12; 13; 8/21/15 (HPV negative)   • Blood type, Rh negative    • Bunion 2016    surgery   • Chlamydia     as a teen   • H/O nipple discharge     Bilateral diagnostic mammogram negative   • HSV-1 (herpes simplex virus 1) infection    • HSV-2 (herpes simplex virus 2) infection    • LGSIL (low grade squamous intraepithelial dysplasia) 2006   • Ovarian cyst     Left   • PONV (postoperative nausea and vomiting)    • Right shoulder pain     SCHEDULED FOR SX        Past Surgical History:   Procedure Laterality Date   • BUNIONECTOMY Right 2016    Dr. Durbin   • CERVICAL CONIZATION, MOE     • COLPOSCOPY W/ BIOPSY / CURETTAGE  2006    benign   • D&C WITH SUCTION  1998    10 week SAB   • ENDOMETRIAL ABLATION  Dec. 2017   • INDUCED       years ago   • [place] HYSTEROSCOPY,W/ENDOMETRIAL ABLATION N/A 2017    Procedure: HYSTEROSCOPY WITH ENDOMETRIAL ABLATION; d&c;  Surgeon: Sandie Hu DO;  Location: [facility];  Service: Obstetrics/Gynecology   • SHOULDER ARTHROSCOPY Right 2020    Procedure: Right shoulder manipulation under anesthesia with arthroscopy and extensive debridement of glenohumeral and subacromial spaces;  Surgeon: Stalin Liu MD;   Location: [facility];  Service: Orthopedics       PT Ortho     Row Name 02/12/20 1000       Subjective Comments    Subjective Comments  Pt feels her shoulder continues to improve with overall decreased pain and increased motion - still tight with reaching behind her back but continues to improve  -      User Key  (r) = Recorded By, (t) = Taken By, (c) = Cosigned By    Initials Name Provider Type    Izaiah Carroll PTA Physical Therapy Assistant                      PT Assessment/Plan     Row Name 02/12/20 1336          PT Assessment    Assessment Comments  continues to demonstate progression of PROM and increasing tolerance; still most limited with active IR  -MH        PT Plan    PT Plan Comments  Cont per POC, progressing as tolerated  -       User Key  (r) = Recorded By, (t) = Taken By, (c) = Cosigned By    Initials Name Provider Type    Izaiah Carroll PTA Physical Therapy Assistant          Modalities     Row Name 02/12/20 1000             Moist Heat    Location  right shoulder with pt sitting and right UE abducted on table  -MH      Rx Minutes  12 mins  -MH      MH Prior to Rx  Yes  -        User Key  (r) = Recorded By, (t) = Taken By, (c) = Cosigned By    Initials Name Provider Type    Izaiah Carroll PTA Physical Therapy Assistant        OP Exercises     Row Name 02/12/20 1000             Subjective Comments    Subjective Comments  Pt feels her shoulder continues to improve with overall decreased pain and increased motion - still tight with reaching behind her back but continues to improve  -MH         Exercise 1    Exercise Name 1  Cane stretch-FLEX  -MH      Cueing 1  Verbal  -MH      Reps 1  15  -MH      Time 1  5 secs  -MH         Exercise 2    Exercise Name 2  Cane stretch-ABD  -MH      Cueing 2  Verbal  -MH      Reps 2  15  -MH      Time 2  5 secs  -MH         Exercise 3    Exercise Name 3  Cane stretch-ER  -MH      Cueing 3  Verbal  -MH      Reps 3  15  -MH      Time 3  5 secs  -MH          Exercise 4    Exercise Name 4  IR circles behind back  -MH      Cueing 4  Verbal  -MH      Reps 4  20x CW/CCW  -MH         Exercise 5    Exercise Name 5  Sleeper stretch  -MH      Cueing 5  Verbal  -MH      Reps 5  10  -MH      Time 5  10 secs  -MH         Exercise 6    Exercise Name 6  Pulley-FLEX  -MH      Cueing 6  Verbal  -MH      Reps 6  20 reps  -MH         Exercise 7    Exercise Name 7  Pulley-Scaption  -MH      Cueing 7  Verbal  -MH      Reps 7  20 reps  -MH         Exercise 8    Exercise Name 8  Shoulder ER vs theraband  -MH      Cueing 8  Verbal  -MH      Sets 8  2  -MH      Reps 8  20  -MH      Time 8  green  -MH         Exercise 9    Exercise Name 9  Shoulder IR vs theraband  -MH      Cueing 9  Verbal  -MH      Sets 9  2  -MH      Reps 9  20  -MH      Time 9  green  -MH         Exercise 10    Exercise Name 10  Scaption Down  -MH      Cueing 10  Verbal  -MH      Sets 10  2  -MH      Reps 10  20  -MH      Time 10  1#  -MH         Exercise 11    Exercise Name 11  Scaption Up  -MH      Cueing 11  Verbal  -MH      Sets 11  2  -MH      Reps 11  20  -MH      Time 11  1#  -MH         Exercise 12    Exercise Name 12  Shoulder Rows vs theraband  -MH      Cueing 12  Verbal  -MH      Reps 12   2 x 20 reps  -MH      Time 12  green  -MH         Exercise 13    Exercise Name 13  Shoulder EXT vs theraband  -MH      Cueing 13  Verbal  -MH      Sets 13  2  -MH      Reps 13  20  -MH      Time 13  green  -MH         Exercise 14    Exercise Name 14  Towel IR stretch  -MH      Cueing 14  Verbal  -MH      Reps 14  5  -MH      Time 14  10 sec  -MH        User Key  (r) = Recorded By, (t) = Taken By, (c) = Cosigned By    Initials Name Provider Type    Izaiah Carroll, PTA Physical Therapy Assistant                      Manual Rx (last 36 hours)      Manual Treatments     Row Name 02/12/20 1000             Manual Rx 1    Manual Rx 1 Location  right shoulder  -MH      Manual Rx 1 Type  GH joint mobilizations  -MH       Manual Rx 1 Grade  grade II  -MH      Manual Rx 1 Duration  5 min  -MH         Manual Rx 2    Manual Rx 2 Location  right shoulder  -MH      Manual Rx 2 Type  PROM in FLEX-ABD-ER-IR  -MH      Manual Rx 2 Duration  15 min  -MH        User Key  (r) = Recorded By, (t) = Taken By, (c) = Cosigned By    Initials Name Provider Type    Izaiah Carroll PTA Physical Therapy Assistant              Therapy Education  Given: HEP, Symptoms/condition management  Program: Reinforced  How Provided: Verbal  Provided to: Patient  Level of Understanding: Teach back education performed, Verbalized, Demonstrated              Time Calculation:   Start Time: 1005  Stop Time: 1113  Time Calculation (min): 68 min  Therapy Charges for Today     Code Description Service Date Service Provider Modifiers Qty    70804370535 HC PT MANUAL THERAPY EA 15 MIN 2/12/2020 Izaiah Abarca PTA GP 1    23296366207 HC PT THER PROC EA 15 MIN 2/12/2020 Izaiah Abarca PTA GP 1                    Izaiah Abarca PTA  2/12/2020

## 2020-02-14 ENCOUNTER — HOSPITAL ENCOUNTER (OUTPATIENT)
Dept: PHYSICAL THERAPY | Facility: HOSPITAL | Age: 55
Setting detail: THERAPIES SERIES
Discharge: HOME | End: 2020-02-14

## 2020-02-14 DIAGNOSIS — M75.01 ADHESIVE CAPSULITIS OF RIGHT SHOULDER: Primary | ICD-10-CM

## 2020-02-14 PROCEDURE — 97140 MANUAL THERAPY 1/> REGIONS: CPT

## 2020-02-14 PROCEDURE — 97110 THERAPEUTIC EXERCISES: CPT

## 2020-02-14 NOTE — THERAPY TREATMENT NOTE
Outpatient Physical Therapy Ortho Treatment Note   Alisha Soto     Patient Name: Desire Hernandez  : 1965  MRN: [id number]  Today's Date: 2020      Visit Date: 2020    Visit Dx:    ICD-10-CM ICD-9-CM   1. Adhesive capsulitis of right shoulder M75.01 726.0       Patient Active Problem List   Diagnosis   • Menopausal symptoms   • Left breast mass   • Adhesive capsulitis of right shoulder   • Status post arthroscopy of shoulder, extensive debridement glenohumeral and subacromial spaces, DOS 2020        Past Medical History:   Diagnosis Date   • ADHD (attention deficit hyperactivity disorder)     back in high school   • Allergic     seasonal   • ASCUS favor dysplasia     07; 08; 6/25/10; 12; 13; 8/21/15 (HPV negative)   • Blood type, Rh negative    • Bunion 2016    surgery   • Chlamydia     as a teen   • H/O nipple discharge     Bilateral diagnostic mammogram negative   • HSV-1 (herpes simplex virus 1) infection    • HSV-2 (herpes simplex virus 2) infection    • LGSIL (low grade squamous intraepithelial dysplasia) 2006   • Ovarian cyst     Left   • PONV (postoperative nausea and vomiting)    • Right shoulder pain     SCHEDULED FOR SX        Past Surgical History:   Procedure Laterality Date   • BUNIONECTOMY Right 2016    Dr. Durbin   • CERVICAL CONIZATION, MOE     • COLPOSCOPY W/ BIOPSY / CURETTAGE  2006    benign   • D&C WITH SUCTION  1998    10 week SAB   • ENDOMETRIAL ABLATION  Dec. 2017   • INDUCED       years ago   • [place] HYSTEROSCOPY,W/ENDOMETRIAL ABLATION N/A 2017    Procedure: HYSTEROSCOPY WITH ENDOMETRIAL ABLATION; d&c;  Surgeon: Sandie Hu DO;  Location: [facility];  Service: Obstetrics/Gynecology   • SHOULDER ARTHROSCOPY Right 2020    Procedure: Right shoulder manipulation under anesthesia with arthroscopy and extensive debridement of glenohumeral and subacromial spaces;  Surgeon: Stalin Liu MD;   "Location: [facility] OR;  Service: Orthopedics       PT Ortho     Row Name 02/14/20 1000       Subjective Comments    Subjective Comments  Pt reports her shoulder continues to feel better and has started to feel \"more like normal\"   -MH      User Key  (r) = Recorded By, (t) = Taken By, (c) = Cosigned By    Initials Name Provider Type    Izaiah Carroll PTA Physical Therapy Assistant                      PT Assessment/Plan     Row Name 02/14/20 1326          PT Assessment    Assessment Comments  good tolerance to progression of resistance today; pt continues to demonstrated increased PROM and AROM throughout her (R) shoulder  -MH        PT Plan    PT Plan Comments  Cont per POC  -MH       User Key  (r) = Recorded By, (t) = Taken By, (c) = Cosigned By    Initials Name Provider Type    Izaiah Carroll PTA Physical Therapy Assistant          Modalities     Row Name 02/14/20 1000             Moist Heat    Location  right shoulder with pt sitting and right UE abducted on table  -MH      Rx Minutes  12 mins  -MH      MH Prior to Rx  Yes  -MH        User Key  (r) = Recorded By, (t) = Taken By, (c) = Cosigned By    Initials Name Provider Type    Izaiah Carroll PTA Physical Therapy Assistant        OP Exercises     Row Name 02/14/20 1000             Subjective Comments    Subjective Comments  Pt reports her shoulder continues to feel better and has started to feel \"more like normal\"   -MH         Exercise 1    Exercise Name 1  Cane stretch-FLEX  -MH      Cueing 1  Verbal  -MH      Reps 1  15  -MH      Time 1  5 secs  -MH         Exercise 2    Exercise Name 2  Cane stretch-ABD  -MH      Cueing 2  Verbal  -MH      Reps 2  15  -MH      Time 2  5 secs  -MH         Exercise 3    Exercise Name 3  Cane stretch-ER  -MH      Cueing 3  Verbal  -MH      Reps 3  15  -MH      Time 3  5 secs  -MH         Exercise 4    Exercise Name 4  IR circles behind back  -MH      Cueing 4  Verbal  -MH      Reps 4  20x CW/CCW  -MH         " Exercise 5    Exercise Name 5  Sleeper stretch  -MH      Cueing 5  Verbal  -MH      Reps 5  10  -MH      Time 5  10 secs  -MH         Exercise 6    Exercise Name 6  Pulley-FLEX  -MH      Cueing 6  Verbal  -MH      Reps 6  5 min  -MH         Exercise 7    Exercise Name 7  Pulley-Scaption  -MH      Cueing 7  Verbal  -MH      Reps 7  5 min  -MH         Exercise 8    Exercise Name 8  Shoulder ER vs theraband  -MH      Cueing 8  Verbal  -MH      Reps 8  20  -MH      Time 8  blue  -MH         Exercise 9    Exercise Name 9  Shoulder IR vs theraband  -MH      Cueing 9  Verbal  -MH      Reps 9  30  -MH      Time 9  blue  -MH         Exercise 10    Exercise Name 10  Scaption Down  -MH      Cueing 10  Verbal  -MH      Sets 10  2  -MH      Reps 10  20  -MH      Time 10  1#  -MH         Exercise 11    Exercise Name 11  Scaption Up  -MH      Cueing 11  Verbal  -MH      Sets 11  2  -MH      Reps 11  20  -MH      Time 11  1#  -MH         Exercise 12    Exercise Name 12  Shoulder Rows vs theraband  -MH      Cueing 12  Verbal  -MH      Reps 12  30  -MH      Time 12  blue  -MH         Exercise 13    Exercise Name 13  Shoulder EXT vs theraband  -MH      Cueing 13  Verbal  -MH      Reps 13  30  -MH      Time 13  blue  -MH         Exercise 14    Exercise Name 14  Towel IR stretch  -MH      Cueing 14  Verbal  -MH      Reps 14  5  -MH      Time 14  10 sec  -MH        User Key  (r) = Recorded By, (t) = Taken By, (c) = Cosigned By    Initials Name Provider Type    Izaiah Carroll, PTA Physical Therapy Assistant                      Manual Rx (last 36 hours)      Manual Treatments     Row Name 02/14/20 1000             Manual Rx 1    Manual Rx 1 Location  right shoulder  -MH      Manual Rx 1 Type  GH joint mobilizations  -MH      Manual Rx 1 Grade  grade II  -MH      Manual Rx 1 Duration  5 min  -MH         Manual Rx 2    Manual Rx 2 Location  right shoulder  -MH      Manual Rx 2 Type  PROM in FLEX-ABD-ER-IR  -MH      Manual Rx 2 Duration   15 min  -        User Key  (r) = Recorded By, (t) = Taken By, (c) = Cosigned By    Initials Name Provider Type     Izaiah Abarca PTA Physical Therapy Assistant              Therapy Education  Education Details: blue theraband issued for home  Given: HEP, Symptoms/condition management  Program: Reinforced  How Provided: Verbal  Provided to: Patient  Level of Understanding: Teach back education performed, Demonstrated, Verbalized              Time Calculation:   Start Time: 1006  Stop Time: 1133  Time Calculation (min): 87 min  Therapy Charges for Today     Code Description Service Date Service Provider Modifiers Qty    76029043388 HC PT MANUAL THERAPY EA 15 MIN 2/14/2020 Izaiah Abarca PTA GP 1    60117135531 HC PT THER PROC EA 15 MIN 2/14/2020 Izaiah Abarca PTA GP 1                    Izaiah Abarca PTA  2/14/2020

## 2020-02-18 ENCOUNTER — HOSPITAL ENCOUNTER (OUTPATIENT)
Dept: PHYSICAL THERAPY | Facility: HOSPITAL | Age: 55
Setting detail: THERAPIES SERIES
Discharge: HOME | End: 2020-02-18

## 2020-02-18 DIAGNOSIS — M75.01 ADHESIVE CAPSULITIS OF RIGHT SHOULDER: Primary | ICD-10-CM

## 2020-02-18 PROCEDURE — 97140 MANUAL THERAPY 1/> REGIONS: CPT

## 2020-02-18 PROCEDURE — 97110 THERAPEUTIC EXERCISES: CPT

## 2020-02-18 NOTE — THERAPY TREATMENT NOTE
Outpatient Physical Therapy Ortho Treatment Note   Alisha Soto     Patient Name: Desire Hernandez  : 1965  MRN: [id number]  Today's Date: 2020      Visit Date: 2020    Visit Dx:    ICD-10-CM ICD-9-CM   1. Adhesive capsulitis of right shoulder M75.01 726.0       Patient Active Problem List   Diagnosis   • Menopausal symptoms   • Left breast mass   • Adhesive capsulitis of right shoulder   • Status post arthroscopy of shoulder, extensive debridement glenohumeral and subacromial spaces, DOS 2020        Past Medical History:   Diagnosis Date   • ADHD (attention deficit hyperactivity disorder)     back in high school   • Allergic     seasonal   • ASCUS favor dysplasia     07; 08; 6/25/10; 12; 13; 8/21/15 (HPV negative)   • Blood type, Rh negative    • Bunion 2016    surgery   • Chlamydia     as a teen   • H/O nipple discharge     Bilateral diagnostic mammogram negative   • HSV-1 (herpes simplex virus 1) infection    • HSV-2 (herpes simplex virus 2) infection    • LGSIL (low grade squamous intraepithelial dysplasia) 2006   • Ovarian cyst     Left   • PONV (postoperative nausea and vomiting)    • Right shoulder pain     SCHEDULED FOR SX        Past Surgical History:   Procedure Laterality Date   • BUNIONECTOMY Right 2016    Dr. Durbin   • CERVICAL CONIZATION, MOE     • COLPOSCOPY W/ BIOPSY / CURETTAGE  2006    benign   • D&C WITH SUCTION  1998    10 week SAB   • ENDOMETRIAL ABLATION  Dec. 2017   • INDUCED       years ago   • [place] HYSTEROSCOPY,W/ENDOMETRIAL ABLATION N/A 2017    Procedure: HYSTEROSCOPY WITH ENDOMETRIAL ABLATION; d&c;  Surgeon: Sandie Hu DO;  Location: [facility];  Service: Obstetrics/Gynecology   • SHOULDER ARTHROSCOPY Right 2020    Procedure: Right shoulder manipulation under anesthesia with arthroscopy and extensive debridement of glenohumeral and subacromial spaces;  Surgeon: Stalin Liu MD;   Location: [facility] OR;  Service: Orthopedics       PT Ortho     Row Name 02/18/20 1000       Subjective Comments    Subjective Comments  Pt states her shoulder feels like it is looser and overall better  -MH      User Key  (r) = Recorded By, (t) = Taken By, (c) = Cosigned By    Initials Name Provider Type    LAWRENCE Abarca Izaiah Jason PTA Physical Therapy Assistant                      PT Assessment/Plan     Row Name 02/18/20 1513          PT Assessment    Assessment Comments  pt demonstrates increased AROM and tolerated progression of reps without complaints  -MH        PT Plan    PT Plan Comments  Cont per POC  -MH       User Key  (r) = Recorded By, (t) = Taken By, (c) = Cosigned By    Initials Name Provider Type    LAWRENCE Izaiah Abarca PTA Physical Therapy Assistant          Modalities     Row Name 02/18/20 1000             Moist Heat    Location  right shoulder with pt sitting and right UE abducted on table  -MH      Rx Minutes  12 mins  -MH      MH Prior to Rx  Yes  -MH         Ice    Location  (R) shoulder - pt seated  -MH      Rx Minutes  10 mins  -MH      Ice S/P Rx  Yes  -MH        User Key  (r) = Recorded By, (t) = Taken By, (c) = Cosigned By    Initials Name Provider Type    LAWRENCE Tevin Izaiah Jason PTA Physical Therapy Assistant        OP Exercises     Row Name 02/18/20 1000             Subjective Comments    Subjective Comments  Pt states her shoulder feels like it is looser and overall better  -MH         Exercise 1    Exercise Name 1  Cane stretch-FLEX  -MH      Cueing 1  Verbal  -MH      Reps 1  15  -MH      Time 1  5 secs  -MH         Exercise 2    Exercise Name 2  Cane stretch-ABD  -MH      Cueing 2  Verbal  -MH      Reps 2  15  -MH      Time 2  5 secs  -MH         Exercise 3    Exercise Name 3  Cane stretch-ER  -MH      Cueing 3  Verbal  -MH      Reps 3  15  -MH      Time 3  5 secs  -MH         Exercise 4    Exercise Name 4  IR circles behind back  -MH      Cueing 4  Verbal  -MH      Reps 4  20x CW/CCW  -MH          Exercise 5    Exercise Name 5  Sleeper stretch  -MH      Cueing 5  Verbal  -MH      Reps 5  10  -MH      Time 5  10 secs  -MH         Exercise 6    Exercise Name 6  Pulley-FLEX  -MH      Cueing 6  Verbal  -MH      Reps 6  5 min  -MH         Exercise 7    Exercise Name 7  Pulley-Scaption  -MH      Cueing 7  Verbal  -MH      Reps 7  5 min  -MH         Exercise 8    Exercise Name 8  Shoulder ER vs theraband  -MH      Cueing 8  Verbal  -MH      Reps 8  30  -MH      Time 8  blue  -MH         Exercise 9    Exercise Name 9  Shoulder IR vs theraband  -MH      Cueing 9  Verbal  -MH      Reps 9  30  -MH      Time 9  blue  -MH         Exercise 10    Exercise Name 10  Scaption Down  -MH      Cueing 10  Verbal  -MH      Sets 10  2  -MH      Reps 10  20  -MH      Time 10  1#  -MH         Exercise 11    Exercise Name 11  Scaption Up  -MH      Cueing 11  Verbal  -MH      Sets 11  2  -MH      Reps 11  20  -MH      Time 11  1#  -MH         Exercise 12    Exercise Name 12  Shoulder Rows vs theraband  -MH      Cueing 12  Verbal  -MH      Reps 12  40  -MH      Time 12  blue  -MH         Exercise 13    Exercise Name 13  Shoulder EXT vs theraband  -MH      Cueing 13  Verbal  -MH      Reps 13  40  -MH      Time 13  blue  -MH         Exercise 14    Exercise Name 14  Towel IR stretch  -MH      Cueing 14  Verbal  -MH      Reps 14  5  -MH      Time 14  10 sec  -MH        User Key  (r) = Recorded By, (t) = Taken By, (c) = Cosigned By    Initials Name Provider Type    Izaiah Carroll, PTA Physical Therapy Assistant                      Manual Rx (last 36 hours)      Manual Treatments     Row Name 02/18/20 1000             Manual Rx 1    Manual Rx 1 Location  right shoulder  -MH      Manual Rx 1 Type  GH joint mobilizations  -MH      Manual Rx 1 Grade  grade II  -MH      Manual Rx 1 Duration  5 min  -MH         Manual Rx 2    Manual Rx 2 Location  right shoulder  -MH      Manual Rx 2 Type  PROM in FLEX-ABD-ER-IR  -MH      Manual Rx 2  Duration  12  -        User Key  (r) = Recorded By, (t) = Taken By, (c) = Cosigned By    Initials Name Provider Type    Izaiah Carroll PTA Physical Therapy Assistant              Therapy Education  Given: HEP  Program: Reinforced  How Provided: Verbal  Provided to: Patient  Level of Understanding: Teach back education performed, Verbalized, Demonstrated              Time Calculation:   Start Time: 1004  Stop Time: 1132  Time Calculation (min): 88 min  Therapy Charges for Today     Code Description Service Date Service Provider Modifiers Qty    08610095127 HC PT THER PROC EA 15 MIN 2/18/2020 Izaiah Abarca PTA GP 1    24893928466 HC PT MANUAL THERAPY EA 15 MIN 2/18/2020 Izaiah Abarca PTA GP 1                    Izaiah Abarca PTA  2/18/2020

## 2020-02-21 ENCOUNTER — HOSPITAL ENCOUNTER (OUTPATIENT)
Dept: PHYSICAL THERAPY | Facility: HOSPITAL | Age: 55
Setting detail: THERAPIES SERIES
Discharge: HOME | End: 2020-02-21

## 2020-02-21 DIAGNOSIS — M75.01 ADHESIVE CAPSULITIS OF RIGHT SHOULDER: Primary | ICD-10-CM

## 2020-02-21 PROCEDURE — 97140 MANUAL THERAPY 1/> REGIONS: CPT

## 2020-02-21 PROCEDURE — 97110 THERAPEUTIC EXERCISES: CPT

## 2020-02-21 NOTE — THERAPY TREATMENT NOTE
Outpatient Physical Therapy Ortho Treatment Note   Alisha Soto     Patient Name: Desire Hernandez  : 1965  MRN: [id number]  Today's Date: 2020      Visit Date: 2020    Visit Dx:    ICD-10-CM ICD-9-CM   1. Adhesive capsulitis of right shoulder M75.01 726.0       Patient Active Problem List   Diagnosis   • Menopausal symptoms   • Left breast mass   • Adhesive capsulitis of right shoulder   • Status post arthroscopy of shoulder, extensive debridement glenohumeral and subacromial spaces, DOS 2020        Past Medical History:   Diagnosis Date   • ADHD (attention deficit hyperactivity disorder)     back in high school   • Allergic     seasonal   • ASCUS favor dysplasia     07; 08; 6/25/10; 12; 13; 8/21/15 (HPV negative)   • Blood type, Rh negative    • Bunion 2016    surgery   • Chlamydia     as a teen   • H/O nipple discharge     Bilateral diagnostic mammogram negative   • HSV-1 (herpes simplex virus 1) infection    • HSV-2 (herpes simplex virus 2) infection    • LGSIL (low grade squamous intraepithelial dysplasia) 2006   • Ovarian cyst     Left   • PONV (postoperative nausea and vomiting)    • Right shoulder pain     SCHEDULED FOR SX        Past Surgical History:   Procedure Laterality Date   • BUNIONECTOMY Right 2016    Dr. Durbin   • CERVICAL CONIZATION, MOE     • COLPOSCOPY W/ BIOPSY / CURETTAGE  2006    benign   • D&C WITH SUCTION  1998    10 week SAB   • ENDOMETRIAL ABLATION  Dec. 2017   • INDUCED       years ago   • [place] HYSTEROSCOPY,W/ENDOMETRIAL ABLATION N/A 2017    Procedure: HYSTEROSCOPY WITH ENDOMETRIAL ABLATION; d&c;  Surgeon: Sandie Hu DO;  Location: [facility];  Service: Obstetrics/Gynecology   • SHOULDER ARTHROSCOPY Right 2020    Procedure: Right shoulder manipulation under anesthesia with arthroscopy and extensive debridement of glenohumeral and subacromial spaces;  Surgeon: Stalin Liu MD;   "Location:  [facility];  Service: Orthopedics       PT Ortho     Row Name 02/21/20 0900       Subjective Comments    Subjective Comments  no new complaints in regards to her shoulder today - most pain in upper back due to \"bulging disc;\" pt states her shoulder is feeling more like normal  -      User Key  (r) = Recorded By, (t) = Taken By, (c) = Cosigned By    Initials Name Provider Type     Izaiah Abarca PTA Physical Therapy Assistant                      PT Assessment/Plan     Row Name 02/21/20 1155          PT Assessment    Assessment Comments  continues with increased ROM and decreased restriction noted with stretching; changes in exercise reps this session due to complaints of upper back pain  -        PT Plan    PT Plan Comments  Cont per POC, progress as tolerated; MD follow up next week  -       User Key  (r) = Recorded By, (t) = Taken By, (c) = Cosigned By    Initials Name Provider Type     Izaiah Abarca PTA Physical Therapy Assistant          Modalities     Row Name 02/21/20 0900             Moist Heat    Location  right shoulder with pt sitting and right UE abducted on table  -      Rx Minutes  12 mins  -      MH Prior to Rx  Yes  -         Ice    Location  (R) shoulder - pt supine  -      Rx Minutes  10 mins  -      Ice S/P Rx  Yes  -        User Key  (r) = Recorded By, (t) = Taken By, (c) = Cosigned By    Initials Name Provider Type     Izaiah Abarca PTA Physical Therapy Assistant        OP Exercises     Row Name 02/21/20 0900             Subjective Comments    Subjective Comments  no new complaints in regards to her shoulder today - most pain in upper back due to \"bulging disc;\" pt states her shoulder is feeling more like normal  -         Exercise 1    Exercise Name 1  Cane stretch-FLEX  -      Cueing 1  Verbal  -      Reps 1  15  -MH      Time 1  5 secs  -         Exercise 2    Exercise Name 2  Cane stretch-ABD  -      Cueing 2  Verbal  -      Reps 2  15  -MH "      Time 2  5 secs  -MH         Exercise 3    Exercise Name 3  Cane stretch-ER  -MH      Cueing 3  Verbal  -MH      Reps 3  15  -MH      Time 3  5 secs  -MH         Exercise 4    Exercise Name 4  IR circles behind back  -MH      Cueing 4  Verbal  -MH      Reps 4  20x CW/CCW  -MH         Exercise 5    Exercise Name 5  Sleeper stretch  -MH      Cueing 5  Verbal  -MH      Reps 5  10  -MH      Time 5  10 secs  -MH         Exercise 6    Exercise Name 6  Pulley-FLEX  -MH      Cueing 6  Verbal  -MH      Reps 6  20 reps   -MH         Exercise 7    Exercise Name 7  Pulley-Scaption  -MH      Cueing 7  Verbal  -MH      Reps 7  20 reps  -MH         Exercise 8    Exercise Name 8  Shoulder ER vs theraband  -MH      Cueing 8  Verbal  -MH      Reps 8  25  -MH      Time 8  blue  -MH         Exercise 9    Exercise Name 9  Shoulder IR vs theraband  -MH      Cueing 9  Verbal  -MH      Reps 9  25  -MH      Time 9  blue  -MH         Exercise 10    Exercise Name 10  Scaption Down  -MH      Cueing 10  Verbal  -MH      Sets 10  4  -MH      Reps 10  10  -MH      Time 10  1#  -MH         Exercise 11    Exercise Name 11  Scaption Up  -MH      Cueing 11  Verbal  -MH      Sets 11  4  -MH      Reps 11  10  -MH      Time 11  1#  -MH         Exercise 12    Exercise Name 12  Shoulder Rows vs theraband  -MH      Cueing 12  Verbal  -MH      Reps 12  25  -MH      Time 12  blue  -MH         Exercise 13    Exercise Name 13  Shoulder EXT vs theraband  -MH      Cueing 13  Verbal  -MH      Reps 13  25  -MH      Time 13  blue  -MH         Exercise 14    Exercise Name 14  Towel IR stretch  -MH      Cueing 14  Verbal  -MH      Reps 14  5  -MH      Time 14  10 sec  -MH        User Key  (r) = Recorded By, (t) = Taken By, (c) = Cosigned By    Initials Name Provider Type    Izaiah Carroll, PTA Physical Therapy Assistant                      Manual Rx (last 36 hours)      Manual Treatments     Row Name 02/21/20 0900             Manual Rx 1    Manual Rx 1  Location  right shoulder  -MH      Manual Rx 1 Type  GH joint mobilizations  -MH      Manual Rx 1 Grade  grade II  -MH      Manual Rx 1 Duration  5 min  -MH         Manual Rx 2    Manual Rx 2 Location  right shoulder  -MH      Manual Rx 2 Type  PROM in FLEX-ABD-ER-IR  -MH      Manual Rx 2 Duration  12  -MH        User Key  (r) = Recorded By, (t) = Taken By, (c) = Cosigned By    Initials Name Provider Type     Izaiah Abarca PTA Physical Therapy Assistant              Therapy Education  Given: HEP, Symptoms/condition management  Program: Reinforced  How Provided: Verbal  Provided to: Patient  Level of Understanding: Teach back education performed, Verbalized, Demonstrated              Time Calculation:   Start Time: 0906  Stop Time: 1041  Time Calculation (min): 95 min  Therapy Charges for Today     Code Description Service Date Service Provider Modifiers Qty    06442215219 HC PT MANUAL THERAPY EA 15 MIN 2/21/2020 Izaiah Abarca PTA GP 1    26206706910 HC PT THER PROC EA 15 MIN 2/21/2020 Izaiah Abarca PTA GP 1                    Izaiah Abarca PTA  2/21/2020

## 2020-02-25 ENCOUNTER — DOCUMENTATION (OUTPATIENT)
Dept: PHYSICAL THERAPY | Facility: HOSPITAL | Age: 55
End: 2020-02-25

## 2020-02-25 ENCOUNTER — OFFICE VISIT (OUTPATIENT)
Dept: ORTHOPEDIC SURGERY | Facility: CLINIC | Age: 55
End: 2020-02-25

## 2020-02-25 ENCOUNTER — TELEPHONE (OUTPATIENT)
Dept: ORTHOPEDIC SURGERY | Facility: CLINIC | Age: 55
End: 2020-02-25

## 2020-02-25 ENCOUNTER — APPOINTMENT (OUTPATIENT)
Dept: PHYSICAL THERAPY | Facility: HOSPITAL | Age: 55
End: 2020-02-25

## 2020-02-25 DIAGNOSIS — Z98.890 STATUS POST ARTHROSCOPY OF SHOULDER: Primary | ICD-10-CM

## 2020-02-25 DIAGNOSIS — M75.01 ADHESIVE CAPSULITIS OF RIGHT SHOULDER: ICD-10-CM

## 2020-02-25 PROCEDURE — 99024 POSTOP FOLLOW-UP VISIT: CPT | Performed by: ORTHOPAEDIC SURGERY

## 2020-02-25 RX ORDER — METHYLPREDNISOLONE 4 MG/1
TABLET ORAL
Qty: 1 EACH | Refills: 0 | Status: SHIPPED | OUTPATIENT
Start: 2020-02-25 | End: 2020-04-06

## 2020-02-25 NOTE — PROGRESS NOTES
CC: follow-up status post right shoulder manipulation under anesthesia with arthroscopy and extensive debridement of glenohumeral and subacromial spaces DOS: 1/13/2020    Interval History: Patient returns to clinic today stating her right shoulder is doing well.  She denies any residual pain in her right shoulder. She has continued to work with physical therapy.   She does complain of some thoracic spine pain which she rates as a 7-8/10 and is aching in nature. This pain does not radiate. She has tried taking hydrocodone, ibuprofen, Meloxicam as well as applying heat/ice and laying on an inversion table without significant improvement of her pain.     Exam:  right shoulder-    FF-   Active- 170  Passive 175  4+/5 strength  ER-      Active- 60  4+/5 strength  IR-  To L2  5/5 strength on belly press test     Brisk cap refill to all digits, palpable radial pulse    Positive sensation to light touch palmar, dorsal aspects of small and index fingers and anatomic snuffbox right hand    Impression: status post right shoulder manipulation under anesthesia with arthroscopy and extensive debridement of glenohumeral and subacromial spaces    Plan:  1. Patient had all questions answered today and was in agreement with treatment plan.  2. Prescribed medrol dosepak today. Advised patient to re-visit chiropractor for thoracic spine pain. If her pain does not improve we may consider obtaining advanced imaging.   3. Continue to work with physical therapy and perform at-home exercises.  4. Follow-up in 6 weeks.     By signing my name here, I Niurka Elliott, attest that all documentation on 02/25/20 at 7:37 AM has been prepared under the direction and in the presence of Dr. Stalin Liu.    I, Dr. Stalin Liu, personally performed the services described in this documentation, as scribed by Niurka Elliott, in my presence, and it is both accurate and complete.

## 2020-02-25 NOTE — SIGNIFICANT NOTE
Pt canceled today's appt stating her back was hurting too bad - pt scheduled to follow up with Dr. Liu regarding her (R) shoulder today.  Will continue as advised.

## 2020-02-28 ENCOUNTER — APPOINTMENT (OUTPATIENT)
Dept: PHYSICAL THERAPY | Facility: HOSPITAL | Age: 55
End: 2020-02-28

## 2020-03-09 RX ORDER — PAROXETINE 10 MG/1
10 TABLET, FILM COATED ORAL EVERY MORNING
Qty: 90 TABLET | Refills: 3 | Status: SHIPPED | OUTPATIENT
Start: 2020-03-09 | End: 2021-04-10 | Stop reason: SDUPTHER

## 2020-03-10 ENCOUNTER — TELEPHONE (OUTPATIENT)
Dept: ORTHOPEDIC SURGERY | Facility: CLINIC | Age: 55
End: 2020-03-10

## 2020-03-10 DIAGNOSIS — M54.6 THORACIC SPINE PAIN: Primary | ICD-10-CM

## 2020-03-10 NOTE — TELEPHONE ENCOUNTER
Older MRI of T-spine has been scanned into chart.  Patient is requesting that MRI be ordered so that she can have it done this week.

## 2020-03-16 ENCOUNTER — HOSPITAL ENCOUNTER (OUTPATIENT)
Dept: MRI IMAGING | Facility: HOSPITAL | Age: 55
Discharge: HOME | End: 2020-03-16
Admitting: ORTHOPAEDIC SURGERY

## 2020-03-16 DIAGNOSIS — M54.6 THORACIC SPINE PAIN: ICD-10-CM

## 2020-03-16 PROCEDURE — 72146 MRI CHEST SPINE W/O DYE: CPT

## 2020-04-01 ENCOUNTER — TELEPHONE (OUTPATIENT)
Dept: ORTHOPEDIC SURGERY | Facility: CLINIC | Age: 55
End: 2020-04-01

## 2020-04-01 NOTE — TELEPHONE ENCOUNTER
----- Message from Stalin Liu MD sent at 4/1/2020  1:15 PM EDT -----  Please schedule patient for video visit to review results in place of her scheduled visit on April 23

## 2020-04-06 ENCOUNTER — TELEMEDICINE (OUTPATIENT)
Dept: ORTHOPEDIC SURGERY | Facility: CLINIC | Age: 55
End: 2020-04-06

## 2020-04-06 DIAGNOSIS — M51.9 THORACIC DISC DISEASE: ICD-10-CM

## 2020-04-06 DIAGNOSIS — M54.6 THORACIC SPINE PAIN: Primary | ICD-10-CM

## 2020-04-06 PROCEDURE — 99213 OFFICE O/P EST LOW 20 MIN: CPT | Performed by: ORTHOPAEDIC SURGERY

## 2020-04-06 NOTE — PROGRESS NOTES
This was an audio and video enabled telemedicine encounter.  Subjective:     Patient ID: Desire Hernandez is a 54 y.o. female.    Chief Complaint:  Follow-up back pain, thoracic spine  History of Present Illness  Desire Hernandez presents for video visit today for evaluation of thoracic spine and shoulder scapular pain, patient states that is actually gotten much better over the last several weeks with home exercises and deep tissue massage particularly on her right side, she denies any numbness or tingling currently at this point time over her back or extending down into her arms, rates residual pain is 1-2 out of 10 describes it is residual tightness that is exacerbated with repetitive lifting activities, moderate improvement with stretching and massage as noted above.     Social History     Occupational History     Employer: [employer]   Tobacco Use   • Smoking status: Never Smoker   • Smokeless tobacco: Never Used   Substance and Sexual Activity   • Alcohol use: Yes     Types: 1 Glasses of wine, 1 Standard drinks or equivalent per week     Comment: occ   • Drug use: No   • Sexual activity: Defer     Partners: Male     Birth control/protection: None      Past Medical History:   Diagnosis Date   • ADHD (attention deficit hyperactivity disorder)     back in high school   • Allergic     seasonal   • ASCUS favor dysplasia     6/4/07; 6/5/08; 6/25/10; 6/27/12; 7/29/13; 8/21/15 (HPV negative)   • Blood type, Rh negative    • Bunion 2016    surgery   • Chlamydia     as a teen   • H/O nipple discharge 9/415    Bilateral diagnostic mammogram negative   • HSV-1 (herpes simplex virus 1) infection    • HSV-2 (herpes simplex virus 2) infection 1993   • LGSIL (low grade squamous intraepithelial dysplasia) 05/26/2006   • Ovarian cyst     Left   • PONV (postoperative nausea and vomiting)    • Right shoulder pain     SCHEDULED FOR SX     Past Surgical History:   Procedure Laterality Date   • BUNIONECTOMY Right      Dr. Durbin   • CERVICAL CONIZATION, LEEP     • COLPOSCOPY W/ BIOPSY / CURETTAGE  2006    benign   • D&C WITH SUCTION  1998    10 week SAB   • ENDOMETRIAL ABLATION  Dec. 2017   • INDUCED       years ago   • [place] HYSTEROSCOPY,W/ENDOMETRIAL ABLATION N/A 2017    Procedure: HYSTEROSCOPY WITH ENDOMETRIAL ABLATION; d&c;  Surgeon: Sandie Hu DO;  Location: [facility];  Service: Obstetrics/Gynecology   • SHOULDER ARTHROSCOPY Right 2020    Procedure: Right shoulder manipulation under anesthesia with arthroscopy and extensive debridement of glenohumeral and subacromial spaces;  Surgeon: Stalin Liu MD;  Location: [facility];  Service: Orthopedics       Family History   Problem Relation Age of Onset   • Hearing loss Father    • Hyperlipidemia Father         DX in his mid 50's   • No Known Problems Brother    • Arthritis Sister         RA   • No Known Problems Son    • Hearing loss Maternal Grandmother    • Hyperlipidemia Maternal Grandmother         DX in her late 70's   • Stroke Maternal Grandmother    • Lung cancer Maternal Grandfather    • Cancer Maternal Grandfather         lung cancer - smoker   • Lung cancer Paternal Uncle    • Cancer Paternal Uncle         lung cancer - smoker   • Breast cancer Neg Hx    • Ovarian cancer Neg Hx    • Uterine cancer Neg Hx    • Colon cancer Neg Hx    • Melanoma Neg Hx          Review of Systems   Constitutional: Negative for chills, diaphoresis, fever and unexpected weight change.   HENT: Negative for hearing loss, nosebleeds, sore throat and tinnitus.    Eyes: Negative for pain and visual disturbance.   Respiratory: Negative for cough, shortness of breath and wheezing.    Cardiovascular: Negative for chest pain and palpitations.   Gastrointestinal: Negative for abdominal pain, diarrhea, nausea and vomiting.   Endocrine: Negative for cold intolerance, heat intolerance and polydipsia.   Genitourinary: Negative for difficulty urinating,  dysuria and hematuria.   Musculoskeletal: Positive for back pain and myalgias. Negative for arthralgias and joint swelling.   Skin: Negative for rash and wound.   Allergic/Immunologic: Negative for environmental allergies.   Neurological: Negative for dizziness, syncope and numbness.   Hematological: Does not bruise/bleed easily.   Psychiatric/Behavioral: Negative for dysphoric mood and sleep disturbance. The patient is not nervous/anxious.            Objective:  There were no vitals filed for this visit.  There were no vitals filed for this visit.  There is no height or weight on file to calculate BMI.  General: No acute distress.  Resp: normal respiratory effort  Skin: no rashes or wounds; normal turgor  Psych: mood and affect appropriate; recent and remote memory intact          Ortho Exam     Right shoulder active forward flexion 175 degrees, external Tatian 65 degrees, internal rotation T12.  Localizes some pain along the medial border the scapula extending to the paraspinal region of the scapular spine on the right side, denies numbness or tingling in the right hand where she states sensation is symmetric to the left.  No scapular winging appreciated.    Imaging:  MRI thoracic spine reviewed by me including review of images as well as radiology report-3/16/2020  IMPRESSION:  There is a right paracentral disc protrusion with osteophyte at T7-8 with mild canal narrowing. Otherwise negative.    Assessment:        1. Thoracic spine pain    2. Thoracic disc disease           Plan:          1. Discussed treatment options at length with patient at today's visit.  Reviewed with patient findings from MRI of thoracic spine indicating paracentral disc protrusion on the right side with osteophyte complex particular the T7-T8 levels.  Patient is happy with the progress she is made with conservative treatment at this point time with stretching, deep tissue massage, and home exercise program.  If pain does increase we may  consider epidural injections versus physical therapy.  She can also continue to supplement with over-the-counter anti-inflammatory medications, consider muscle relaxers as well as needed.      Desire Woodpkins was in agreement with plan and had all questions answered.     Orders:  No orders of the defined types were placed in this encounter.      Medications:  No orders of the defined types were placed in this encounter.      Followup:  Return if symptoms worsen or fail to improve.    Desire was seen today for shoulder pain, back pain and results.    Diagnoses and all orders for this visit:    Thoracic spine pain    Thoracic disc disease          Dictated utilizing Dragon dictation

## 2020-04-21 PROBLEM — M51.9 THORACIC DISC DISEASE: Status: ACTIVE | Noted: 2020-04-21

## 2020-07-21 ENCOUNTER — OFFICE VISIT (OUTPATIENT)
Dept: ORTHOPEDIC SURGERY | Facility: CLINIC | Age: 55
End: 2020-07-21

## 2020-07-21 DIAGNOSIS — M75.01 ADHESIVE CAPSULITIS OF RIGHT SHOULDER: Primary | ICD-10-CM

## 2020-07-21 DIAGNOSIS — M25.511 CHRONIC RIGHT SHOULDER PAIN: ICD-10-CM

## 2020-07-21 DIAGNOSIS — G89.29 CHRONIC RIGHT SHOULDER PAIN: ICD-10-CM

## 2020-07-21 DIAGNOSIS — M75.41 SUBACROMIAL IMPINGEMENT OF RIGHT SHOULDER: ICD-10-CM

## 2020-07-21 DIAGNOSIS — Z98.890 STATUS POST ARTHROSCOPY OF SHOULDER: ICD-10-CM

## 2020-07-21 PROCEDURE — 20610 DRAIN/INJ JOINT/BURSA W/O US: CPT | Performed by: ORTHOPAEDIC SURGERY

## 2020-07-21 PROCEDURE — 99213 OFFICE O/P EST LOW 20 MIN: CPT | Performed by: ORTHOPAEDIC SURGERY

## 2020-07-21 RX ADMIN — LIDOCAINE HYDROCHLORIDE 4 ML: 10 INJECTION, SOLUTION EPIDURAL; INFILTRATION; INTRACAUDAL; PERINEURAL at 15:27

## 2020-07-21 RX ADMIN — TRIAMCINOLONE ACETONIDE 80 MG: 40 INJECTION, SUSPENSION INTRA-ARTICULAR; INTRAMUSCULAR at 15:27

## 2020-07-21 NOTE — PROGRESS NOTES
Subjective:     Patient ID: Desire Hernandez is a 55 y.o. female.    Chief Complaint: follow-up right shoulder pain    History of Present Illness  Desire Hernandez returns to clinic today for evaluation of her right shoulder. She complains of some residual shoulder pain today. She notes that she has been exercising frequently, and certain exercises worsen her pain. She rates the pain as 7/10, describes it as aching in nature. Localizes pain to the posterolateral aspect of the shoulder, with some radiation to the lateral arm.  Has noted improvement with abduction and extension. Symptoms are exacerbated with rest and activity modification. Denies associated numbness or tingling.    Social History     Occupational History     Employer: [employer]   Tobacco Use   • Smoking status: Never Smoker   • Smokeless tobacco: Never Used   Substance and Sexual Activity   • Alcohol use: Yes     Types: 1 Glasses of wine, 1 Standard drinks or equivalent per week     Comment: occ   • Drug use: No   • Sexual activity: Defer     Partners: Male     Birth control/protection: None      Past Medical History:   Diagnosis Date   • ADHD (attention deficit hyperactivity disorder)     back in high school   • Allergic     seasonal   • ASCUS favor dysplasia     6/4/07; 6/5/08; 6/25/10; 6/27/12; 7/29/13; 8/21/15 (HPV negative)   • Blood type, Rh negative    • Bunion 2016    surgery   • Chlamydia     as a teen   • H/O nipple discharge 9/415    Bilateral diagnostic mammogram negative   • HSV-1 (herpes simplex virus 1) infection    • HSV-2 (herpes simplex virus 2) infection 1993   • LGSIL (low grade squamous intraepithelial dysplasia) 05/26/2006   • Ovarian cyst     Left   • PONV (postoperative nausea and vomiting)    • Right shoulder pain     SCHEDULED FOR SX     Past Surgical History:   Procedure Laterality Date   • BUNIONECTOMY Right 2016    Dr. Durbin   • CERVICAL CONIZATION, LEEP  1996   • COLPOSCOPY W/ BIOPSY / CURETTAGE   2006    benign   • D&C WITH SUCTION  1998    10 week SAB   • ENDOMETRIAL ABLATION  Dec. 2017   • INDUCED       years ago   • [place] HYSTEROSCOPY,W/ENDOMETRIAL ABLATION N/A 2017    Procedure: HYSTEROSCOPY WITH ENDOMETRIAL ABLATION; d&c;  Surgeon: Sandie Hu DO;  Location: [facility];  Service: Obstetrics/Gynecology   • SHOULDER ARTHROSCOPY Right 2020    Procedure: Right shoulder manipulation under anesthesia with arthroscopy and extensive debridement of glenohumeral and subacromial spaces;  Surgeon: Stalin Liu MD;  Location: [facility];  Service: Orthopedics       Family History   Problem Relation Age of Onset   • Hearing loss Father    • Hyperlipidemia Father         DX in his mid 50's   • No Known Problems Brother    • Arthritis Sister         RA   • No Known Problems Son    • Hearing loss Maternal Grandmother    • Hyperlipidemia Maternal Grandmother         DX in her late 70's   • Stroke Maternal Grandmother    • Lung cancer Maternal Grandfather    • Cancer Maternal Grandfather         lung cancer - smoker   • Lung cancer Paternal Uncle    • Cancer Paternal Uncle         lung cancer - smoker   • Breast cancer Neg Hx    • Ovarian cancer Neg Hx    • Uterine cancer Neg Hx    • Colon cancer Neg Hx    • Melanoma Neg Hx          Review of Systems   Constitutional: Negative for chills, diaphoresis, fever and unexpected weight change.   HENT: Negative for hearing loss, nosebleeds, sore throat and tinnitus.    Eyes: Negative for pain and visual disturbance.   Respiratory: Negative for cough, shortness of breath and wheezing.    Cardiovascular: Negative for chest pain and palpitations.   Gastrointestinal: Negative for abdominal pain, diarrhea, nausea and vomiting.   Endocrine: Negative for cold intolerance, heat intolerance and polydipsia.   Genitourinary: Negative for difficulty urinating, dysuria and hematuria.   Musculoskeletal: Positive for arthralgias. Negative for joint swelling  and myalgias.   Skin: Negative for rash and wound.   Allergic/Immunologic: Negative for environmental allergies.   Neurological: Negative for dizziness, syncope and numbness.   Hematological: Does not bruise/bleed easily.   Psychiatric/Behavioral: Negative for dysphoric mood and sleep disturbance. The patient is not nervous/anxious.    All other systems reviewed and are negative.          Objective:  There were no vitals filed for this visit.  There were no vitals filed for this visit.  There is no height or weight on file to calculate BMI.    General: No acute distress.  Resp: normal respiratory effort  Skin: no rashes or wounds; normal turgor  Psych: mood and affect appropriate; recent and remote memory intact        Ortho Exam       Right shoulder-  Tenderness  located posterior, lateral  FF-   Active- 170    Strength- 4+/5  ER-      Active- 65 on the left, compared to 85 on the right    Strength- 5/5     Strength- 5/5 on belly press test  Bear hug sign-negative    Neer's sign- mildly positive  Amaral- positive    Brisk cap refill to all digits, palpable radial pulse    Positive sensation to light touch palmar, dorsal aspects of small and index fingers and anatomic snuffbox right hand      Imaging:  None    Assessment:        1. Adhesive capsulitis of right shoulder    2. Status post arthroscopy of shoulder, extensive debridement glenohumeral and subacromial spaces and manipulation under anesthesia, DOS 1/13/2020    3. Subacromial impingement of right shoulder           Plan:  Large Joint Arthrocentesis: R subacromial bursa  Date/Time: 7/21/2020 3:27 PM  Consent given by: patient  Site marked: site marked  Timeout: Immediately prior to procedure a time out was called to verify the correct patient, procedure, equipment, support staff and site/side marked as required   Supporting Documentation  Indications: pain   Procedure Details  Location: shoulder - R subacromial bursa  Preparation: Patient was prepped and  draped in the usual sterile fashion  Needle size: 22 G  Approach: lateral  Medications administered: 4 mL lidocaine PF 1% 1 %; 80 mg triamcinolone acetonide 40 MG/ML  Patient tolerance: patient tolerated the procedure well with no immediate complications                  1. Discussed treatment options at length with patient at today's visit.  2. Patient would like to proceed with cortisone injection today to the right shoulder. Recommended limited use of affected extremity for the next 24 hours to only essential activites other than work on general active and passive motion. Recommended supplementing with ice and soft tissue massage. Discussed with patient that they should see results in 5-7 days, if no improvement in 5-6 weeks I have asked them to call the office to review other options. Patient should call office immediately if they notice redness, warmth, fevers, chills, or residual numbness or tingling for greater than 6 hours after injection.   3. Follow-up with me in 6 weeks for re-evaluation       Desire Hernandez was in agreement with plan and had all questions answered.     Orders:  No orders of the defined types were placed in this encounter.      Medications:  No orders of the defined types were placed in this encounter.      Followup:  Return in about 6 weeks (around 9/1/2020).    Desire was seen today for pain.    Diagnoses and all orders for this visit:    Adhesive capsulitis of right shoulder    Status post arthroscopy of shoulder, extensive debridement glenohumeral and subacromial spaces and manipulation under anesthesia, DOS 1/13/2020    Subacromial impingement of right shoulder           By signing my name here, I No Moncada attest that all documentation on 07/21/20 at 15:19 has been prepared under the direction and in the presence of Dr. Stalin Liu MD.    I, Dr. Stalin Liu, personally performed the services described in this documentation, as scribed by No Moncada, in my  presence, and it is both accurate and complete.        Dictated utilizing Dragon dictation

## 2020-07-23 PROBLEM — M75.41 SUBACROMIAL IMPINGEMENT OF RIGHT SHOULDER: Status: ACTIVE | Noted: 2020-07-23

## 2020-07-23 RX ORDER — LIDOCAINE HYDROCHLORIDE 10 MG/ML
4 INJECTION, SOLUTION EPIDURAL; INFILTRATION; INTRACAUDAL; PERINEURAL
Status: COMPLETED | OUTPATIENT
Start: 2020-07-21 | End: 2020-07-21

## 2020-07-23 RX ORDER — TRIAMCINOLONE ACETONIDE 40 MG/ML
80 INJECTION, SUSPENSION INTRA-ARTICULAR; INTRAMUSCULAR
Status: COMPLETED | OUTPATIENT
Start: 2020-07-21 | End: 2020-07-21

## 2020-08-27 ENCOUNTER — OFFICE VISIT (OUTPATIENT)
Dept: GASTROENTEROLOGY | Facility: CLINIC | Age: 55
End: 2020-08-27

## 2020-08-27 ENCOUNTER — LAB (OUTPATIENT)
Dept: LAB | Facility: HOSPITAL | Age: 55
End: 2020-08-27

## 2020-08-27 VITALS — BODY MASS INDEX: 21.48 KG/M2 | TEMPERATURE: 97.9 F | HEIGHT: 64 IN | WEIGHT: 125.8 LBS

## 2020-08-27 DIAGNOSIS — R10.13 DYSPEPSIA: ICD-10-CM

## 2020-08-27 DIAGNOSIS — R10.13 DYSPEPSIA: Primary | ICD-10-CM

## 2020-08-27 DIAGNOSIS — Z12.11 ENCOUNTER FOR SCREENING FOR MALIGNANT NEOPLASM OF COLON: ICD-10-CM

## 2020-08-27 PROCEDURE — 36415 COLL VENOUS BLD VENIPUNCTURE: CPT

## 2020-08-27 PROCEDURE — 86677 HELICOBACTER PYLORI ANTIBODY: CPT

## 2020-08-27 PROCEDURE — 99204 OFFICE O/P NEW MOD 45 MIN: CPT | Performed by: INTERNAL MEDICINE

## 2020-08-27 RX ORDER — OMEPRAZOLE 40 MG/1
40 CAPSULE, DELAYED RELEASE ORAL DAILY
Qty: 90 CAPSULE | Refills: 3 | Status: SHIPPED | OUTPATIENT
Start: 2020-08-27 | End: 2021-09-23

## 2020-08-27 NOTE — PROGRESS NOTES
"    PATIENT INFORMATION  Desire Hernandez       - 1965    CHIEF COMPLAINT  Chief Complaint   Patient presents with   • Abdominal Pain       HISTORY OF PRESENT ILLNESS  Is on Paxil for Hot flashes for 2 years with good results due to sleep and mood.    4 weeks ago had abd pain after a restaurant meal and 2 glasses of wine and vomited on the way home. Then was epigastric pain and sharp to \"nagging\" and assoc with Nausea  Lasted for 5 days and bloated thru out. Settled down but recurred then 8/10 resumed with immediate post prandial bloating and no vomiting and better in the Am and no nocturnal awakening and never really goes away. Backed off her K-Cups (caffiene) and doesn't eat fried or heavy foods    Frozen shoulder due to nerve pain from an IV and did try ibuprofen but has not tolerated  A trial of that. And did take a course of PPI to get through that and hasnt traken for a year. Still rare intermittant Aleve.          REVIEW OF SYSTEMS  Review of Systems   Gastrointestinal: Positive for abdominal pain.   All other systems reviewed and are negative.        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]   • Status post arthroscopy of shoulder, extensive debridement glenohumeral and subacromial spaces, DOS 2020 [Z98.890]   • Adhesive capsulitis of right shoulder [M75.01]   • Left breast mass [N63.20]   • Menopausal symptoms [N95.1]         PAST MEDICAL HISTORY  Past Medical History:   Diagnosis Date   • ADHD (attention deficit hyperactivity disorder)     back in high school   • Allergic     seasonal   • ASCUS favor dysplasia     07; 08; 6/25/10; 12; 13; 8/21/15 (HPV negative)   • Blood type, Rh negative    • Bunion 2016    surgery   • Chlamydia     as a teen   • H/O nipple discharge     Bilateral diagnostic mammogram negative   • HSV-1 (herpes simplex virus 1) infection    • HSV-2 " (herpes simplex virus 2) infection    • LGSIL (low grade squamous intraepithelial dysplasia) 2006   • Ovarian cyst     Left   • PONV (postoperative nausea and vomiting)    • Right shoulder pain     SCHEDULED FOR SX         SURGICAL HISTORY  Past Surgical History:   Procedure Laterality Date   • BUNIONECTOMY Right 2016    Dr. Durbin   • CERVICAL CONIZATION, LEEP     • COLPOSCOPY W/ BIOPSY / CURETTAGE  2006    benign   • D&C WITH SUCTION  1998    10 week SAB   • ENDOMETRIAL ABLATION  Dec. 2017   • INDUCED       years ago   • [place] HYSTEROSCOPY,W/ENDOMETRIAL ABLATION N/A 2017    Procedure: HYSTEROSCOPY WITH ENDOMETRIAL ABLATION; d&c;  Surgeon: Sandie Hu DO;  Location: [facility];  Service: Obstetrics/Gynecology   • SHOULDER ARTHROSCOPY Right 2020    Procedure: Right shoulder manipulation under anesthesia with arthroscopy and extensive debridement of glenohumeral and subacromial spaces;  Surgeon: Stalin Liu MD;  Location: [facility];  Service: Orthopedics         FAMILY HISTORY  Family History   Problem Relation Age of Onset   • Hearing loss Father    • Hyperlipidemia Father         DX in his mid 50's   • No Known Problems Brother    • Arthritis Sister         RA   • No Known Problems Son    • Hearing loss Maternal Grandmother    • Hyperlipidemia Maternal Grandmother         DX in her late 70's   • Stroke Maternal Grandmother    • Lung cancer Maternal Grandfather    • Cancer Maternal Grandfather         lung cancer - smoker   • Lung cancer Paternal Uncle    • Cancer Paternal Uncle         lung cancer - smoker   • Breast cancer Neg Hx    • Ovarian cancer Neg Hx    • Uterine cancer Neg Hx    • Colon cancer Neg Hx    • Melanoma Neg Hx    • Colon polyps Neg Hx          SOCIAL HISTORY  Social History     Occupational History     Employer: [employer]   Tobacco Use   • Smoking status: Never Smoker   • Smokeless tobacco: Never Used   Substance and Sexual  "Activity   • Alcohol use: Yes     Types: 1 Glasses of wine, 1 Standard drinks or equivalent per week     Comment: occ   • Drug use: No   • Sexual activity: Defer     Partners: Male     Birth control/protection: None         CURRENT MEDICATIONS    Current Outpatient Medications:   •  CBD (cannabidiol) oral oil, Take 15 drops by mouth Every Night., Disp: , Rfl:   •  omeprazole (priLOSEC) 40 MG capsule, Take 1 capsule by mouth Daily., Disp: 90 capsule, Rfl: 3  •  PARoxetine (PAXIL) 10 MG tablet, Take 1 tablet by mouth Every Morning., Disp: 90 tablet, Rfl: 3  •  tiZANidine (ZANAFLEX) 2 MG tablet, Take 1 tablet by mouth Every 8 (Eight) Hours As Needed for Muscle Spasms., Disp: 30 tablet, Rfl: 1    ALLERGIES  Patient has no known allergies.    VITALS  Vitals:    08/27/20 1219   Temp: 97.9 °F (36.6 °C)   TempSrc: Temporal   Weight: 57.1 kg (125 lb 12.8 oz)   Height: 162.6 cm (64.02\")       LAST RESULTS   Appointment on 01/06/2020   Component Date Value Ref Range Status   • PTT 01/06/2020 33.6  24.3 - 38.1 seconds Final   • Protime 01/06/2020 12.0* 12.1 - 15.0 Seconds Final   • INR 01/06/2020 0.92  0.90 - 1.10 Final   • Glucose 01/06/2020 106* 65 - 99 mg/dL Final   • BUN 01/06/2020 16  6 - 20 mg/dL Final   • Creatinine 01/06/2020 0.82  0.57 - 1.00 mg/dL Final   • Sodium 01/06/2020 140  136 - 145 mmol/L Final   • Potassium 01/06/2020 4.1  3.5 - 5.2 mmol/L Final   • Chloride 01/06/2020 101  98 - 107 mmol/L Final   • CO2 01/06/2020 25.8  22.0 - 29.0 mmol/L Final   • Calcium 01/06/2020 9.3  8.6 - 10.5 mg/dL Final   • eGFR Non African Amer 01/06/2020 73  >60 mL/min/1.73 Final   • BUN/Creatinine Ratio 01/06/2020 19.5  7.0 - 25.0 Final   • Anion Gap 01/06/2020 13.2  5.0 - 15.0 mmol/L Final   • WBC 01/06/2020 6.38  3.40 - 10.80 10*3/mm3 Final   • RBC 01/06/2020 4.55  3.77 - 5.28 10*6/mm3 Final   • Hemoglobin 01/06/2020 13.9  12.0 - 15.9 g/dL Final   • Hematocrit 01/06/2020 43.0  34.0 - 46.6 % Final   • MCV 01/06/2020 94.5  79.0 - " "97.0 fL Final   • MCH 01/06/2020 30.5  26.6 - 33.0 pg Final   • MCHC 01/06/2020 32.3  31.5 - 35.7 g/dL Final   • RDW 01/06/2020 12.1* 12.3 - 15.4 % Final   • RDW-SD 01/06/2020 42.2  37.0 - 54.0 fl Final   • MPV 01/06/2020 9.4  6.0 - 12.0 fL Final   • Platelets 01/06/2020 218  140 - 450 10*3/mm3 Final   • Neutrophil % 01/06/2020 57.0  42.7 - 76.0 % Final   • Lymphocyte % 01/06/2020 36.5  19.6 - 45.3 % Final   • Monocyte % 01/06/2020 5.2  5.0 - 12.0 % Final   • Eosinophil % 01/06/2020 1.1  0.3 - 6.2 % Final   • Basophil % 01/06/2020 0.2  0.0 - 1.5 % Final   • Immature Grans % 01/06/2020 0.0  0.0 - 0.5 % Final   • Neutrophils, Absolute 01/06/2020 3.64  1.70 - 7.00 10*3/mm3 Final   • Lymphocytes, Absolute 01/06/2020 2.33  0.70 - 3.10 10*3/mm3 Final   • Monocytes, Absolute 01/06/2020 0.33  0.10 - 0.90 10*3/mm3 Final   • Eosinophils, Absolute 01/06/2020 0.07  0.00 - 0.40 10*3/mm3 Final   • Basophils, Absolute 01/06/2020 0.01  0.00 - 0.20 10*3/mm3 Final   • Immature Grans, Absolute 01/06/2020 0.00  0.00 - 0.05 10*3/mm3 Final     No results found.    PHYSICAL EXAM  Debilities/Disabilities Identified: None  Emotional Behavior: Appropriate  Wt Readings from Last 3 Encounters:   08/27/20 57.1 kg (125 lb 12.8 oz)   03/16/20 61.2 kg (135 lb)   01/21/20 61.2 kg (135 lb)     Ht Readings from Last 1 Encounters:   08/27/20 162.6 cm (64.02\")     Body mass index is 21.58 kg/m².  Physical Exam   Constitutional: She is oriented to person, place, and time. She appears well-developed and well-nourished.   HENT:   Head: Normocephalic and atraumatic.   Eyes: Pupils are equal, round, and reactive to light. Conjunctivae and EOM are normal. No scleral icterus.   Neck: Normal range of motion. Neck supple. No thyromegaly present.   Cardiovascular: Normal rate, regular rhythm, normal heart sounds and intact distal pulses. Exam reveals no gallop.   No murmur heard.  Pulmonary/Chest: Effort normal and breath sounds normal. She has no wheezes. She " has no rales.   Abdominal: Soft. Bowel sounds are normal. She exhibits no shifting dullness, no distension, no fluid wave, no abdominal bruit, no ascites and no mass. There is no hepatosplenomegaly. There is tenderness in the right upper quadrant and epigastric area. There is no guarding and negative Shannon's sign. Hernia confirmed negative in the ventral area.   Musculoskeletal: Normal range of motion. She exhibits no edema.   Lymphadenopathy:     She has no cervical adenopathy.   Neurological: She is alert and oriented to person, place, and time.   Skin: Skin is warm and dry. No rash noted. She is not diaphoretic. No erythema.   Psychiatric: She has a normal mood and affect. Her behavior is normal.       CLINICAL DATA REVIEWED   reviewed previous lab results and integrated with today's visit, reviewed notes from other physicians and/or last GI encounter, reviewed previous endoscopy results and available photos    ASSESSMENT  Diagnoses and all orders for this visit:    Dyspepsia  -     Helicobacter Pylori, IgA IgG IgM; Future    Encounter for screening for malignant neoplasm of colon    Other orders  -     omeprazole (priLOSEC) 40 MG capsule; Take 1 capsule by mouth Daily.          PLAN  Check HP but will need a colon Plus/ Minus EGD    Return if symptoms worsen or fail to improve.    I have discussed the above plan with the patient.  They verbalize understanding and are in agreement with the plan.  They have been advised to contact the office for any questions, concerns, or changes related to their health.

## 2020-08-28 LAB
H PYLORI IGA SER IA-ACNC: <9 UNITS (ref 0–8.9)
H PYLORI IGG SER IA-ACNC: 0.33 INDEX VALUE (ref 0–0.79)
H PYLORI IGM SER-ACNC: <9 UNITS (ref 0–8.9)

## 2020-08-29 ENCOUNTER — PREP FOR SURGERY (OUTPATIENT)
Dept: OTHER | Facility: HOSPITAL | Age: 55
End: 2020-08-29

## 2020-08-29 DIAGNOSIS — Z12.11 ENCOUNTER FOR SCREENING COLONOSCOPY: Primary | ICD-10-CM

## 2020-08-29 DIAGNOSIS — R10.13 DYSPEPSIA: ICD-10-CM

## 2020-08-31 ENCOUNTER — TRANSCRIBE ORDERS (OUTPATIENT)
Dept: ADMINISTRATIVE | Facility: HOSPITAL | Age: 55
End: 2020-08-31

## 2020-08-31 ENCOUNTER — HOSPITAL ENCOUNTER (OUTPATIENT)
Facility: HOSPITAL | Age: 55
Setting detail: HOSPITAL OUTPATIENT SURGERY
End: 2020-08-31
Attending: INTERNAL MEDICINE | Admitting: INTERNAL MEDICINE

## 2020-08-31 ENCOUNTER — TELEPHONE (OUTPATIENT)
Dept: GASTROENTEROLOGY | Facility: CLINIC | Age: 55
End: 2020-08-31

## 2020-08-31 DIAGNOSIS — Z12.31 SCREENING MAMMOGRAM, ENCOUNTER FOR: Primary | ICD-10-CM

## 2020-08-31 PROBLEM — R10.13 DYSPEPSIA: Status: ACTIVE | Noted: 2020-08-31

## 2020-08-31 PROBLEM — Z12.11 ENCOUNTER FOR SCREENING COLONOSCOPY: Status: ACTIVE | Noted: 2020-08-31

## 2020-08-31 NOTE — TELEPHONE ENCOUNTER
Called and spoke with patient.  Scheduled EGD & Colonoscopy on 09/26/2020 at 9:45am - arrive 8:30am.  E-mail instructions [email or web address] today.  Explained about the COVID test on 09/24/2020 at 9:20am, then self quarantine until after procedure.  She understands.

## 2020-09-22 ENCOUNTER — PREP FOR SURGERY (OUTPATIENT)
Dept: OTHER | Facility: HOSPITAL | Age: 55
End: 2020-09-22

## 2020-09-22 ENCOUNTER — TELEPHONE (OUTPATIENT)
Dept: GASTROENTEROLOGY | Facility: CLINIC | Age: 55
End: 2020-09-22

## 2020-09-22 DIAGNOSIS — Z12.11 ENCOUNTER FOR SCREENING FOR MALIGNANT NEOPLASM OF COLON: Primary | ICD-10-CM

## 2020-09-22 DIAGNOSIS — R10.13 DYSPEPSIA: ICD-10-CM

## 2020-09-22 NOTE — TELEPHONE ENCOUNTER
CALL FROM PATIENT.  SHE FEELS UNCOMFORTABLE TO HAVE HER EGD & COLONOSCOPY AT [facility], BECAUSE SHE WORKS AT [facility].  RESCHEDULED TO [facility] 11/17/2020 AT 2:15PM - ARRIVEE 1PM.  E-MAIL INSTRUCTIONS [email or web address] TODAY.

## 2020-09-24 ENCOUNTER — APPOINTMENT (OUTPATIENT)
Dept: LAB | Facility: HOSPITAL | Age: 55
End: 2020-09-24

## 2020-09-28 ENCOUNTER — APPOINTMENT (OUTPATIENT)
Dept: MAMMOGRAPHY | Facility: HOSPITAL | Age: 55
End: 2020-09-28

## 2020-10-05 ENCOUNTER — OFFICE VISIT (OUTPATIENT)
Dept: SURGERY | Facility: CLINIC | Age: 55
End: 2020-10-05

## 2020-10-05 VITALS — BODY MASS INDEX: 21.34 KG/M2 | WEIGHT: 125 LBS | HEIGHT: 64 IN

## 2020-10-05 DIAGNOSIS — R10.11 RIGHT UPPER QUADRANT ABDOMINAL PAIN: Primary | ICD-10-CM

## 2020-10-05 PROCEDURE — 99212 OFFICE O/P EST SF 10 MIN: CPT | Performed by: SURGERY

## 2020-10-05 NOTE — PROGRESS NOTES
PATIENT INFORMATION  Desire Hernandez       - 1965    CHIEF COMPLAINT  Chief Complaint   Patient presents with   • Abdominal Pain   RUQ abd. Pain x couple months. No testing done.    HISTORY OF PRESENT ILLNESS  HPI she complains of several month history of intermittent epigastric and right upper quadrant abdominal pain this last several hours and is primarily after eating.  She cannot really tell me what kind of food is worse.  She complains of upper abdominal bloating as well and pain back through to her back.  She did have some nausea and vomiting she has been scheduled for an EGD and a colonoscopy.  She had similar attacks years ago.  She says that her stool has been lighter than normal.  She denies any urinary tract complaints        REVIEW OF SYSTEMS  Review of Systems she took Prilosec for several days then stopped.  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]   • Status post arthroscopy of shoulder, extensive debridement glenohumeral and subacromial spaces, DOS 2020 [Z98.890]   • Adhesive capsulitis of right shoulder [M75.01]   • Left breast mass [N63.20]   • Menopausal symptoms [N95.1]         PAST MEDICAL HISTORY  Past Medical History:   Diagnosis Date   • ADHD (attention deficit hyperactivity disorder)     back in high school   • Allergic     seasonal   • ASCUS favor dysplasia     07; 08; 6/25/10; 12; 13; 8/21/15 (HPV negative)   • Blood type, Rh negative    • Bunion 2016    surgery   • Chlamydia     as a teen   • H/O nipple discharge     Bilateral diagnostic mammogram negative   • HSV-1 (herpes simplex virus 1) infection    • HSV-2 (herpes simplex virus 2) infection    • LGSIL (low grade squamous intraepithelial dysplasia) 2006    • Ovarian cyst     Left   • PONV (postoperative nausea and vomiting)    • Right shoulder pain     SCHEDULED FOR SX         SURGICAL HISTORY  Past Surgical History:   Procedure Laterality Date   • BUNIONECTOMY Right 2016    Dr. Durbin   • CERVICAL CONIZATION, LEEP     • COLPOSCOPY W/ BIOPSY / CURETTAGE  2006    benign   • D&C WITH SUCTION  1998    10 week SAB   • ENDOMETRIAL ABLATION  Dec. 2017   • INDUCED       years ago   • [place] HYSTEROSCOPY,W/ENDOMETRIAL ABLATION N/A 2017    Procedure: HYSTEROSCOPY WITH ENDOMETRIAL ABLATION; d&c;  Surgeon: Sandie Hu DO;  Location: [facility];  Service: Obstetrics/Gynecology   • SHOULDER ARTHROSCOPY Right 2020    Procedure: Right shoulder manipulation under anesthesia with arthroscopy and extensive debridement of glenohumeral and subacromial spaces;  Surgeon: Stalin Liu MD;  Location: [facility];  Service: Orthopedics         FAMILY HISTORY  Family History   Problem Relation Age of Onset   • Hearing loss Father    • Hyperlipidemia Father         DX in his mid 50's   • No Known Problems Brother    • Arthritis Sister         RA   • No Known Problems Son    • Hearing loss Maternal Grandmother    • Hyperlipidemia Maternal Grandmother         DX in her late 70's   • Stroke Maternal Grandmother    • Lung cancer Maternal Grandfather    • Cancer Maternal Grandfather         lung cancer - smoker   • Lung cancer Paternal Uncle    • Cancer Paternal Uncle         lung cancer - smoker   • Breast cancer Neg Hx    • Ovarian cancer Neg Hx    • Uterine cancer Neg Hx    • Colon cancer Neg Hx    • Melanoma Neg Hx    • Colon polyps Neg Hx          SOCIAL HISTORY  Social History     Occupational History     Employer: [employer]   Tobacco Use   • Smoking status: Never Smoker   • Smokeless tobacco: Never Used   Substance and Sexual Activity   • Alcohol use: Yes     Types: 1 Glasses of wine, 1 Standard drinks or equivalent per week     Comment:  "occ   • Drug use: No   • Sexual activity: Defer     Partners: Male     Birth control/protection: None         CURRENT MEDICATIONS    Current Outpatient Medications:   •  CBD (cannabidiol) oral oil, Take 15 drops by mouth Every Night., Disp: , Rfl:   •  PARoxetine (PAXIL) 10 MG tablet, Take 1 tablet by mouth Every Morning., Disp: 90 tablet, Rfl: 3  •  tiZANidine (ZANAFLEX) 2 MG tablet, Take 1 tablet by mouth Every 8 (Eight) Hours As Needed for Muscle Spasms., Disp: 30 tablet, Rfl: 1  •  omeprazole (priLOSEC) 40 MG capsule, Take 1 capsule by mouth Daily., Disp: 90 capsule, Rfl: 3    ALLERGIES  Patient has no known allergies.    VITALS  Vitals:    10/05/20 1531   Weight: 56.7 kg (125 lb)   Height: 162.6 cm (64.02\")       LAST RESULTS   Lab on 08/27/2020   Component Date Value Ref Range Status   • H. pylori IgG 08/27/2020 0.33  0.00 - 0.79 Index Value Final                                 Negative           <0.80                               Equivocal    0.80 - 0.89                               Positive           >0.89   • H. pylori, IgA ABS 08/27/2020 <9.0  0.0 - 8.9 units Final                                    Negative          <9.0                                  Equivocal   9.0 - 11.0                                  Positive         >11.0   • H. Pylori, IgM 08/27/2020 <9.0  0.0 - 8.9 units Final                                    Negative          <9.0                                  Equivocal   9.0 - 11.0                                  Positive         >11.0  This test was developed and its performance characteristics  determined by [facility]. It has not been cleared or approved  by the Food and Drug Administration.     No results found.    PHYSICAL EXAM  Debilities/Disabilities Identified: None  Emotional Behavior: Appropriate  Physical Exam is alert white female who is a little bit anxious.  Her heart shows a regular rate and rhythm her lungs are clear and equal.  She has subjective epigastric and right " upper quadrant tenderness that is mild there is no mass.  She does not have any clinical jaundice.  Her H. pylori was negative    ASSESSMENT  Right upper quadrant abdominal pain      PLAN the risk benefits and options were discussed with her in detail.  We will check an ultrasound of her gallbladder if that is normal we will check a Kinevac stimulation HIDA scan and I will see her back after the studies are complete

## 2020-10-13 ENCOUNTER — APPOINTMENT (OUTPATIENT)
Dept: ULTRASOUND IMAGING | Facility: HOSPITAL | Age: 55
End: 2020-10-13

## 2020-10-23 ENCOUNTER — APPOINTMENT (OUTPATIENT)
Dept: ULTRASOUND IMAGING | Facility: HOSPITAL | Age: 55
End: 2020-10-23

## 2020-10-27 ENCOUNTER — HOSPITAL ENCOUNTER (OUTPATIENT)
Dept: MAMMOGRAPHY | Facility: HOSPITAL | Age: 55
Discharge: HOME | End: 2020-10-27
Admitting: OBSTETRICS & GYNECOLOGY

## 2020-10-27 DIAGNOSIS — Z12.31 SCREENING MAMMOGRAM, ENCOUNTER FOR: ICD-10-CM

## 2020-10-27 PROCEDURE — 77067 SCR MAMMO BI INCL CAD: CPT

## 2020-10-27 PROCEDURE — 77063 BREAST TOMOSYNTHESIS BI: CPT

## 2020-10-28 ENCOUNTER — HOSPITAL ENCOUNTER (OUTPATIENT)
Dept: ULTRASOUND IMAGING | Facility: HOSPITAL | Age: 55
Discharge: HOME | End: 2020-10-28
Admitting: SURGERY

## 2020-10-28 ENCOUNTER — HOSPITAL ENCOUNTER (OUTPATIENT)
Dept: NUCLEAR MEDICINE | Facility: HOSPITAL | Age: 55
Discharge: HOME | End: 2020-10-28

## 2020-10-28 DIAGNOSIS — R10.11 RIGHT UPPER QUADRANT ABDOMINAL PAIN: ICD-10-CM

## 2020-10-28 PROCEDURE — 78227 HEPATOBIL SYST IMAGE W/DRUG: CPT

## 2020-10-28 PROCEDURE — 0 TECHNETIUM TC 99M MEBROFENIN KIT: Performed by: SURGERY

## 2020-10-28 PROCEDURE — 25010000002 SINCALIDE PER 5 MCG: Performed by: SURGERY

## 2020-10-28 PROCEDURE — 76705 ECHO EXAM OF ABDOMEN: CPT

## 2020-10-28 PROCEDURE — A9537 TC99M MEBROFENIN: HCPCS | Performed by: SURGERY

## 2020-10-28 RX ORDER — KIT FOR THE PREPARATION OF TECHNETIUM TC 99M MEBROFENIN 45 MG/10ML
1 INJECTION, POWDER, LYOPHILIZED, FOR SOLUTION INTRAVENOUS
Status: COMPLETED | OUTPATIENT
Start: 2020-10-28 | End: 2020-10-28

## 2020-10-28 RX ADMIN — SINCALIDE 1.1 MCG: 5 INJECTION, POWDER, LYOPHILIZED, FOR SOLUTION INTRAVENOUS at 12:26

## 2020-10-28 RX ADMIN — MEBROFENIN 1 DOSE: 45 INJECTION, POWDER, LYOPHILIZED, FOR SOLUTION INTRAVENOUS at 11:16

## 2020-11-11 ENCOUNTER — TELEPHONE (OUTPATIENT)
Dept: GASTROENTEROLOGY | Facility: CLINIC | Age: 55
End: 2020-11-11

## 2020-11-11 NOTE — TELEPHONE ENCOUNTER
CALL FROM PATIENT.  SCHEDULED FOR EGD & COLONOSCOPY ON 11/17/2020.  NEEDS TO CANCEL AT THIS TIME.  WILL CALL LATER TO RESCHEDULE.

## 2020-11-17 ENCOUNTER — LAB (OUTPATIENT)
Dept: SPORTS MEDICINE | Facility: CLINIC | Age: 55
End: 2020-11-17

## 2020-11-17 DIAGNOSIS — Z13.29 THYROID DISORDER SCREEN: ICD-10-CM

## 2020-11-17 DIAGNOSIS — Z13.220 SCREENING CHOLESTEROL LEVEL: ICD-10-CM

## 2020-11-17 DIAGNOSIS — Z00.00 ANNUAL PHYSICAL EXAM: Primary | ICD-10-CM

## 2020-11-18 LAB
ALBUMIN SERPL-MCNC: 4.7 G/DL (ref 3.5–5.2)
ALBUMIN/GLOB SERPL: 2.2 G/DL
ALP SERPL-CCNC: 90 U/L (ref 39–117)
ALT SERPL-CCNC: 21 U/L (ref 1–33)
APPEARANCE UR: CLEAR
AST SERPL-CCNC: 16 U/L (ref 1–32)
BACTERIA #/AREA URNS HPF: NORMAL /HPF
BASOPHILS # BLD AUTO: 0.03 10*3/MM3 (ref 0–0.2)
BASOPHILS NFR BLD AUTO: 0.5 % (ref 0–1.5)
BILIRUB SERPL-MCNC: 0.4 MG/DL (ref 0–1.2)
BILIRUB UR QL STRIP: NEGATIVE
BUN SERPL-MCNC: 16 MG/DL (ref 6–20)
BUN/CREAT SERPL: 18.4 (ref 7–25)
CALCIUM SERPL-MCNC: 9.9 MG/DL (ref 8.6–10.5)
CHLORIDE SERPL-SCNC: 101 MMOL/L (ref 98–107)
CHOLEST SERPL-MCNC: 202 MG/DL (ref 0–200)
CHOLEST/HDLC SERPL: 2.06 {RATIO}
CO2 SERPL-SCNC: 29.3 MMOL/L (ref 22–29)
COLOR UR: YELLOW
CREAT SERPL-MCNC: 0.87 MG/DL (ref 0.57–1)
EOSINOPHIL # BLD AUTO: 0.15 10*3/MM3 (ref 0–0.4)
EOSINOPHIL NFR BLD AUTO: 2.7 % (ref 0.3–6.2)
EPI CELLS #/AREA URNS HPF: NORMAL /HPF (ref 0–10)
ERYTHROCYTE [DISTWIDTH] IN BLOOD BY AUTOMATED COUNT: 11.7 % (ref 12.3–15.4)
GLOBULIN SER CALC-MCNC: 2.1 GM/DL
GLUCOSE SERPL-MCNC: 89 MG/DL (ref 65–99)
GLUCOSE UR QL: NEGATIVE
HCT VFR BLD AUTO: 43.9 % (ref 34–46.6)
HDLC SERPL-MCNC: 98 MG/DL (ref 40–60)
HGB BLD-MCNC: 14.7 G/DL (ref 12–15.9)
HGB UR QL STRIP: NEGATIVE
IMM GRANULOCYTES # BLD AUTO: 0.01 10*3/MM3 (ref 0–0.05)
IMM GRANULOCYTES NFR BLD AUTO: 0.2 % (ref 0–0.5)
KETONES UR QL STRIP: NEGATIVE
LDLC SERPL CALC-MCNC: 95 MG/DL (ref 0–100)
LEUKOCYTE ESTERASE UR QL STRIP: NEGATIVE
LYMPHOCYTES # BLD AUTO: 2.09 10*3/MM3 (ref 0.7–3.1)
LYMPHOCYTES NFR BLD AUTO: 37.7 % (ref 19.6–45.3)
MCH RBC QN AUTO: 31 PG (ref 26.6–33)
MCHC RBC AUTO-ENTMCNC: 33.5 G/DL (ref 31.5–35.7)
MCV RBC AUTO: 92.6 FL (ref 79–97)
MICRO URNS: NORMAL
MICRO URNS: NORMAL
MONOCYTES # BLD AUTO: 0.36 10*3/MM3 (ref 0.1–0.9)
MONOCYTES NFR BLD AUTO: 6.5 % (ref 5–12)
NEUTROPHILS # BLD AUTO: 2.91 10*3/MM3 (ref 1.7–7)
NEUTROPHILS NFR BLD AUTO: 52.4 % (ref 42.7–76)
NITRITE UR QL STRIP: NEGATIVE
NRBC BLD AUTO-RTO: 0 /100 WBC (ref 0–0.2)
PH UR STRIP: 7 [PH] (ref 5–7.5)
PLATELET # BLD AUTO: 204 10*3/MM3 (ref 140–450)
POTASSIUM SERPL-SCNC: 4.4 MMOL/L (ref 3.5–5.2)
PROT SERPL-MCNC: 6.8 G/DL (ref 6–8.5)
PROT UR QL STRIP: NEGATIVE
RBC # BLD AUTO: 4.74 10*6/MM3 (ref 3.77–5.28)
RBC #/AREA URNS HPF: NORMAL /HPF (ref 0–2)
SODIUM SERPL-SCNC: 139 MMOL/L (ref 136–145)
SP GR UR: 1.01 (ref 1–1.03)
TRIGL SERPL-MCNC: 47 MG/DL (ref 0–150)
TSH SERPL DL<=0.005 MIU/L-ACNC: 4.06 UIU/ML (ref 0.45–4.5)
URINALYSIS REFLEX: NORMAL
UROBILINOGEN UR STRIP-MCNC: 0.2 MG/DL (ref 0.2–1)
VLDLC SERPL CALC-MCNC: 9 MG/DL (ref 5–40)
WBC # BLD AUTO: 5.55 10*3/MM3 (ref 3.4–10.8)
WBC #/AREA URNS HPF: NORMAL /HPF (ref 0–5)

## 2020-11-24 ENCOUNTER — OFFICE VISIT (OUTPATIENT)
Dept: SPORTS MEDICINE | Facility: CLINIC | Age: 55
End: 2020-11-24

## 2020-11-24 ENCOUNTER — OFFICE VISIT (OUTPATIENT)
Dept: OBSTETRICS AND GYNECOLOGY | Facility: CLINIC | Age: 55
End: 2020-11-24

## 2020-11-24 VITALS
DIASTOLIC BLOOD PRESSURE: 78 MMHG | WEIGHT: 124 LBS | SYSTOLIC BLOOD PRESSURE: 120 MMHG | HEIGHT: 64 IN | BODY MASS INDEX: 21.17 KG/M2

## 2020-11-24 VITALS
TEMPERATURE: 98.6 F | DIASTOLIC BLOOD PRESSURE: 82 MMHG | BODY MASS INDEX: 21.17 KG/M2 | OXYGEN SATURATION: 97 % | HEART RATE: 69 BPM | HEIGHT: 64 IN | WEIGHT: 124 LBS | SYSTOLIC BLOOD PRESSURE: 118 MMHG | RESPIRATION RATE: 16 BRPM

## 2020-11-24 DIAGNOSIS — Z13.9 SCREENING FOR CONDITION: ICD-10-CM

## 2020-11-24 DIAGNOSIS — N94.10 FEMALE DYSPAREUNIA: ICD-10-CM

## 2020-11-24 DIAGNOSIS — Z11.51 SPECIAL SCREENING EXAMINATION FOR HUMAN PAPILLOMAVIRUS (HPV): ICD-10-CM

## 2020-11-24 DIAGNOSIS — Z01.419 ENCOUNTER FOR GYNECOLOGICAL EXAMINATION: Primary | ICD-10-CM

## 2020-11-24 DIAGNOSIS — Z00.00 ANNUAL PHYSICAL EXAM: Primary | ICD-10-CM

## 2020-11-24 DIAGNOSIS — Z01.419 PAP SMEAR, LOW-RISK: ICD-10-CM

## 2020-11-24 LAB
BILIRUB BLD-MCNC: NEGATIVE MG/DL
CLARITY, POC: CLEAR
COLOR UR: YELLOW
GLUCOSE UR STRIP-MCNC: NEGATIVE MG/DL
KETONES UR QL: NEGATIVE
LEUKOCYTE EST, POC: NEGATIVE
NITRITE UR-MCNC: NEGATIVE MG/ML
PH UR: 5 [PH] (ref 5–8)
PROT UR STRIP-MCNC: NEGATIVE MG/DL
RBC # UR STRIP: NEGATIVE /UL
SP GR UR: 1.03 (ref 1–1.03)
UROBILINOGEN UR QL: NORMAL

## 2020-11-24 PROCEDURE — 81002 URINALYSIS NONAUTO W/O SCOPE: CPT | Performed by: OBSTETRICS & GYNECOLOGY

## 2020-11-24 PROCEDURE — 99396 PREV VISIT EST AGE 40-64: CPT | Performed by: FAMILY MEDICINE

## 2020-11-24 PROCEDURE — 99396 PREV VISIT EST AGE 40-64: CPT | Performed by: OBSTETRICS & GYNECOLOGY

## 2020-11-24 NOTE — PROGRESS NOTES
GYN Annual Exam     CC- Here for annual exam.     Desire Hernandez is a 55 y.o. female who presents for annual well woman exam. S/p endometrial ablation 2017. No vaginal bleeding. Using Paxil 10mg qam to control vasomotor sx. Pt still getting intermittent vasomotor symptoms. She has started using black cohosh daily over the last year to help with hot flashes.  Patient also complaining of painful intercourse. Interested in treatment.    OB History        3    Para   2    Term   2            AB   1    Living   2       SAB   1    TAB        Ectopic        Molar        Multiple        Live Births   2                Current contraception: tubal ligation  History of abnormal Pap smear: no  History of abnormal mammogram: Had DX MMG and left breast US due to palpable mass and pain but imaging normal  Family history of uterine, colon or ovarian cancer: no  Family history of breast cancer: no    Health Maintenance   Topic Date Due   • COLONOSCOPY  1967   • HEPATITIS C SCREENING  2016   • ZOSTER VACCINE (1 of 2) 2017   • Annual Gynecologic Pelvic and Breast Exam  2020   • ANNUAL PHYSICAL  2021   • PAP SMEAR  09/10/2022   • MAMMOGRAM  10/27/2022   • TDAP/TD VACCINES (2 - Td) 2027   • INFLUENZA VACCINE  Completed   • Pneumococcal Vaccine 0-64  Aged Out       Past Medical History:   Diagnosis Date   • ADHD (attention deficit hyperactivity disorder)     back in high school   • Allergic     seasonal   • ASCUS favor dysplasia     07; 08; 6/25/10; 12; 13; 8/21/15 (HPV negative)   • Blood type, Rh negative    • Bunion 2016    surgery   • Chlamydia     as a teen   • H/O nipple discharge     Bilateral diagnostic mammogram negative   • HSV-1 (herpes simplex virus 1) infection    • HSV-2 (herpes simplex virus 2) infection    • LGSIL (low grade squamous intraepithelial dysplasia) 2006   • Ovarian cyst     Left   • PONV (postoperative nausea and vomiting)     • Right shoulder pain     SCHEDULED FOR SX       Past Surgical History:   Procedure Laterality Date   • BUNIONECTOMY Right 2016    Dr. Durbin   • CERVICAL CONIZATION, LEEP     • COLPOSCOPY W/ BIOPSY / CURETTAGE  2006    benign   • D&C WITH SUCTION  1998    10 week SAB   • ENDOMETRIAL ABLATION  Dec. 2017   • INDUCED       years ago   • RI HYSTEROSCOPY,W/ENDOMETRIAL ABLATION N/A 2017    Procedure: HYSTEROSCOPY WITH ENDOMETRIAL ABLATION; d&c;  Surgeon: Sandie Hu DO;  Location: [facility];  Service: Obstetrics/Gynecology   • SHOULDER ARTHROSCOPY Right 2020    Procedure: Right shoulder manipulation under anesthesia with arthroscopy and extensive debridement of glenohumeral and subacromial spaces;  Surgeon: Stalin Liu MD;  Location: [facility];  Service: Orthopedics         Current Outpatient Medications:   •  CBD (cannabidiol) oral oil, Take 15 drops by mouth Every Night., Disp: , Rfl:   •  Estradiol (Imvexxy Maintenance Pack) 10 MCG insert, Insert 1 ampule into the vagina 2 (Two) Times a Week., Disp: 8 each, Rfl: 11  •  Estradiol Starter Pack (Imvexxy Starter Pack) 10 MCG insert, Insert 1 ampule into the vagina Daily., Disp: 18 each, Rfl: 0  •  omeprazole (priLOSEC) 40 MG capsule, Take 1 capsule by mouth Daily., Disp: 90 capsule, Rfl: 3  •  PARoxetine (PAXIL) 10 MG tablet, Take 1 tablet by mouth Every Morning., Disp: 90 tablet, Rfl: 3  •  progesterone (Prometrium) 100 MG capsule, Take 1 capsule by mouth Daily., Disp: 30 capsule, Rfl: 11  •  tiZANidine (ZANAFLEX) 2 MG tablet, Take 1 tablet by mouth Every 8 (Eight) Hours As Needed for Muscle Spasms., Disp: 30 tablet, Rfl: 1    No Known Allergies    Social History     Tobacco Use   • Smoking status: Never Smoker   • Smokeless tobacco: Never Used   Substance Use Topics   • Alcohol use: Yes     Types: 1 Glasses of wine, 1 Standard drinks or equivalent per week     Comment: occ   • Drug use: No       Family History   Problem  "Relation Age of Onset   • Hearing loss Father    • Hyperlipidemia Father         DX in his mid 50's   • No Known Problems Brother    • Arthritis Sister         RA   • No Known Problems Son    • Hearing loss Maternal Grandmother    • Hyperlipidemia Maternal Grandmother         DX in her late 70's   • Stroke Maternal Grandmother    • Lung cancer Maternal Grandfather    • Cancer Maternal Grandfather         lung cancer - smoker   • Lung cancer Paternal Uncle    • Cancer Paternal Uncle         lung cancer - smoker   • Breast cancer Neg Hx    • Ovarian cancer Neg Hx    • Uterine cancer Neg Hx    • Colon cancer Neg Hx    • Melanoma Neg Hx    • Colon polyps Neg Hx        Review of Systems   Constitutional: Negative for appetite change, chills, fatigue, fever and unexpected weight change.   Gastrointestinal: Negative for abdominal distention, abdominal pain, anal bleeding, blood in stool, constipation, diarrhea, nausea and vomiting.   Genitourinary: Positive for dyspareunia. Negative for dysuria, menstrual problem, pelvic pain, vaginal bleeding, vaginal discharge and vaginal pain.       /78   Ht 162.6 cm (64.02\")   Wt 56.2 kg (124 lb)   BMI 21.27 kg/m²     Physical Exam  Vitals signs reviewed.   Constitutional:       Appearance: She is well-developed.   HENT:      Mouth/Throat:      Dentition: Normal dentition. No dental caries.   Cardiovascular:      Rate and Rhythm: Normal rate and regular rhythm.      Heart sounds: Normal heart sounds.   Pulmonary:      Effort: Pulmonary effort is normal. No respiratory distress.      Breath sounds: Normal breath sounds. No stridor. No wheezing.   Chest:      Breasts:         Right: No inverted nipple, mass, nipple discharge, skin change or tenderness.         Left: No inverted nipple, mass, nipple discharge, skin change or tenderness.   Abdominal:      General: There is no distension.      Palpations: Abdomen is soft. There is no mass.      Tenderness: There is no abdominal " tenderness.   Genitourinary:     Labia:         Right: No rash, tenderness or lesion.         Left: No rash, tenderness or lesion.       Vagina: No vaginal discharge, tenderness or bleeding.      Cervix: No cervical motion tenderness, discharge or friability.      Uterus: Not deviated, not enlarged, not fixed and not tender.       Adnexa:         Right: No mass, tenderness or fullness.          Left: No mass, tenderness or fullness.     Musculoskeletal: Normal range of motion.         General: No tenderness.   Skin:     General: Skin is warm.      Findings: No erythema or rash.   Neurological:      Mental Status: She is alert and oriented to person, place, and time.      Cranial Nerves: No cranial nerve deficit.      Coordination: Coordination normal.   Psychiatric:         Behavior: Behavior normal.         Thought Content: Thought content normal.         Judgment: Judgment normal.            Assessment/Plan    1) GYN HM: Check pap smear. SBE demonstrated and encouraged. Last MMG 10/2020.  Needs colonoscopy. Had a negative Cologard.  2) /vasomotor sx: No PMPB s/p endometrial ablation. Discussed starting Prometrium 100mg po qhs. If sx improved will discontinue Paxil. Pt encouraged to stop black cohosh.  3) Bone health - Weight bearing exercise, dietary calcium recommendations and vitamin D reviewed.   4) Diet and Exercise discussed  5) Smoking Status: nonsmoker  6) Social: had PT and surgery for frozen right shoulder after IV in ER gave her nerve damage: pt is now better.  7) hx of left breast pain and mass noted on PE: Was seen by Dr Pinedo 2/2018. Had negative imaging. PE unremarkable. Just doing yearly MMG now.  Dyspareunia: Start Imvexxy- samples and script written.  8) Follow up prn and 1 year       Diagnoses and all orders for this visit:    Encounter for gynecological examination  -     Pap IG, HPV-hr    Screening for condition  -     POC Urinalysis Dipstick    Female dyspareunia    Special screening  examination for human papillomavirus (HPV)  -     Pap IG, HPV-hr    Pap smear, low-risk  -     Pap IG, HPV-hr    Other orders  -     progesterone (Prometrium) 100 MG capsule; Take 1 capsule by mouth Daily.        Sandie Hu DO  11/28/2020  20:13 EST

## 2020-11-24 NOTE — PROGRESS NOTES
"Desire Hernandez is here today for an annual physical exam.     Eating a healthy diet. Exercising routinely. Losing weight on purpose.     PHQ-2 Depression Screening  Little interest or pleasure in doing things? 0   Feeling down, depressed, or hopeless? 0   PHQ-2 Total Score 0       Health Maintenance   Topic Date Due   • COLONOSCOPY  1965   • HEPATITIS C SCREENING  08/25/2016   • ZOSTER VACCINE (1 of 2) 06/09/2017   • PT PLAN OF CARE  04/13/2020   • INFLUENZA VACCINE  08/01/2020   • Annual Gynecologic Pelvic and Breast Exam  09/11/2020   • ANNUAL PHYSICAL  11/20/2020   • PAP SMEAR  09/10/2022   • MAMMOGRAM  10/27/2022   • TDAP/TD VACCINES (2 - Td) 09/29/2027   • Pneumococcal Vaccine 0-64  Aged Out       Review of Systems   Constitutional: Negative.    Respiratory: Negative.    Cardiovascular: Negative.        /82 (BP Location: Left arm, Patient Position: Sitting, Cuff Size: Adult)   Pulse 69   Temp 98.6 °F (37 °C)   Resp 16   Ht 162.6 cm (64.02\")   Wt 56.2 kg (124 lb)   SpO2 97%   BMI 21.27 kg/m²      Physical Exam    Vital signs reviewed.  General appearance: No acute distress  Eyes: conjunctiva clear without erythema; pupils equally round and reactive  ENT: external ears normal; hearing normal  Neck: supple; no thyromegaly  CV: normal rate and rhythm; no peripheral edema  Respiratory: normal respiratory effort; lungs clear to auscultation bilaterally  MSK: normal gait and station; no focal joint deformity or swelling  Skin: no rash or wounds; normal turgor  Neuro: cranial nerves 2-12 grossly intact; normal sensation to light touch  Psych: mood and affect normal; recent and remote memory intact    Office Visit on 11/24/2020   Component Date Value Ref Range Status   • Color 11/24/2020 Yellow  Yellow, Straw, Dark Yellow, Marivel Final   • Clarity, UA 11/24/2020 Clear  Clear Final   • Glucose, UA 11/24/2020 Negative  Negative, 1000 mg/dL (3+) mg/dL Final   • Bilirubin 11/24/2020 Negative  Negative " Final   • Ketones, UA 11/24/2020 Negative  Negative Final   • Specific Gravity  11/24/2020 1.030  1.005 - 1.030 Final   • Blood, UA 11/24/2020 Negative  Negative Final   • pH, Urine 11/24/2020 5.0  5.0 - 8.0 Final   • Protein, POC 11/24/2020 Negative  Negative mg/dL Final   • Urobilinogen, UA 11/24/2020 Normal  Normal Final   • Leukocytes 11/24/2020 Negative  Negative Final   • Nitrite, UA 11/24/2020 Negative  Negative Final   Lab on 11/17/2020   Component Date Value Ref Range Status   • Glucose 11/17/2020 89  65 - 99 mg/dL Final   • BUN 11/17/2020 16  6 - 20 mg/dL Final   • Creatinine 11/17/2020 0.87  0.57 - 1.00 mg/dL Final   • eGFR Non  Am 11/17/2020 68  >60 mL/min/1.73 Final   • eGFR African Am 11/17/2020 83  >60 mL/min/1.73 Final   • BUN/Creatinine Ratio 11/17/2020 18.4  7.0 - 25.0 Final   • Sodium 11/17/2020 139  136 - 145 mmol/L Final   • Potassium 11/17/2020 4.4  3.5 - 5.2 mmol/L Final   • Chloride 11/17/2020 101  98 - 107 mmol/L Final   • Total CO2 11/17/2020 29.3* 22.0 - 29.0 mmol/L Final   • Calcium 11/17/2020 9.9  8.6 - 10.5 mg/dL Final   • Total Protein 11/17/2020 6.8  6.0 - 8.5 g/dL Final   • Albumin 11/17/2020 4.70  3.50 - 5.20 g/dL Final   • Globulin 11/17/2020 2.1  gm/dL Final   • A/G Ratio 11/17/2020 2.2  g/dL Final   • Total Bilirubin 11/17/2020 0.4  0.0 - 1.2 mg/dL Final   • Alkaline Phosphatase 11/17/2020 90  39 - 117 U/L Final   • AST (SGOT) 11/17/2020 16  1 - 32 U/L Final   • ALT (SGPT) 11/17/2020 21  1 - 33 U/L Final   • WBC 11/17/2020 5.55  3.40 - 10.80 10*3/mm3 Final   • RBC 11/17/2020 4.74  3.77 - 5.28 10*6/mm3 Final   • Hemoglobin 11/17/2020 14.7  12.0 - 15.9 g/dL Final   • Hematocrit 11/17/2020 43.9  34.0 - 46.6 % Final   • MCV 11/17/2020 92.6  79.0 - 97.0 fL Final   • MCH 11/17/2020 31.0  26.6 - 33.0 pg Final   • MCHC 11/17/2020 33.5  31.5 - 35.7 g/dL Final   • RDW 11/17/2020 11.7* 12.3 - 15.4 % Final   • Platelets 11/17/2020 204  140 - 450 10*3/mm3 Final   • Neutrophil Rel %  11/17/2020 52.4  42.7 - 76.0 % Final   • Lymphocyte Rel % 11/17/2020 37.7  19.6 - 45.3 % Final   • Monocyte Rel % 11/17/2020 6.5  5.0 - 12.0 % Final   • Eosinophil Rel % 11/17/2020 2.7  0.3 - 6.2 % Final   • Basophil Rel % 11/17/2020 0.5  0.0 - 1.5 % Final   • Neutrophils Absolute 11/17/2020 2.91  1.70 - 7.00 10*3/mm3 Final   • Lymphocytes Absolute 11/17/2020 2.09  0.70 - 3.10 10*3/mm3 Final   • Monocytes Absolute 11/17/2020 0.36  0.10 - 0.90 10*3/mm3 Final   • Eosinophils Absolute 11/17/2020 0.15  0.00 - 0.40 10*3/mm3 Final   • Basophils Absolute 11/17/2020 0.03  0.00 - 0.20 10*3/mm3 Final   • Immature Granulocyte Rel % 11/17/2020 0.2  0.0 - 0.5 % Final   • Immature Grans Absolute 11/17/2020 0.01  0.00 - 0.05 10*3/mm3 Final   • nRBC 11/17/2020 0.0  0.0 - 0.2 /100 WBC Final   • Total Cholesterol 11/17/2020 202* 0 - 200 mg/dL Final   • Triglycerides 11/17/2020 47  0 - 150 mg/dL Final   • HDL Cholesterol 11/17/2020 98* 40 - 60 mg/dL Final   • VLDL Cholesterol [place] 11/17/2020 9  5 - 40 mg/dL Final   • LDL Chol Calc (NIH) 11/17/2020 95  0 - 100 mg/dL Final   • Chol/HDL Ratio 11/17/2020 2.06   Final   • Specific Gravity, UA 11/17/2020 1.006  1.005 - 1.030 Final   • pH, UA 11/17/2020 7.0  5.0 - 7.5 Final   • Color, UA 11/17/2020 Yellow  Yellow Final   • Appearance, UA 11/17/2020 Clear  Clear Final   • Leukocytes, UA 11/17/2020 Negative  Negative Final   • Protein 11/17/2020 Negative  Negative/Trace Final   • Glucose, UA 11/17/2020 Negative  Negative Final   • Ketones 11/17/2020 Negative  Negative Final   • Blood, UA 11/17/2020 Negative  Negative Final   • Bilirubin, UA 11/17/2020 Negative  Negative Final   • Urobilinogen, UA 11/17/2020 0.2  0.2 - 1.0 mg/dL Final   • Nitrite, UA 11/17/2020 Negative  Negative Final   • Microscopic Examination 11/17/2020 Comment   Final    Microscopic follows if indicated.   • Microscopic Examination 11/17/2020 See below:   Final    Microscopic was indicated and was performed.   • Urinalysis  Reflex 11/17/2020 Comment   Final    This specimen will not reflex to a Urine Culture.   • TSH 11/17/2020 4.060  0.450 - 4.500 uIU/mL Final    Comment: No apparent thyroid disorder. Additional testing not indicated. In  rare instances, Secondary Hypothyroidism as well as Subclinical  Hypothyroidism have been reported in some patients with normal TSH  values.     • WBC, UA 11/17/2020 0-5  0 - 5 /hpf Final   • RBC, UA 11/17/2020 None seen  0 - 2 /hpf Final   • Epithelial Cells (non renal) 11/17/2020 None seen  0 - 10 /hpf Final   • Bacteria, UA 11/17/2020 Few  None seen/Few /hpf Final         Current Outpatient Medications:   •  CBD (cannabidiol) oral oil, Take 15 drops by mouth Every Night., Disp: , Rfl:   •  omeprazole (priLOSEC) 40 MG capsule, Take 1 capsule by mouth Daily., Disp: 90 capsule, Rfl: 3  •  PARoxetine (PAXIL) 10 MG tablet, Take 1 tablet by mouth Every Morning., Disp: 90 tablet, Rfl: 3  •  progesterone (Prometrium) 100 MG capsule, Take 1 capsule by mouth Daily., Disp: 30 capsule, Rfl: 11  •  tiZANidine (ZANAFLEX) 2 MG tablet, Take 1 tablet by mouth Every 8 (Eight) Hours As Needed for Muscle Spasms., Disp: 30 tablet, Rfl: 1    Diagnoses and all orders for this visit:    1. Annual physical exam (Primary)        Encourage healthy diet and exercise.  Encourage patient to stay up to date on screening examinations as indicated based on age and risk factors.  Labs reassuring.   Discussed shingrix.  F/u with GI re: cscope.   Continue f/u with gyn regarding menopausal symptoms.     EMR Dragon/Transcription disclaimer:    Much of this encounter note is an electronic transcription/translation of spoken language to printed text.  The electronic translation of spoken language may permit erroneous, or at times, nonsensical words or phrases to be inadvertently transcribed.  Although I have reviewed the note for such errors some may still exist.

## 2020-11-25 ENCOUNTER — TELEPHONE (OUTPATIENT)
Dept: OBSTETRICS AND GYNECOLOGY | Facility: CLINIC | Age: 55
End: 2020-11-25

## 2020-11-25 RX ORDER — ESTRADIOL 10 UG/1
1 INSERT VAGINAL 2 TIMES WEEKLY
Qty: 8 EACH | Refills: 11 | Status: SHIPPED | OUTPATIENT
Start: 2020-11-26 | End: 2021-09-23

## 2020-11-25 RX ORDER — ESTRADIOL 10 UG/1
1 INSERT VAGINAL DAILY
Qty: 18 EACH | Refills: 0 | Status: SHIPPED | OUTPATIENT
Start: 2020-11-25 | End: 2021-09-23

## 2020-12-01 LAB
CYTOLOGIST CVX/VAG CYTO: NORMAL
CYTOLOGY CVX/VAG DOC CYTO: NORMAL
CYTOLOGY CVX/VAG DOC THIN PREP: NORMAL
DX ICD CODE: NORMAL
HIV 1 & 2 AB SER-IMP: NORMAL
HPV I/H RISK 1 DNA CVX QL PROBE+SIG AMP: NORMAL
HPV I/H RISK 4 DNA CVX QL PROBE+SIG AMP: NEGATIVE
Lab: NORMAL
OTHER STN SPEC: NORMAL
STAT OF ADQ CVX/VAG CYTO-IMP: NORMAL

## 2021-04-12 RX ORDER — PAROXETINE 10 MG/1
10 TABLET, FILM COATED ORAL EVERY MORNING
Qty: 90 TABLET | Refills: 3 | Status: SHIPPED | OUTPATIENT
Start: 2021-04-12 | End: 2022-05-24 | Stop reason: SDUPTHER

## 2021-05-19 ENCOUNTER — OFFICE VISIT (OUTPATIENT)
Dept: ORTHOPEDIC SURGERY | Facility: CLINIC | Age: 56
End: 2021-05-19

## 2021-05-19 VITALS — WEIGHT: 125 LBS | BODY MASS INDEX: 21.34 KG/M2 | HEIGHT: 64 IN

## 2021-05-19 DIAGNOSIS — R52 PAIN: Primary | ICD-10-CM

## 2021-05-19 DIAGNOSIS — M75.52 SUBACROMIAL BURSITIS OF LEFT SHOULDER JOINT: ICD-10-CM

## 2021-05-19 DIAGNOSIS — M75.82 ROTATOR CUFF TENDINITIS, LEFT: ICD-10-CM

## 2021-05-19 PROCEDURE — 73030 X-RAY EXAM OF SHOULDER: CPT | Performed by: NURSE PRACTITIONER

## 2021-05-19 PROCEDURE — 99214 OFFICE O/P EST MOD 30 MIN: CPT | Performed by: NURSE PRACTITIONER

## 2021-05-19 NOTE — PROGRESS NOTES
Subjective:     Patient ID: Desire Hernandez is a 55 y.o. female.    Chief Complaint:  Left shoulder pain, new issue to examiner  History of Present Illness  Desire Hernandez 55-year-old female presents to clinic for evaluation of her left upper extremity.  She was lifting weights doing other strengthening exercises of the left upper extremity about 3 weeks ago when she felt a pull/pop with sudden onset of pain at the lateral aspect of the shoulder.  She laid off exercises for the last 3 weeks which she believes is made symptoms worse.  She said first fall felt like a pulled muscle but pain has become more intense and she is now getting a catching sensation of the shoulder with reaching above her head.  Pain also noted with reaching back behind her back as well as decreased range of motion when reaching back behind her back.  Rates discomfort 6-7 out of a 10 aching, sharp, shooting in nature.  Increased pain noted with shoulder abduction and 90 degrees, reaching out to the side and again reaching back behind her back.  She has had adhesive capsulitis of the right upper extremity in the past denies any injury prior to 3 weeks ago to the left shoulder.  She has had to rely heavily on the left upper extremity while recovering with the right upper extremity and does notice increased strength of the right upper extremity however over the last 3 weeks strength significantly decreased.  Denies presence of numbness or tingling radiating down the left upper extremity.  Pain does radiate to the elbow as well as pain to the side of the neck.  Denies other complaints present time.    Social History     Occupational History     Employer: [employer]   Tobacco Use   • Smoking status: Never Smoker   • Smokeless tobacco: Never Used   Vaping Use   • Vaping Use: Never used   Substance and Sexual Activity   • Alcohol use: Yes     Types: 1 Glasses of wine, 1 Standard drinks or equivalent per week     Comment: occ   •  Drug use: No   • Sexual activity: Defer     Partners: Male     Birth control/protection: None      Past Medical History:   Diagnosis Date   • ADHD (attention deficit hyperactivity disorder)     back in high school   • Allergic     seasonal   • ASCUS favor dysplasia     07; 08; 6/25/10; 12; 13; 8/21/15 (HPV negative)   • Blood type, Rh negative    • Bunion 2016    surgery   • Chlamydia     as a teen   • H/O nipple discharge     Bilateral diagnostic mammogram negative   • HSV-1 (herpes simplex virus 1) infection    • HSV-2 (herpes simplex virus 2) infection    • LGSIL (low grade squamous intraepithelial dysplasia) 2006   • Menopause    • Ovarian cyst     Left   • PONV (postoperative nausea and vomiting)    • Right shoulder pain     SCHEDULED FOR SX     Past Surgical History:   Procedure Laterality Date   • BUNIONECTOMY Right 2016    Dr. Durbin   • CERVICAL CONIZATION, LEEP     • COLPOSCOPY W/ BIOPSY / CURETTAGE  2006    benign   • D & C WITH SUCTION  1998    10 week SAB   • ENDOMETRIAL ABLATION  Dec. 2017   • INDUCED       years ago   • CA HYSTEROSCOPY,W/ENDOMETRIAL ABLATION N/A 2017    Procedure: HYSTEROSCOPY WITH ENDOMETRIAL ABLATION; d&c;  Surgeon: Sandie Hu DO;  Location: [facility];  Service: Obstetrics/Gynecology   • SHOULDER ARTHROSCOPY Right 2020    Procedure: Right shoulder manipulation under anesthesia with arthroscopy and extensive debridement of glenohumeral and subacromial spaces;  Surgeon: Stalin Liu MD;  Location: [facility];  Service: Orthopedics       Family History   Problem Relation Age of Onset   • Hearing loss Father    • Hyperlipidemia Father         DX in his mid 50's   • No Known Problems Brother    • Arthritis Sister         RA   • No Known Problems Son    • Hearing loss Maternal Grandmother    • Hyperlipidemia Maternal Grandmother         DX in her late 70's   • Stroke Maternal Grandmother    • Lung cancer  "Maternal Grandfather    • Cancer Maternal Grandfather         lung cancer - smoker   • Lung cancer Paternal Uncle    • Cancer Paternal Uncle         lung cancer - smoker   • Breast cancer Neg Hx    • Ovarian cancer Neg Hx    • Uterine cancer Neg Hx    • Colon cancer Neg Hx    • Melanoma Neg Hx    • Colon polyps Neg Hx        Objective:  Physical Exam    Vital signs reviewed.   General: No acute distress.  Eyes: conjunctiva clear; pupils equally round and reactive  ENT: external ears and nose atraumatic; oropharynx clear  CV: no peripheral edema  Resp: normal respiratory effort  Skin: no rashes or wounds; normal turgor  Psych: mood and affect appropriate; recent and remote memory intact    Vitals:    05/19/21 1035   Weight: 56.7 kg (125 lb)   Height: 162.6 cm (64.02\")         05/19/21  1035   Weight: 56.7 kg (125 lb)     Body mass index is 21.45 kg/m².      Left Shoulder Exam     Tenderness   The patient is experiencing tenderness in the acromion.    Range of Motion   External rotation: 60   Forward flexion: 180   Internal rotation 0 degrees: L1     Muscle Strength   Internal rotation: 4/5   External rotation: 4/5   Supraspinatus: 4/5   Subscapularis: 4/5   Biceps: 4/5     Tests   Amaral test: positive  Cross arm: negative  Impingement: positive  Drop arm: negative    Other   Erythema: absent  Sensation: normal  Pulse: present     Comments:  Mildly positive empty can  negative Summers's  negative Speed's  negative bear hug exam             Imaging:  Left Shoulder X-Ray  Indication: Pain  AP Internal and External Rotation views    Findings:  No fracture  No bony lesion  Normal soft tissues  Normal joint spaces    No prior studies were available for comparison.    Assessment:        1. Pain    2. Subacromial bursitis of left shoulder joint    3. Rotator cuff tendinitis, left           Plan:  1. Discussed plan of care with patient. Will start PT now for range of motion and stretching, and strengthening.  She is unable " to tolerate oral NSAIDs secondary to GI upset possible ulceration.  Discussed topical diclofenac apply 4 times daily.  We will proceed with MRI to evaluate for cuff tear given injury with weight lifting.  She verbalized understanding of all information agrees with plan of care.  Denies other concerns present this time.  Orders:  Orders Placed This Encounter   Procedures   • XR Shoulder 2+ View Left   • MRI Shoulder Left Without Contrast   • Ambulatory Referral to Physical Therapy       Medications:  No orders of the defined types were placed in this encounter.      Followup:  No follow-ups on file.    Diagnoses and all orders for this visit:    1. Pain (Primary)  -     XR Shoulder 2+ View Left    2. Subacromial bursitis of left shoulder joint  -     Ambulatory Referral to Physical Therapy  -     MRI Shoulder Left Without Contrast; Future    3. Rotator cuff tendinitis, left  -     Ambulatory Referral to Physical Therapy  -     MRI Shoulder Left Without Contrast; Future          I ordered and reviewed the ROSA today.     Dictated utilizing Dragon dictation

## 2021-05-26 ENCOUNTER — HOSPITAL ENCOUNTER (OUTPATIENT)
Dept: PHYSICAL THERAPY | Facility: HOSPITAL | Age: 56
Setting detail: THERAPIES SERIES
Discharge: HOME | End: 2021-05-26

## 2021-05-26 DIAGNOSIS — M75.41 SUBACROMIAL IMPINGEMENT OF RIGHT SHOULDER: Primary | ICD-10-CM

## 2021-05-26 PROCEDURE — 97161 PT EVAL LOW COMPLEX 20 MIN: CPT

## 2021-05-26 NOTE — THERAPY EVALUATION
Outpatient Physical Therapy Ortho Initial Evaluation   Alisha Soto     Patient Name: Desire Hernandez  : 1965  MRN: [id number]  Today's Date: 2021      Visit Date: 2021    Patient Active Problem List   Diagnosis   • Menopausal symptoms   • Left breast mass   • Adhesive capsulitis of right shoulder   • Status post arthroscopy of shoulder, extensive debridement glenohumeral and subacromial spaces, DOS 2020   • Thoracic disc disease   • Subacromial impingement of right shoulder   • Encounter for screening colonoscopy   • Dyspepsia        Past Medical History:   Diagnosis Date   • ADHD (attention deficit hyperactivity disorder)     back in high school   • Allergic     seasonal   • ASCUS favor dysplasia     07; 08; 6/25/10; 12; 13; 8/21/15 (HPV negative)   • Blood type, Rh negative    • Bunion 2016    surgery   • Chlamydia     as a teen   • H/O nipple discharge     Bilateral diagnostic mammogram negative   • HSV-1 (herpes simplex virus 1) infection    • HSV-2 (herpes simplex virus 2) infection    • LGSIL (low grade squamous intraepithelial dysplasia) 2006   • Menopause    • Ovarian cyst     Left   • PONV (postoperative nausea and vomiting)    • Right shoulder pain     SCHEDULED FOR SX        Past Surgical History:   Procedure Laterality Date   • BUNIONECTOMY Right 2016    Dr. Durbin   • CERVICAL CONIZATION, MOE     • COLPOSCOPY W/ BIOPSY / CURETTAGE  2006    benign   • D & C WITH SUCTION  1998    10 week SAB   • ENDOMETRIAL ABLATION  Dec. 2017   • INDUCED       years ago   • DE HYSTEROSCOPY,W/ENDOMETRIAL ABLATION N/A 2017    Procedure: HYSTEROSCOPY WITH ENDOMETRIAL ABLATION; d&c;  Surgeon: Sandie Hu DO;  Location: [facility];  Service: Obstetrics/Gynecology   • SHOULDER ARTHROSCOPY Right 2020    Procedure: Right shoulder manipulation under anesthesia with arthroscopy and extensive debridement of glenohumeral and  subacromial spaces;  Surgeon: Stalin Liu MD;  Location: [facility];  Service: Orthopedics       Visit Dx:     ICD-10-CM ICD-9-CM   1. Subacromial impingement of right shoulder  M75.41 726.19         Patient History     Row Name 05/26/21 1100             History    Chief Complaint  Difficulty with daily activities;Muscle tenderness;Muscle weakness;Pain  -AS      Type of Pain  Shoulder pain Left  -AS      Brief Description of Current Complaint  Desire Hernandez 55-year-old female presents to clinic for evaluation of her left upper extremity.  She was lifting weights doing other strengthening exercises of the left upper extremity about 4 weeks ago when she felt a pull/pop with sudden onset of pain at the lateral aspect of the shoulder.  She laid off exercises for the last 4 weeks which she believes is made symptoms worse.  She said first fall felt like a pulled muscle but pain has become more intense and she is now getting a catching sensation of the shoulder with reaching above her head.  Pain also noted with reaching back behind her back as well as decreased range of motion when reaching back behind her back.  Rates discomfort 6-7 out of a 10 aching, sharp, shooting in nature.  Increased pain noted with shoulder abduction and 90 degrees, reaching out to the side and again reaching back behind her back.  She has had adhesive capsulitis of the right upper extremity in the past denies any injury prior to 4 weeks ago to the left shoulder. Patient plans to have an MRI of left shoulder to evaluate for RC tear due to SYDNEE.  -AS      Patient/Caregiver Goals  Relieve pain;Improve mobility;Improve strength  -AS      Patient's Rating of General Health  Good  -AS      Hand Dominance  right-handed  -AS      Patient seeing anyone else for problem(s)?  Dafne Markham  -AS      How has patient tried to help current problem?  rest  -AS         Pain     Pain Location  Shoulder  -AS      Pain Frequency  Intermittent  -AS      Pain  Description  Aching  -AS      What Performance Factors Make the Current Problem(s) WORSE?  use of LUE  -AS      What Performance Factors Make the Current Problem(s) BETTER?  rest  -AS         Daily Activities    Primary Language  English  -AS      Are you able to read  Yes  -AS      Are you able to write  Yes  -AS      How does patient learn best?  Listening;Reading;Demonstration  -AS      Teaching needs identified  Home Exercise Program;Management of Condition  -AS      Patient is concerned about/has problems with  Difficulty with self care (i.e. bathing, dressing, toileting:;Flexibility;Performing home management (household chores, shopping, care of dependents);Performing job responsibilities/community activities (work, school,;Performing sports, recreation, and play activities;Reaching over head;Repetitive movements of the hand, arm, shoulder  -AS      Does patient have problems with the following?  None  -AS      Barriers to learning  None  -AS      Pt Participated in POC and Goals  Yes  -AS         Safety    Are you being hurt, hit, or frightened by anyone at home or in your life?  No  -AS      Are you being neglected by a caregiver  No  -AS        User Key  (r) = Recorded By, (t) = Taken By, (c) = Cosigned By    Initials Name Provider Type    AS Jeovanny Stephen, PT Physical Therapist          PT Ortho     Row Name 05/26/21 1100       Precautions and Contraindications    Precautions/Limitations  no known precautions/limitations  -AS       Subjective Pain    Able to rate subjective pain?  yes  -AS    Pre-Treatment Pain Level  4  -AS    Post-Treatment Pain Level  4  -AS       Posture/Observations    Posture- WNL  Posture is WNL  -AS       Shoulder Impingement/Rotator Cuff Special Tests    Amaral-Jerardo Test (RC Lesion vs. Bursitis)  Left:;Positive  -AS    Neer Impingement Test (RC Lesion vs. Bursitis)  Left:;Positive  -AS    Speed's Test (LH of Biceps Lesion)  Left:;Negative  -AS       Shoulder Girdle  Palpation    Subacromial Space  Left:;Tender  -AS    Supraspinatus Insertion  Left:;Tender  -AS    Long Head of Biceps  Left:;Tender  -AS    Greater Tubercule  Left:;Tender  -AS       Left Upper Ext    Lt Shoulder Abduction AROM  160  -AS    Lt Shoulder Abduction PROM  180  -AS    Lt Shoulder Flexion AROM  163  -AS    Lt Shoulder Flexion PROM  180  -AS    Lt Shoulder External Rotation AROM  70  -AS    Lt Shoulder External Rotation PROM  90  -AS    Lt Shoulder Internal Rotation AROM  45  -AS    Lt Shoulder Internal Rotation PROM  65  -AS       MMT Left Upper Ext    Lt Shoulder Flexion MMT, Gross Movement  (4-/5) good minus  -AS    Lt Shoulder ABduction MMT, Gross Movement  (4-/5) good minus  -AS    Lt Shoulder Internal Rotation MMT, Gross Movement  (4-/5) good minus  -AS    Lt Shoulder External Rotation MMT, Gross Movement  (4-/5) good minus  -AS       Sensation    Sensation WNL?  WNL  -AS    Light Touch  No apparent deficits  -AS      User Key  (r) = Recorded By, (t) = Taken By, (c) = Cosigned By    Initials Name Provider Type    AS Jeovanny Stephen, PT Physical Therapist                      Therapy Education  Given: HEP, Symptoms/condition management, Pain management  Program: New  How Provided: Verbal, Demonstration, Written  Provided to: Patient  Level of Understanding: Teach back education performed, Verbalized, Demonstrated     PT OP Goals     Row Name 05/26/21 1100          PT Short Term Goals    STG Date to Achieve  06/09/21  -AS     STG 1  Patient to demonstrate compliance with her initial HEP for flexibility, ROM and strengthening.  -AS     STG 2  Patient to report left shoulder pain on VAS of 4-5/10 at worst with activity.  -AS     STG 3  Patient to demonstrate improved left shoulder strength to 4/5 in all planes.  -AS     STG 4  Patient to demonstrate improved left shoulder PROM to within 10 degrees of contralateral shoulder.  -AS        Long Term Goals    LTG Date to Achieve  06/23/21  -AS     LTG  1  Patient to demonstrate compliance with her advanced HEP for flexibility, ROM and strengthening.  -AS     LTG 2  Patient to report left shoulder pain on VAS of 0-1/10 at worst with activity.  -AS     LTG 3  Patient to demonstrate improved left shoulder strength to 4+/5 in all planes.  -AS     LTG 4  Patient to demonstrate improved left shoulder AROM to WNL in all planes.  -AS     LTG 5  Patient to report improved function and decreased pain Quick DASH by >10-15 points.  -AS        Time Calculation    PT Goal Re-Cert Due Date  06/23/21  -AS       User Key  (r) = Recorded By, (t) = Taken By, (c) = Cosigned By    Initials Name Provider Type    AS Jeovanny Stephen, PT Physical Therapist          PT Assessment/Plan     Row Name 05/26/21 1100          PT Assessment    Functional Limitations  Limitation in home management;Limitations in community activities;Performance in leisure activities;Performance in self-care ADL;Performance in sport activities;Performance in work activities  -AS     Impairments  Muscle strength;Pain;Range of motion  -AS     Assessment Comments  Patient presents to outpatient PT with complaints of left shoulder x1 month. Patient has increased pain with reaching above her head and behind her back. She has limited left shoulder ROM, limited LUE strength, and increased pain and discomfort with activity. Patient has limited function at this time secondary to the above.  -AS     Please refer to paper survey for additional self-reported information  Yes  -AS     Rehab Potential  Good  -AS     Patient/caregiver participated in establishment of treatment plan and goals  Yes  -AS     Patient would benefit from skilled therapy intervention  Yes  -AS        PT Plan    PT Frequency  1x/week;2x/week  -AS     Predicted Duration of Therapy Intervention (PT)  4 weeks  -AS     Planned CPT's?  PT RE-EVAL: 25864;PT THER PROC EA 15 MIN: 71707;PT THER ACT EA 15 MIN: 55375;PT MANUAL THERAPY EA 15 MIN: 64062;PT  NEUROMUSC RE-EDUCATION EA 15 MIN: 56271  -AS       User Key  (r) = Recorded By, (t) = Taken By, (c) = Cosigned By    Initials Name Provider Type    AS Jeovanny Stephen, PT Physical Therapist            OP Exercises     Row Name 05/26/21 1100             Subjective Pain    Able to rate subjective pain?  yes  -AS      Pre-Treatment Pain Level  4  -AS      Post-Treatment Pain Level  4  -AS         Exercise 1    Exercise Name 1  Sleeper Stretch  -AS      Reps 1  10  -AS      Time 1  10 sec hold each  -AS         Exercise 2    Exercise Name 2  S/L ER  -AS      Reps 2  25  -AS      Time 2  1#  -AS         Exercise 3    Exercise Name 3  Prone I, Y, T  -AS         Exercise 4    Exercise Name 4  Empty/Full Can  -AS      Reps 4  25 each  -AS      Time 4  1#  -AS         Exercise 5    Exercise Name 5  Rows  -AS         Exercise 6    Exercise Name 6  Extensions  -AS         Exercise 7    Exercise Name 7  IR  -AS      Reps 7  25  -AS      Time 7  Blue  -AS         Exercise 8    Exercise Name 8  ER  -AS      Reps 8  25  -AS      Time 8  Blue  -AS         Exercise 9    Exercise Name 9  IR Towel Stretch  -AS        User Key  (r) = Recorded By, (t) = Taken By, (c) = Cosigned By    Initials Name Provider Type    AS Jeovanny Stephen, PT Physical Therapist           Manual Rx (last 36 hours)      Manual Treatments     Row Name 05/26/21 1100             Manual Rx 1    Manual Rx 1 Location  Left Shoulder  -AS      Manual Rx 1 Type  PROM - Flex, ABD, IR, ER  -AS      Manual Rx 1 Duration  15 min  -AS        User Key  (r) = Recorded By, (t) = Taken By, (c) = Cosigned By    Initials Name Provider Type    AS Jeovanny Stephen, PT Physical Therapist                      Outcome Measure Options: Quick DASH  Quick DASH  Open a tight or new jar.: Severe Difficulty  Do heavy household chores (e.g., wash walls, wash floors): Severe Difficulty  Carry a shopping bag or briefcase: Mild Difficulty  Wash your back: Severe Difficulty  Use  a knife to cut food: Moderate Difficulty  Recreational activities in which you take some force or impact through your arm, should or hand (e.g. golf, hammering, tennis, etc.): Severe Difficulty  During the past week, to what extent has your arm, shoulder, or hand problem interfered with your normal social activites with family, friends, neighbors or groups?: Moderately  During the past week, were you limited in your work or other regular daily activities as a result of your arm, shoulder or hand problem?: Slightly Limited  Arm, Shoulder, or hand pain: Severe  Tingling (pins and needles) in your arm, shoulder, or hand: Moderate  During the past week, how much difficulty have you had sleeping because of the pain in your arm, shoulder or hand?: Severe Difficulty  Number of Questions Answered: 11  Quick DASH Score: 59.09  Work Module (Optional)  Using your usual technique for your work?: Mild Difficulty  Doing your usual work because of arm, shoulder or hand pain?: Mild Difficulty  Doing your work as well as you would like?: Mild Difficulty  Spending your usual amount of time doing your work?: Mild Difficulty  Work Module Score: 25  Sports/Performing Arts Module (Optional)  Using your usual technique for playing your instrument or sport?: Moderate Difficulty  Playing your musical instrument or sport because of arm, shoulder or hand pain?: Severe Difficulty  Playing your musical instrument or sport as well as you would like?: Moderate Difficulty  Spending your usual amount of time practising or playing your instrument or sport?: Moderate Difficulty  Sports/Performing Arts Score: 56.25         Time Calculation:     Start Time: 1100  Stop Time: 1150  Time Calculation (min): 50 min     Therapy Charges for Today     Code Description Service Date Service Provider Modifiers Qty    66264947526  PT EVAL LOW COMPLEXITY 3 5/26/2021 Jeovanny Stephen, PT GP 1          PT G-Codes  Outcome Measure Options: Quick DASH  Quick  DASH Score: 59.09         Jeovanny Stephen, PT  5/26/2021

## 2021-05-28 ENCOUNTER — HOSPITAL ENCOUNTER (OUTPATIENT)
Dept: PHYSICAL THERAPY | Facility: HOSPITAL | Age: 56
Setting detail: THERAPIES SERIES
Discharge: HOME | End: 2021-05-28

## 2021-05-28 DIAGNOSIS — M75.41 SUBACROMIAL IMPINGEMENT OF RIGHT SHOULDER: Primary | ICD-10-CM

## 2021-05-28 DIAGNOSIS — M75.01 ADHESIVE CAPSULITIS OF RIGHT SHOULDER: ICD-10-CM

## 2021-05-28 PROCEDURE — 97110 THERAPEUTIC EXERCISES: CPT

## 2021-05-28 PROCEDURE — 97140 MANUAL THERAPY 1/> REGIONS: CPT

## 2021-05-28 NOTE — THERAPY TREATMENT NOTE
Outpatient Physical Therapy Ortho Treatment Note   Alisha Soto     Patient Name: Desire Hernandez  : 1965  MRN: [id number]  Today's Date: 2021      Visit Date: 2021    Visit Dx:    ICD-10-CM ICD-9-CM   1. Subacromial impingement of right shoulder  M75.41 726.19   2. Adhesive capsulitis of right shoulder  M75.01 726.0       Patient Active Problem List   Diagnosis   • Menopausal symptoms   • Left breast mass   • Adhesive capsulitis of right shoulder   • Status post arthroscopy of shoulder, extensive debridement glenohumeral and subacromial spaces, DOS 2020   • Thoracic disc disease   • Subacromial impingement of right shoulder   • Encounter for screening colonoscopy   • Dyspepsia        Past Medical History:   Diagnosis Date   • ADHD (attention deficit hyperactivity disorder)     back in high school   • Allergic     seasonal   • ASCUS favor dysplasia     07; 08; 6/25/10; 12; 13; 8/21/15 (HPV negative)   • Blood type, Rh negative    • Bunion 2016    surgery   • Chlamydia     as a teen   • H/O nipple discharge     Bilateral diagnostic mammogram negative   • HSV-1 (herpes simplex virus 1) infection    • HSV-2 (herpes simplex virus 2) infection    • LGSIL (low grade squamous intraepithelial dysplasia) 2006   • Menopause    • Ovarian cyst     Left   • PONV (postoperative nausea and vomiting)    • Right shoulder pain     SCHEDULED FOR SX        Past Surgical History:   Procedure Laterality Date   • BUNIONECTOMY Right 2016    Dr. Durbin   • CERVICAL CONIZATION, LEECHRIST     • COLPOSCOPY W/ BIOPSY / CURETTAGE  2006    benign   • D & C WITH SUCTION  1998    10 week SAB   • ENDOMETRIAL ABLATION  Dec. 2017   • INDUCED       years ago   • NM HYSTEROSCOPY,W/ENDOMETRIAL ABLATION N/A 2017    Procedure: HYSTEROSCOPY WITH ENDOMETRIAL ABLATION; d&c;  Surgeon: Sandie Hu DO;  Location: [facility];  Service: Obstetrics/Gynecology   • SHOULDER  "ARTHROSCOPY Right 1/13/2020    Procedure: Right shoulder manipulation under anesthesia with arthroscopy and extensive debridement of glenohumeral and subacromial spaces;  Surgeon: Stalin Liu MD;  Location: [facility];  Service: Orthopedics                       PT Assessment/Plan     Row Name 05/28/21 1100          PT Assessment    Assessment Comments  Progressed patient with flexibility, ROM, and strengthening exercises today. Patient tolerated her treatment well today.  -AS        PT Plan    PT Plan Comments  Continue with current treatment plan.  -AS       User Key  (r) = Recorded By, (t) = Taken By, (c) = Cosigned By    Initials Name Provider Type    AS Jeovanny Stephen, PT Physical Therapist          Modalities     Row Name 05/28/21 1100             Moist Heat    MH Applied  Yes  -AS      Location  Left Shoulder - Sitting  -AS      PT Moist Heat Minutes  10  -AS      MH Prior to Rx  Yes  -AS        User Key  (r) = Recorded By, (t) = Taken By, (c) = Cosigned By    Initials Name Provider Type    AS Jeovanny Stephen, PT Physical Therapist        OP Exercises     Row Name 05/28/21 1156 05/28/21 1100          Subjective Comments    Subjective Comments  --  Patient states she has been compliant with her HEP but states her shoulder \"feel;s about the same\".   -AS        Total Minutes    77434 - PT Therapeutic Exercise Minutes  24  -AS  --     18253 - PT Manual Therapy Minutes  15  -AS  --        Exercise 1    Exercise Name 1  --  Sleeper Stretch  -AS     Reps 1  --  10  -AS     Time 1  --  10 sec hold each  -AS        Exercise 2    Exercise Name 2  --  S/L ER  -AS     Reps 2  --  30  -AS     Time 2  --  1#  -AS        Exercise 3    Exercise Name 3  --  Prone I, Y, T  -AS     Reps 3  --  25 each  -AS        Exercise 4    Exercise Name 4  --  Empty/Full Can  -AS     Reps 4  --  30 each  -AS     Time 4  --  1#  -AS        Exercise 5    Exercise Name 5  --  Rows  -AS        Exercise 6    Exercise Name 6  " --  Extensions  -AS        Exercise 7    Exercise Name 7  --  IR  -AS     Reps 7  --  25  -AS     Time 7  --  Blue  -AS        Exercise 8    Exercise Name 8  --  ER  -AS     Reps 8  --  25  -AS     Time 8  --  Blue  -AS        Exercise 9    Exercise Name 9  --  IR Towel Stretch  -AS     Reps 9  --  10  -AS     Time 9  --  10 sec hold each  -AS       User Key  (r) = Recorded By, (t) = Taken By, (c) = Cosigned By    Initials Name Provider Type    AS Jeovanny Stephen, PT Physical Therapist                      Manual Rx (last 36 hours)      Manual Treatments     Row Name 05/28/21 1156 05/28/21 1100          Total Minutes    81899 - PT Manual Therapy Minutes  15  -AS  --        Manual Rx 1    Manual Rx 1 Location  --  Left Shoulder  -AS     Manual Rx 1 Type  --  PROM - Flex, ABD, IR, ER  -AS     Manual Rx 1 Duration  --  15 min  -AS       User Key  (r) = Recorded By, (t) = Taken By, (c) = Cosigned By    Initials Name Provider Type    AS Jeovanny Stephen, PT Physical Therapist                             Time Calculation:   Start Time: 1107  Stop Time: 1156  Time Calculation (min): 49 min  Timed Charges  26310 - PT Therapeutic Exercise Minutes: 24  58435 - PT Manual Therapy Minutes: 15  Untimed Charges  PT Moist Heat Minutes: 10  Total Minutes  Timed Charges Total Minutes: 39  Untimed Charges Total Minutes: 10   Total Minutes: 49  Therapy Charges for Today     Code Description Service Date Service Provider Modifiers Qty    14813461046 HC PT THER PROC EA 15 MIN 5/28/2021 Jeovanny Stephen, PT GP 2    00374743654 HC PT MANUAL THERAPY EA 15 MIN 5/28/2021 Jeovanny Stephen, PT GP 1                    Jeovanny Stephen, PT  5/28/2021

## 2021-06-01 ENCOUNTER — HOSPITAL ENCOUNTER (OUTPATIENT)
Dept: PHYSICAL THERAPY | Facility: HOSPITAL | Age: 56
Setting detail: THERAPIES SERIES
Discharge: HOME | End: 2021-06-01

## 2021-06-01 DIAGNOSIS — M75.41 SUBACROMIAL IMPINGEMENT OF RIGHT SHOULDER: Primary | ICD-10-CM

## 2021-06-01 PROCEDURE — 97140 MANUAL THERAPY 1/> REGIONS: CPT

## 2021-06-01 PROCEDURE — 97110 THERAPEUTIC EXERCISES: CPT

## 2021-06-01 NOTE — THERAPY TREATMENT NOTE
Outpatient Physical Therapy Ortho Treatment Note   Alisha Soto     Patient Name: Desire Hernandez  : 1965  MRN: [id number]  Today's Date: 2021      Visit Date: 2021    Visit Dx:    ICD-10-CM ICD-9-CM   1. Subacromial impingement of right shoulder  M75.41 726.19       Patient Active Problem List   Diagnosis   • Menopausal symptoms   • Left breast mass   • Adhesive capsulitis of right shoulder   • Status post arthroscopy of shoulder, extensive debridement glenohumeral and subacromial spaces, DOS 2020   • Thoracic disc disease   • Subacromial impingement of right shoulder   • Encounter for screening colonoscopy   • Dyspepsia        Past Medical History:   Diagnosis Date   • ADHD (attention deficit hyperactivity disorder)     back in high school   • Allergic     seasonal   • ASCUS favor dysplasia     07; 08; 6/25/10; 12; 13; 8/21/15 (HPV negative)   • Blood type, Rh negative    • Bunion 2016    surgery   • Chlamydia     as a teen   • H/O nipple discharge     Bilateral diagnostic mammogram negative   • HSV-1 (herpes simplex virus 1) infection    • HSV-2 (herpes simplex virus 2) infection    • LGSIL (low grade squamous intraepithelial dysplasia) 2006   • Menopause    • Ovarian cyst     Left   • PONV (postoperative nausea and vomiting)    • Right shoulder pain     SCHEDULED FOR SX        Past Surgical History:   Procedure Laterality Date   • BUNIONECTOMY Right 2016    Dr. Durbin   • CERVICAL CONIZATION, LEEP     • COLPOSCOPY W/ BIOPSY / CURETTAGE  2006    benign   • D & C WITH SUCTION  1998    10 week SAB   • ENDOMETRIAL ABLATION  Dec. 2017   • INDUCED       years ago   • GA HYSTEROSCOPY,W/ENDOMETRIAL ABLATION N/A 2017    Procedure: HYSTEROSCOPY WITH ENDOMETRIAL ABLATION; d&c;  Surgeon: Sandie Hu DO;  Location: [facility] OR;  Service: Obstetrics/Gynecology   • SHOULDER ARTHROSCOPY Right 2020    Procedure: Right shoulder  "manipulation under anesthesia with arthroscopy and extensive debridement of glenohumeral and subacromial spaces;  Surgeon: Stalin Liu MD;  Location: [facility];  Service: Orthopedics                       PT Assessment/Plan     Row Name 06/01/21 1200          PT Assessment    Assessment Comments  Patient continues to report left shoulder pain and discomfort that she feels is worsening at times. She is able to tolerated progressions in her strengthening exercises and she has improved left shoulder PROM.  -AS        PT Plan    PT Plan Comments  Continue with current treatment plan.  -AS       User Key  (r) = Recorded By, (t) = Taken By, (c) = Cosigned By    Initials Name Provider Type    AS Jeovanny Stephen, PT Physical Therapist          Modalities     Row Name 06/01/21 1200             Moist Heat    MH Applied  Yes  -AS      Location  Left Shoulder - Sitting  -AS      PT Moist Heat Minutes  10  -AS      MH Prior to Rx  Yes  -AS        User Key  (r) = Recorded By, (t) = Taken By, (c) = Cosigned By    Initials Name Provider Type    AS Jeovanny Stephen, PT Physical Therapist        OP Exercises     Row Name 06/01/21 1301 06/01/21 1200          Subjective Comments    Subjective Comments  --  Patient states she is \"doing good\" this afternoon. She reports she feels her shoulder is \"getting a little worse\". She states she is scheduled to have an MRI on 6-14-21.  -AS        Total Minutes    63991 - PT Therapeutic Exercise Minutes  25  -AS  --     10420 - PT Manual Therapy Minutes  15  -AS  --        Exercise 1    Exercise Name 1  --  Sleeper Stretch  -AS     Reps 1  --  10  -AS     Time 1  --  10 sec hold each  -AS        Exercise 2    Exercise Name 2  --  S/L ER  -AS     Reps 2  --  40  -AS     Time 2  --  1#  -AS        Exercise 3    Exercise Name 3  --  Prone I, Y, T  -AS     Reps 3  --  30 each  -AS        Exercise 4    Exercise Name 4  --  Empty/Full Can  -AS     Reps 4  --  40 each  -AS     Time 4  " --  1#  -AS        Exercise 5    Exercise Name 5  --  Rows  -AS     Reps 5  --  25  -AS     Time 5  --  Blue  -AS        Exercise 6    Exercise Name 6  --  Extensions  -AS     Reps 6  --  25  -AS     Time 6  --  Blue  -AS        Exercise 7    Exercise Name 7  --  IR  -AS     Reps 7  --  25  -AS     Time 7  --  Blue  -AS        Exercise 8    Exercise Name 8  --  ER  -AS     Reps 8  --  25  -AS     Time 8  --  Blue  -AS        Exercise 9    Exercise Name 9  --  IR Towel Stretch  -AS     Reps 9  --  10  -AS     Time 9  --  10 sec hold each  -AS       User Key  (r) = Recorded By, (t) = Taken By, (c) = Cosigned By    Initials Name Provider Type    AS Jeovanny Stephen, PT Physical Therapist                      Manual Rx (last 36 hours)      Manual Treatments     Row Name 06/01/21 1301 06/01/21 1100          Total Minutes    28012 - PT Manual Therapy Minutes  15  -AS  --        Manual Rx 1    Manual Rx 1 Location  --  Left Shoulder  -AS     Manual Rx 1 Type  --  PROM - Flex, ABD, IR, ER  -AS     Manual Rx 1 Duration  --  15 min  -AS       User Key  (r) = Recorded By, (t) = Taken By, (c) = Cosigned By    Initials Name Provider Type    AS Jeovanny Stephen, PT Physical Therapist                             Time Calculation:   Start Time: 1208  Stop Time: 1300  Time Calculation (min): 52 min  Timed Charges  86829 - PT Therapeutic Exercise Minutes: 25  13180 - PT Manual Therapy Minutes: 15  Untimed Charges  PT Moist Heat Minutes: 10  Total Minutes  Timed Charges Total Minutes: 40  Untimed Charges Total Minutes: 10   Total Minutes: 50  Therapy Charges for Today     Code Description Service Date Service Provider Modifiers Qty    39233458687 HC PT THER PROC EA 15 MIN 6/1/2021 Jeovanny Stephen, PT GP 2    53750530065 HC PT MANUAL THERAPY EA 15 MIN 6/1/2021 Jeovanny Stephen, PT GP 1                    Jeovanny Stephen, PT  6/1/2021

## 2021-06-03 ENCOUNTER — HOSPITAL ENCOUNTER (OUTPATIENT)
Dept: MRI IMAGING | Facility: HOSPITAL | Age: 56
Discharge: HOME | End: 2021-06-03
Admitting: NURSE PRACTITIONER

## 2021-06-03 DIAGNOSIS — M75.82 ROTATOR CUFF TENDINITIS, LEFT: ICD-10-CM

## 2021-06-03 DIAGNOSIS — M75.52 SUBACROMIAL BURSITIS OF LEFT SHOULDER JOINT: ICD-10-CM

## 2021-06-03 PROCEDURE — 73221 MRI JOINT UPR EXTREM W/O DYE: CPT

## 2021-06-04 ENCOUNTER — HOSPITAL ENCOUNTER (OUTPATIENT)
Dept: PHYSICAL THERAPY | Facility: HOSPITAL | Age: 56
Setting detail: THERAPIES SERIES
Discharge: HOME | End: 2021-06-04

## 2021-06-04 DIAGNOSIS — M75.41 SUBACROMIAL IMPINGEMENT OF RIGHT SHOULDER: Primary | ICD-10-CM

## 2021-06-04 DIAGNOSIS — M75.01 ADHESIVE CAPSULITIS OF RIGHT SHOULDER: ICD-10-CM

## 2021-06-04 PROCEDURE — 97140 MANUAL THERAPY 1/> REGIONS: CPT

## 2021-06-04 NOTE — THERAPY TREATMENT NOTE
Outpatient Physical Therapy Ortho Treatment Note   Alisha Soto     Patient Name: Desire Hernandez  : 1965  MRN: [id number]  Today's Date: 2021      Visit Date: 2021    Visit Dx:    ICD-10-CM ICD-9-CM   1. Subacromial impingement of right shoulder  M75.41 726.19   2. Adhesive capsulitis of right shoulder  M75.01 726.0       Patient Active Problem List   Diagnosis   • Menopausal symptoms   • Left breast mass   • Adhesive capsulitis of right shoulder   • Status post arthroscopy of shoulder, extensive debridement glenohumeral and subacromial spaces, DOS 2020   • Thoracic disc disease   • Subacromial impingement of right shoulder   • Encounter for screening colonoscopy   • Dyspepsia        Past Medical History:   Diagnosis Date   • ADHD (attention deficit hyperactivity disorder)     back in high school   • Allergic     seasonal   • ASCUS favor dysplasia     07; 08; 6/25/10; 12; 13; 8/21/15 (HPV negative)   • Blood type, Rh negative    • Bunion 2016    surgery   • Chlamydia     as a teen   • H/O nipple discharge     Bilateral diagnostic mammogram negative   • HSV-1 (herpes simplex virus 1) infection    • HSV-2 (herpes simplex virus 2) infection    • LGSIL (low grade squamous intraepithelial dysplasia) 2006   • Menopause    • Ovarian cyst     Left   • PONV (postoperative nausea and vomiting)    • Right shoulder pain     SCHEDULED FOR SX        Past Surgical History:   Procedure Laterality Date   • BUNIONECTOMY Right 2016    Dr. Durbin   • CERVICAL CONIZATION, LEECHRIST     • COLPOSCOPY W/ BIOPSY / CURETTAGE  2006    benign   • D & C WITH SUCTION  1998    10 week SAB   • ENDOMETRIAL ABLATION  Dec. 2017   • INDUCED       years ago   • [place] HYSTEROSCOPY,W/ENDOMETRIAL ABLATION N/A 2017    Procedure: HYSTEROSCOPY WITH ENDOMETRIAL ABLATION; d&c;  Surgeon: Sandie Hu DO;  Location: [facility];  Service: Obstetrics/Gynecology   • SHOULDER  ARTHROSCOPY Right 1/13/2020    Procedure: Right shoulder manipulation under anesthesia with arthroscopy and extensive debridement of glenohumeral and subacromial spaces;  Surgeon: Stalin Liu MD;  Location: [facility];  Service: Orthopedics                       PT Assessment/Plan     Row Name 06/04/21 1200          PT Assessment    Assessment Comments  Patient continues to report pain in LUE and shoulder. Was able to progress her with strengthening exercises today. Plan to hold patient from PT until results of MRI are known.  -AS        PT Plan    PT Plan Comments  Hold PT until results of MRI are known.  -AS       User Key  (r) = Recorded By, (t) = Taken By, (c) = Cosigned By    Initials Name Provider Type    AS Jeovanny Stephen, PT Physical Therapist          Modalities     Row Name 06/04/21 1200             Moist Heat    MH Applied  Yes  -AS      Location  Left Shoulder - Sitting  -AS      PT Moist Heat Minutes  10  -AS      MH Prior to Rx  Yes  -AS        User Key  (r) = Recorded By, (t) = Taken By, (c) = Cosigned By    Initials Name Provider Type    AS Jeovanny Stephen, PT Physical Therapist        OP Exercises     Row Name 06/04/21 1212 06/04/21 1200          Subjective Comments    Subjective Comments  --  Patient states she has had an MRI yesterday but does not know the results. She reports aching pain in her left arm and shoulder on Tuesday and Wednesday but reports improvements yesterday and today.   -AS        Total Minutes    63657 - PT Therapeutic Exercise Minutes  25  -AS  --     49999 - PT Manual Therapy Minutes  15  -AS  --        Exercise 1    Exercise Name 1  --  Sleeper Stretch  -AS     Reps 1  --  10  -AS     Time 1  --  10 sec hold each  -AS        Exercise 2    Exercise Name 2  --  S/L ER  -AS     Reps 2  --  25  -AS     Time 2  --  2#  -AS        Exercise 3    Exercise Name 3  --  Prone I, Y, T  -AS     Reps 3  --  30 each  -AS        Exercise 4    Exercise Name 4  --   Empty/Full Can  -AS     Reps 4  --  25 each  -AS     Time 4  --  2#  -AS        Exercise 5    Exercise Name 5  --  Rows  -AS     Reps 5  --  25  -AS     Time 5  --  Blue  -AS        Exercise 6    Exercise Name 6  --  Extensions  -AS     Reps 6  --  25  -AS     Time 6  --  Blue  -AS        Exercise 7    Exercise Name 7  --  IR  -AS     Reps 7  --  25  -AS     Time 7  --  Blue  -AS        Exercise 8    Exercise Name 8  --  ER  -AS     Reps 8  --  25  -AS     Time 8  --  Blue  -AS        Exercise 9    Exercise Name 9  --  IR Towel Stretch  -AS     Reps 9  --  10  -AS     Time 9  --  10 sec hold each  -AS       User Key  (r) = Recorded By, (t) = Taken By, (c) = Cosigned By    Initials Name Provider Type    AS Jeovanny Stephen, PT Physical Therapist                      Manual Rx (last 36 hours)      Manual Treatments     Row Name 06/04/21 1212 06/04/21 1100          Total Minutes    28038 - PT Manual Therapy Minutes  15  -AS  --        Manual Rx 1    Manual Rx 1 Location  --  Left Shoulder  -AS     Manual Rx 1 Type  --  PROM - Flex, ABD, IR, ER  -AS     Manual Rx 1 Duration  --  15 min  -AS       User Key  (r) = Recorded By, (t) = Taken By, (c) = Cosigned By    Initials Name Provider Type    AS Jeovanny Stephen, PT Physical Therapist                             Time Calculation:   Start Time: 1044  Stop Time: 1137  Time Calculation (min): 53 min  Timed Charges  02588 - PT Therapeutic Exercise Minutes: 25  55749 - PT Manual Therapy Minutes: 15  Untimed Charges  PT Moist Heat Minutes: 10  Total Minutes  Timed Charges Total Minutes: 40  Untimed Charges Total Minutes: 10   Total Minutes: 50  Therapy Charges for Today     Code Description Service Date Service Provider Modifiers Qty    28573965893 HC PT MANUAL THERAPY EA 15 MIN 6/4/2021 Jeovanny Stephen, PT GP 1                    Jeovanny Stephen, PT  6/4/2021

## 2021-06-07 ENCOUNTER — OFFICE VISIT (OUTPATIENT)
Dept: ORTHOPEDIC SURGERY | Facility: CLINIC | Age: 56
End: 2021-06-07

## 2021-06-07 VITALS — BODY MASS INDEX: 23.05 KG/M2 | HEIGHT: 64 IN | WEIGHT: 135 LBS

## 2021-06-07 DIAGNOSIS — M67.912 TENDINOPATHY OF LEFT ROTATOR CUFF: ICD-10-CM

## 2021-06-07 DIAGNOSIS — M75.02 ADHESIVE CAPSULITIS OF LEFT SHOULDER: Primary | ICD-10-CM

## 2021-06-07 PROCEDURE — 99214 OFFICE O/P EST MOD 30 MIN: CPT | Performed by: NURSE PRACTITIONER

## 2021-06-07 PROCEDURE — 20610 DRAIN/INJ JOINT/BURSA W/O US: CPT | Performed by: NURSE PRACTITIONER

## 2021-06-07 RX ORDER — TRIAMCINOLONE ACETONIDE 40 MG/ML
80 INJECTION, SUSPENSION INTRA-ARTICULAR; INTRAMUSCULAR
Status: COMPLETED | OUTPATIENT
Start: 2021-06-07 | End: 2021-06-07

## 2021-06-07 RX ORDER — LIDOCAINE HYDROCHLORIDE 10 MG/ML
4 INJECTION, SOLUTION EPIDURAL; INFILTRATION; INTRACAUDAL; PERINEURAL
Status: COMPLETED | OUTPATIENT
Start: 2021-06-07 | End: 2021-06-07

## 2021-06-07 RX ADMIN — LIDOCAINE HYDROCHLORIDE 4 ML: 10 INJECTION, SOLUTION EPIDURAL; INFILTRATION; INTRACAUDAL; PERINEURAL at 10:56

## 2021-06-07 RX ADMIN — TRIAMCINOLONE ACETONIDE 80 MG: 40 INJECTION, SUSPENSION INTRA-ARTICULAR; INTRAMUSCULAR at 10:56

## 2021-06-07 NOTE — PROGRESS NOTES
Subjective:     Patient ID: Desire Hernandez is a 55 y.o. female.    Chief Complaint:  Follow-up rotator cuff tendinopathy left shoulder  Adhesive capsulitis left shoulder   History of Present Illness  Desire Hernandez returns to clinic for follow-up left shoulder. Has started PT, provided with home strengthening exercises left shoulder until follow-up in office to discuss MRI results. Max tenderness present lateral aspect left shoulder with pain radiating inferiorly to mid humerus. MRI has been completed and presents to discuss results and further plan of care. Initially presented to clinic for evaluation of her left upper extremity.  She was lifting weights doing other strengthening exercises of the left upper extremity about 3 weeks ago when she felt a pull/pop with sudden onset of pain at the lateral aspect of the shoulder.  She laid off exercises for the last 3 weeks which she believes is made symptoms worse.  She said first fall felt like a pulled muscle but pain has become more intense and she is now getting a catching sensation of the shoulder with reaching above her head.  Pain also noted with reaching back behind her back as well as decreased range of motion when reaching back behind her back.  Rates discomfort 6-7 out of a 10 aching, sharp, shooting in nature.  Increased pain noted with shoulder abduction and 90 degrees, reaching out to the side and again reaching back behind her back.  She has had adhesive capsulitis of the right upper extremity in the past denies any injury prior to 3 weeks ago to the left shoulder.  She has had to rely heavily on the left upper extremity while recovering with the right upper extremity and does notice increased strength of the right upper extremity however over the last 3 weeks strength significantly decreased. Denies presence of numbness or tingling radiating down the left upper extremity.      Social History     Occupational History     Employer: [employer]  [employer]   Tobacco Use   • Smoking status: Never Smoker   • Smokeless tobacco: Never Used   Vaping Use   • Vaping Use: Never used   Substance and Sexual Activity   • Alcohol use: Yes     Types: 1 Glasses of wine, 1 Standard drinks or equivalent per week     Comment: occ   • Drug use: No   • Sexual activity: Defer     Partners: Male     Birth control/protection: None      Past Medical History:   Diagnosis Date   • ADHD (attention deficit hyperactivity disorder)     back in high school   • Allergic     seasonal   • ASCUS favor dysplasia     07; 08; 6/25/10; 12; 13; 8/21/15 (HPV negative)   • Blood type, Rh negative    • Bunion 2016    surgery   • Chlamydia     as a teen   • H/O nipple discharge     Bilateral diagnostic mammogram negative   • HSV-1 (herpes simplex virus 1) infection    • HSV-2 (herpes simplex virus 2) infection    • LGSIL (low grade squamous intraepithelial dysplasia) 2006   • Menopause    • Ovarian cyst     Left   • PONV (postoperative nausea and vomiting)    • Right shoulder pain     SCHEDULED FOR SX     Past Surgical History:   Procedure Laterality Date   • BUNIONECTOMY Right 2016    Dr. Durbin   • CERVICAL CONIZATION, LEEP     • COLPOSCOPY W/ BIOPSY / CURETTAGE  2006    benign   • D & C WITH SUCTION  1998    10 week SAB   • ENDOMETRIAL ABLATION  Dec. 2017   • INDUCED       years ago   • [place] HYSTEROSCOPY,W/ENDOMETRIAL ABLATION N/A 2017    Procedure: HYSTEROSCOPY WITH ENDOMETRIAL ABLATION; d&c;  Surgeon: Sandie Hu DO;  Location: [facility];  Service: Obstetrics/Gynecology   • SHOULDER ARTHROSCOPY Right 2020    Procedure: Right shoulder manipulation under anesthesia with arthroscopy and extensive debridement of glenohumeral and subacromial spaces;  Surgeon: Stalin Liu MD;  Location: [facility];  Service: Orthopedics       Family History   Problem Relation Age of Onset   • Hearing loss Father    • Hyperlipidemia Father         " DX in his mid 50's   • No Known Problems Brother    • Arthritis Sister         RA   • No Known Problems Son    • Hearing loss Maternal Grandmother    • Hyperlipidemia Maternal Grandmother         DX in her late 70's   • Stroke Maternal Grandmother    • Lung cancer Maternal Grandfather    • Cancer Maternal Grandfather         lung cancer - smoker   • Lung cancer Paternal Uncle    • Cancer Paternal Uncle         lung cancer - smoker   • Breast cancer Neg Hx    • Ovarian cancer Neg Hx    • Uterine cancer Neg Hx    • Colon cancer Neg Hx    • Melanoma Neg Hx    • Colon polyps Neg Hx        Objective:  Physical Exam    General: No acute distress.  Eyes: conjunctiva clear; pupils equally round and reactive  ENT: external ears and nose atraumatic; oropharynx clear  CV: no peripheral edema  Resp: normal respiratory effort  Skin: no rashes or wounds; normal turgor  Psych: mood and affect appropriate; recent and remote memory intact    Vitals:    06/07/21 1026   Weight: 61.2 kg (135 lb)   Height: 162.6 cm (64.02\")         06/07/21  1026   Weight: 61.2 kg (135 lb)     Body mass index is 23.16 kg/m².      Ortho Exam        Left Shoulder Exam      Tenderness   The patient is experiencing tenderness in the acromion.     Range of Motion   External rotation: 60   Forward flexion: 170   Abduction 90 degrees  Internal rotation 0 degrees: L1      Muscle Strength   Internal rotation: 4/5   External rotation: 4/5   Supraspinatus: 4/5   Subscapularis: 4/5   Biceps: 4/5      Tests   Amaral test: positive  Cross arm: negative  Impingement: positive  Drop arm: negative     Other   Erythema: absent  Sensation: normal  Pulse: present      Comments:   Mildly positive empty can  negative Martin's  negative Speed's  negative bear hug exam    Imaging:    MRI Shoulder Left Without Contrast    Result Date: 6/4/2021  1. Mild supraspinatus and infraspinatus tendinopathy. No rotator cuff tear. 2. No acute labral pathology. 3. Minimal edema in the " rotator interval suggests mild capsulitis in the appropriate clinical setting. 4. Mild AC joint arthrosis which produces minimal mass effect on the supraspinatus outlet, but no evidence of significant bursal inflammation. Signer Name: Edward River MD  Signed: 6/4/2021 10:58 AM  Workstation Name: [id number]  [facility]    Independently reviewed MRI results, supraspinatus and infraspinatus tendinopathy, mild capsulitis, AC joint arthrosis     Assessment:        1. Adhesive capsulitis of left shoulder    2. Tendinopathy of left rotator cuff           Plan:  1. Discussed plan of care with patient. Wishes to proceed with corticosteroid injection left shoulder, subacromial approach. Will continue PT and home range of motion/strengthening exercises left shoulder. Plan to follow-up in four weeks to reevaluate. Discussed to call to clinic if not improving or symptoms get worse. All questions answered.     Large Joint Arthrocentesis: L subacromial bursa  Date/Time: 6/7/2021 10:56 AM  Consent given by: patient  Site marked: site marked  Timeout: Immediately prior to procedure a time out was called to verify the correct patient, procedure, equipment, support staff and site/side marked as required   Supporting Documentation  Indications: pain   Procedure Details  Location: shoulder - L subacromial bursa  Preparation: Patient was prepped and draped in the usual sterile fashion  Needle size: 22 G  Approach: lateral.  Medications administered: 80 mg triamcinolone acetonide 40 MG/ML; 4 mL lidocaine PF 1% 1 %  Patient tolerance: patient tolerated the procedure well with no immediate complications          Orders:  Orders Placed This Encounter   Procedures   • Large Joint Arthrocentesis: L subacromial bursa       Medications:  No orders of the defined types were placed in this encounter.      Followup:  No follow-ups on file.    Diagnoses and all orders for this visit:    1. Adhesive capsulitis of left  shoulder (Primary)  -     Large Joint Arthrocentesis: L subacromial bursa    2. Tendinopathy of left rotator cuff  -     Large Joint Arthrocentesis: L subacromial bursa        Dictated utilizing Dragon dictation

## 2021-06-14 ENCOUNTER — HOSPITAL ENCOUNTER (OUTPATIENT)
Dept: MRI IMAGING | Facility: HOSPITAL | Age: 56
End: 2021-06-14

## 2021-06-14 ENCOUNTER — HOSPITAL ENCOUNTER (OUTPATIENT)
Dept: PHYSICAL THERAPY | Facility: HOSPITAL | Age: 56
Setting detail: THERAPIES SERIES
Discharge: HOME | End: 2021-06-14

## 2021-06-14 DIAGNOSIS — M75.41 SUBACROMIAL IMPINGEMENT OF RIGHT SHOULDER: Primary | ICD-10-CM

## 2021-06-14 DIAGNOSIS — M75.01 ADHESIVE CAPSULITIS OF RIGHT SHOULDER: ICD-10-CM

## 2021-06-14 PROCEDURE — 97110 THERAPEUTIC EXERCISES: CPT

## 2021-06-14 PROCEDURE — 97140 MANUAL THERAPY 1/> REGIONS: CPT

## 2021-06-14 NOTE — THERAPY TREATMENT NOTE
Outpatient Physical Therapy Ortho Treatment Note  SIL Lin     Patient Name: Desire Hernandez  : 1965  MRN: [id number]  Today's Date: 2021      Visit Date: 2021    Visit Dx:    ICD-10-CM ICD-9-CM   1. Subacromial impingement of right shoulder  M75.41 726.19   2. Adhesive capsulitis of right shoulder  M75.01 726.0       Patient Active Problem List   Diagnosis   • Menopausal symptoms   • Left breast mass   • Adhesive capsulitis of right shoulder   • Status post arthroscopy of shoulder, extensive debridement glenohumeral and subacromial spaces, DOS 2020   • Thoracic disc disease   • Subacromial impingement of right shoulder   • Encounter for screening colonoscopy   • Dyspepsia   • Adhesive capsulitis of left shoulder   • Tendinopathy of left rotator cuff        Past Medical History:   Diagnosis Date   • ADHD (attention deficit hyperactivity disorder)     back in high school   • Allergic     seasonal   • ASCUS favor dysplasia     07; 08; 6/25/10; 12; 13; 8/21/15 (HPV negative)   • Blood type, Rh negative    • Bunion 2016    surgery   • Chlamydia     as a teen   • H/O nipple discharge     Bilateral diagnostic mammogram negative   • HSV-1 (herpes simplex virus 1) infection    • HSV-2 (herpes simplex virus 2) infection    • LGSIL (low grade squamous intraepithelial dysplasia) 2006   • Menopause    • Ovarian cyst     Left   • PONV (postoperative nausea and vomiting)    • Right shoulder pain     SCHEDULED FOR SX        Past Surgical History:   Procedure Laterality Date   • BUNIONECTOMY Right 2016    Dr. Durbin   • CERVICAL CONIZATION, MOE     • COLPOSCOPY W/ BIOPSY / CURETTAGE  2006    benign   • D & C WITH SUCTION  1998    10 week SAB   • ENDOMETRIAL ABLATION  Dec. 2017   • INDUCED       years ago   • NE HYSTEROSCOPY,W/ENDOMETRIAL ABLATION N/A 2017    Procedure: HYSTEROSCOPY WITH ENDOMETRIAL ABLATION; d&c;  Surgeon: Sandie TAN  "Fritz, DO;  Location:  [facility];  Service: Obstetrics/Gynecology   • SHOULDER ARTHROSCOPY Right 1/13/2020    Procedure: Right shoulder manipulation under anesthesia with arthroscopy and extensive debridement of glenohumeral and subacromial spaces;  Surgeon: Stalin Liu MD;  Location:  [facility];  Service: Orthopedics                       PT Assessment/Plan     Row Name 06/14/21 1200          PT Assessment    Assessment Comments  Patient's MRI shows adhesive capsulitis in her left shoulder. Focused more on ROM exercises during her treatment today.  -AS        PT Plan    PT Plan Comments  Continue with current treatment plan.  -AS       User Key  (r) = Recorded By, (t) = Taken By, (c) = Cosigned By    Initials Name Provider Type    AS Jeovanny Stephen, PT Physical Therapist          Modalities     Row Name 06/14/21 1100             Moist Heat    MH Applied  Yes  -AS      Location  Left Shoulder - Sitting  -AS      PT Moist Heat Minutes  10  -AS      MH Prior to Rx  Yes  -AS        User Key  (r) = Recorded By, (t) = Taken By, (c) = Cosigned By    Initials Name Provider Type    AS Jeovanny Stephen, PT Physical Therapist        OP Exercises     Row Name 06/14/21 1343 06/14/21 1200          Subjective Comments    Subjective Comments  --  Patient states she has been increasing her ROM exercises at home. She states she had an injection last week and her shoulder feels better today but \"still hurts\".   -AS        Total Minutes    26316 - PT Therapeutic Exercise Minutes  15  -AS  --     67589 - PT Manual Therapy Minutes  15  -AS  --        Exercise 1    Exercise Name 1  --  Sleeper Stretch  -AS     Reps 1  --  10  -AS     Time 1  --  10 sec hold each  -AS        Exercise 2    Exercise Name 2  --  S/L ER  -AS     Reps 2  --  --  -AS     Time 2  --  --  -AS        Exercise 3    Exercise Name 3  --  Prone I, Y, T  -AS     Reps 3  --  --  -AS        Exercise 4    Exercise Name 4  --  Empty/Full Can  -AS     Reps " 4  --  --  -AS     Time 4  --  --  -AS        Exercise 5    Exercise Name 5  --  Rows  -AS     Reps 5  --  --  -AS     Time 5  --  --  -AS        Exercise 6    Exercise Name 6  --  Extensions  -AS     Reps 6  --  --  -AS     Time 6  --  --  -AS        Exercise 7    Exercise Name 7  --  IR  -AS     Reps 7  --  --  -AS     Time 7  --  --  -AS        Exercise 8    Exercise Name 8  --  ER  -AS     Reps 8  --  --  -AS     Time 8  --  --  -AS        Exercise 9    Exercise Name 9  --  IR Towel Stretch  -AS     Reps 9  --  10  -AS     Time 9  --  10 sec hold each  -AS        Exercise 10    Exercise Name 10  --  3-Way Cane  -AS     Reps 10  --  25 each  -AS        Exercise 11    Exercise Name 11  --  Pulleys - Flex & ABD  -AS     Time 11  --  5 min each  -AS       User Key  (r) = Recorded By, (t) = Taken By, (c) = Cosigned By    Initials Name Provider Type    AS Jeovanny Stephen, PT Physical Therapist                      Manual Rx (last 36 hours)      Manual Treatments     Row Name 06/14/21 1343 06/14/21 1100          Total Minutes    64594 - PT Manual Therapy Minutes  15  -AS  --        Manual Rx 1    Manual Rx 1 Location  --  Left Shoulder  -AS     Manual Rx 1 Type  --  PROM - Flex, ABD, IR, ER  -AS     Manual Rx 1 Duration  --  15 min  -AS       User Key  (r) = Recorded By, (t) = Taken By, (c) = Cosigned By    Initials Name Provider Type    AS Jeovanny Stephen, PT Physical Therapist                             Time Calculation:   Start Time: 1249  Stop Time: 1340  Time Calculation (min): 51 min  Timed Charges  62011 - PT Therapeutic Exercise Minutes: 15  38241 - PT Manual Therapy Minutes: 15  Untimed Charges  PT Moist Heat Minutes: 10  Total Minutes  Timed Charges Total Minutes: 30  Untimed Charges Total Minutes: 10   Total Minutes: 40  Therapy Charges for Today     Code Description Service Date Service Provider Modifiers Qty    [id number]  PT THER PROC EA 15 MIN 6/14/2021 Jeovanny Stephen, PT GP 1     [id number]  PT MANUAL THERAPY EA 15 MIN 6/14/2021 Jeovanny Stephen, PT GP 1                    Jeovanny Stephen, PT  6/14/2021

## 2021-06-17 ENCOUNTER — APPOINTMENT (OUTPATIENT)
Dept: PHYSICAL THERAPY | Facility: HOSPITAL | Age: 56
End: 2021-06-17

## 2021-06-17 RX ORDER — TIZANIDINE 2 MG/1
2 TABLET ORAL EVERY 8 HOURS PRN
Qty: 30 TABLET | Refills: 1 | Status: SHIPPED | OUTPATIENT
Start: 2021-06-17 | End: 2021-10-18

## 2021-07-11 ENCOUNTER — HOSPITAL ENCOUNTER (EMERGENCY)
Facility: HOSPITAL | Age: 56
Discharge: HOME | End: 2021-07-11
Attending: EMERGENCY MEDICINE

## 2021-07-11 VITALS
TEMPERATURE: 99.9 F | DIASTOLIC BLOOD PRESSURE: 75 MMHG | OXYGEN SATURATION: 98 % | SYSTOLIC BLOOD PRESSURE: 116 MMHG | HEART RATE: 81 BPM | RESPIRATION RATE: 16 BRPM | BODY MASS INDEX: 21.34 KG/M2 | WEIGHT: 125 LBS | HEIGHT: 64 IN

## 2021-07-11 DIAGNOSIS — U07.1 COVID-19: Primary | ICD-10-CM

## 2021-07-11 PROCEDURE — 99283 EMERGENCY DEPT VISIT LOW MDM: CPT

## 2021-07-11 PROCEDURE — EDLOS: Performed by: PHYSICIAN ASSISTANT

## 2021-07-11 PROCEDURE — 63710000001 ONDANSETRON ODT 4 MG TABLET DISPERSIBLE: Performed by: PHYSICIAN ASSISTANT

## 2021-07-11 RX ORDER — ONDANSETRON 4 MG/1
4 TABLET, ORALLY DISINTEGRATING ORAL ONCE
Status: COMPLETED | OUTPATIENT
Start: 2021-07-11 | End: 2021-07-11

## 2021-07-11 RX ORDER — ONDANSETRON 4 MG/1
4 TABLET, ORALLY DISINTEGRATING ORAL EVERY 6 HOURS PRN
Qty: 20 TABLET | Refills: 0 | Status: SHIPPED | OUTPATIENT
Start: 2021-07-11 | End: 2021-09-23

## 2021-07-11 RX ADMIN — ONDANSETRON 4 MG: 4 TABLET, ORALLY DISINTEGRATING ORAL at 12:19

## 2021-07-11 NOTE — ED NOTES
Pt waiting for ride in room. Notified to use call light when ride has arrived.      Fadia Jeffery RN  07/11/21 8615

## 2021-07-11 NOTE — DISCHARGE INSTRUCTIONS
Return to the emergency department with worsening symptoms, inability to tolerate oral liquids, fever greater than 102°F not controlled by Tylenol or Motrin, or for any other emergent symptoms.

## 2021-07-11 NOTE — ED PROVIDER NOTES
EMERGENCY DEPARTMENT ENCOUNTER      Room Number: 01/01    History is provided by the patient, no translation services needed    HPI:    Chief complaint: COVID-19, nausea, fatigue, decreased appetite    Location: Generalized body aches    Quality/Severity: 3/10    Timing/Duration: Symptom onset 8 days ago, diagnosed with Covid on Tuesday.  Started feeling better on Wednesday, and then worsened on Thursday with fatigue, low-grade fevers and decreased appetite.    Modifying Factors: Has been taking Aleve at home for low-grade fevers    Associated Symptoms: Positive for nausea, fatigue, decreased appetite, myalgias, and low-grade fever.  Denies any chest pain, shortness of breath, abdominal pain, vomiting, or diarrhea.    Narrative: Pt is a 56 y.o. female who presents complaining of the above symptoms.  Patient states she started having some nasal congestion and runny nose 8 days ago, she also had a dry cough.  She states she lost her taste and smell, and tested positive for Covid on Tuesday.  She states she actually started feeling better Tuesday and Wednesday, but then worsened on Thursday and started having low-grade fevers with nausea, fatigue, and decreased appetite.  She has been taking Aleve at home which helps her chills and body aches.      PMD: Osei Forrest MD    REVIEW OF SYSTEMS  Review of Systems   Constitutional: Positive for appetite change, chills, fatigue and fever.   HENT: Positive for congestion and rhinorrhea.    Respiratory: Negative for cough and shortness of breath.    Cardiovascular: Negative for chest pain and palpitations.   Gastrointestinal: Positive for nausea. Negative for abdominal pain and vomiting.   Genitourinary: Negative for difficulty urinating and dysuria.   Musculoskeletal: Negative for arthralgias and myalgias.   Skin: Negative for rash and wound.   Neurological: Negative for syncope and headaches.   Psychiatric/Behavioral: Negative for confusion. The patient is not  nervous/anxious.          PAST MEDICAL HISTORY  Active Ambulatory Problems     Diagnosis Date Noted   • Menopausal symptoms 2017   • Left breast mass 2017   • Adhesive capsulitis of right shoulder 2019   • Status post arthroscopy of shoulder, extensive debridement glenohumeral and subacromial spaces, DOS 2020   • Thoracic disc disease 2020   • Subacromial impingement of right shoulder 2020   • Encounter for screening colonoscopy 2020   • Dyspepsia 2020   • Adhesive capsulitis of left shoulder 2021   • Tendinopathy of left rotator cuff 2021     Resolved Ambulatory Problems     Diagnosis Date Noted   • Achilles tendinitis of left lower extremity 08/10/2016   • Strain of lumbar region 08/10/2016   • Plantar fasciitis 08/10/2016   • Trochanteric bursitis of right hip 08/10/2016   • Menometrorrhagia 2017     Past Medical History:   Diagnosis Date   • ADHD (attention deficit hyperactivity disorder)    • Allergic    • ASCUS favor dysplasia    • Blood type, Rh negative    • Bunion 2016   • Chlamydia    • H/O nipple discharge    • HSV-1 (herpes simplex virus 1) infection    • HSV-2 (herpes simplex virus 2) infection    • LGSIL (low grade squamous intraepithelial dysplasia) 2006   • Menopause    • Ovarian cyst    • PONV (postoperative nausea and vomiting)    • Right shoulder pain        PAST SURGICAL HISTORY  Past Surgical History:   Procedure Laterality Date   • BUNIONECTOMY Right 2016    Dr. Durbin   • CERVICAL CONIZATION, LEEP     • COLPOSCOPY W/ BIOPSY / CURETTAGE  2006    benign   • D & C WITH SUCTION  1998    10 week SAB   • ENDOMETRIAL ABLATION  Dec. 2017   • INDUCED       years ago   • [place] HYSTEROSCOPY,W/ENDOMETRIAL ABLATION N/A 2017    Procedure: HYSTEROSCOPY WITH ENDOMETRIAL ABLATION; d&c;  Surgeon: Sandie Hu DO;  Location: [facility];  Service: Obstetrics/Gynecology   • SHOULDER ARTHROSCOPY  Right 1/13/2020    Procedure: Right shoulder manipulation under anesthesia with arthroscopy and extensive debridement of glenohumeral and subacromial spaces;  Surgeon: Stalin Liu MD;  Location: [facility];  Service: Orthopedics       FAMILY HISTORY  Family History   Problem Relation Age of Onset   • Hearing loss Father    • Hyperlipidemia Father         DX in his mid 50's   • No Known Problems Brother    • Arthritis Sister         RA   • No Known Problems Son    • Hearing loss Maternal Grandmother    • Hyperlipidemia Maternal Grandmother         DX in her late 70's   • Stroke Maternal Grandmother    • Lung cancer Maternal Grandfather    • Cancer Maternal Grandfather         lung cancer - smoker   • Lung cancer Paternal Uncle    • Cancer Paternal Uncle         lung cancer - smoker   • Breast cancer Neg Hx    • Ovarian cancer Neg Hx    • Uterine cancer Neg Hx    • Colon cancer Neg Hx    • Melanoma Neg Hx    • Colon polyps Neg Hx        SOCIAL HISTORY  Social History     Socioeconomic History   • Marital status:      Spouse name: Lawrence   • Number of children: 2   • Years of education: Not on file   • Highest education level: Not on file   Tobacco Use   • Smoking status: Never Smoker   • Smokeless tobacco: Never Used   Vaping Use   • Vaping Use: Never used   Substance and Sexual Activity   • Alcohol use: Yes     Types: 1 Glasses of wine, 1 Standard drinks or equivalent per week     Comment: occ   • Drug use: No   • Sexual activity: Defer     Partners: Male     Birth control/protection: None       ALLERGIES  Patient has no known allergies.    No current facility-administered medications for this encounter.    Current Outpatient Medications:   •  CBD (cannabidiol) oral oil, Take 15 drops by mouth Every Night., Disp: , Rfl:   •  Estradiol (Imvexxy Maintenance Pack) 10 MCG insert, Insert 1 ampule into the vagina 2 (Two) Times a Week., Disp: 8 each, Rfl: 11  •  Estradiol Starter Pack (Imvexxy Starter Pack) 10  MCG insert, Insert 1 ampule into the vagina Daily., Disp: 18 each, Rfl: 0  •  omeprazole (priLOSEC) 40 MG capsule, Take 1 capsule by mouth Daily., Disp: 90 capsule, Rfl: 3  •  ondansetron ODT (ZOFRAN-ODT) 4 MG disintegrating tablet, Place 1 tablet under the tongue Every 6 (Six) Hours As Needed for Nausea or Vomiting., Disp: 20 tablet, Rfl: 0  •  PARoxetine (Paxil) 10 MG tablet, Take 1 tablet by mouth Every Morning., Disp: 90 tablet, Rfl: 3  •  progesterone (Prometrium) 100 MG capsule, Take 1 capsule by mouth Daily., Disp: 30 capsule, Rfl: 11  •  tiZANidine (ZANAFLEX) 2 MG tablet, Take 1 tablet by mouth Every 8 (Eight) Hours As Needed for Muscle Spasms., Disp: 30 tablet, Rfl: 1    PHYSICAL EXAM  ED Triage Vitals [07/11/21 1120]   Temp Heart Rate Resp BP SpO2   99.9 °F (37.7 °C) 81 16 116/75 98 %      Temp src Heart Rate Source Patient Position BP Location FiO2 (%)   Oral Monitor Sitting Right arm --       Physical Exam  Vitals and nursing note reviewed. Exam conducted with a chaperone present.   Constitutional:       General: She is not in acute distress.     Appearance: She is normal weight. She is not toxic-appearing.   HENT:      Head: Normocephalic and atraumatic.   Eyes:      Conjunctiva/sclera: Conjunctivae normal.      Pupils: Pupils are equal, round, and reactive to light.   Cardiovascular:      Rate and Rhythm: Normal rate and regular rhythm.      Pulses: Normal pulses.   Pulmonary:      Effort: Pulmonary effort is normal. No respiratory distress.      Breath sounds: Normal breath sounds.   Abdominal:      General: Bowel sounds are normal.      Palpations: Abdomen is soft.      Tenderness: There is no abdominal tenderness. There is no guarding.   Musculoskeletal:         General: Normal range of motion.   Skin:     General: Skin is warm and dry.      Capillary Refill: Capillary refill takes less than 2 seconds.   Neurological:      General: No focal deficit present.      Mental Status: She is alert and  oriented to person, place, and time.   Psychiatric:         Mood and Affect: Mood and affect normal.         Cognition and Memory: Memory normal.         Judgment: Judgment normal.           LAB RESULTS  Lab Results (last 24 hours)     ** No results found for the last 24 hours. **            I ordered the above labs and reviewed the results    RADIOLOGY  No Radiology Exams Resulted Within Past 24 Hours    I ordered the above radiologic testing and reviewed the results    PROCEDURES  Procedures      PROGRESS AND CONSULTS  ED Course as of Jul 11 1232   Sun Jul 11, 2021   1225 Patient presents with known diagnosis of COVID-19 and complains of fatigue and nausea.  She is tolerating fluids by mouth, and has had a decreased appetite today.  I discussed with patient that the symptoms are very typical for people with COVID-19.  She has no concerning symptoms here and her vital signs are normal.  Discussed I will give her a Zofran here, and a prescription for home.  I have discussed all return to ER warnings.  Patient verbalizes understanding and is agreeable with plan for discharge at this time.    [KS]      ED Course User Index  [KS] Jeannette Johnson, PREMA           MEDICAL DECISION MAKING    MDM       DIAGNOSIS  Final diagnoses:   COVID-19       Latest Documented Vital Signs:  As of 12:32 EDT  BP- 116/75 HR- 81 Temp- 99.9 °F (37.7 °C) (Oral) O2 sat- 98%    DISPOSITION  Patient discharged home.    Discussed pertinent findings with the patient/family.  Patient/Family voiced understanding of need to follow-up for recheck and further testing as needed.  Return to the Emergency Department warnings were given.         Medication List      New Prescriptions    ondansetron ODT 4 MG disintegrating tablet  Commonly known as: ZOFRAN-ODT  Place 1 tablet under the tongue Every 6 (Six) Hours As Needed for Nausea or Vomiting.           Where to Get Your Medications      These medications were sent to [id number] -  [place], [place] - [address] - [phone]  - [phone] FX  [address], [facility] [address]    Phone: [phone]   · ondansetron ODT 4 MG disintegrating tablet             Follow-up Information     Call  Osei Forrest MD.    Specialties: Sports Medicine, Family Medicine, Emergency Medicine  Why: As needed  Contact information:  [address]  [address] [address] [phone]                     Dictated utilizing Dragon dictation     Jeannette Johnson PA-C  07/11/21 1232

## 2021-07-12 ENCOUNTER — TELEPHONE (OUTPATIENT)
Dept: SPORTS MEDICINE | Facility: CLINIC | Age: 56
End: 2021-07-12

## 2021-07-12 RX ORDER — PROMETHAZINE HYDROCHLORIDE 25 MG/1
25 TABLET ORAL EVERY 8 HOURS PRN
Qty: 30 TABLET | Refills: 0 | OUTPATIENT
Start: 2021-07-12 | End: 2021-09-29

## 2021-07-12 NOTE — TELEPHONE ENCOUNTER
Patient called in, went to ED last night for worsening COVD-19 sxs, reports testing positive last week. Was given Zofran for severe nausea, but it's not helping, it's actually making her worse. Wants to know if a phenergan prescription can be called in for her to [facility].   I informed Dr. Forrest was out of the office, and I could ask Dr. Victoria to send rx, but cannot guarantee he will.   All information was verbally understood.     Thanks  Zuri

## 2021-07-12 NOTE — TELEPHONE ENCOUNTER
Called and spoke to patient, informed rx was sent in for her. Also stated if her sxs do not improve she should return to the ED. She does not sound well at all over the phone, very SOB when speaking over the phone.   All information was verbally understood.  Thanks  Zuri

## 2021-08-13 ENCOUNTER — TELEPHONE (OUTPATIENT)
Dept: SPORTS MEDICINE | Facility: CLINIC | Age: 56
End: 2021-08-13

## 2021-08-13 NOTE — TELEPHONE ENCOUNTER
Patient called in, wants to know if you can put in an order for a chest x-ray, or if you have any recommendations/suggestions for her.   Chest/rib pain on the LT side for about 4 days now, no other sxs reported. Did have covid last month, concerned for possible residual side effects from that. Describes it as a sharp aching pain, worse with deep breaths. No change in medications or daily activities.   Informed we don't normally order chest x-rays without evaluation, but we will let her know what is suggested for her.     Thanks  Zuri

## 2021-08-13 NOTE — TELEPHONE ENCOUNTER
Called and informed patient of response, no openings today with our schedule in office, suggested UC if pain does not get any better. All information was verbally understood.     Thanks  Zuri

## 2021-08-17 ENCOUNTER — HOSPITAL ENCOUNTER (OUTPATIENT)
Dept: GENERAL RADIOLOGY | Facility: HOSPITAL | Age: 56
Discharge: HOME | End: 2021-08-17
Admitting: NURSE PRACTITIONER

## 2021-08-17 ENCOUNTER — OFFICE VISIT (OUTPATIENT)
Dept: ORTHOPEDIC SURGERY | Facility: CLINIC | Age: 56
End: 2021-08-17

## 2021-08-17 VITALS — BODY MASS INDEX: 21.34 KG/M2 | HEIGHT: 64 IN | WEIGHT: 125 LBS

## 2021-08-17 DIAGNOSIS — R07.81 RIB PAIN: ICD-10-CM

## 2021-08-17 DIAGNOSIS — M75.02 ADHESIVE CAPSULITIS OF LEFT SHOULDER: Primary | ICD-10-CM

## 2021-08-17 PROCEDURE — 99214 OFFICE O/P EST MOD 30 MIN: CPT | Performed by: NURSE PRACTITIONER

## 2021-08-17 PROCEDURE — 71046 X-RAY EXAM CHEST 2 VIEWS: CPT

## 2021-08-17 RX ORDER — PREDNISONE 10 MG/1
TABLET ORAL
Qty: 36 TABLET | Refills: 0 | Status: SHIPPED | OUTPATIENT
Start: 2021-08-17 | End: 2021-09-10

## 2021-08-17 NOTE — PROGRESS NOTES
Subjective:     Patient ID: Desire Hernandez is a 56 y.o. female.    Chief Complaint:  Left upper extremity pain/left chest wall pain, new issue to examiner   Follow-up adhesive capsulitis left upper extremity  History of Present Illness  Desire Hernandez presents to clinic with new onset of pain along the anterior aspect of the left chest wall.  Has had Covid approximately 1 month ago has recovered is noticing pain describes pain as sharp with inhalation and with motion of the trunk.  She is also experiencing pain at the left shoulder continues to experience limited range of motion such as internal rotation behind her back and forward flexion.  She has not been participating with physical therapy since she has been ill.  Has not had any improvement with meloxicam or Aleve.  Denies any recent x-ray images of the chest.  Denies other concerns present time.    Social History     Occupational History     Employer: [email or web address]   Tobacco Use   • Smoking status: Never Smoker   • Smokeless tobacco: Never Used   Vaping Use   • Vaping Use: Never used   Substance and Sexual Activity   • Alcohol use: Yes     Types: 1 Glasses of wine, 1 Standard drinks or equivalent per week     Comment: occ   • Drug use: No   • Sexual activity: Defer     Partners: Male     Birth control/protection: None      Past Medical History:   Diagnosis Date   • ADHD (attention deficit hyperactivity disorder)     back in high school   • Allergic     seasonal   • ASCUS favor dysplasia     6/4/07; 6/5/08; 6/25/10; 6/27/12; 7/29/13; 8/21/15 (HPV negative)   • Blood type, Rh negative    • Bunion 2016    surgery   • Chlamydia     as a teen   • H/O nipple discharge 9/415    Bilateral diagnostic mammogram negative   • HSV-1 (herpes simplex virus 1) infection    • HSV-2 (herpes simplex virus 2) infection 1993   • LGSIL (low grade squamous intraepithelial dysplasia) 05/26/2006   • Menopause    • Ovarian cyst     Left   • PONV (postoperative nausea  and vomiting)    • Right shoulder pain     SCHEDULED FOR SX     Past Surgical History:   Procedure Laterality Date   • BUNIONECTOMY Right 2016    Dr. Durbin   • CERVICAL CONIZATION, LEEP     • COLPOSCOPY W/ BIOPSY / CURETTAGE  2006    benign   • D & C WITH SUCTION  1998    10 week SAB   • ENDOMETRIAL ABLATION  Dec. 2017   • INDUCED       years ago   • NM HYSTEROSCOPY,W/ENDOMETRIAL ABLATION N/A 2017    Procedure: HYSTEROSCOPY WITH ENDOMETRIAL ABLATION; d&c;  Surgeon: Sandie Hu DO;  Location: [facility];  Service: Obstetrics/Gynecology   • SHOULDER ARTHROSCOPY Right 2020    Procedure: Right shoulder manipulation under anesthesia with arthroscopy and extensive debridement of glenohumeral and subacromial spaces;  Surgeon: Stalin Liu MD;  Location: [facility];  Service: Orthopedics       Family History   Problem Relation Age of Onset   • Hearing loss Father    • Hyperlipidemia Father         DX in his mid 50's   • No Known Problems Brother    • Arthritis Sister         RA   • No Known Problems Son    • Hearing loss Maternal Grandmother    • Hyperlipidemia Maternal Grandmother         DX in her late 70's   • Stroke Maternal Grandmother    • Lung cancer Maternal Grandfather    • Cancer Maternal Grandfather         lung cancer - smoker   • Lung cancer Paternal Uncle    • Cancer Paternal Uncle         lung cancer - smoker   • Breast cancer Neg Hx    • Ovarian cancer Neg Hx    • Uterine cancer Neg Hx    • Colon cancer Neg Hx    • Melanoma Neg Hx    • Colon polyps Neg Hx      Objective:  Physical Exam    General: No acute distress.  Eyes: conjunctiva clear; pupils equally round and reactive  ENT: external ears and nose atraumatic; oropharynx clear  CV: no peripheral edema  Resp: normal respiratory effort  Skin: no rashes or wounds; normal turgor  Psych: mood and affect appropriate; recent and remote memory intact    Vitals:    21 1049   Weight: 56.7 kg (125 lb)   Height:  "162.6 cm (64.02\")         21  1049   Weight: 56.7 kg (125 lb)     Body mass index is 21.45 kg/m².      Ortho Exam       Left Shoulder Exam      Tenderness   The patient is experiencing tenderness in the acromion.     Range of Motion   External rotation: 60   Forward flexion: 155   Abduction 90 degrees  Internal rotation 0 degrees: L1      Muscle Strength   Internal rotation: 4/5   External rotation: 4/5   Supraspinatus: 4/5   Subscapularis: 4/5   Biceps: 4/5      Tests   Amaral test: positive  Cross arm: negative  Impingement: positive  Drop arm: negative     Other   Erythema: absent  Sensation: normal  Pulse: present      Comments:   Mildly positive empty can  negative Hubbard's  negative Speed's  negative bear hug exam    Chest wall discomfort, left side  Positive tenderness with twisting trunk  Negative tenderness to palpate     Assessment:        1. Adhesive capsulitis of left shoulder    2. Rib pain           Plan:  1.  Discussed plan of care with patient.  Proceed with x-ray imaging of her chest.  2.  Proceed with referral back to physical therapy start prednisone taper not improving will have her follow-up with Dr. Liu to discuss shoulder manipulation left upper extremity.  Discussed with patient we will call her after completion of the x-ray imaging discuss results and further plan of care.  All questions answered.  Orders:  Orders Placed This Encounter   Procedures   • XR Chest 2 View   • Ambulatory Referral to Physical Therapy       Medications:  New Medications Ordered This Visit   Medications   • predniSONE (DELTASONE) 10 MG tablet     Si mg daily x 3 days, 40 mg daily x 3 days, 20 mg daily x 3 days, 10 mg daily x 3 days     Dispense:  36 tablet     Refill:  0       Followup:  No follow-ups on file.    Diagnoses and all orders for this visit:    1. Adhesive capsulitis of left shoulder (Primary)  -     Ambulatory Referral to Physical Therapy    2. Rib pain  -     XR Chest 2 View; " Future    Other orders  -     predniSONE (DELTASONE) 10 MG tablet; 50 mg daily x 3 days, 40 mg daily x 3 days, 20 mg daily x 3 days, 10 mg daily x 3 days  Dispense: 36 tablet; Refill: 0        I ordered and reviewed the ROAS today.     Dictated utilizing Dragon dictation

## 2021-08-23 ENCOUNTER — OFFICE VISIT (OUTPATIENT)
Dept: SPORTS MEDICINE | Facility: CLINIC | Age: 56
End: 2021-08-23

## 2021-08-23 ENCOUNTER — TELEPHONE (OUTPATIENT)
Dept: ORTHOPEDIC SURGERY | Facility: CLINIC | Age: 56
End: 2021-08-23

## 2021-08-23 VITALS
BODY MASS INDEX: 21.34 KG/M2 | RESPIRATION RATE: 16 BRPM | HEART RATE: 78 BPM | DIASTOLIC BLOOD PRESSURE: 74 MMHG | HEIGHT: 64 IN | WEIGHT: 125 LBS | SYSTOLIC BLOOD PRESSURE: 110 MMHG | TEMPERATURE: 97 F | OXYGEN SATURATION: 99 %

## 2021-08-23 DIAGNOSIS — R07.89 OTHER CHEST PAIN: Primary | ICD-10-CM

## 2021-08-23 PROCEDURE — 99214 OFFICE O/P EST MOD 30 MIN: CPT | Performed by: FAMILY MEDICINE

## 2021-08-23 RX ORDER — CELECOXIB 200 MG/1
200 CAPSULE ORAL DAILY
Qty: 30 CAPSULE | Refills: 2 | Status: SHIPPED | OUTPATIENT
Start: 2021-08-23 | End: 2021-09-23

## 2021-08-23 NOTE — TELEPHONE ENCOUNTER
XR Chest 2 View    Result Date: 8/17/2021  XR CHEST 2 VW-: 8/17/2021 12:35 PM  INDICATION:  Left anterior rib pain under the breast extending up towards the left shoulder. Pain with deep inspiration. No recent injury. Symptoms 9 days. Covid infection in July.  COMPARISON:  None available.  FINDINGS: PA and lateral views of the chest.  Unremarkable cardiac silhouette. The vascularity is normal. There are small calcified granulomas. No effusion or dense consolidation. Probable retrocardiac atelectasis. No displaced rib fracture. No pneumothorax..       Negative chest radiograph.  This report was finalized on 8/17/2021 12:59 PM by Dr. Pankaj Chavis MD.      Call patient discuss chest x-ray imaging no pneumothorax no rib fracture negative chest radiograph.

## 2021-08-23 NOTE — PROGRESS NOTES
"Desire is a 56 y.o. year old female    Chief Complaint   Patient presents with   • Chest Pain     Chest pain since August the , NPI. Xrays at [facility].        History of Present Illness   HPI   Had covid in July; mother also had covid and  about 2 weeks ago.   Around  started having pain in the left lower ribs, moved up to the sternum as well. Progressively worsened. Tried nsaids and leftover muscle relaxants without any relief. Worse with any exertion.   Had xrays (reviewed images and report - maybe some retrocardiac atelectasis o/w normal). Rx'd prednisone but not really improved.   Some discomfort present at rest, worse with deep breath or thoracic movement.   Mild lingering cough - post-covid vs allergies.     Review of Systems   Constitutional: Negative for diaphoresis and fever.   Respiratory: Negative for shortness of breath and wheezing.    Cardiovascular: Positive for palpitations (maybe).       /74 (BP Location: Left arm, Patient Position: Sitting, Cuff Size: Adult)   Pulse 78   Temp 97 °F (36.1 °C)   Resp 16   Ht 162.6 cm (64.02\")   Wt 56.7 kg (125 lb)   SpO2 99%   BMI 21.44 kg/m²          Physical Exam  Vitals reviewed.   Cardiovascular:      Rate and Rhythm: Normal rate and regular rhythm.      Pulses: Normal pulses.      Heart sounds: Normal heart sounds.   Pulmonary:      Effort: Pulmonary effort is normal.      Breath sounds: Normal breath sounds.   Chest:      Chest wall: Tenderness (left>right costochondral) present.   Musculoskeletal:      Cervical back: No tenderness.   Lymphadenopathy:      Cervical: No cervical adenopathy.   Neurological:      Mental Status: She is alert.   Psychiatric:         Mood and Affect: Mood normal.         Behavior: Behavior normal.           Current Outpatient Medications:   •  CBD (cannabidiol) oral oil, Take 15 drops by mouth Every Night., Disp: , Rfl:   •  Estradiol (Imvexxy Maintenance Pack) 10 MCG insert, Insert 1 ampule into the vagina " 2 (Two) Times a Week., Disp: 8 each, Rfl: 11  •  Estradiol Starter Pack (Imvexxy Starter Pack) 10 MCG insert, Insert 1 ampule into the vagina Daily., Disp: 18 each, Rfl: 0  •  omeprazole (priLOSEC) 40 MG capsule, Take 1 capsule by mouth Daily., Disp: 90 capsule, Rfl: 3  •  ondansetron ODT (ZOFRAN-ODT) 4 MG disintegrating tablet, Place 1 tablet under the tongue Every 6 (Six) Hours As Needed for Nausea or Vomiting., Disp: 20 tablet, Rfl: 0  •  PARoxetine (Paxil) 10 MG tablet, Take 1 tablet by mouth Every Morning., Disp: 90 tablet, Rfl: 3  •  predniSONE (DELTASONE) 10 MG tablet, 50 mg daily x 3 days, 40 mg daily x 3 days, 20 mg daily x 3 days, 10 mg daily x 3 days, Disp: 36 tablet, Rfl: 0  •  progesterone (Prometrium) 100 MG capsule, Take 1 capsule by mouth Daily., Disp: 30 capsule, Rfl: 11  •  promethazine (PHENERGAN) 25 MG tablet, Take 1 tablet by mouth Every 8 (Eight) Hours As Needed for Nausea., Disp: 30 tablet, Rfl: 0  •  tiZANidine (ZANAFLEX) 2 MG tablet, Take 1 tablet by mouth Every 8 (Eight) Hours As Needed for Muscle Spasms., Disp: 30 tablet, Rfl: 1  •  celecoxib (CeleBREX) 200 MG capsule, Take 1 capsule by mouth Daily., Disp: 30 capsule, Rfl: 2     Diagnoses and all orders for this visit:    Other chest pain  -     D-dimer, Quantitative  -     celecoxib (CeleBREX) 200 MG capsule; Take 1 capsule by mouth Daily.       Seems like costochondritis but based on recent covid and ongoing pain will check a d-dimer as well; if positive plan CTA chest. Rx topical compound as well.       EMR Dragon/Transcription disclaimer:    Much of this encounter note is an electronic transcription/translation of spoken language to printed text.  The electronic translation of spoken language may permit erroneous, or at times, nonsensical words or phrases to be inadvertently transcribed.  Although I have reviewed the note for such errors some may still exist.

## 2021-08-24 ENCOUNTER — TELEPHONE (OUTPATIENT)
Dept: SPORTS MEDICINE | Facility: CLINIC | Age: 56
End: 2021-08-24

## 2021-08-24 LAB — D DIMER PPP FEU-MCNC: 0.22 MG/L FEU (ref 0–0.49)

## 2021-08-24 NOTE — PROGRESS NOTES
Called patient via phone and verbally relayed results and recommendations per Dr. Forrest. All information was verbally understood by the patient.     Thank you  Zuri

## 2021-08-24 NOTE — TELEPHONE ENCOUNTER
I called and spoke with patient about lab results, she had a couple follow up questions for you.     First, the rx for celebrex, usually anti-inflammatories upsets her stomach, wants to make sure she can take the prilosec and celebrex together.     Also, she has not received her covid vaccine, wants to know what your thoughts are in regards to waiting until her issues are cleared up prior to getting the vaccine and would like your recommendation if she should wait, or if she would be okay.     Thanks  Zuri

## 2021-08-25 ENCOUNTER — HOSPITAL ENCOUNTER (OUTPATIENT)
Dept: PHYSICAL THERAPY | Facility: HOSPITAL | Age: 56
Setting detail: THERAPIES SERIES
Discharge: HOME | End: 2021-08-25

## 2021-08-25 DIAGNOSIS — M75.01 ADHESIVE CAPSULITIS OF RIGHT SHOULDER: Primary | ICD-10-CM

## 2021-08-25 PROCEDURE — 97161 PT EVAL LOW COMPLEX 20 MIN: CPT

## 2021-08-25 NOTE — THERAPY EVALUATION
After Visit Summary   4/5/2018    Eb Eisenberg    MRN: [id number]           Patient Information     Date Of Birth          1952        Visit Information        Provider Department      4/5/2018 3:40 PM Jackeline Rachel MD [facility]        Today's Diagnoses     Cervical stenosis of spine    -  1    Chronic narcotic use        Type 1 diabetes mellitus with vascular disease (H)          Care Instructions          Thank you for choosing [facility].  You may be receiving a survey in the mail from [facility] regarding your visit today.  Please take a few minutes to complete and return the survey to let us know how we are doing.      Our Clinic hours are:  Mondays    7:20 am - 7 pm  Tues -  Fri  7:20 am - 5 pm    Clinic Phone: [phone]    The clinic lab opens at 7:30 am Mon - Fri and appointments are required.    [facility]  Ph. [phone]  Monday-Thursday 8 am - 7pm  Tues/Wed/Fri 8 am - 5:30 pm                 Follow-ups after your visit        Who to contact     If you have questions or need follow up information about today's clinic visit or your schedule please contact [facility] directly at [phone].  Normal or non-critical lab and imaging results will be communicated to you by MyChart, letter or phone within 4 business days after the clinic has received the results. If you do not hear from us within 7 days, please contact the clinic through [employer] or phone. If you have a critical or abnormal lab result, we will notify you by phone as soon as possible.  Submit refill requests through [employer] or call your pharmacy and they will forward the refill request to us. Please allow 3 business days for your refill to be completed.          Additional Information About Your Visit        [employer] Information     [employer] gives you secure access to your electronic health record. If you see a primary care provider, you can also send      Outpatient Physical Therapy Ortho Initial Evaluation   [place]     Patient Name: Desire Hernandez  : 1965  MRN: [id number]  Today's Date: 2021      Visit Date: 2021    Patient Active Problem List   Diagnosis   • Menopausal symptoms   • Left breast mass   • Adhesive capsulitis of right shoulder   • Status post arthroscopy of shoulder, extensive debridement glenohumeral and subacromial spaces, DOS 2020   • Thoracic disc disease   • Subacromial impingement of right shoulder   • Encounter for screening colonoscopy   • Dyspepsia   • Adhesive capsulitis of left shoulder   • Tendinopathy of left rotator cuff        Past Medical History:   Diagnosis Date   • ADHD (attention deficit hyperactivity disorder)     back in high school   • Allergic     seasonal   • ASCUS favor dysplasia     07; 08; 6/25/10; 12; 13; 8/21/15 (HPV negative)   • Blood type, Rh negative    • Bunion     surgery   • Chlamydia     as a teen   • H/O nipple discharge     Bilateral diagnostic mammogram negative   • HSV-1 (herpes simplex virus 1) infection    • HSV-2 (herpes simplex virus 2) infection    • LGSIL (low grade squamous intraepithelial dysplasia) 2006   • Menopause    • Ovarian cyst     Left   • PONV (postoperative nausea and vomiting)    • Right shoulder pain     SCHEDULED FOR SX        Past Surgical History:   Procedure Laterality Date   • BUNIONECTOMY Right 2016    Dr. Durbin   • CERVICAL CONIZATION, LEECHRIST     • COLPOSCOPY W/ BIOPSY / CURETTAGE  2006    benign   • D & C WITH SUCTION  1998    10 week SAB   • ENDOMETRIAL ABLATION  Dec. 2017   • INDUCED       years ago   • GA HYSTEROSCOPY,W/ENDOMETRIAL ABLATION N/A 2017    Procedure: HYSTEROSCOPY WITH ENDOMETRIAL ABLATION; d&c;  Surgeon: Sandie Hu DO;  Location: [facility] OR;  Service: Obstetrics/Gynecology   • SHOULDER ARTHROSCOPY Right 2020    Procedure: Right shoulder manipulation under  "messages to your care team and make appointments. If you have questions, please call your primary care clinic.  If you do not have a primary care provider, please call [phone] and they will assist you.        Care EveryWhere ID     This is your Care EveryWhere ID. This could be used by other organizations to access your [place] medical records  [id number]        Your Vitals Were     Pulse Temperature Respirations Height BMI (Body Mass Index)       75 97.8  F (36.6  C) (Tympanic) 18 5' 9\" (1.753 m) 28.35 kg/m2        Blood Pressure from Last 3 Encounters:   04/05/18 138/81   12/15/17 (!) 148/91   10/05/17 136/74    Weight from Last 3 Encounters:   04/05/18 192 lb (87.1 kg)   12/15/17 186 lb (84.4 kg)   10/05/17 182 lb (82.6 kg)              We Performed the Following     Albumin Random Urine Quantitative with Creat Ratio     Hemoglobin A1c          Where to get your medicines      Some of these will need a paper prescription and others can be bought over the counter.  Ask your nurse if you have questions.     Bring a paper prescription for each of these medications     HYDROcodone-acetaminophen  MG per tablet    HYDROcodone-acetaminophen  MG per tablet    HYDROcodone-acetaminophen  MG per tablet          Primary Care Provider Office Phone # Fax #    Jackeline Rachel [phone] [address] [facility] [address]        Equal Access to Services     [facility] HospitalJAQUELINE : Hadii kole ku hadasho Soomaali, waaxda luqadaha, qaybta kaalmada adejonnyyada, geremias leija. So [facility] [phone].    ATENCIÓN: Si habla español, tiene a white disposición servicios gratuitos de asistencia lingüística. Llame al [phone].    We comply with applicable federal civil rights laws and [place] laws. We do not discriminate on the basis of race, color, national origin, age, disability, sex, sexual orientation, or gender identity.            Thank you!     Thank you for " anesthesia with arthroscopy and extensive debridement of glenohumeral and subacromial spaces;  Surgeon: Stalin Liu MD;  Location: [facility] OR;  Service: Orthopedics       Visit Dx:     ICD-10-CM ICD-9-CM   1. Adhesive capsulitis of right shoulder  M75.01 726.0             PT Ortho     Row Name 08/25/21 1100       Precautions and Contraindications    Precautions/Limitations  no known precautions/limitations  -AS       Subjective Pain    Able to rate subjective pain?  yes  -AS    Pre-Treatment Pain Level  5  -AS    Post-Treatment Pain Level  4  -AS       Posture/Observations    Posture- WNL  Posture is WNL  -AS       Shoulder Impingement/Rotator Cuff Special Tests    Amaral-Jerardo Test (RC Lesion vs. Bursitis)  Left:;Positive  -AS    Neer Impingement Test (RC Lesion vs. Bursitis)  Left:;Positive  -AS    Speed's Test (LH of Biceps Lesion)  Left:;Negative  -AS       Shoulder Girdle Palpation    Subacromial Space  Left:;Tender  -AS    Supraspinatus Insertion  Left:;Tender  -AS    Long Head of Biceps  Left:;Tender  -AS    Greater Tubercule  Left:;Tender  -AS       Left Upper Ext    Lt Shoulder Abduction AROM  125  -AS    Lt Shoulder Abduction PROM  140  -AS    Lt Shoulder Flexion AROM  132  -AS    Lt Shoulder Flexion PROM  145  -AS    Lt Shoulder External Rotation AROM  25  -AS    Lt Shoulder External Rotation PROM  40  -AS    Lt Shoulder Internal Rotation AROM  60  -AS    Lt Shoulder Internal Rotation PROM  65  -AS       MMT Left Upper Ext    Lt Shoulder Flexion MMT, Gross Movement  (4-/5) good minus  -AS    Lt Shoulder ABduction MMT, Gross Movement  (4-/5) good minus  -AS    Lt Shoulder Internal Rotation MMT, Gross Movement  (4-/5) good minus  -AS    Lt Shoulder External Rotation MMT, Gross Movement  (4-/5) good minus  -AS       Sensation    Sensation WNL?  WNL  -AS    Light Touch  No apparent deficits  -AS      User Key  (r) = Recorded By, (t) = Taken By, (c) = Cosigned By    Initials Name Provider Type    AS  Jeovanny Stephen, PT Physical Therapist                            PT OP Goals     Row Name 08/25/21 1100          PT Short Term Goals    STG 1  Patient to demonstrate compliance with her initial HEP for flexibility, ROM and strengthening.  -AS     STG 2  Patient to report left shoulder pain on VAS of 4-5/10 at worst with activity.  -AS     STG 3  Patient to demonstrate improved left shoulder strength to 4/5 in all planes.  -AS     STG 4  Patient to demonstrate improved left shoulder PROM to within 10 degrees of contralateral shoulder.  -AS        Long Term Goals    LTG 1  Patient to demonstrate compliance with her advanced HEP for flexibility, ROM and strengthening.  -AS     LTG 2  Patient to report left shoulder pain on VAS of 0-1/10 at worst with activity.  -AS     LTG 3  Patient to demonstrate improved left shoulder strength to 4+/5 in all planes.  -AS     LTG 4  Patient to demonstrate improved left shoulder AROM to WNL in all planes.  -AS     LTG 5  Patient to report improved function and decreased pain Quick DASH by >10-15 points.  -AS       User Key  (r) = Recorded By, (t) = Taken By, (c) = Cosigned By    Initials Name Provider Type    AS Jeovanny Stephen, PT Physical Therapist          PT Assessment/Plan     Row Name 08/25/21 1100          PT Assessment    Functional Limitations  Limitation in home management;Limitations in community activities;Performance in leisure activities;Performance in self-care ADL;Performance in sport activities;Performance in work activities  -AS     Impairments  Impaired flexibility;Joint mobility;Muscle strength;Pain;Range of motion  -AS     Assessment Comments  Patient presents to outpatient PT with complaints of left shoulder x2-3 month. Patient has increased pain with reaching above her head and behind her back. She has limited left shoulder ROM, limited LUE strength, and increased pain and discomfort with activity. Patient has limited function at this time secondary  choosing [facility]  for your care. Our goal is always to provide you with excellent care. Hearing back from our patients is one way we can continue to improve our services. Please take a few minutes to complete the written survey that you may receive in the mail after your visit with us. Thank you!             Your Updated Medication List - Protect others around you: Learn how to safely use, store and throw away your medicines at www.disposemymeds.org.          This list is accurate as of 4/5/18  4:18 PM.  Always use your most recent med list.                   Brand Name Dispense Instructions for use Diagnosis    aspirin 81 MG chewable tablet     108 tablet    Take 1 tablet (81 mg) by mouth daily    Syncope       blood glucose monitoring test strip    CEE CONTOUR    10 Box    by In Vitro route 8-10 times daily    Type 1 diabetes mellitus with vascular disease (H)       buPROPion 150 MG 12 hr tablet    WELLBUTRIN SR    90 tablet    Take 1 tablet (150 mg) by mouth daily    Major depressive disorder, single episode, severe without psychotic features (H)       DOCUSATE SODIUM PO      Take 100 mg by mouth every evening        GLUCAGON EMERGENCY KIT 1 MG Kit     1 Kit    use as directed for severe hypoglycemia    Type I (juvenile type) diabetes mellitus without mention of complication, not stated as uncontrolled       * HYDROcodone-acetaminophen  MG per tablet   Start taking on:  4/6/2018    NORCO    120 tablet    Take 1 tablet by mouth every 6 hours as needed for moderate to severe pain    Chronic narcotic use       * HYDROcodone-acetaminophen  MG per tablet   Start taking on:  5/7/2018    NORCO    120 tablet    Take 1 tablet by mouth every 6 hours as needed for moderate to severe pain    Chronic narcotic use, Cervical stenosis of spine       * HYDROcodone-acetaminophen  MG per tablet   Start taking on:  6/6/2018    NORCO    120 tablet    Take 1 tablet by mouth every 6 hours as  "needed for moderate to severe pain    Chronic narcotic use, Cervical stenosis of spine       insulin aspart 100 UNITS/ML injection    NovoLOG    30 mL    As directed per insulin pump    Type 1 diabetes mellitus with vascular disease (H)       * insulin pump infusion      Date last updated:  5/11/16 Medtronic Minimed: BASAL RATES and times: 12   AM (midnight): 1.4 units/hour  5 am: 1.50 units/hour  9   AM: 1.15 units/hour  1 pm: 1.30 units/hour  3   PM: 1.0 units/hour  9    PM: 1.20 units/hour  Basal Pattern A:  12 am: 1.00 10 am: 0.90 12pm:0.50 6 pm: 0.75 9 pm: 1.05  Pattern B: 12 mid: 0.8 10 am: 0.7 12 noon: 0.3 6 pm: 0.55 9 pm: 0.85 CARB RATIO and times: 12   AM (midnight): 9 12  PM (noon):  9 6    PM:  9 Corection Factor (Sensitivity) and times: 12   AM (midnight): 33 mg/dL BLOOD GLUCOSE TARGET and times: 12   AM (midnight): 110 - 110Active Insulin Time:  4 hours Basal to Bolus Ratio:  Sensor:  No Carelink / Diasend username:   Carelink / Diasend Password:    Type 1 diabetes, HbA1c goal < 8% (H)       Insulin Syringe 30G X 1/2\" 0.5 ML Misc     100 each    1 Device as needed.    Type I (juvenile type) diabetes mellitus without mention of complication, not stated as uncontrolled       KETO-DIASTIX Strp     100 strip    1 Stick by In Vitro route See Admin Instructions. Use as needed to monitor for ketones    Type I (juvenile type) diabetes mellitus without mention of complication, not stated as uncontrolled, Type 1 diabetes, HbA1c goal < 8% (H)       losartan 50 MG tablet    COZAAR    90 tablet    Take 1 tablet (50 mg) by mouth daily    Left ventricular dysfunction       metoprolol succinate 25 MG 24 hr tablet    TOPROL-XL    90 tablet    Take 1 tablet (25 mg) by mouth daily    Type 1 diabetes mellitus with vascular disease (H)       multivitamin per tablet     100    1 TABLET ORALLY DAILY        OMEGA-3 FISH OIL PO           omeprazole 20 MG tablet     90 tablet    Take 1 tablet (20 mg) by mouth daily Take 30-60 " to the above. Patient has been diagnosed with left shoulder adhesive capsulitis.  -AS     Please refer to paper survey for additional self-reported information  Yes  -AS     Rehab Potential  Good  -AS     Patient/caregiver participated in establishment of treatment plan and goals  Yes  -AS     Patient would benefit from skilled therapy intervention  Yes  -AS        PT Plan    PT Frequency  1x/week;2x/week  -AS     Predicted Duration of Therapy Intervention (PT)  2-4 weeks  -AS     Planned CPT's?  PT RE-EVAL: 79559;PT THER PROC EA 15 MIN: 31359;PT THER ACT EA 15 MIN: 63222;PT MANUAL THERAPY EA 15 MIN: 03369;PT NEUROMUSC RE-EDUCATION EA 15 MIN: 64683  -AS     PT Plan Comments  Continue with current treatment plan.  -AS       User Key  (r) = Recorded By, (t) = Taken By, (c) = Cosigned By    Initials Name Provider Type    AS Jeovanny Stephen, PT Physical Therapist            OP Exercises     Row Name 08/25/21 1100             Subjective Comments    Subjective Comments  Patient states she continue to have left shoulder pain and decreased ROM. She reports she has not been able to attend PT due to being ill with COVID and her mom passing away recently.  -AS         Subjective Pain    Able to rate subjective pain?  yes  -AS      Pre-Treatment Pain Level  5  -AS      Post-Treatment Pain Level  4  -AS         Exercise 1    Exercise Name 1  3-Way Cane  -AS      Reps 1  25 each direction  -AS         Exercise 2    Exercise Name 2  Pulleys - Flex & ABD  -AS      Time 2  5 min each  -AS         Exercise 11    Exercise Name 11  Pulleys - Flex & ABD  -AS      Time 11  5 min each  -AS        User Key  (r) = Recorded By, (t) = Taken By, (c) = Cosigned By    Initials Name Provider Type    AS Jeovanny Stephen, PT Physical Therapist           Manual Rx (last 36 hours)      Manual Treatments     Row Name 08/25/21 1100             Manual Rx 1    Manual Rx 1 Location  Left Shoulder  -AS      Manual Rx 1 Type  PROM - Flex, ABD,  IR, ER  -AS      Manual Rx 1 Duration  15 min  -AS        User Key  (r) = Recorded By, (t) = Taken By, (c) = Cosigned By    Initials Name Provider Type    AS Jeovanny Stephen, PT Physical Therapist                                Time Calculation:     Start Time: 1100  Stop Time: 1147  Time Calculation (min): 47 min     Therapy Charges for Today     Code Description Service Date Service Provider Modifiers Qty    99037800192  PT EVAL LOW COMPLEXITY 3 8/25/2021 Jeovanny Stephen, PT GP 1                    Jeovanny Stephen, PT  8/25/2021       minutes before a meal.    Gastroesophageal reflux disease without esophagitis       * order for DME      Auto-CPAP: Max 11 cm H2O Min 5 cm H2O Pressure changed in clinic Continuous  Lifetime need and heated humidity.    LYNNETTE (obstructive sleep apnea)       PARoxetine 20 MG tablet    PAXIL    90 tablet    Take 1 tablet (20 mg) by mouth daily    Major depressive disorder, single episode, severe without psychotic features (H)       rosuvastatin 40 MG tablet    CRESTOR    90 tablet    TAKE 1 TABLET EVERY DAY    Hyperlipidemia LDL goal <70       tamsulosin 0.4 MG capsule    FLOMAX    90 capsule    Take 1 capsule (0.4 mg) by mouth daily    Benign non-nodular prostatic hyperplasia with lower urinary tract symptoms       tiZANidine 4 MG tablet    ZANAFLEX    90 tablet    Take 1 tablet (4 mg) by mouth 3 times daily as needed    Cervical stenosis of spine       ZINC PO           * Notice:  This list has 5 medication(s) that are the same as other medications prescribed for you. Read the directions carefully, and ask your doctor or other care provider to review them with you.

## 2021-08-26 NOTE — TELEPHONE ENCOUNTER
I specifically chose Celebrex as it is less likely to upset her stomach.  She can take this with Prilosec.    Regarding Covid vaccination, I do not see any specific restrictions here but it would be logical if she wants to wait until she feels better before she takes it.

## 2021-08-27 ENCOUNTER — APPOINTMENT (OUTPATIENT)
Dept: PHYSICAL THERAPY | Facility: HOSPITAL | Age: 56
End: 2021-08-27

## 2021-09-07 ENCOUNTER — TELEPHONE (OUTPATIENT)
Dept: SPORTS MEDICINE | Facility: CLINIC | Age: 56
End: 2021-09-07

## 2021-09-07 NOTE — TELEPHONE ENCOUNTER
Provider: DR VICTORIA   Caller: MRS ROSALES   Relationship to Patient: PATIENT   Phone Number: [phone]  Reason for Call: PATIENT CALLED IN TO F/U WITH HER VISIT 8/23/21 PATIENT STATES THAT PROVIDER PUT HER ON CELEBREX PATIENT STATES THE CELEBREX HAS NOT DONE ANYTHING FOR THE PAIN EITHER PATIENT STATES ON A SCALE OF 1-10 THE PAIN IS A 15 AND SHE IS WANTING TO SPEAK TO PROVIDER OR MA TO SEE WHAT ELSE CAN BE BE RECOMMENDED.   When was the patient last seen: 8/23/21     PATIENT STATES SHE HAS MOVE SO HER NEW PHARMACY IS GOING TO BE THE [facility] IN [address] PHONE# [phone]

## 2021-09-08 ENCOUNTER — APPOINTMENT (OUTPATIENT)
Dept: PHYSICAL THERAPY | Facility: HOSPITAL | Age: 56
End: 2021-09-08

## 2021-09-08 DIAGNOSIS — R07.89 OTHER CHEST PAIN: Primary | ICD-10-CM

## 2021-09-08 NOTE — TELEPHONE ENCOUNTER
I called and spoke with patient, informed of response and recommendations for her. She would prefer for the CT to be done at a [place] location since she just moved there. Informed that was added to the notes in her CT order, and scheduling should be calling to get that set up.   All information was verbally understood.     Thanks  Zuri

## 2021-09-09 ENCOUNTER — HOSPITAL ENCOUNTER (OUTPATIENT)
Dept: CT IMAGING | Facility: HOSPITAL | Age: 56
Discharge: HOME | End: 2021-09-09
Admitting: FAMILY MEDICINE

## 2021-09-09 DIAGNOSIS — R07.89 OTHER CHEST PAIN: ICD-10-CM

## 2021-09-09 PROCEDURE — 71250 CT THORAX DX C-: CPT

## 2021-09-10 DIAGNOSIS — I31.39 PERICARDIAL EFFUSION: Primary | ICD-10-CM

## 2021-09-10 DIAGNOSIS — J90 PLEURAL EFFUSION: ICD-10-CM

## 2021-09-10 RX ORDER — COLCHICINE 0.6 MG/1
0.6 TABLET ORAL 2 TIMES DAILY
Qty: 60 TABLET | Refills: 0 | OUTPATIENT
Start: 2021-09-10 | End: 2021-09-29

## 2021-09-10 RX ORDER — PREDNISONE 20 MG/1
20 TABLET ORAL DAILY
Qty: 14 TABLET | Refills: 0 | OUTPATIENT
Start: 2021-09-10 | End: 2021-09-29

## 2021-09-14 ENCOUNTER — LAB (OUTPATIENT)
Dept: LAB | Facility: HOSPITAL | Age: 56
End: 2021-09-14

## 2021-09-14 ENCOUNTER — APPOINTMENT (OUTPATIENT)
Dept: CT IMAGING | Facility: HOSPITAL | Age: 56
End: 2021-09-14

## 2021-09-14 DIAGNOSIS — I31.39 PERICARDIAL EFFUSION: ICD-10-CM

## 2021-09-14 LAB
ALBUMIN SERPL-MCNC: 4.1 G/DL (ref 3.5–5.2)
ALBUMIN/GLOB SERPL: 1.3 G/DL
ALP SERPL-CCNC: 114 U/L (ref 39–117)
ALT SERPL W P-5'-P-CCNC: 22 U/L (ref 1–33)
ANION GAP SERPL CALCULATED.3IONS-SCNC: 12.1 MMOL/L (ref 5–15)
AST SERPL-CCNC: 14 U/L (ref 1–32)
BASOPHILS # BLD AUTO: 0.03 10*3/MM3 (ref 0–0.2)
BASOPHILS NFR BLD AUTO: 0.4 % (ref 0–1.5)
BILIRUB SERPL-MCNC: 0.2 MG/DL (ref 0–1.2)
BUN SERPL-MCNC: 11 MG/DL (ref 6–20)
BUN/CREAT SERPL: 15.5 (ref 7–25)
CALCIUM SPEC-SCNC: 9.8 MG/DL (ref 8.6–10.5)
CHLORIDE SERPL-SCNC: 100 MMOL/L (ref 98–107)
CO2 SERPL-SCNC: 26.9 MMOL/L (ref 22–29)
CREAT SERPL-MCNC: 0.71 MG/DL (ref 0.57–1)
CRP SERPL-MCNC: 3.65 MG/DL (ref 0–0.5)
DEPRECATED RDW RBC AUTO: 43.3 FL (ref 37–54)
EOSINOPHIL # BLD AUTO: 0 10*3/MM3 (ref 0–0.4)
EOSINOPHIL NFR BLD AUTO: 0 % (ref 0.3–6.2)
ERYTHROCYTE [DISTWIDTH] IN BLOOD BY AUTOMATED COUNT: 12.9 % (ref 12.3–15.4)
ERYTHROCYTE [SEDIMENTATION RATE] IN BLOOD: 40 MM/HR (ref 0–30)
GFR SERPL CREATININE-BSD FRML MDRD: 86 ML/MIN/1.73
GLOBULIN UR ELPH-MCNC: 3.2 GM/DL
GLUCOSE SERPL-MCNC: 153 MG/DL (ref 65–99)
HCT VFR BLD AUTO: 39.6 % (ref 34–46.6)
HGB BLD-MCNC: 12.4 G/DL (ref 12–15.9)
IMM GRANULOCYTES # BLD AUTO: 0.04 10*3/MM3 (ref 0–0.05)
IMM GRANULOCYTES NFR BLD AUTO: 0.5 % (ref 0–0.5)
LYMPHOCYTES # BLD AUTO: 1.3 10*3/MM3 (ref 0.7–3.1)
LYMPHOCYTES NFR BLD AUTO: 16.3 % (ref 19.6–45.3)
MCH RBC QN AUTO: 29 PG (ref 26.6–33)
MCHC RBC AUTO-ENTMCNC: 31.3 G/DL (ref 31.5–35.7)
MCV RBC AUTO: 92.5 FL (ref 79–97)
MONOCYTES # BLD AUTO: 0.24 10*3/MM3 (ref 0.1–0.9)
MONOCYTES NFR BLD AUTO: 3 % (ref 5–12)
NEUTROPHILS NFR BLD AUTO: 6.36 10*3/MM3 (ref 1.7–7)
NEUTROPHILS NFR BLD AUTO: 79.8 % (ref 42.7–76)
NRBC BLD AUTO-RTO: 0 /100 WBC (ref 0–0.2)
PLATELET # BLD AUTO: 559 10*3/MM3 (ref 140–450)
PMV BLD AUTO: 9.3 FL (ref 6–12)
POTASSIUM SERPL-SCNC: 4.9 MMOL/L (ref 3.5–5.2)
PROT SERPL-MCNC: 7.3 G/DL (ref 6–8.5)
RBC # BLD AUTO: 4.28 10*6/MM3 (ref 3.77–5.28)
SODIUM SERPL-SCNC: 139 MMOL/L (ref 136–145)
WBC # BLD AUTO: 7.97 10*3/MM3 (ref 3.4–10.8)

## 2021-09-14 PROCEDURE — 85025 COMPLETE CBC W/AUTO DIFF WBC: CPT

## 2021-09-14 PROCEDURE — 86140 C-REACTIVE PROTEIN: CPT

## 2021-09-14 PROCEDURE — 36415 COLL VENOUS BLD VENIPUNCTURE: CPT

## 2021-09-14 PROCEDURE — 85652 RBC SED RATE AUTOMATED: CPT

## 2021-09-14 PROCEDURE — 84484 ASSAY OF TROPONIN QUANT: CPT

## 2021-09-14 PROCEDURE — 80053 COMPREHEN METABOLIC PANEL: CPT

## 2021-09-15 LAB — TROPONIN T SERPL-MCNC: <0.01 NG/ML (ref 0–0.03)

## 2021-09-16 NOTE — PROGRESS NOTES
Patient reviewed results online via [employer] and has spoken with Dr. Forrest about here sxs.       Thanks  Zuri

## 2021-09-20 ENCOUNTER — HOSPITAL ENCOUNTER (OUTPATIENT)
Dept: CARDIOLOGY | Facility: HOSPITAL | Age: 56
Discharge: HOME | End: 2021-09-20
Admitting: FAMILY MEDICINE

## 2021-09-20 DIAGNOSIS — I31.39 PERICARDIAL EFFUSION: ICD-10-CM

## 2021-09-20 PROCEDURE — 93306 TTE W/DOPPLER COMPLETE: CPT | Performed by: INTERNAL MEDICINE

## 2021-09-20 PROCEDURE — 93306 TTE W/DOPPLER COMPLETE: CPT

## 2021-09-21 LAB
BH CV ECHO MEAS - AO MAX PG (FULL): 4.9 MMHG
BH CV ECHO MEAS - AO MAX PG: 8 MMHG
BH CV ECHO MEAS - AO MEAN PG (FULL): 2 MMHG
BH CV ECHO MEAS - AO MEAN PG: 4 MMHG
BH CV ECHO MEAS - AO ROOT AREA (BSA CORRECTED): 1.7
BH CV ECHO MEAS - AO ROOT AREA: 5.7 CM^2
BH CV ECHO MEAS - AO ROOT DIAM: 2.7 CM
BH CV ECHO MEAS - AO V2 MAX: 140 CM/SEC
BH CV ECHO MEAS - AO V2 MEAN: 89.5 CM/SEC
BH CV ECHO MEAS - AO V2 VTI: 30 CM
BH CV ECHO MEAS - AVA(I,A): 1.7 CM^2
BH CV ECHO MEAS - AVA(I,D): 1.7 CM^2
BH CV ECHO MEAS - AVA(V,A): 1.6 CM^2
BH CV ECHO MEAS - AVA(V,D): 1.6 CM^2
BH CV ECHO MEAS - BSA(HAYCOCK): 1.6 M^2
BH CV ECHO MEAS - BSA: 1.6 M^2
BH CV ECHO MEAS - BZI_BMI: 21.5 KILOGRAMS/M^2
BH CV ECHO MEAS - BZI_METRIC_HEIGHT: 162.6 CM
BH CV ECHO MEAS - BZI_METRIC_WEIGHT: 56.7 KG
BH CV ECHO MEAS - EDV(CUBED): 97.3 ML
BH CV ECHO MEAS - EDV(MOD-SP4): 50 ML
BH CV ECHO MEAS - EDV(TEICH): 97.3 ML
BH CV ECHO MEAS - EF(CUBED): 68.2 %
BH CV ECHO MEAS - EF(MOD-SP4): 54 %
BH CV ECHO MEAS - EF(TEICH): 59.8 %
BH CV ECHO MEAS - ESV(CUBED): 31 ML
BH CV ECHO MEAS - ESV(MOD-SP4): 23 ML
BH CV ECHO MEAS - ESV(TEICH): 39.1 ML
BH CV ECHO MEAS - FS: 31.7 %
BH CV ECHO MEAS - IVS/LVPW: 0.78
BH CV ECHO MEAS - IVSD: 0.57 CM
BH CV ECHO MEAS - LA DIMENSION: 3 CM
BH CV ECHO MEAS - LA/AO: 1.1
BH CV ECHO MEAS - LAD MAJOR: 4.5 CM
BH CV ECHO MEAS - LAT PEAK E' VEL: 10 CM/SEC
BH CV ECHO MEAS - LATERAL E/E' RATIO: 7.2
BH CV ECHO MEAS - LV DIASTOLIC VOL/BSA (35-75): 31.2 ML/M^2
BH CV ECHO MEAS - LV MASS(C)D: 90.9 GRAMS
BH CV ECHO MEAS - LV MASS(C)DI: 56.8 GRAMS/M^2
BH CV ECHO MEAS - LV MAX PG: 3.1 MMHG
BH CV ECHO MEAS - LV MEAN PG: 2 MMHG
BH CV ECHO MEAS - LV SYSTOLIC VOL/BSA (12-30): 14.4 ML/M^2
BH CV ECHO MEAS - LV V1 MAX: 88 CM/SEC
BH CV ECHO MEAS - LV V1 MEAN: 57.9 CM/SEC
BH CV ECHO MEAS - LV V1 VTI: 19.9 CM
BH CV ECHO MEAS - LVIDD: 4.6 CM
BH CV ECHO MEAS - LVIDS: 3.1 CM
BH CV ECHO MEAS - LVLD AP4: 6.7 CM
BH CV ECHO MEAS - LVLS AP4: 5.6 CM
BH CV ECHO MEAS - LVOT AREA (M): 2.5 CM^2
BH CV ECHO MEAS - LVOT AREA: 2.5 CM^2
BH CV ECHO MEAS - LVOT DIAM: 1.8 CM
BH CV ECHO MEAS - LVPWD: 0.74 CM
BH CV ECHO MEAS - MED PEAK E' VEL: 10.1 CM/SEC
BH CV ECHO MEAS - MEDIAL E/E' RATIO: 7.1
BH CV ECHO MEAS - MV A MAX VEL: 53.3 CM/SEC
BH CV ECHO MEAS - MV DEC TIME: 0.21 SEC
BH CV ECHO MEAS - MV E MAX VEL: 71.6 CM/SEC
BH CV ECHO MEAS - MV E/A: 1.3
BH CV ECHO MEAS - PA ACC SLOPE: 374 CM/SEC^2
BH CV ECHO MEAS - PA ACC TIME: 0.16 SEC
BH CV ECHO MEAS - PA PR(ACCEL): 5.2 MMHG
BH CV ECHO MEAS - RAP SYSTOLE: 3 MMHG
BH CV ECHO MEAS - RVSP: 14.4 MMHG
BH CV ECHO MEAS - SI(AO): 107.2 ML/M^2
BH CV ECHO MEAS - SI(CUBED): 41.4 ML/M^2
BH CV ECHO MEAS - SI(LVOT): 31.6 ML/M^2
BH CV ECHO MEAS - SI(MOD-SP4): 16.9 ML/M^2
BH CV ECHO MEAS - SI(TEICH): 36.3 ML/M^2
BH CV ECHO MEAS - SV(AO): 171.8 ML
BH CV ECHO MEAS - SV(CUBED): 66.4 ML
BH CV ECHO MEAS - SV(LVOT): 50.6 ML
BH CV ECHO MEAS - SV(MOD-SP4): 27 ML
BH CV ECHO MEAS - SV(TEICH): 58.2 ML
BH CV ECHO MEAS - TAPSE (>1.6): 2 CM
BH CV ECHO MEAS - TR MAX PG: 11.4 MMHG
BH CV ECHO MEAS - TR MAX VEL: 169 CM/SEC
BH CV ECHO MEASUREMENTS AVERAGE E/E' RATIO: 7.12
BH CV XLRA - RV BASE: 2.8 CM
BH CV XLRA - RV LENGTH: 5 CM
BH CV XLRA - RV MID: 2.2 CM
LV EF 2D ECHO EST: 60 %

## 2021-09-21 NOTE — PROGRESS NOTES
Called patient in regards to echocardiogram, verbalized understanding. Curious as to why she still hurts in her heart area?

## 2021-09-23 ENCOUNTER — TELEMEDICINE (OUTPATIENT)
Dept: SPORTS MEDICINE | Facility: CLINIC | Age: 56
End: 2021-09-23

## 2021-09-23 DIAGNOSIS — J90 PLEURAL EFFUSION: ICD-10-CM

## 2021-09-23 DIAGNOSIS — I31.39 PERICARDIAL EFFUSION: Primary | ICD-10-CM

## 2021-09-23 DIAGNOSIS — R07.89 OTHER CHEST PAIN: ICD-10-CM

## 2021-09-23 PROCEDURE — 99214 OFFICE O/P EST MOD 30 MIN: CPT | Performed by: FAMILY MEDICINE

## 2021-09-23 RX ORDER — ESOMEPRAZOLE MAGNESIUM 40 MG/1
40 CAPSULE, DELAYED RELEASE ORAL
Qty: 30 CAPSULE | Refills: 1 | OUTPATIENT
Start: 2021-09-23 | End: 2021-09-29

## 2021-09-23 NOTE — PROGRESS NOTES
Video Visit Note    CC: f/u chest pain    History of Present Illness  Follow up chest pain; notes a reduction in pain now only 1 day of prednisone left. Reviewed CT and echo findings.   Still having pain just at rest in the left chest region; worse with trying to lay flat. Worse with deep breath.   Pain started 15/10, now down to 5/10 severity.     I have reviewed the patient's medical, family, and social history in detail and updated the computerized patient record.    Review of Systems    Physical Exam    General: No acute distress.  Eyes: conjunctiva clear; pupils equally round and reactive  Neck: Normal appearance, midline trachea, no visible masses  ENT: external ears and nose atraumatic; oropharynx clear; hearing appropriate  Resp: normal respiratory effort  Skin: No visible rashes  Psych: Alert and oriented to person place and time.  Mood and affect appropriate. Recent and remote memory intact.  Judgment and insight appropriate.      Diagnoses and all orders for this visit:    Pericardial effusion  -     CT angiogram chest w contrast; Future    Pleural effusion  -     CT angiogram chest w contrast; Future    Other chest pain  -     esomeprazole (nexIUM) 40 MG capsule; Take 1 capsule by mouth Every Morning Before Breakfast.  -     CT angiogram chest w contrast; Future      Reduce colchicine to once daily due to GI effects  Add treatment for possible associated gastritis/gerd  Plan CT with contrast r/o PE if symptoms do not continue     This patient was evaluated by video due to current precautions with COVID-19. Consent to audio/video visit for this problem was given by the patient. Estimated time of visit duration: 16 minutes.

## 2021-09-27 ENCOUNTER — TELEPHONE (OUTPATIENT)
Dept: SPORTS MEDICINE | Facility: CLINIC | Age: 56
End: 2021-09-27

## 2021-09-27 NOTE — TELEPHONE ENCOUNTER
Called and spoke with patient, informed of response and recommendations, all were verbally understood. Informed to let me know if she needs a refill and I'll be happy to send in another refill for her.   CT scheduled for this week for her.     No further action at this time     Thanks  Zuri

## 2021-09-27 NOTE — TELEPHONE ENCOUNTER
Patient called in stating that the decrease in the Colchicine is not helping as of yesterday, she is in more pain. Wants to know if she should go back to taking this twice daily or if there is a different avenue to take or try something else.     Thank you   Zuri

## 2021-09-28 ENCOUNTER — TELEPHONE (OUTPATIENT)
Dept: SPORTS MEDICINE | Facility: CLINIC | Age: 56
End: 2021-09-28

## 2021-09-28 NOTE — TELEPHONE ENCOUNTER
Patient called in with some concerns she states she forgot to mention to you and is unsure if it is relevant to her current condition.   She reports a cardiologist diagnosed her with vasovagal several years ago after passing/blacking out in the past. States since July when she was diagnosed with COVID, she has passed out 4 times, and again today, this morning, which she expressed has been the worst episode she has had. She is very fatigued, dizzy, nauseous, sweaty, and unable to get out of bed.   She just wanted to report this you but isn't sure if there is anything that needs to be done.    Thank you,  Zuri

## 2021-09-29 ENCOUNTER — APPOINTMENT (OUTPATIENT)
Dept: CT IMAGING | Facility: HOSPITAL | Age: 56
End: 2021-09-29

## 2021-09-29 ENCOUNTER — APPOINTMENT (OUTPATIENT)
Dept: GENERAL RADIOLOGY | Facility: HOSPITAL | Age: 56
End: 2021-09-29

## 2021-09-29 ENCOUNTER — HOSPITAL ENCOUNTER (OUTPATIENT)
Dept: CT IMAGING | Facility: HOSPITAL | Age: 56
End: 2021-09-29

## 2021-09-29 ENCOUNTER — HOSPITAL ENCOUNTER (EMERGENCY)
Facility: HOSPITAL | Age: 56
Discharge: HOME | End: 2021-09-29
Attending: EMERGENCY MEDICINE | Admitting: EMERGENCY MEDICINE

## 2021-09-29 VITALS
HEART RATE: 121 BPM | DIASTOLIC BLOOD PRESSURE: 67 MMHG | HEIGHT: 64 IN | OXYGEN SATURATION: 95 % | TEMPERATURE: 98.2 F | BODY MASS INDEX: 19.81 KG/M2 | SYSTOLIC BLOOD PRESSURE: 94 MMHG | RESPIRATION RATE: 18 BRPM | WEIGHT: 116 LBS

## 2021-09-29 DIAGNOSIS — R59.0 MEDIASTINAL LYMPHADENOPATHY: ICD-10-CM

## 2021-09-29 DIAGNOSIS — Z86.16 HISTORY OF COVID-19: ICD-10-CM

## 2021-09-29 DIAGNOSIS — R07.81 PLEURITIC CHEST PAIN: Primary | ICD-10-CM

## 2021-09-29 DIAGNOSIS — R91.1 PULMONARY NODULE: ICD-10-CM

## 2021-09-29 LAB
ALBUMIN SERPL-MCNC: 4.2 G/DL (ref 3.5–5.2)
ALBUMIN/GLOB SERPL: 1.2 G/DL
ALP SERPL-CCNC: 96 U/L (ref 39–117)
ALT SERPL W P-5'-P-CCNC: 11 U/L (ref 1–33)
ANION GAP SERPL CALCULATED.3IONS-SCNC: 13 MMOL/L (ref 5–15)
AST SERPL-CCNC: 12 U/L (ref 1–32)
BASOPHILS # BLD AUTO: 0.04 10*3/MM3 (ref 0–0.2)
BASOPHILS NFR BLD AUTO: 0.2 % (ref 0–1.5)
BILIRUB SERPL-MCNC: 1 MG/DL (ref 0–1.2)
BUN SERPL-MCNC: 13 MG/DL (ref 6–20)
BUN/CREAT SERPL: 14.3 (ref 7–25)
CALCIUM SPEC-SCNC: 9.6 MG/DL (ref 8.6–10.5)
CHLORIDE SERPL-SCNC: 98 MMOL/L (ref 98–107)
CO2 SERPL-SCNC: 24 MMOL/L (ref 22–29)
CREAT SERPL-MCNC: 0.91 MG/DL (ref 0.57–1)
DEPRECATED RDW RBC AUTO: 45.6 FL (ref 37–54)
EOSINOPHIL # BLD AUTO: 0.07 10*3/MM3 (ref 0–0.4)
EOSINOPHIL NFR BLD AUTO: 0.4 % (ref 0.3–6.2)
ERYTHROCYTE [DISTWIDTH] IN BLOOD BY AUTOMATED COUNT: 13.8 % (ref 12.3–15.4)
GFR SERPL CREATININE-BSD FRML MDRD: 64 ML/MIN/1.73
GLOBULIN UR ELPH-MCNC: 3.5 GM/DL
GLUCOSE SERPL-MCNC: 141 MG/DL (ref 65–99)
HCT VFR BLD AUTO: 47.8 % (ref 34–46.6)
HGB BLD-MCNC: 15.9 G/DL (ref 12–15.9)
HOLD SPECIMEN: NORMAL
IMM GRANULOCYTES # BLD AUTO: 0.09 10*3/MM3 (ref 0–0.05)
IMM GRANULOCYTES NFR BLD AUTO: 0.5 % (ref 0–0.5)
LIPASE SERPL-CCNC: 32 U/L (ref 13–60)
LYMPHOCYTES # BLD AUTO: 1.83 10*3/MM3 (ref 0.7–3.1)
LYMPHOCYTES NFR BLD AUTO: 10 % (ref 19.6–45.3)
MCH RBC QN AUTO: 29.8 PG (ref 26.6–33)
MCHC RBC AUTO-ENTMCNC: 33.3 G/DL (ref 31.5–35.7)
MCV RBC AUTO: 89.5 FL (ref 79–97)
MONOCYTES # BLD AUTO: 1.33 10*3/MM3 (ref 0.1–0.9)
MONOCYTES NFR BLD AUTO: 7.3 % (ref 5–12)
NEUTROPHILS NFR BLD AUTO: 14.9 10*3/MM3 (ref 1.7–7)
NEUTROPHILS NFR BLD AUTO: 81.6 % (ref 42.7–76)
NRBC BLD AUTO-RTO: 0 /100 WBC (ref 0–0.2)
NT-PROBNP SERPL-MCNC: 272.1 PG/ML (ref 0–900)
PLATELET # BLD AUTO: 253 10*3/MM3 (ref 140–450)
PMV BLD AUTO: 9.9 FL (ref 6–12)
POTASSIUM SERPL-SCNC: 4 MMOL/L (ref 3.5–5.2)
PROT SERPL-MCNC: 7.7 G/DL (ref 6–8.5)
RBC # BLD AUTO: 5.34 10*6/MM3 (ref 3.77–5.28)
SODIUM SERPL-SCNC: 135 MMOL/L (ref 136–145)
TROPONIN T SERPL-MCNC: <0.01 NG/ML (ref 0–0.03)
TROPONIN T SERPL-MCNC: <0.01 NG/ML (ref 0–0.03)
WBC # BLD AUTO: 18.26 10*3/MM3 (ref 3.4–10.8)
WHOLE BLOOD HOLD SPECIMEN: NORMAL
WHOLE BLOOD HOLD SPECIMEN: NORMAL

## 2021-09-29 PROCEDURE — 0 IOPAMIDOL PER 1 ML: Performed by: EMERGENCY MEDICINE

## 2021-09-29 PROCEDURE — 83690 ASSAY OF LIPASE: CPT

## 2021-09-29 PROCEDURE — 93005 ELECTROCARDIOGRAM TRACING: CPT

## 2021-09-29 PROCEDURE — 84484 ASSAY OF TROPONIN QUANT: CPT

## 2021-09-29 PROCEDURE — 83880 ASSAY OF NATRIURETIC PEPTIDE: CPT

## 2021-09-29 PROCEDURE — 25010000002 KETOROLAC TROMETHAMINE PER 15 MG: Performed by: PHYSICIAN ASSISTANT

## 2021-09-29 PROCEDURE — 85025 COMPLETE CBC W/AUTO DIFF WBC: CPT

## 2021-09-29 PROCEDURE — 71045 X-RAY EXAM CHEST 1 VIEW: CPT

## 2021-09-29 PROCEDURE — 80053 COMPREHEN METABOLIC PANEL: CPT

## 2021-09-29 PROCEDURE — 71275 CT ANGIOGRAPHY CHEST: CPT

## 2021-09-29 PROCEDURE — 93005 ELECTROCARDIOGRAM TRACING: CPT | Performed by: EMERGENCY MEDICINE

## 2021-09-29 PROCEDURE — 84484 ASSAY OF TROPONIN QUANT: CPT | Performed by: EMERGENCY MEDICINE

## 2021-09-29 PROCEDURE — 99283 EMERGENCY DEPT VISIT LOW MDM: CPT

## 2021-09-29 PROCEDURE — 96374 THER/PROPH/DIAG INJ IV PUSH: CPT

## 2021-09-29 RX ORDER — SODIUM CHLORIDE 0.9 % (FLUSH) 0.9 %
10 SYRINGE (ML) INJECTION AS NEEDED
Status: DISCONTINUED | OUTPATIENT
Start: 2021-09-29 | End: 2021-09-29 | Stop reason: HOSPADM

## 2021-09-29 RX ORDER — KETOROLAC TROMETHAMINE 30 MG/ML
15 INJECTION, SOLUTION INTRAMUSCULAR; INTRAVENOUS ONCE
Status: COMPLETED | OUTPATIENT
Start: 2021-09-29 | End: 2021-09-29

## 2021-09-29 RX ORDER — ASPIRIN 81 MG/1
324 TABLET, CHEWABLE ORAL ONCE
Status: COMPLETED | OUTPATIENT
Start: 2021-09-29 | End: 2021-09-29

## 2021-09-29 RX ADMIN — KETOROLAC TROMETHAMINE 15 MG: 30 INJECTION, SOLUTION INTRAMUSCULAR; INTRAVENOUS at 19:12

## 2021-09-29 RX ADMIN — IOPAMIDOL 75 ML: 755 INJECTION, SOLUTION INTRAVENOUS at 19:20

## 2021-09-29 RX ADMIN — SODIUM CHLORIDE 1000 ML: 9 INJECTION, SOLUTION INTRAVENOUS at 19:12

## 2021-09-29 RX ADMIN — ASPIRIN 81 MG CHEWABLE TABLET 324 MG: 81 TABLET CHEWABLE at 19:12

## 2021-09-29 NOTE — TELEPHONE ENCOUNTER
I called and spoke with the patient, informed that she should go to the ED for further care since her sxs are worsening. She verbally understood these instructions.     Thanks  Zuri

## 2021-09-29 NOTE — TELEPHONE ENCOUNTER
Patient called in today, stating her pain has gotten worse, it is more severe and her chest pain is worse. She can hardly move or get up from bed, she will not be able to make her appointment for her CT today as well. Wants to know what you would recommend for her.     Thanks  Zuri

## 2021-09-30 ENCOUNTER — TELEPHONE (OUTPATIENT)
Dept: SPORTS MEDICINE | Facility: CLINIC | Age: 56
End: 2021-09-30

## 2021-09-30 DIAGNOSIS — J90 PLEURAL EFFUSION: Primary | ICD-10-CM

## 2021-09-30 DIAGNOSIS — I31.39 PERICARDIAL EFFUSION: ICD-10-CM

## 2021-09-30 LAB
QT INTERVAL: 294 MS
QT INTERVAL: 316 MS
QTC INTERVAL: 403 MS
QTC INTERVAL: 403 MS

## 2021-09-30 NOTE — TELEPHONE ENCOUNTER
Miroslava brunner, patient will be trying to contact this office to set up her appointment.     Thank you !       I called and spoke with patient, informed referral placed, she can call at her convenience, and prescription was sent in for her.   All information was verbally understood.     Thanks  Zuri

## 2021-09-30 NOTE — TELEPHONE ENCOUNTER
Thank you I have routed referral to our [facility] [place] by [facility] office in [place]. They will review referral then they will call and schedule patient, no further action required.

## 2021-09-30 NOTE — TELEPHONE ENCOUNTER
Patient called in this morning, she went to the ED yesterday as advised, they informed for her to see a pulmonologist, recommended [facility] care in [place], [place] (P#[phone] F#[phone]).   First, she wants to know if we can place a referral for her, they would not let her schedule without the referral.   Second, she was prescribed Voltaren, and they did not give her much , but wants to know if this medication would be okay for her today or if you would suggest taking this medication. If so, can we send in a refill for for to last her until she can see Pulmonology?.       Thank you,  Zuri

## 2021-10-13 ENCOUNTER — OFFICE VISIT (OUTPATIENT)
Dept: ORTHOPEDIC SURGERY | Facility: CLINIC | Age: 56
End: 2021-10-13

## 2021-10-13 VITALS
WEIGHT: 116 LBS | HEART RATE: 85 BPM | SYSTOLIC BLOOD PRESSURE: 136 MMHG | DIASTOLIC BLOOD PRESSURE: 81 MMHG | HEIGHT: 64 IN | BODY MASS INDEX: 19.81 KG/M2

## 2021-10-13 DIAGNOSIS — M75.02 ADHESIVE CAPSULITIS OF LEFT SHOULDER: Primary | ICD-10-CM

## 2021-10-13 PROCEDURE — 99214 OFFICE O/P EST MOD 30 MIN: CPT | Performed by: ORTHOPAEDIC SURGERY

## 2021-10-13 NOTE — PROGRESS NOTES
Subjective:     Patient ID: Desire Hernandez is a 56 y.o. female.    Chief Complaint:  Left shoulder pain, new issue to examiner    History of Present Illness     Desire Hernandez presents to clinic today for follow up of left shoulder pain.     The patient has been experiencing tightness and left shoulder pain since 05/2021.  She had an MRI in 06/2021 and she started physical therapy. She also received an injection on 06/07/2021. The patient rates her pain as a 9 out of 10, and aggravated with motion. It is localized laterally and shoots down towards her elbow. She has experienced minimal tingling and numbness in the area.     The patient is taking diclofenac. She had COVID-19 in 07/2021 and has been very sick since then. She was not offered the monoclonal antibodies. She is experiencing swollen lymph nodes, pleural effusion, and she has an appointment with a pulmonologist 10/25/2021. She has not received her COVID-19 vaccine. .     Social History     Occupational History     Employer: [employer]   Tobacco Use   • Smoking status: Never Smoker   • Smokeless tobacco: Never Used   Vaping Use   • Vaping Use: Never used   Substance and Sexual Activity   • Alcohol use: Yes     Types: 1 Glasses of wine, 1 Standard drinks or equivalent per week     Comment: occ   • Drug use: No   • Sexual activity: Defer     Partners: Male     Birth control/protection: None      Past Medical History:   Diagnosis Date   • ADHD (attention deficit hyperactivity disorder)     back in high school   • Allergic     seasonal   • ASCUS favor dysplasia     6/4/07; 6/5/08; 6/25/10; 6/27/12; 7/29/13; 8/21/15 (HPV negative)   • Blood type, Rh negative    • Bunion 2016    surgery   • Chlamydia     as a teen   • H/O nipple discharge 9/415    Bilateral diagnostic mammogram negative   • HSV-1 (herpes simplex virus 1) infection    • HSV-2 (herpes simplex virus 2) infection 1993   • LGSIL (low grade squamous intraepithelial dysplasia)  "2006   • Menopause    • Ovarian cyst     Left   • PONV (postoperative nausea and vomiting)    • Right shoulder pain     SCHEDULED FOR SX     Past Surgical History:   Procedure Laterality Date   • BUNIONECTOMY Right 2016    Dr. Durbin   • CERVICAL CONIZATION, LEEP     • COLPOSCOPY W/ BIOPSY / CURETTAGE  2006    benign   • D & C WITH SUCTION  1998    10 week SAB   • ENDOMETRIAL ABLATION  Dec. 2017   • INDUCED       years ago   • IA HYSTEROSCOPY,W/ENDOMETRIAL ABLATION N/A 2017    Procedure: HYSTEROSCOPY WITH ENDOMETRIAL ABLATION; d&c;  Surgeon: Sandie Hu DO;  Location: [facility];  Service: Obstetrics/Gynecology   • SHOULDER ARTHROSCOPY Right 2020    Procedure: Right shoulder manipulation under anesthesia with arthroscopy and extensive debridement of glenohumeral and subacromial spaces;  Surgeon: Stalin Liu MD;  Location: [facility];  Service: Orthopedics       Family History   Problem Relation Age of Onset   • Hearing loss Father    • Hyperlipidemia Father         DX in his mid 50's   • No Known Problems Brother    • Arthritis Sister         RA   • No Known Problems Son    • Hearing loss Maternal Grandmother    • Hyperlipidemia Maternal Grandmother         DX in her late 70's   • Stroke Maternal Grandmother    • Lung cancer Maternal Grandfather    • Cancer Maternal Grandfather         lung cancer - smoker   • Lung cancer Paternal Uncle    • Cancer Paternal Uncle         lung cancer - smoker   • Breast cancer Neg Hx    • Ovarian cancer Neg Hx    • Uterine cancer Neg Hx    • Colon cancer Neg Hx    • Melanoma Neg Hx    • Colon polyps Neg Hx          Review of Systems        Objective:  Vitals:    10/13/21 1031   BP: 136/81   BP Location: Right arm   Pulse: 85   Weight: 52.6 kg (116 lb)   Height: 162.6 cm (64\")         10/13/21  1031   Weight: 52.6 kg (116 lb)     Body mass index is 19.91 kg/m².  Physical Exam    Vital signs reviewed.   General: No acute distress, " alert and oriented  Eyes: conjunctiva clear; pupils equally round and reactive  ENT: external ears and nose atraumatic; oropharynx clear  CV: no peripheral edema  Resp: normal respiratory effort  Skin: no rashes or wounds; normal turgor  Psych: mood and affect appropriate; recent and remote memory intact          Ortho Exam     Left Shoulder-     Maximal tenderness anterior and posterior joint line as well as lateral subacromial space.     FF-   Active - 95 degrees   Passive - 110 degrees   Strength- 4/5     ER-      Active - 20 degrees   Passive - 25 degrees    Strength - 4/5     IR - L5   Belly Press Strength- 4+/5     Bear hug sign - Negative    Imaging:    MRI left shoulder June 2021  IMPRESSION:     1. Mild supraspinatus and infraspinatus tendinopathy. No rotator cuff tear.  2. No acute labral pathology.  3. Minimal edema in the rotator interval suggests mild capsulitis in the appropriate clinical setting.  4. Mild AC joint arthrosis which produces minimal mass effect on the supraspinatus outlet, but no evidence of significant  bursal inflammation.    Review of MRI left shoulder from June 2021 include review of images as well as radiology report indicates mild supraspinatus rotator cuff tendinopathy with edema the rotator interval consistent with capsulitis.    Review of prior x-rays left shoulder from office from May 2021 indicate no evidence of fracture, dislocation, or subluxation.  Assessment:        1. Adhesive capsulitis of left shoulder           Plan:          1. Discussed treatment options at length with patient at today's visit.   2. At this point in time we need to wait for evaluation from pulmonology on whether she would be a candidate for surgical treatment with general and tracheal anesthesia given some residual effects from COVID-19.   3. If she does get a clearance to be able to proceed with surgical treatment, we would plan on proceeding with left shoulder manipulation under anesthesia with  arthroscopic lysis of adhesions.   4. Plan will be for left shoulder arthroscopy, rotator cuff debridement versus repair, possible biceps tenodesis, subacromial decompression, left distal clavicle excision and all associated procedures.  I reviewed risks benefits and alternatives the procedure with risks including not limited to neurovascular damage, bleeding, infection, chronic pain, re-tear rotator cuff, failure of healing rotator cuff, loss of motion, weakness, stiffness, instability, biceps sag, DVT, pulmonary embolus, death, stroke, complex regional pain syndrome, myocardial infarction, need for additional procedures. She understood all these had all questions answered.  Patient verbally consented to proceed with surgery.  No guarantees were given regarding results of surgery.  We will have patient medically optimized by primary care physician and proceed with surgery at next available date.  5. The patient denies any history of blood clots or diabetes. She notes that she had a CT with contrast that revealed fluid around her heart.   6. If she is recommended for holding off on any surgical intervention with associated anesthesia for greater than 3 months, then I would recommend consideration for intra-articular glenohumeral injection. She understood this.       Desire Hernandez was in agreement with plan and had all questions answered.     Orders:  No orders of the defined types were placed in this encounter.      Medications:  No orders of the defined types were placed in this encounter.      Followup:  No follow-ups on file.    Diagnoses and all orders for this visit:    1. Adhesive capsulitis of left shoulder (Primary)          Dictated utilizing Dragon dictation       Transcribed from ambient dictation for Stalin Liu MD by Roosevelt Polanco.  10/13/21   14:43 EDT    I have personally performed the services described in this document as transcribed by the above individual, and it is both accurate and  complete.  Stalin Liu MD  10/28/2021  07:40 EDT

## 2021-10-18 ENCOUNTER — OFFICE VISIT (OUTPATIENT)
Dept: INTERNAL MEDICINE | Facility: CLINIC | Age: 56
End: 2021-10-18

## 2021-10-18 ENCOUNTER — LAB (OUTPATIENT)
Dept: LAB | Facility: HOSPITAL | Age: 56
End: 2021-10-18

## 2021-10-18 VITALS
HEART RATE: 63 BPM | BODY MASS INDEX: 20.66 KG/M2 | OXYGEN SATURATION: 97 % | TEMPERATURE: 96.8 F | SYSTOLIC BLOOD PRESSURE: 122 MMHG | HEIGHT: 64 IN | RESPIRATION RATE: 16 BRPM | WEIGHT: 121 LBS | DIASTOLIC BLOOD PRESSURE: 76 MMHG

## 2021-10-18 DIAGNOSIS — F41.8 DEPRESSION WITH ANXIETY: ICD-10-CM

## 2021-10-18 DIAGNOSIS — R70.0 ELEVATED SED RATE: ICD-10-CM

## 2021-10-18 DIAGNOSIS — U09.9 POST-COVID SYNDROME: Primary | ICD-10-CM

## 2021-10-18 DIAGNOSIS — D72.829 LEUKOCYTOSIS, UNSPECIFIED TYPE: ICD-10-CM

## 2021-10-18 DIAGNOSIS — R79.82 ELEVATED C-REACTIVE PROTEIN (CRP): ICD-10-CM

## 2021-10-18 DIAGNOSIS — R07.81 PLEURITIC CHEST PAIN: ICD-10-CM

## 2021-10-18 PROBLEM — U07.1 COVID-19 VIRUS INFECTION: Status: ACTIVE | Noted: 2021-10-18

## 2021-10-18 LAB
BASOPHILS # BLD AUTO: 0.05 10*3/MM3 (ref 0–0.2)
BASOPHILS NFR BLD AUTO: 0.7 % (ref 0–1.5)
DEPRECATED RDW RBC AUTO: 44.1 FL (ref 37–54)
EOSINOPHIL # BLD AUTO: 0.13 10*3/MM3 (ref 0–0.4)
EOSINOPHIL NFR BLD AUTO: 1.9 % (ref 0.3–6.2)
ERYTHROCYTE [DISTWIDTH] IN BLOOD BY AUTOMATED COUNT: 13.7 % (ref 12.3–15.4)
HCT VFR BLD AUTO: 37.8 % (ref 34–46.6)
HGB BLD-MCNC: 12.6 G/DL (ref 12–15.9)
IMM GRANULOCYTES # BLD AUTO: 0.02 10*3/MM3 (ref 0–0.05)
IMM GRANULOCYTES NFR BLD AUTO: 0.3 % (ref 0–0.5)
LYMPHOCYTES # BLD AUTO: 2.37 10*3/MM3 (ref 0.7–3.1)
LYMPHOCYTES NFR BLD AUTO: 35.5 % (ref 19.6–45.3)
MCH RBC QN AUTO: 29.7 PG (ref 26.6–33)
MCHC RBC AUTO-ENTMCNC: 33.3 G/DL (ref 31.5–35.7)
MCV RBC AUTO: 89.2 FL (ref 79–97)
MONOCYTES # BLD AUTO: 0.42 10*3/MM3 (ref 0.1–0.9)
MONOCYTES NFR BLD AUTO: 6.3 % (ref 5–12)
NEUTROPHILS NFR BLD AUTO: 3.69 10*3/MM3 (ref 1.7–7)
NEUTROPHILS NFR BLD AUTO: 55.3 % (ref 42.7–76)
NRBC BLD AUTO-RTO: 0 /100 WBC (ref 0–0.2)
PLATELET # BLD AUTO: 306 10*3/MM3 (ref 140–450)
PMV BLD AUTO: 10 FL (ref 6–12)
RBC # BLD AUTO: 4.24 10*6/MM3 (ref 3.77–5.28)
WBC # BLD AUTO: 6.68 10*3/MM3 (ref 3.4–10.8)

## 2021-10-18 PROCEDURE — 85025 COMPLETE CBC W/AUTO DIFF WBC: CPT | Performed by: NURSE PRACTITIONER

## 2021-10-18 PROCEDURE — 85652 RBC SED RATE AUTOMATED: CPT | Performed by: NURSE PRACTITIONER

## 2021-10-18 PROCEDURE — 99214 OFFICE O/P EST MOD 30 MIN: CPT | Performed by: NURSE PRACTITIONER

## 2021-10-18 PROCEDURE — 86140 C-REACTIVE PROTEIN: CPT | Performed by: NURSE PRACTITIONER

## 2021-10-18 PROCEDURE — 80053 COMPREHEN METABOLIC PANEL: CPT | Performed by: NURSE PRACTITIONER

## 2021-10-18 RX ORDER — MELOXICAM 15 MG/1
TABLET ORAL
Qty: 30 TABLET | Refills: 2 | Status: SHIPPED | OUTPATIENT
Start: 2021-10-18 | End: 2021-10-25

## 2021-10-19 LAB
ALBUMIN SERPL-MCNC: 4.2 G/DL (ref 3.5–5.2)
ALBUMIN/GLOB SERPL: 1.7 G/DL
ALP SERPL-CCNC: 84 U/L (ref 39–117)
ALT SERPL W P-5'-P-CCNC: 24 U/L (ref 1–33)
ANION GAP SERPL CALCULATED.3IONS-SCNC: 10.1 MMOL/L (ref 5–15)
AST SERPL-CCNC: 20 U/L (ref 1–32)
BILIRUB SERPL-MCNC: 0.2 MG/DL (ref 0–1.2)
BUN SERPL-MCNC: 16 MG/DL (ref 6–20)
BUN/CREAT SERPL: 25 (ref 7–25)
CALCIUM SPEC-SCNC: 9.1 MG/DL (ref 8.6–10.5)
CHLORIDE SERPL-SCNC: 105 MMOL/L (ref 98–107)
CO2 SERPL-SCNC: 26.9 MMOL/L (ref 22–29)
CREAT SERPL-MCNC: 0.64 MG/DL (ref 0.57–1)
CRP SERPL-MCNC: <0.3 MG/DL (ref 0–0.5)
ERYTHROCYTE [SEDIMENTATION RATE] IN BLOOD: 14 MM/HR (ref 0–30)
GFR SERPL CREATININE-BSD FRML MDRD: 97 ML/MIN/1.73
GLOBULIN UR ELPH-MCNC: 2.5 GM/DL
GLUCOSE SERPL-MCNC: 93 MG/DL (ref 65–99)
POTASSIUM SERPL-SCNC: 4.1 MMOL/L (ref 3.5–5.2)
PROT SERPL-MCNC: 6.7 G/DL (ref 6–8.5)
SODIUM SERPL-SCNC: 142 MMOL/L (ref 136–145)

## 2021-10-22 ENCOUNTER — CLINICAL SUPPORT NO REQUIREMENTS (OUTPATIENT)
Dept: PULMONOLOGY | Facility: CLINIC | Age: 56
End: 2021-10-22

## 2021-10-22 DIAGNOSIS — Z01.812 BLOOD TESTS PRIOR TO TREATMENT OR PROCEDURE: Primary | ICD-10-CM

## 2021-10-22 PROCEDURE — U0004 COV-19 TEST NON-CDC HGH THRU: HCPCS | Performed by: NURSE PRACTITIONER

## 2021-10-22 PROCEDURE — 99211 OFF/OP EST MAY X REQ PHY/QHP: CPT | Performed by: NURSE PRACTITIONER

## 2021-10-23 LAB — SARS-COV-2 RNA NOSE QL NAA+PROBE: NOT DETECTED

## 2021-10-25 ENCOUNTER — OFFICE VISIT (OUTPATIENT)
Dept: PULMONOLOGY | Facility: CLINIC | Age: 56
End: 2021-10-25

## 2021-10-25 ENCOUNTER — LAB (OUTPATIENT)
Dept: LAB | Facility: HOSPITAL | Age: 56
End: 2021-10-25

## 2021-10-25 VITALS
WEIGHT: 119.1 LBS | DIASTOLIC BLOOD PRESSURE: 70 MMHG | HEART RATE: 75 BPM | TEMPERATURE: 97.5 F | SYSTOLIC BLOOD PRESSURE: 110 MMHG | BODY MASS INDEX: 20.33 KG/M2 | OXYGEN SATURATION: 99 % | HEIGHT: 64 IN

## 2021-10-25 DIAGNOSIS — R53.83 FATIGUE, UNSPECIFIED TYPE: ICD-10-CM

## 2021-10-25 DIAGNOSIS — K21.9 GERD WITHOUT ESOPHAGITIS: ICD-10-CM

## 2021-10-25 DIAGNOSIS — J30.9 ALLERGIC RHINITIS, UNSPECIFIED SEASONALITY, UNSPECIFIED TRIGGER: ICD-10-CM

## 2021-10-25 DIAGNOSIS — Z01.812 BLOOD TESTS PRIOR TO TREATMENT OR PROCEDURE: Primary | ICD-10-CM

## 2021-10-25 DIAGNOSIS — R07.89 CHEST PAIN, ATYPICAL: ICD-10-CM

## 2021-10-25 DIAGNOSIS — U07.1 COVID-19 VIRUS INFECTION: ICD-10-CM

## 2021-10-25 DIAGNOSIS — R13.10 DYSPHAGIA, UNSPECIFIED TYPE: Primary | ICD-10-CM

## 2021-10-25 PROCEDURE — 94726 PLETHYSMOGRAPHY LUNG VOLUMES: CPT | Performed by: NURSE PRACTITIONER

## 2021-10-25 PROCEDURE — 86003 ALLG SPEC IGE CRUDE XTRC EA: CPT

## 2021-10-25 PROCEDURE — 94375 RESPIRATORY FLOW VOLUME LOOP: CPT | Performed by: NURSE PRACTITIONER

## 2021-10-25 PROCEDURE — 86256 FLUORESCENT ANTIBODY TITER: CPT

## 2021-10-25 PROCEDURE — 86331 IMMUNODIFFUSION OUCHTERLONY: CPT

## 2021-10-25 PROCEDURE — 82104 ALPHA-1-ANTITRYPSIN PHENO: CPT

## 2021-10-25 PROCEDURE — 86738 MYCOPLASMA ANTIBODY: CPT

## 2021-10-25 PROCEDURE — 99215 OFFICE O/P EST HI 40 MIN: CPT | Performed by: NURSE PRACTITIONER

## 2021-10-25 PROCEDURE — 84443 ASSAY THYROID STIM HORMONE: CPT

## 2021-10-25 PROCEDURE — 86606 ASPERGILLUS ANTIBODY: CPT

## 2021-10-25 PROCEDURE — 86609 BACTERIUM ANTIBODY: CPT

## 2021-10-25 PROCEDURE — 82785 ASSAY OF IGE: CPT

## 2021-10-25 PROCEDURE — 82103 ALPHA-1-ANTITRYPSIN TOTAL: CPT

## 2021-10-25 PROCEDURE — 83520 IMMUNOASSAY QUANT NOS NONAB: CPT

## 2021-10-25 PROCEDURE — 82164 ANGIOTENSIN I ENZYME TEST: CPT

## 2021-10-25 PROCEDURE — 86431 RHEUMATOID FACTOR QUANT: CPT

## 2021-10-25 PROCEDURE — 86671 FUNGUS NES ANTIBODY: CPT

## 2021-10-25 PROCEDURE — 86602 ANTINOMYCES ANTIBODY: CPT

## 2021-10-25 PROCEDURE — 86038 ANTINUCLEAR ANTIBODIES: CPT

## 2021-10-25 PROCEDURE — 36415 COLL VENOUS BLD VENIPUNCTURE: CPT

## 2021-10-25 PROCEDURE — 94729 DIFFUSING CAPACITY: CPT | Performed by: NURSE PRACTITIONER

## 2021-10-26 PROBLEM — R07.89 CHEST PAIN, ATYPICAL: Status: ACTIVE | Noted: 2021-10-26

## 2021-10-26 PROBLEM — K21.9 GERD WITHOUT ESOPHAGITIS: Status: ACTIVE | Noted: 2021-10-26

## 2021-10-26 LAB
CHROMATIN AB SERPL-ACNC: <10 IU/ML (ref 0–14)
TSH SERPL DL<=0.05 MIU/L-ACNC: 2.66 UIU/ML (ref 0.27–4.2)

## 2021-10-26 NOTE — PROGRESS NOTES
[facility] Pulmonary Initial Evaluation    CHIEF COMPLAINT    Chest discomfort/pulmonary nodules    Referred by:  Osei Forrest MD    HISTORY OF PRESENT ILLNESS    Desire Hernandez is a 56 y.o.female here today for an initial evaluation.  She states that she contacted the COVID-19 virus in July.  She not require any hospitalization or medication for treatment.  She states since that time she has felt poorly.  She has had intermittent chest discomfort under her left breast and has moved to midsternal at times.  She was treated with a round of steroids in August and this helped some she was also started on diclofenac after the prednisone taper.  She then presented to her PCP and was prescribed Celebrex for the midsternal pain.  This did not help at all.  She was also started on Nexium but only took this for 2 days.      She had a CT of the chest performed in early September that showed a small to moderate pericardial effusion and a small bilateral pleural effusions.  She also was found to have multiple pulmonary nodules.  She had an echocardiogram performed after the CT scan was completed that showed no effusion.  She was referred to our office for evaluation.  She then presented to the [facility] ED on 9/29 and had a CTA of the chest with results below.  This was negative for PE.    She denies any childhood breathing difficulties.  She denies any asthma as a child.  She denies any recurrent bronchitis or pneumonia.  She denies any chemical or environmental exposures in the past.  She denies any exposure to TB.  She denies any first-degree relatives with lung cancer.    She is a lifetime non-smoker.  She did occasionally smoke marijuana when she was younger.    She does have seasonal allergies and will take over-the-counter medication as needed.  She had been allergy tested in the past and was told that she was allergic to dust mites and cedar.  She never required allergy injections.    She denies any  breathing difficulties.  She denies any cough.  She states that she is just tender in her midsternal area.  She is currently taking diclofenac daily and states that this does help her pain in her chest area but it is caused her to have some stomach pains.    She states that she did have difficulty sleeping at night when she first contacted COVID but is now able to sleep flat.  She does not sleep that well.    She has also noticed that she has had more vertigo over the last couple of weeks.  She is also losing her hair.    She denies any arthritic pains or skin rashes.    She denies any overt reflux symptoms.  She does occasionally have some choking with foods or liquids.    She denies fever, chills, sputum production, hemoptysis, night sweats, weight loss, chest pain or palpitations.  She denies any lower extremity edema or calf tenderness.  She denies any sinus or allergy symptoms currently.  She denies reflux symptoms currently.    She has yet to receive her influenza vaccination.    She was very active prior to the Covid virus in July but now currently is only walking her dog with no extra exercise at this time.    Patient Active Problem List   Diagnosis   • Menopausal symptoms   • Left breast mass   • Adhesive capsulitis of right shoulder   • Status post arthroscopy of shoulder, extensive debridement glenohumeral and subacromial spaces, DOS 1/13/2020   • Thoracic disc disease   • Subacromial impingement of right shoulder   • Encounter for screening colonoscopy   • Dyspepsia   • Adhesive capsulitis of left shoulder   • Tendinopathy of left rotator cuff   • COVID-19 virus infection   • Chest pain, atypical   • GERD without esophagitis       No Known Allergies    Current Outpatient Medications:   •  diclofenac (VOLTAREN) 50 MG EC tablet, Take 50 mg by mouth 3 (Three) Times a Day., Disp: , Rfl:   •  PARoxetine (Paxil) 10 MG tablet, Take 1 tablet by mouth Every Morning., Disp: 90 tablet, Rfl: 3  MEDICATION LIST AND  "ALLERGIES REVIEWED.    Social History     Tobacco Use   • Smoking status: Never Smoker   • Smokeless tobacco: Never Used   Vaping Use   • Vaping Use: Never used   Substance Use Topics   • Alcohol use: Yes     Alcohol/week: 1.0 standard drink     Types: 1 Glasses of wine per week     Comment: 2 drinks per week    • Drug use: Not Currently     Types: Marijuana       FAMILY AND SOCIAL HISTORY REVIEWED.    Review of Systems   Constitutional: Positive for activity change and fatigue. Negative for appetite change, fever and unexpected weight change.   HENT: Negative for congestion, postnasal drip, rhinorrhea, sinus pressure, sore throat and voice change.    Eyes: Negative for visual disturbance.   Respiratory: Positive for chest tightness. Negative for cough, shortness of breath and wheezing.    Cardiovascular: Positive for chest pain. Negative for palpitations and leg swelling.   Gastrointestinal: Positive for abdominal pain and nausea. Negative for abdominal distention and vomiting.   Endocrine: Negative for cold intolerance and heat intolerance.   Genitourinary: Negative for difficulty urinating and urgency.   Musculoskeletal: Negative for arthralgias, back pain and neck pain.   Skin: Negative for color change and pallor.   Allergic/Immunologic: Negative for environmental allergies and food allergies.   Neurological: Positive for dizziness. Negative for syncope, weakness and light-headedness.   Hematological: Negative for adenopathy. Does not bruise/bleed easily.   Psychiatric/Behavioral: Positive for sleep disturbance. Negative for agitation and behavioral problems. The patient is nervous/anxious.    .    /70   Pulse 75   Temp 97.5 °F (36.4 °C)   Ht 162.6 cm (64\")   Wt 54 kg (119 lb 1.6 oz)   SpO2 99%   BMI 20.44 kg/m²     Immunization History   Administered Date(s) Administered   • Influenza, Unspecified 10/01/2020   • Tdap 09/29/2017       Physical Exam  Vitals and nursing note reviewed. "   Constitutional:       Appearance: She is well-developed. She is not diaphoretic.   HENT:      Head: Normocephalic and atraumatic.   Eyes:      Pupils: Pupils are equal, round, and reactive to light.   Neck:      Thyroid: No thyromegaly.   Cardiovascular:      Rate and Rhythm: Normal rate and regular rhythm.      Heart sounds: Normal heart sounds. No murmur heard.  No friction rub. No gallop.    Pulmonary:      Effort: Pulmonary effort is normal. No respiratory distress.      Breath sounds: Normal breath sounds. No wheezing or rales.   Chest:      Chest wall: No tenderness.   Abdominal:      General: Bowel sounds are normal.      Palpations: Abdomen is soft.      Tenderness: There is no abdominal tenderness.   Musculoskeletal:         General: No swelling. Normal range of motion.      Cervical back: Normal range of motion and neck supple.   Lymphadenopathy:      Cervical: No cervical adenopathy.   Skin:     General: Skin is warm and dry.      Capillary Refill: Capillary refill takes less than 2 seconds.   Neurological:      Mental Status: She is alert and oriented to person, place, and time.   Psychiatric:         Mood and Affect: Mood normal.         Behavior: Behavior normal.           RESULTS    PFTS in the office today, read by me, FVC 3.23 96% predicted, FEV1 2.34 88% predicted, FEV1/FVC 73% predicted, TLC 4.62 95% predicted, DLCO 89% predicted, no obstruction, no restriction and reduced DLCO.    Adult Transthoracic Echo Complete W/ Cont if Necessary Per Protocol    Addendum Date: 9/21/2021    · Estimated left ventricular EF = 60% Left ventricular systolic function is normal. · LVOT turbulence without gradient. · No effusion.      CT Chest Without Contrast Diagnostic    Result Date: 9/10/2021  1.  Mild cardiomegaly with small to moderate pericardial effusion. 2.  Small bilateral pleural effusions and basilar atelectasis, greater on the left. Bandlike pulmonary scarring in both lung bases. 3.  No rib fracture  or other chest wall lesion. 4.  Multifocal pulmonary and pleural nodularity and mildly enlarged, faintly calcified mediastinal and hilar lymph nodes. Benign granulomatous lung disease is favored, although metastatic disease is not entirely excluded. Clinical and laboratory correlation is recommended. Consider short interval follow-up chest CT in 2-3 months. Signer Name: Austin Bullard MD  Signed: 9/10/2021 8:24 AM  Workstation Name: [id number]  [facility]    XR Chest 1 View    Result Date: 9/29/2021  No evidence of acute disease in the chest.  This report was finalized on 9/29/2021 5:42 PM by Alvarez Givens.      CT Angiogram Chest    Result Date: 9/29/2021  1.  No evidence of PE.  2. Mediastinal lymph nodes and pulmonary nodules that are similar to prior study. This is suggestive of an underlying process that may be neoplastic. Clinical correlation and close follow-up necessary. Malignancy cannot be excluded. 3. Persistent Bilateral pleural effusions, pericardial effusion Signer Name: Maryann Conde MD  Signed: 9/29/2021 7:33 PM  Workstation Name: [id number]  [facility]    PROBLEM LIST    Problem List Items Addressed This Visit        Gastrointestinal Abdominal     GERD without esophagitis       Symptoms and Signs    Chest pain, atypical       Other    COVID-19 virus infection    Overview     July 2021           Other Visit Diagnoses     Dysphagia, unspecified type    -  Primary    Relevant Orders    FL esophagram complete    Pulmonary Function Test (Completed)    Fatigue, unspecified type        Relevant Orders    Hypersensitivity Pneumonitis Profile    VENICE Screen    Rheumatoid Factor (Completed)    ANCA Panel    Angiotensin Converting Enzyme    Hypersensitivity Pnuemonitis Profile    Aspergillus Antibodies    Aspergillus Fumigatus IgE    Mycoplasma Pneu. IgG / IgM Antibodies    Alpha - 1 - Antitrypsin Phenotype    TSH (Completed)    Pulmonary Function Test  (Completed)    Allergic rhinitis, unspecified seasonality, unspecified trigger        Relevant Medications    diclofenac (VOLTAREN) 50 MG EC tablet    Other Relevant Orders    Allergens, Zone 8    IgE Level    Ambulatory Referral to Cardiology            DISCUSSION    Ms. Chapman was here for an initial evaluation of her atypical chest pain.  We did review her full PFTs in the office today and she has no obstruction or restriction.  She does not meet any qualifications for inhaler therapy at this time.      We also reviewed her CT of the chest that was performed on 9/9 and 9/29 that does show multiple pulmonary nodules and mildly enlarged mediastinal lymphadenopathy.  She does have a follow-up CT scan scheduled on 11/5.  I did advise her that the largest pulmonary nodule was 7 mm and according to the Fleischner Society 2017 recommendations it is recommended to have a repeat CT scan in 3 months for follow-up.  She is considered low risk as she is a non-smoker and has no family history of lung cancer.  I do suspect that some of the mediastinal lymphadenopathy is related to her Covid 19 virus in July.    I will draw some lab work to rule out any type of infectious causes of her pulmonary nodules.    I do suspect that she may have some silent GERD and does need to resume Nexium every day until her next appointment.  She does have plenty of medication at home and did not need a refill.  We also discussed strict reflux precautions such as elevating the head of the bed at night, not eating to 3 hours before bed and avoiding foods that trigger reflux symptoms.  She is also agreeable to have an esophagram performed to rule out any swallowing difficulties or GERD.    I will also refer her to cardiology for this atypical chest pain as at this point there is no obvious reason for her pain from a pulmonary standpoint.  She does have a pericardial effusion present on her CTA that was performed on 929 however a echocardiogram  that was performed on 921 shows no pericardial effusion.  She is agreeable to have the referral and wanted to pursue this in the office today.    She also has felt like she is more anxious since having these intermittent chest pains and with the hair loss.  I will also check a TSH to rule out any thyroid disorder.    I did advise her that she could resume physical activity and encouraged her to do some daily exercise.    She states that she is also had some difficulty eating and no appetite and I did encourage her to try to eat small frequent meals.    She will follow up in 3 to 4 weeks with an MD in this office or sooner if her symptoms worsen.  I did advise her to call with any additional concerns or questions.  I will notify her of any abnormal results of her lab work or her esophagram once I receive them.    Level of service justified based on 49 minutes spent in patient care on this date of service including, but not limited to: preparing to see the patient, obtaining and/or reviewing history, performing medically appropriate examination, ordering tests/medicine/procedures, independently interpreting results, documenting clinical information in EHR, and counseling/education of patient/family/caregiver. (Level 4 30-39 minutes; Level 5 40-54 minutes)      ONOFRE Zelaya  10/25/[id number]:25 EDT  Electronically signed     Please note that portions of this note were completed with a voice recognition program. Efforts were made to edit the dictations, but occasionally words are mistranscribed.      CC: Alejandra Saez APRN

## 2021-10-27 LAB
ACE SERPL-CCNC: 51 U/L (ref 14–82)
ANA SER QL: NEGATIVE
M PNEUMO IGG SER IA-ACNC: 268 U/ML (ref 0–99)
M PNEUMO IGM SER IA-ACNC: 801 U/ML (ref 0–769)

## 2021-10-28 LAB
A1AT PHENOTYP SERPL IFE: NORMAL
A1AT SERPL-MCNC: 170 MG/DL (ref 101–187)

## 2021-10-28 RX ORDER — PREGABALIN 150 MG/1
150 CAPSULE ORAL ONCE
Status: CANCELLED | OUTPATIENT
Start: 2021-10-28 | End: 2021-10-28

## 2021-10-28 RX ORDER — MELOXICAM 7.5 MG/1
15 TABLET ORAL ONCE
Status: CANCELLED | OUTPATIENT
Start: 2021-10-28 | End: 2021-10-28

## 2021-10-28 RX ORDER — ACETAMINOPHEN 325 MG/1
1000 TABLET ORAL ONCE
Status: CANCELLED | OUTPATIENT
Start: 2021-10-28 | End: 2021-10-28

## 2021-10-29 LAB
A ALTERNATA IGE QN: <0.1 KU/L
A FLAVUS AB SER QL ID: NORMAL
A FUMIGATUS AB SER QL ID: NORMAL
A FUMIGATUS IGE QN: <0.1 KU/L
A NIGER AB SER QL ID: NORMAL
AMER ROACH IGE QN: <0.1 KU/L
BAHIA GRASS IGE QN: <0.1 KU/L
BERMUDA GRASS IGE QN: <0.1 KU/L
BOXELDER IGE QN: <0.1 KU/L
C HERBARUM IGE QN: <0.1 KU/L
C-ANCA TITR SER IF: ABNORMAL TITER
CAT DANDER IGE QN: <0.1 KU/L
CMN PIGWEED IGE QN: <0.1 KU/L
COMMON RAGWEED IGE QN: <0.1 KU/L
CONV CLASS DESCRIPTION: ABNORMAL
D FARINAE IGE QN: <0.1 KU/L
D PTERONYSS IGE QN: <0.1 KU/L
DOG DANDER IGE QN: <0.1 KU/L
ENGL PLANTAIN IGE QN: <0.1 KU/L
HAZELNUT POLN IGE QN: <0.1 KU/L
JOHNSON GRASS IGE QN: <0.1 KU/L
KENT BLUE GRASS IGE QN: <0.1 KU/L
LONDON PLANE IGE QN: <0.1 KU/L
M RACEMOSUS IGE QN: <0.1 KU/L
MT JUNIPER IGE QN: 5.12 KU/L
MUGWORT IGE QN: <0.1 KU/L
MYELOPEROXIDASE AB SER IA-ACNC: <9 U/ML (ref 0–9)
NETTLE IGE QN: <0.1 KU/L
P NOTATUM IGE QN: <0.1 KU/L
P-ANCA ATYPICAL TITR SER IF: ABNORMAL TITER
P-ANCA TITR SER IF: ABNORMAL TITER
PROTEINASE3 AB SER IA-ACNC: 5.5 U/ML (ref 0–3.5)
S BOTRYOSUM IGE QN: <0.1 KU/L
SHEEP SORREL IGE QN: <0.1 KU/L
SWEET GUM IGE QN: <0.1 KU/L
WHITE ELM IGE QN: <0.1 KU/L
WHITE HICKORY IGE QN: <0.1 KU/L
WHITE MULBERRY IGE QN: <0.1 KU/L
WHITE OAK IGE QN: <0.1 KU/L

## 2021-10-30 LAB
A FUMIGATUS IGE QN: <0.1 KU/L
IGE SERPL-ACNC: 22 IU/ML (ref 6–495)

## 2021-11-01 ENCOUNTER — TELEPHONE (OUTPATIENT)
Dept: PULMONOLOGY | Facility: CLINIC | Age: 56
End: 2021-11-01

## 2021-11-01 NOTE — TELEPHONE ENCOUNTER
Pt called today requesting a call back @ [phone] with her most recent lab results that was done on 10/25. Pt also is scheduled for a Chest CT scheduled on 11/5 but her insurance has denied this.

## 2021-11-02 ENCOUNTER — APPOINTMENT (OUTPATIENT)
Dept: PREADMISSION TESTING | Facility: HOSPITAL | Age: 56
End: 2021-11-02

## 2021-11-02 DIAGNOSIS — Z01.812 BLOOD TESTS PRIOR TO TREATMENT OR PROCEDURE: ICD-10-CM

## 2021-11-02 DIAGNOSIS — R06.02 SHORTNESS OF BREATH: Primary | ICD-10-CM

## 2021-11-02 DIAGNOSIS — M75.02 ADHESIVE CAPSULITIS OF LEFT SHOULDER: ICD-10-CM

## 2021-11-02 LAB — SARS-COV-2 RNA PNL SPEC NAA+PROBE: NOT DETECTED

## 2021-11-02 PROCEDURE — U0004 COV-19 TEST NON-CDC HGH THRU: HCPCS | Performed by: NURSE PRACTITIONER

## 2021-11-04 LAB
A FUMIGATUS IGG SER QL: NEGATIVE
A PULLULANS AB SER QL: NEGATIVE
LACEYELLA SACCHARI AB SER QL: NEGATIVE
PIGEON SERUM AB QL ID: NEGATIVE
S RECTIVIRGULA AB SER QL ID: NEGATIVE
T VULGARIS AB SER QL ID: NEGATIVE

## 2021-11-05 ENCOUNTER — HOSPITAL ENCOUNTER (OUTPATIENT)
Dept: GENERAL RADIOLOGY | Facility: HOSPITAL | Age: 56
Discharge: HOME | End: 2021-11-05
Admitting: NURSE PRACTITIONER

## 2021-11-05 ENCOUNTER — TRANSCRIBE ORDERS (OUTPATIENT)
Dept: PULMONOLOGY | Facility: CLINIC | Age: 56
End: 2021-11-05

## 2021-11-05 ENCOUNTER — TELEPHONE (OUTPATIENT)
Dept: PULMONOLOGY | Facility: CLINIC | Age: 56
End: 2021-11-05

## 2021-11-05 ENCOUNTER — HOSPITAL ENCOUNTER (OUTPATIENT)
Dept: CT IMAGING | Facility: HOSPITAL | Age: 56
End: 2021-11-05

## 2021-11-05 DIAGNOSIS — J40 BRONCHITIS: Primary | ICD-10-CM

## 2021-11-05 DIAGNOSIS — R13.10 DYSPHAGIA, UNSPECIFIED TYPE: ICD-10-CM

## 2021-11-05 PROCEDURE — 74220 X-RAY XM ESOPHAGUS 1CNTRST: CPT

## 2021-11-05 RX ORDER — AMOXICILLIN AND CLAVULANATE POTASSIUM 875; 125 MG/1; MG/1
1 TABLET, FILM COATED ORAL 2 TIMES DAILY
Qty: 10 TABLET | Refills: 0 | Status: SHIPPED | OUTPATIENT
Start: 2021-11-05 | End: 2021-11-18

## 2021-11-05 RX ADMIN — BARIUM SULFATE 183 ML: 960 POWDER, FOR SUSPENSION ORAL at 13:28

## 2021-11-05 NOTE — TELEPHONE ENCOUNTER
Pt called stating that she was offered abx last time she spoke with you and declined but now is requesting for this to be sent to [facility].

## 2021-11-08 ENCOUNTER — OFFICE VISIT (OUTPATIENT)
Dept: CARDIOLOGY | Facility: CLINIC | Age: 56
End: 2021-11-08

## 2021-11-08 VITALS
SYSTOLIC BLOOD PRESSURE: 114 MMHG | HEART RATE: 73 BPM | WEIGHT: 121 LBS | BODY MASS INDEX: 20.66 KG/M2 | DIASTOLIC BLOOD PRESSURE: 70 MMHG | OXYGEN SATURATION: 98 % | HEIGHT: 64 IN

## 2021-11-08 DIAGNOSIS — R07.2 PRECORDIAL CHEST PAIN: Primary | ICD-10-CM

## 2021-11-08 DIAGNOSIS — U09.9 POST-COVID SYNDROME: ICD-10-CM

## 2021-11-08 DIAGNOSIS — R00.2 PALPITATIONS: ICD-10-CM

## 2021-11-08 PROCEDURE — 99204 OFFICE O/P NEW MOD 45 MIN: CPT | Performed by: INTERNAL MEDICINE

## 2021-11-08 PROCEDURE — 93000 ELECTROCARDIOGRAM COMPLETE: CPT | Performed by: INTERNAL MEDICINE

## 2021-11-08 RX ORDER — ESOMEPRAZOLE MAGNESIUM 40 MG/1
40 CAPSULE, DELAYED RELEASE ORAL
COMMUNITY
End: 2021-11-18

## 2021-11-08 NOTE — PROGRESS NOTES
OFFICE VISIT  NOTE  [facility] CARDIOLOGY      Name: Desire Hernandez    Date: 2021  MRN:  [id number]  :  1965      REFERRING/PRIMARY PROVIDER:  Alejandra Saez APRN    Chief Complaint   Patient presents with   • Atypical Chest Pain       HPI: Desire Hernandez is a 56 y.o. female who presents today for chest pain.  She had COVID-19 infection 2021.  Had CT of the chest that showed moderate pericardial effusion .  She had follow-up echocardiogram  that did not show pericardial effusion.  She also had lung nodules and mediastinal lymph node enlargement on CT chest, will have repeat CT chest in 3 months per pulmonary.  Echo dated 2021 showed EF 60% with no pericardial effusion.  Chest pain is sharp, aching, left-sided, worse with deep inspiration or walking, she tried to go walk 20 to 30 minutes with a made chest pain worse.    Past Medical History:   Diagnosis Date   • ADHD (attention deficit hyperactivity disorder)     back in high school   • Allergic     seasonal   • ASCUS favor dysplasia     07; 08; 6/25/10; 12; 13; 8/21/15 (HPV negative)   • Blood type, Rh negative    • Bulging lumbar disc    • Bunion 2016    surgery   • Chlamydia     as a teen   • Diarrhea    • Dizziness    • Fatigue    • H/O nipple discharge     Bilateral diagnostic mammogram negative   • Hair loss    • HSV-1 (herpes simplex virus 1) infection    • HSV-2 (herpes simplex virus 2) infection    • LGSIL (low grade squamous intraepithelial dysplasia) 2006   • Menopause    • Nausea    • Ovarian cyst     Left   • Palpitations    • PONV (postoperative nausea and vomiting)    • Right shoulder pain     SCHEDULED FOR SX   • Sleep disturbance    • Syncope    • Tinnitus        Past Surgical History:   Procedure Laterality Date   • BUNIONECTOMY Right 2016    Dr. Durbin   • CERVICAL CONIZATION, LEEP     • COLPOSCOPY W/ BIOPSY / CURETTAGE  2006    benign   • D & C  WITH SUCTION  1998    10 week SAB   • ENDOMETRIAL ABLATION  Dec. 2017   • INDUCED       years ago   • [place] HYSTEROSCOPY,W/ENDOMETRIAL ABLATION N/A 2017    Procedure: HYSTEROSCOPY WITH ENDOMETRIAL ABLATION; d&c;  Surgeon: Sandie Hu DO;  Location: [facility];  Service: Obstetrics/Gynecology   • SHOULDER ARTHROSCOPY Right 2020    Procedure: Right shoulder manipulation under anesthesia with arthroscopy and extensive debridement of glenohumeral and subacromial spaces;  Surgeon: Stalin Liu MD;  Location: [facility];  Service: Orthopedics       Social History     Socioeconomic History   • Marital status:      Spouse name: Lawrence   • Number of children: 2   Tobacco Use   • Smoking status: Never Smoker   • Smokeless tobacco: Never Used   Vaping Use   • Vaping Use: Never used   Substance and Sexual Activity   • Alcohol use: Yes     Alcohol/week: 1.0 standard drink     Types: 1 Glasses of wine per week     Comment: 2 drinks per week    • Drug use: Not Currently     Types: Marijuana   • Sexual activity: Defer     Partners: Male     Birth control/protection: None       Family History   Problem Relation Age of Onset   • Hearing loss Father    • Hyperlipidemia Father         DX in his mid 50's   • Hypothyroidism Father    • Hypertension Father    • Hypothyroidism Mother    • Osteoarthritis Mother    • No Known Problems Brother    • Arthritis Sister         RA   • No Known Problems Son    • Hearing loss Maternal Grandmother    • Hyperlipidemia Maternal Grandmother         DX in her late 70's   • Stroke Maternal Grandmother    • Lung cancer Maternal Grandfather    • Cancer Maternal Grandfather         lung cancer - smoker   • Lung cancer Paternal Uncle    • Cancer Paternal Uncle         lung cancer - smoker   • Rheum arthritis Other    • Breast cancer Neg Hx    • Ovarian cancer Neg Hx    • Uterine cancer Neg Hx    • Colon cancer Neg Hx    • Melanoma Neg Hx    • Colon polyps Neg Hx   "       ROS:   Constitutional no fever,  no weight loss   Skin no rash, no subcutaneous nodules   Otolaryngeal no difficulty swallowing   Cardiovascular See HPI   Pulmonary no cough, no sputum production   Gastrointestinal no constipation, no diarrhea   Genitourinary no dysuria, no hematuria   Hematologic no easy bruisability, no abnormal bleeding   Musculoskeletal no muscle pain   Neurologic no dizziness, no falls         No Known Allergies      Current Outpatient Medications:   •  amoxicillin-clavulanate (Augmentin) 875-125 MG per tablet, Take 1 tablet by mouth 2 (Two) Times a Day., Disp: 10 tablet, Rfl: 0  •  esomeprazole (nexIUM) 40 MG capsule, Take 40 mg by mouth Every Morning Before Breakfast., Disp: , Rfl:   •  PARoxetine (Paxil) 10 MG tablet, Take 1 tablet by mouth Every Morning., Disp: 90 tablet, Rfl: 3    Vitals:    11/08/21 1255   BP: 114/70   BP Location: Right arm   Patient Position: Sitting   Cuff Size: Adult   Pulse: 73   SpO2: 98%   Weight: 54.9 kg (121 lb)   Height: 162.6 cm (64\")     Body mass index is 20.77 kg/m².    PHYSICAL EXAM:    General Appearance:   · well developed  · well nourished  HENT:   · oropharynx moist  · lips not cyanotic  Neck:  · thyroid not enlarged  · supple  Respiratory:  · no respiratory distress  · normal breath sounds  · no rales  Cardiovascular:  · no jugular venous distention  · regular rhythm  · apical impulse normal  · S1 normal, S2 normal  · no S3, no S4   · no murmur  · no rub, no thrill  · carotid pulses normal; no bruit  · lower extremity edema: none      Musculoskeletal:  · no clubbing of fingers.   · normocephalic, head atraumatic  Skin:   · warm, dry  Psychiatric:  · judgement and insight appropriate  · normal mood and affect    RESULTS:     ECG 12 Lead    Date/Time: 11/8/2021 1:26 PM  Performed by: Dru Hernandez MD  Authorized by: Dru Hernandez MD   Comparison: compared with previous ECG from 9/29/2021  Similar to previous ECG  Rhythm: sinus " rhythm  Rate: normal  QRS axis: normal    Clinical impression: abnormal EKG  Comments: Anterior T wave inversion.            Results for orders placed during the hospital encounter of 09/20/21    Adult Transthoracic Echo Complete W/ Cont if Necessary Per Protocol    Interpretation Summary  · Estimated left ventricular EF = 60% Left ventricular systolic function is normal.  · LVOT turbulence without gradient.  · No effusion.        Labs:  Lab Results   Component Value Date    TRIG 47 11/17/2020    HDL 98 (H) 11/17/2020    LDL 95 11/17/2020    AST 20 10/18/2021    ALT 24 10/18/2021     No results found for: HGBA1C  No components found for: CREATINININE  eGFR Non  Amer   Date Value Ref Range Status   10/18/2021 97 >60 mL/min/1.73 Final   09/29/2021 64 >60 mL/min/1.73 Final   09/14/2021 86 >60 mL/min/1.73 Final       Most recent PCP note, imaging tests, and labs reviewed.    ASSESSMENT:  Problem List Items Addressed This Visit     None      Visit Diagnoses     Precordial chest pain    -  Primary    Relevant Orders    Stress Test With Myocardial Perfusion One Day    Palpitations        Relevant Orders    Stress Test With Myocardial Perfusion One Day    Post-COVID syndrome              PLAN:    1.  Chest pain:  Given abnormal EKG and ongoing progressive symptoms worse with exertion, will proceed with exercise nuclear stress test.  The patient was advised to call 911 if chest pain worsens or persist despite rest.  If stress test is low risk, symptoms likely related to post Covid syndrome and pleurisy.    2.  Palpitations:  She gets dizzy and lightheaded especially bending over, advised patient increase water intake to 64 ounces daily.  If symptoms worsen we will proceed with Holter monitor.    3.  Post Covid syndrome:  Most of her complaints could be related to post Covid syndrome  She has follow-up with pulmonary  She had an abnormal CT of the chest, repeat CT pending        Return to clinic in 6 months, or sooner  as needed.    Thank you for the opportunity to share in the care of your patient; please do not hesitate to call me with any questions.     Dru Hernandez MD, [facility]  Office: [phone] [address] [place] [address]    11/08/21

## 2021-11-11 ENCOUNTER — HOSPITAL ENCOUNTER (OUTPATIENT)
Dept: CT IMAGING | Facility: HOSPITAL | Age: 56
Discharge: HOME | End: 2021-11-11
Admitting: NURSE PRACTITIONER

## 2021-11-11 DIAGNOSIS — R06.02 SHORTNESS OF BREATH: ICD-10-CM

## 2021-11-11 PROCEDURE — 71250 CT THORAX DX C-: CPT

## 2021-11-15 ENCOUNTER — TELEPHONE (OUTPATIENT)
Dept: CARDIOLOGY | Facility: CLINIC | Age: 56
End: 2021-11-15

## 2021-11-15 NOTE — TELEPHONE ENCOUNTER
Spoke with pt regarding stress results. Dr. Stalin Browne may need CC from us in the future they will call us if needed.

## 2021-11-15 NOTE — TELEPHONE ENCOUNTER
----- Message from Dru Hernandez MD sent at 11/15/2021  3:52 PM EST -----  Please inform the patient of their test results. Thank you.

## 2021-11-17 ENCOUNTER — OFFICE VISIT (OUTPATIENT)
Dept: PULMONOLOGY | Facility: CLINIC | Age: 56
End: 2021-11-17

## 2021-11-17 VITALS
BODY MASS INDEX: 21.07 KG/M2 | HEIGHT: 64 IN | WEIGHT: 123.4 LBS | SYSTOLIC BLOOD PRESSURE: 122 MMHG | OXYGEN SATURATION: 98 % | TEMPERATURE: 97.5 F | HEART RATE: 81 BPM | DIASTOLIC BLOOD PRESSURE: 78 MMHG

## 2021-11-17 DIAGNOSIS — R91.1 LUNG NODULE: Primary | ICD-10-CM

## 2021-11-17 PROCEDURE — 99214 OFFICE O/P EST MOD 30 MIN: CPT | Performed by: INTERNAL MEDICINE

## 2021-11-17 NOTE — PROGRESS NOTES
CC:    Follow up SOA    HPI:    55 y/o WF w/ h/o lifetime non-smoking, allergic rhinitis, who had COVID in 2021.   Was not hospitalized.  Has had persistent chest pain / SOA.   Previously seen by APRN in our clinic and has had some work-up.  Before that was very active.    Overall feeling better in terms of breathing, atypical chest pain, etc but still not 100% normal.  Nexium has helped some.    PMH:    Past Medical History:   Diagnosis Date   • ADHD (attention deficit hyperactivity disorder)     back in high school   • Allergic     seasonal   • ASCUS favor dysplasia     07; 08; 6/25/10; 12; 13; 8/21/15 (HPV negative)   • Blood type, Rh negative    • Bulging lumbar disc    • Bunion     surgery   • Chlamydia     as a teen   • Diarrhea    • Dizziness    • Fatigue    • H/O nipple discharge     Bilateral diagnostic mammogram negative   • Hair loss    • HSV-1 (herpes simplex virus 1) infection    • HSV-2 (herpes simplex virus 2) infection    • LGSIL (low grade squamous intraepithelial dysplasia) 2006   • Menopause    • Nausea    • Ovarian cyst     Left   • Palpitations    • PONV (postoperative nausea and vomiting)    • Right shoulder pain     SCHEDULED FOR SX   • Sleep disturbance    • Syncope    • Tinnitus      PSH:    Past Surgical History:   Procedure Laterality Date   • BUNIONECTOMY Right 2016    Dr. Durbin   • CERVICAL CONIZATION, LEEP     • COLPOSCOPY W/ BIOPSY / CURETTAGE  2006    benign   • D & C WITH SUCTION  1998    10 week SAB   • ENDOMETRIAL ABLATION  Dec. 2017   • INDUCED       years ago   • [place] HYSTEROSCOPY,W/ENDOMETRIAL ABLATION N/A 2017    Procedure: HYSTEROSCOPY WITH ENDOMETRIAL ABLATION; d&c;  Surgeon: Sandie Hu DO;  Location: [facility];  Service: Obstetrics/Gynecology   • SHOULDER ARTHROSCOPY Right 2020    Procedure: Right shoulder manipulation under anesthesia with arthroscopy and extensive debridement of glenohumeral  and subacromial spaces;  Surgeon: Stalin Liu MD;  [facility];  Service: Orthopedics     FH:    Family History   Problem Relation Age of Onset   • Hearing loss Father    • Hyperlipidemia Father         DX in his mid 50's   • Hypothyroidism Father    • Hypertension Father    • Hypothyroidism Mother    • Osteoarthritis Mother    • No Known Problems Brother    • Arthritis Sister         RA   • No Known Problems Son    • Hearing loss Maternal Grandmother    • Hyperlipidemia Maternal Grandmother         DX in her late 70's   • Stroke Maternal Grandmother    • Lung cancer Maternal Grandfather    • Cancer Maternal Grandfather         lung cancer - smoker   • Lung cancer Paternal Uncle    • Cancer Paternal Uncle         lung cancer - smoker   • Rheum arthritis Other    • Breast cancer Neg Hx    • Ovarian cancer Neg Hx    • Uterine cancer Neg Hx    • Colon cancer Neg Hx    • Melanoma Neg Hx    • Colon polyps Neg Hx      SH:    Social History     Socioeconomic History   • Marital status:      Spouse name: Lawrence   • Number of children: 2   Tobacco Use   • Smoking status: Never Smoker   • Smokeless tobacco: Never Used   Vaping Use   • Vaping Use: Never used   Substance and Sexual Activity   • Alcohol use: Yes     Alcohol/week: 1.0 standard drink     Types: 1 Glasses of wine per week     Comment: 2 drinks per week    • Drug use: Not Currently     Types: Marijuana   • Sexual activity: Defer     Partners: Male     Birth control/protection: None     ALLERGIES:    No Known Allergies  MEDICATIONS:      Current Outpatient Medications:   •  esomeprazole (nexIUM) 40 MG capsule, Take 40 mg by mouth Every Morning Before Breakfast., Disp: , Rfl:   •  PARoxetine (Paxil) 10 MG tablet, Take 1 tablet by mouth Every Morning., Disp: 90 tablet, Rfl: 3  •  amoxicillin-clavulanate (Augmentin) 875-125 MG per tablet, Take 1 tablet by mouth 2 (Two) Times a Day., Disp: 10 tablet, Rfl: 0  ROS:  Per HPI, otherwise all systems  reviewed and negative.    DIAGNOSTIC DATA (Reviewed and interpreted by me unless otherwise specified):    CT Chest 11/11/21 & 9/29/21 - stable borderline mediastinal LAD and scattered sub-centimeter smooth nodules in bronchovascular distribution.  Resolved small pleural and pericardial effusions.  Overall suggestive of old granulomatous disease or sarcoid.  Malignancy much less likely given appearance and overall stability.    PFT 10/25/21 - no obstruction, no restriction, normal DLCO, small airway dysfunction    Vitals:    11/17/21 1329   BP: 122/78   Pulse: 81   Temp: 97.5 °F (36.4 °C)   SpO2: 98%       Physical Exam   Constitutional: Oriented to person, place, and time. Appears well-developed and well-nourished.   Head: Normocephalic and atraumatic.   Nose: Nose normal.   Mouth/Throat: Oropharynx is clear and moist.   Eyes: Conjunctivae are normal.  Pupils normal.  Neck: No tracheal deviation present.   Cardiovascular: Normal rate, regular rhythm, normal heart sounds and intact distal pulses.  Exam reveals no gallop and no friction rub.  No thrill.  No JVD.  No edema.  No murmur heard.  Pulmonary/Chest: Effort normal and breath sounds normal.  No tenderness to palpation.  No clubbing.   Abdominal: Soft. Bowel sounds are normal. No distension. No tenderness. There is no guarding.   Musculoskeletal: Normal range of motion.  No tenderness.  Lymphadenopathy:  No cervical adenopathy.   Neurological:  No new focal neurological deficits observed   Skin: Skin is warm and dry. No rash noted.   Psychiatric: Normal mood and affect.  Behavior is normal. Judgment normal.    Assessment/Plan     1)  COVID 19 July 2021 - not hospitalized  2)  Dyspnea  3)  Atypical Chest Pain  4)  Allergic Rhinitis    Clinically patient is slowly improving since having COVID in July without much intervention.  She did have some reflux on barium esophagram and some of the reflux / chest pain has resolved, but not completely.  Still with some  residual chest discomfort / dyspnea.  Stress test and Echo unremarkeable.  Suspect she may have some post viral RADS / asthma.  Start Breo 100.      On labs inflammatory markers improved.   Mycoplasma IgM likely a false positive.  Isolated mild increase in PR3 without any other signs / symptoms of vasculitis of unclear significance.  ANCA and MPO were negative.    For lung nodules, my suspicion is that they are unrelated and likely have been present for some time, however, no old CT for comparison.  Will repeat in 6 months.  If any growth or patients symptoms worsen we could consider EBUS.    RTC 6 months w/ Full PFT and CT Chest w/o Contrast    Kentrell Steele MD  Pulmonology and Critical Care Medicine  11/17/21 14:00 EST  Electronically Signed    C.C.:  No ref. provider found, Alejandra Saez, ONOFRE

## 2021-11-18 ENCOUNTER — OFFICE VISIT (OUTPATIENT)
Dept: INTERNAL MEDICINE | Facility: CLINIC | Age: 56
End: 2021-11-18

## 2021-11-18 VITALS
BODY MASS INDEX: 21.08 KG/M2 | SYSTOLIC BLOOD PRESSURE: 104 MMHG | TEMPERATURE: 96.8 F | OXYGEN SATURATION: 98 % | DIASTOLIC BLOOD PRESSURE: 70 MMHG | RESPIRATION RATE: 16 BRPM | WEIGHT: 123.46 LBS | HEART RATE: 65 BPM | HEIGHT: 64 IN

## 2021-11-18 DIAGNOSIS — Z23 NEED FOR INFLUENZA VACCINATION: ICD-10-CM

## 2021-11-18 DIAGNOSIS — U09.9 POST-COVID SYNDROME: Primary | ICD-10-CM

## 2021-11-18 DIAGNOSIS — R07.81 PLEURITIC CHEST PAIN: ICD-10-CM

## 2021-11-18 DIAGNOSIS — F41.8 DEPRESSION WITH ANXIETY: ICD-10-CM

## 2021-11-18 PROCEDURE — 90686 IIV4 VACC NO PRSV 0.5 ML IM: CPT | Performed by: NURSE PRACTITIONER

## 2021-11-18 PROCEDURE — 90471 IMMUNIZATION ADMIN: CPT | Performed by: NURSE PRACTITIONER

## 2021-11-18 PROCEDURE — 99214 OFFICE O/P EST MOD 30 MIN: CPT | Performed by: NURSE PRACTITIONER

## 2021-11-30 ENCOUNTER — PRE-ADMISSION TESTING (OUTPATIENT)
Dept: PREADMISSION TESTING | Facility: HOSPITAL | Age: 56
End: 2021-11-30

## 2021-11-30 VITALS
WEIGHT: 124.4 LBS | RESPIRATION RATE: 14 BRPM | DIASTOLIC BLOOD PRESSURE: 83 MMHG | SYSTOLIC BLOOD PRESSURE: 126 MMHG | BODY MASS INDEX: 21.24 KG/M2 | HEART RATE: 87 BPM | HEIGHT: 64 IN | OXYGEN SATURATION: 94 %

## 2021-11-30 DIAGNOSIS — M75.02 ADHESIVE CAPSULITIS OF LEFT SHOULDER: ICD-10-CM

## 2021-11-30 LAB
ANION GAP SERPL CALCULATED.3IONS-SCNC: 8 MMOL/L (ref 5–15)
BASOPHILS # BLD AUTO: 0.04 10*3/MM3 (ref 0–0.2)
BASOPHILS NFR BLD AUTO: 0.6 % (ref 0–1.5)
BUN SERPL-MCNC: 18 MG/DL (ref 6–20)
BUN/CREAT SERPL: 20.5 (ref 7–25)
CALCIUM SPEC-SCNC: 9.6 MG/DL (ref 8.6–10.5)
CHLORIDE SERPL-SCNC: 102 MMOL/L (ref 98–107)
CO2 SERPL-SCNC: 30 MMOL/L (ref 22–29)
CREAT SERPL-MCNC: 0.88 MG/DL (ref 0.57–1)
DEPRECATED RDW RBC AUTO: 48.5 FL (ref 37–54)
EOSINOPHIL # BLD AUTO: 0.12 10*3/MM3 (ref 0–0.4)
EOSINOPHIL NFR BLD AUTO: 1.8 % (ref 0.3–6.2)
ERYTHROCYTE [DISTWIDTH] IN BLOOD BY AUTOMATED COUNT: 14.4 % (ref 12.3–15.4)
GFR SERPL CREATININE-BSD FRML MDRD: 67 ML/MIN/1.73
GLUCOSE SERPL-MCNC: 93 MG/DL (ref 65–99)
HCT VFR BLD AUTO: 42.4 % (ref 34–46.6)
HGB BLD-MCNC: 13.6 G/DL (ref 12–15.9)
IMM GRANULOCYTES # BLD AUTO: 0 10*3/MM3 (ref 0–0.05)
IMM GRANULOCYTES NFR BLD AUTO: 0 % (ref 0–0.5)
INR PPP: 0.89 (ref 0.9–1.1)
LYMPHOCYTES # BLD AUTO: 2.47 10*3/MM3 (ref 0.7–3.1)
LYMPHOCYTES NFR BLD AUTO: 37.2 % (ref 19.6–45.3)
MCH RBC QN AUTO: 29.2 PG (ref 26.6–33)
MCHC RBC AUTO-ENTMCNC: 32.1 G/DL (ref 31.5–35.7)
MCV RBC AUTO: 91 FL (ref 79–97)
MONOCYTES # BLD AUTO: 0.35 10*3/MM3 (ref 0.1–0.9)
MONOCYTES NFR BLD AUTO: 5.3 % (ref 5–12)
NEUTROPHILS NFR BLD AUTO: 3.66 10*3/MM3 (ref 1.7–7)
NEUTROPHILS NFR BLD AUTO: 55.1 % (ref 42.7–76)
PLATELET # BLD AUTO: 225 10*3/MM3 (ref 140–450)
PMV BLD AUTO: 9.2 FL (ref 6–12)
POTASSIUM SERPL-SCNC: 4.1 MMOL/L (ref 3.5–5.2)
PROTHROMBIN TIME: 12.2 SECONDS (ref 12.1–15)
RBC # BLD AUTO: 4.66 10*6/MM3 (ref 3.77–5.28)
SODIUM SERPL-SCNC: 140 MMOL/L (ref 136–145)
WBC NRBC COR # BLD: 6.64 10*3/MM3 (ref 3.4–10.8)

## 2021-11-30 PROCEDURE — 36415 COLL VENOUS BLD VENIPUNCTURE: CPT

## 2021-11-30 PROCEDURE — 80048 BASIC METABOLIC PNL TOTAL CA: CPT | Performed by: ORTHOPAEDIC SURGERY

## 2021-11-30 PROCEDURE — 85025 COMPLETE CBC W/AUTO DIFF WBC: CPT | Performed by: ORTHOPAEDIC SURGERY

## 2021-11-30 PROCEDURE — 85610 PROTHROMBIN TIME: CPT | Performed by: ORTHOPAEDIC SURGERY

## 2021-12-03 ENCOUNTER — OFFICE VISIT (OUTPATIENT)
Dept: OBSTETRICS AND GYNECOLOGY | Facility: CLINIC | Age: 56
End: 2021-12-03

## 2021-12-03 VITALS
WEIGHT: 123 LBS | SYSTOLIC BLOOD PRESSURE: 108 MMHG | DIASTOLIC BLOOD PRESSURE: 68 MMHG | BODY MASS INDEX: 21 KG/M2 | HEIGHT: 64 IN

## 2021-12-03 DIAGNOSIS — R31.9 HEMATURIA, UNSPECIFIED TYPE: ICD-10-CM

## 2021-12-03 DIAGNOSIS — Z01.419 PAP SMEAR, LOW-RISK: ICD-10-CM

## 2021-12-03 DIAGNOSIS — Z01.419 ROUTINE GYNECOLOGICAL EXAMINATION: Primary | ICD-10-CM

## 2021-12-03 DIAGNOSIS — Z11.51 SCREENING FOR HUMAN PAPILLOMAVIRUS: ICD-10-CM

## 2021-12-03 DIAGNOSIS — Z13.9 SCREENING FOR CONDITION: ICD-10-CM

## 2021-12-03 LAB
BILIRUB BLD-MCNC: NEGATIVE MG/DL
CLARITY, POC: CLEAR
COLOR UR: YELLOW
GLUCOSE UR STRIP-MCNC: NEGATIVE MG/DL
KETONES UR QL: NEGATIVE
LEUKOCYTE EST, POC: NEGATIVE
NITRITE UR-MCNC: NEGATIVE MG/ML
PH UR: 5 [PH] (ref 5–8)
PROT UR STRIP-MCNC: NEGATIVE MG/DL
RBC # UR STRIP: ABNORMAL /UL
SP GR UR: 1 (ref 1–1.03)
UROBILINOGEN UR QL: NORMAL

## 2021-12-03 PROCEDURE — 99396 PREV VISIT EST AGE 40-64: CPT | Performed by: NURSE PRACTITIONER

## 2021-12-03 PROCEDURE — 99213 OFFICE O/P EST LOW 20 MIN: CPT | Performed by: NURSE PRACTITIONER

## 2021-12-03 PROCEDURE — 81002 URINALYSIS NONAUTO W/O SCOPE: CPT | Performed by: NURSE PRACTITIONER

## 2021-12-03 NOTE — PROGRESS NOTES
"GYN Annual Exam     Chief Complaint   Patient presents with   • Gynecologic Exam     annual       Desire Hernandez is a 56 y.o. female who presents for annual well woman exam. She is S/P endometrial ablation and stopped having cycles with the procedure in . She takes Paxil 10 mg daily to help manage vasomotor symptoms. She denies vaginal spotting or discharge. She does SBE monthly.     She reports \"I have so much going on.\" She had COVID in July. Reports since her covid infection, she has numerous medical complications since having the virus. She reports \"things are finally starting to improve.\" She is now off all of her medications and is starting to feel more like herself.  She is followed by cardiology and pulmonology at this time.     She is scheduled for shoulder manipulation with Dr. Browne.     She has pain and dryness in the vagina. She has been using Vit E suppositories in the vagina with relief of symptoms. She was given Imvexy but did not use because she did not want to use hormones.     Reports the past few weeks her breast are painful. She is not on HRT.     OB History        3    Para   2    Term   2            AB   1    Living   2       SAB   1    IAB        Ectopic        Molar        Multiple        Live Births   2              Menarche: 14  Mammogram:  Normal   Dexa scan: denies   Colonoscopy: has not had, had cologuard   Last Pap :    History of abnormal Pap smear: no  Family history of uterine, colon or ovarian cancer: no  Family history of breast cancer: no  History of abnormal mammogram: no        Past Medical History:   Diagnosis Date   • ADHD (attention deficit hyperactivity disorder)     back in high school   • Allergic     seasonal   • ASCUS favor dysplasia     07; 08; 6/25/10; 12; 13; 8/21/15 (HPV negative)   • Blood type, Rh negative    • Bulging lumbar disc    • Bunion 2016    surgery   • Chlamydia     as a teen   • Diarrhea    • Dizziness  "   • Fatigue    • H/O nipple discharge     Bilateral diagnostic mammogram negative   • Hair loss    • HSV-1 (herpes simplex virus 1) infection    • HSV-2 (herpes simplex virus 2) infection    • LGSIL (low grade squamous intraepithelial dysplasia) 2006   • Menopause    • Nausea    • Ovarian cyst     Left   • Palpitations    • PONV (postoperative nausea and vomiting)    • Right shoulder pain     SCHEDULED FOR SX   • Sleep disturbance    • Syncope    • Tinnitus        Past Surgical History:   Procedure Laterality Date   • BUNIONECTOMY Right 2016    Dr. Durbin   • CERVICAL CONIZATION, LEEP     • COLPOSCOPY W/ BIOPSY / CURETTAGE  2006    benign   • D & C WITH SUCTION  1998    10 week SAB   • ENDOMETRIAL ABLATION  Dec. 2017   • INDUCED       years ago   • [place] HYSTEROSCOPY,W/ENDOMETRIAL ABLATION N/A 2017    Procedure: HYSTEROSCOPY WITH ENDOMETRIAL ABLATION; d&c;  Surgeon: Sandie Hu DO;  Location: [facility];  Service: Obstetrics/Gynecology   • SHOULDER ARTHROSCOPY Right 2020    Procedure: Right shoulder manipulation under anesthesia with arthroscopy and extensive debridement of glenohumeral and subacromial spaces;  Surgeon: Stalin Liu MD;  Location: [facility];  Service: Orthopedics         Current Outpatient Medications:   •  PARoxetine (Paxil) 10 MG tablet, Take 1 tablet by mouth Every Morning. (Patient taking differently: Take 10 mg by mouth Every Night.), Disp: 90 tablet, Rfl: 3    No Known Allergies    Social History     Tobacco Use   • Smoking status: Never Smoker   • Smokeless tobacco: Never Used   Vaping Use   • Vaping Use: Never used   Substance Use Topics   • Alcohol use: Yes     Alcohol/week: 1.0 standard drink     Types: 1 Glasses of wine per week     Comment: 2 drinks per week    • Drug use: Not Currently     Types: Marijuana       Family History   Problem Relation Age of Onset   • Hearing loss Father    • Hyperlipidemia Father         DX in his mid  "50's   • Hypothyroidism Father    • Hypertension Father    • Hypothyroidism Mother    • Osteoarthritis Mother    • No Known Problems Brother    • Arthritis Sister         RA   • No Known Problems Son    • Hearing loss Maternal Grandmother    • Hyperlipidemia Maternal Grandmother         DX in her late 70's   • Stroke Maternal Grandmother    • Lung cancer Maternal Grandfather    • Cancer Maternal Grandfather         lung cancer - smoker   • Lung cancer Paternal Uncle    • Cancer Paternal Uncle         lung cancer - smoker   • Rheum arthritis Other    • Breast cancer Neg Hx    • Ovarian cancer Neg Hx    • Uterine cancer Neg Hx    • Colon cancer Neg Hx    • Melanoma Neg Hx    • Colon polyps Neg Hx    • Malig Hyperthermia Neg Hx        Review of Systems   Constitutional: Positive for fatigue.   Respiratory: Negative.    Cardiovascular: Negative.    Gastrointestinal: Negative.    Endocrine: Negative.    Genitourinary: Positive for vaginal pain (and dryness).   Musculoskeletal: Negative.    Skin: Negative.    Neurological: Negative.    Psychiatric/Behavioral: Negative.        /68   Ht 162.6 cm (64\")   Wt 55.8 kg (123 lb)   BMI 21.11 kg/m²     Physical Exam  Vitals reviewed.   Constitutional:       Appearance: She is well-developed.   Neck:      Thyroid: No thyromegaly.   Cardiovascular:      Rate and Rhythm: Normal rate and regular rhythm.   Pulmonary:      Effort: Pulmonary effort is normal.      Breath sounds: Normal breath sounds.   Chest:   Breasts:      Right: No inverted nipple, mass, nipple discharge, skin change or tenderness.      Left: No inverted nipple, mass, nipple discharge, skin change or tenderness.       Abdominal:      General: Bowel sounds are normal.      Palpations: Abdomen is soft.   Genitourinary:     Exam position: Supine.      Labia:         Right: No rash, tenderness, lesion or injury.         Left: No rash, tenderness, lesion or injury.       Vagina: No signs of injury and foreign body. " No vaginal discharge, erythema, tenderness or bleeding.      Cervix: No cervical motion tenderness, discharge or friability.      Uterus: Not deviated, not enlarged, not fixed and not tender.       Adnexa:         Right: No mass, tenderness or fullness.          Left: No mass, tenderness or fullness.        Rectum: Normal.   Musculoskeletal:         General: Normal range of motion.      Cervical back: Normal range of motion and neck supple.   Skin:     General: Skin is warm and dry.   Neurological:      General: No focal deficit present.      Mental Status: She is alert and oriented to person, place, and time.   Psychiatric:         Mood and Affect: Mood normal.         Behavior: Behavior normal.            Assessment     1) GYN annual well woman exam.   2) Postmenopausal   3) S/P COVID infection   4) Breast pain   5) Hematuria      Plan   1) GYN HM: Check pap smear. SBE demonstrated and encouraged. Last MMG 10/2020.    2) /vasomotor sx: No PMPB s/p endometrial ablation.No HRT. Take Paxil for vasomotor symptoms. Has c/o vaginal dryness, does not want hormones. Disc use of coconut oil or extra virgin olive oil for c/o.   3) Bone health - Weight bearing exercise, dietary calcium recommendations and vitamin D reviewed.   4) Diet and Exercise discussed  5) Smoking Status: nonsmoker  6) Breast pain- Bilateral. Check screening MMG. Enc good support bra. Disc use of Vit E for breast pain.   7) S/P Covid infection- Improving.   8)  Colon- Needs screening colonoscopy. Cologuard neg 11/19. Order cologuard.   9) Hematuria- Disc UA. CHeck urine cx.     RTO ONOFRE Reyes  12/3/2021  12:07 EST

## 2021-12-04 ENCOUNTER — APPOINTMENT (OUTPATIENT)
Dept: LAB | Facility: HOSPITAL | Age: 56
End: 2021-12-04

## 2021-12-04 LAB
APPEARANCE UR: CLEAR
BACTERIA #/AREA URNS HPF: NORMAL /[HPF]
BACTERIA UR CULT: NORMAL
BACTERIA UR CULT: NORMAL
BILIRUB UR QL STRIP: NEGATIVE
CASTS URNS QL MICRO: NORMAL /LPF
COLOR UR: YELLOW
EPI CELLS #/AREA URNS HPF: NORMAL /HPF (ref 0–10)
GLUCOSE UR QL: NEGATIVE
HGB UR QL STRIP: NEGATIVE
KETONES UR QL STRIP: NEGATIVE
LEUKOCYTE ESTERASE UR QL STRIP: NEGATIVE
MICRO URNS: NORMAL
MICRO URNS: NORMAL
NITRITE UR QL STRIP: NEGATIVE
PH UR STRIP: 5.5 [PH] (ref 5–7.5)
PROT UR QL STRIP: NEGATIVE
RBC #/AREA URNS HPF: NORMAL /HPF (ref 0–2)
SP GR UR: 1.01 (ref 1–1.03)
UROBILINOGEN UR STRIP-MCNC: 0.2 MG/DL (ref 0.2–1)
WBC #/AREA URNS HPF: NORMAL /HPF (ref 0–5)

## 2021-12-06 ENCOUNTER — ANESTHESIA EVENT (OUTPATIENT)
Dept: PERIOP | Facility: HOSPITAL | Age: 56
End: 2021-12-06

## 2021-12-07 ENCOUNTER — ANESTHESIA (OUTPATIENT)
Dept: PERIOP | Facility: HOSPITAL | Age: 56
End: 2021-12-07

## 2021-12-07 ENCOUNTER — HOSPITAL ENCOUNTER (OUTPATIENT)
Facility: HOSPITAL | Age: 56
Setting detail: HOSPITAL OUTPATIENT SURGERY
Discharge: HOME | End: 2021-12-07
Attending: ORTHOPAEDIC SURGERY | Admitting: ORTHOPAEDIC SURGERY

## 2021-12-07 VITALS
TEMPERATURE: 97.5 F | OXYGEN SATURATION: 97 % | SYSTOLIC BLOOD PRESSURE: 123 MMHG | DIASTOLIC BLOOD PRESSURE: 75 MMHG | HEART RATE: 72 BPM | BODY MASS INDEX: 21.08 KG/M2 | WEIGHT: 122.8 LBS | RESPIRATION RATE: 12 BRPM

## 2021-12-07 DIAGNOSIS — M75.02 ADHESIVE CAPSULITIS OF LEFT SHOULDER: ICD-10-CM

## 2021-12-07 LAB
CYTOLOGIST CVX/VAG CYTO: NORMAL
CYTOLOGY CVX/VAG DOC CYTO: NORMAL
CYTOLOGY CVX/VAG DOC THIN PREP: NORMAL
DX ICD CODE: NORMAL
HCG SERPL QL: NEGATIVE
HIV 1 & 2 AB SER-IMP: NORMAL
HPV I/H RISK 4 DNA CVX QL PROBE+SIG AMP: NEGATIVE
OTHER STN SPEC: NORMAL
SARS-COV-2 RNA PNL SPEC NAA+PROBE: NOT DETECTED
STAT OF ADQ CVX/VAG CYTO-IMP: NORMAL

## 2021-12-07 PROCEDURE — 87635 SARS-COV-2 COVID-19 AMP PRB: CPT | Performed by: ORTHOPAEDIC SURGERY

## 2021-12-07 PROCEDURE — 76942 ECHO GUIDE FOR BIOPSY: CPT | Performed by: ORTHOPAEDIC SURGERY

## 2021-12-07 PROCEDURE — 29826 SHO ARTHRS SRG DECOMPRESSION: CPT | Performed by: SPECIALIST/TECHNOLOGIST, OTHER

## 2021-12-07 PROCEDURE — 29825 SHO ARTHRS SRG LSS&RESCJ ADS: CPT | Performed by: ORTHOPAEDIC SURGERY

## 2021-12-07 PROCEDURE — 29825 SHO ARTHRS SRG LSS&RESCJ ADS: CPT | Performed by: SPECIALIST/TECHNOLOGIST, OTHER

## 2021-12-07 PROCEDURE — 25010000002 NEOSTIGMINE 10 MG/10ML SOLUTION: Performed by: NURSE ANESTHETIST, CERTIFIED REGISTERED

## 2021-12-07 PROCEDURE — 25010000002 EPINEPHRINE PER 0.1 MG: Performed by: ORTHOPAEDIC SURGERY

## 2021-12-07 PROCEDURE — 25010000002 ONDANSETRON PER 1 MG: Performed by: REGISTERED NURSE

## 2021-12-07 PROCEDURE — 84703 CHORIONIC GONADOTROPIN ASSAY: CPT | Performed by: REGISTERED NURSE

## 2021-12-07 PROCEDURE — 29826 SHO ARTHRS SRG DECOMPRESSION: CPT | Performed by: ORTHOPAEDIC SURGERY

## 2021-12-07 PROCEDURE — 25010000002 FENTANYL CITRATE (PF) 50 MCG/ML SOLUTION: Performed by: NURSE ANESTHETIST, CERTIFIED REGISTERED

## 2021-12-07 PROCEDURE — 25010000002 ROPIVACAINE PER 1 MG: Performed by: NURSE ANESTHETIST, CERTIFIED REGISTERED

## 2021-12-07 PROCEDURE — 25010000002 MIDAZOLAM PER 1MG: Performed by: REGISTERED NURSE

## 2021-12-07 PROCEDURE — 25010000002 VANCOMYCIN 750 MG RECONSTITUTED SOLUTION: Performed by: ORTHOPAEDIC SURGERY

## 2021-12-07 PROCEDURE — 25010000002 PROPOFOL 10 MG/ML EMULSION: Performed by: NURSE ANESTHETIST, CERTIFIED REGISTERED

## 2021-12-07 PROCEDURE — 25010000002 DEXAMETHASONE SODIUM PHOSPHATE 10 MG/ML SOLUTION: Performed by: NURSE ANESTHETIST, CERTIFIED REGISTERED

## 2021-12-07 PROCEDURE — 25010000002 DEXAMETHASONE PER 1 MG: Performed by: REGISTERED NURSE

## 2021-12-07 PROCEDURE — C9803 HOPD COVID-19 SPEC COLLECT: HCPCS

## 2021-12-07 RX ORDER — FENTANYL CITRATE 50 UG/ML
INJECTION, SOLUTION INTRAMUSCULAR; INTRAVENOUS AS NEEDED
Status: DISCONTINUED | OUTPATIENT
Start: 2021-12-07 | End: 2021-12-07 | Stop reason: SURG

## 2021-12-07 RX ORDER — EPHEDRINE SULFATE 50 MG/ML
INJECTION, SOLUTION INTRAVENOUS AS NEEDED
Status: DISCONTINUED | OUTPATIENT
Start: 2021-12-07 | End: 2021-12-07 | Stop reason: SURG

## 2021-12-07 RX ORDER — ONDANSETRON 2 MG/ML
4 INJECTION INTRAMUSCULAR; INTRAVENOUS ONCE AS NEEDED
Status: COMPLETED | OUTPATIENT
Start: 2021-12-07 | End: 2021-12-07

## 2021-12-07 RX ORDER — SENNA PLUS 8.6 MG/1
1 TABLET ORAL NIGHTLY
Qty: 20 TABLET | Refills: 0 | Status: SHIPPED | OUTPATIENT
Start: 2021-12-07 | End: 2021-12-22

## 2021-12-07 RX ORDER — SODIUM CHLORIDE 0.9 % (FLUSH) 0.9 %
10 SYRINGE (ML) INJECTION AS NEEDED
Status: DISCONTINUED | OUTPATIENT
Start: 2021-12-07 | End: 2021-12-07 | Stop reason: HOSPADM

## 2021-12-07 RX ORDER — DIPHENHYDRAMINE HYDROCHLORIDE 50 MG/ML
12.5 INJECTION INTRAMUSCULAR; INTRAVENOUS
Status: DISCONTINUED | OUTPATIENT
Start: 2021-12-07 | End: 2021-12-07 | Stop reason: HOSPADM

## 2021-12-07 RX ORDER — ASPIRIN 81 MG/1
81 TABLET ORAL DAILY
Qty: 14 TABLET | Refills: 0 | Status: SHIPPED | OUTPATIENT
Start: 2021-12-07 | End: 2022-02-18

## 2021-12-07 RX ORDER — SODIUM CHLORIDE, SODIUM LACTATE, POTASSIUM CHLORIDE, CALCIUM CHLORIDE 600; 310; 30; 20 MG/100ML; MG/100ML; MG/100ML; MG/100ML
9 INJECTION, SOLUTION INTRAVENOUS CONTINUOUS
Status: DISCONTINUED | OUTPATIENT
Start: 2021-12-07 | End: 2021-12-07 | Stop reason: HOSPADM

## 2021-12-07 RX ORDER — MIDAZOLAM HYDROCHLORIDE 2 MG/2ML
2 INJECTION, SOLUTION INTRAMUSCULAR; INTRAVENOUS
Status: DISCONTINUED | OUTPATIENT
Start: 2021-12-07 | End: 2021-12-07 | Stop reason: HOSPADM

## 2021-12-07 RX ORDER — DEXAMETHASONE SODIUM PHOSPHATE 10 MG/ML
INJECTION, SOLUTION INTRAMUSCULAR; INTRAVENOUS AS NEEDED
Status: DISCONTINUED | OUTPATIENT
Start: 2021-12-07 | End: 2021-12-07 | Stop reason: SURG

## 2021-12-07 RX ORDER — EPINEPHRINE 1 MG/ML
INJECTION, SOLUTION, CONCENTRATE INTRAVENOUS AS NEEDED
Status: DISCONTINUED | OUTPATIENT
Start: 2021-12-07 | End: 2021-12-07 | Stop reason: HOSPADM

## 2021-12-07 RX ORDER — SODIUM CHLORIDE 9 MG/ML
40 INJECTION, SOLUTION INTRAVENOUS AS NEEDED
Status: DISCONTINUED | OUTPATIENT
Start: 2021-12-07 | End: 2021-12-07 | Stop reason: HOSPADM

## 2021-12-07 RX ORDER — NEOSTIGMINE METHYLSULFATE 1 MG/ML
INJECTION, SOLUTION INTRAVENOUS AS NEEDED
Status: DISCONTINUED | OUTPATIENT
Start: 2021-12-07 | End: 2021-12-07 | Stop reason: SURG

## 2021-12-07 RX ORDER — ONDANSETRON 2 MG/ML
4 INJECTION INTRAMUSCULAR; INTRAVENOUS ONCE AS NEEDED
Status: DISCONTINUED | OUTPATIENT
Start: 2021-12-07 | End: 2021-12-07 | Stop reason: HOSPADM

## 2021-12-07 RX ORDER — SODIUM CHLORIDE, SODIUM LACTATE, POTASSIUM CHLORIDE, CALCIUM CHLORIDE 600; 310; 30; 20 MG/100ML; MG/100ML; MG/100ML; MG/100ML
100 INJECTION, SOLUTION INTRAVENOUS CONTINUOUS
Status: DISCONTINUED | OUTPATIENT
Start: 2021-12-07 | End: 2021-12-07 | Stop reason: HOSPADM

## 2021-12-07 RX ORDER — HYDROCODONE BITARTRATE AND ACETAMINOPHEN 7.5; 325 MG/1; MG/1
1-2 TABLET ORAL EVERY 4 HOURS PRN
Qty: 40 TABLET | Refills: 0 | Status: SHIPPED | OUTPATIENT
Start: 2021-12-07 | End: 2021-12-22

## 2021-12-07 RX ORDER — SODIUM CHLORIDE 0.9 % (FLUSH) 0.9 %
10 SYRINGE (ML) INJECTION EVERY 12 HOURS SCHEDULED
Status: DISCONTINUED | OUTPATIENT
Start: 2021-12-07 | End: 2021-12-07 | Stop reason: HOSPADM

## 2021-12-07 RX ORDER — PREGABALIN 75 MG/1
150 CAPSULE ORAL ONCE
Status: COMPLETED | OUTPATIENT
Start: 2021-12-07 | End: 2021-12-07

## 2021-12-07 RX ORDER — LIDOCAINE HYDROCHLORIDE 20 MG/ML
INJECTION, SOLUTION INFILTRATION; PERINEURAL AS NEEDED
Status: DISCONTINUED | OUTPATIENT
Start: 2021-12-07 | End: 2021-12-07 | Stop reason: SURG

## 2021-12-07 RX ORDER — LIDOCAINE HYDROCHLORIDE 10 MG/ML
0.5 INJECTION, SOLUTION EPIDURAL; INFILTRATION; INTRACAUDAL; PERINEURAL ONCE AS NEEDED
Status: DISCONTINUED | OUTPATIENT
Start: 2021-12-07 | End: 2021-12-07 | Stop reason: HOSPADM

## 2021-12-07 RX ORDER — LIDOCAINE HYDROCHLORIDE AND EPINEPHRINE 10; 10 MG/ML; UG/ML
INJECTION, SOLUTION INFILTRATION; PERINEURAL AS NEEDED
Status: DISCONTINUED | OUTPATIENT
Start: 2021-12-07 | End: 2021-12-07 | Stop reason: HOSPADM

## 2021-12-07 RX ORDER — ROPIVACAINE HYDROCHLORIDE 5 MG/ML
INJECTION, SOLUTION EPIDURAL; INFILTRATION; PERINEURAL
Status: COMPLETED | OUTPATIENT
Start: 2021-12-07 | End: 2021-12-07

## 2021-12-07 RX ORDER — ACETAMINOPHEN 500 MG
1000 TABLET ORAL ONCE
Status: COMPLETED | OUTPATIENT
Start: 2021-12-07 | End: 2021-12-07

## 2021-12-07 RX ORDER — MELOXICAM 7.5 MG/1
15 TABLET ORAL ONCE
Status: COMPLETED | OUTPATIENT
Start: 2021-12-07 | End: 2021-12-07

## 2021-12-07 RX ORDER — ROCURONIUM BROMIDE 10 MG/ML
INJECTION, SOLUTION INTRAVENOUS AS NEEDED
Status: DISCONTINUED | OUTPATIENT
Start: 2021-12-07 | End: 2021-12-07 | Stop reason: SURG

## 2021-12-07 RX ORDER — FAMOTIDINE 10 MG/ML
20 INJECTION, SOLUTION INTRAVENOUS
Status: COMPLETED | OUTPATIENT
Start: 2021-12-07 | End: 2021-12-07

## 2021-12-07 RX ORDER — SODIUM CHLORIDE 9 MG/ML
INJECTION, SOLUTION INTRAVENOUS AS NEEDED
Status: DISCONTINUED | OUTPATIENT
Start: 2021-12-07 | End: 2021-12-07 | Stop reason: HOSPADM

## 2021-12-07 RX ORDER — PROPOFOL 10 MG/ML
VIAL (ML) INTRAVENOUS AS NEEDED
Status: DISCONTINUED | OUTPATIENT
Start: 2021-12-07 | End: 2021-12-07 | Stop reason: SURG

## 2021-12-07 RX ORDER — MAGNESIUM HYDROXIDE 1200 MG/15ML
LIQUID ORAL AS NEEDED
Status: DISCONTINUED | OUTPATIENT
Start: 2021-12-07 | End: 2021-12-07 | Stop reason: HOSPADM

## 2021-12-07 RX ORDER — FENTANYL CITRATE 50 UG/ML
25 INJECTION, SOLUTION INTRAMUSCULAR; INTRAVENOUS
Status: DISCONTINUED | OUTPATIENT
Start: 2021-12-07 | End: 2021-12-07 | Stop reason: HOSPADM

## 2021-12-07 RX ORDER — DEXMEDETOMIDINE HYDROCHLORIDE 100 UG/ML
INJECTION, SOLUTION INTRAVENOUS AS NEEDED
Status: DISCONTINUED | OUTPATIENT
Start: 2021-12-07 | End: 2021-12-07 | Stop reason: SURG

## 2021-12-07 RX ORDER — ONDANSETRON 4 MG/1
4 TABLET, FILM COATED ORAL EVERY 8 HOURS PRN
Qty: 30 TABLET | Refills: 0 | Status: SHIPPED | OUTPATIENT
Start: 2021-12-07 | End: 2022-02-18

## 2021-12-07 RX ORDER — DEXAMETHASONE SODIUM PHOSPHATE 4 MG/ML
8 INJECTION, SOLUTION INTRA-ARTICULAR; INTRALESIONAL; INTRAMUSCULAR; INTRAVENOUS; SOFT TISSUE ONCE AS NEEDED
Status: COMPLETED | OUTPATIENT
Start: 2021-12-07 | End: 2021-12-07

## 2021-12-07 RX ORDER — GLYCOPYRROLATE 0.2 MG/ML
INJECTION INTRAMUSCULAR; INTRAVENOUS AS NEEDED
Status: DISCONTINUED | OUTPATIENT
Start: 2021-12-07 | End: 2021-12-07 | Stop reason: SURG

## 2021-12-07 RX ORDER — OXYCODONE HYDROCHLORIDE AND ACETAMINOPHEN 5; 325 MG/1; MG/1
1 TABLET ORAL ONCE AS NEEDED
Status: DISCONTINUED | OUTPATIENT
Start: 2021-12-07 | End: 2021-12-07 | Stop reason: HOSPADM

## 2021-12-07 RX ADMIN — PREGABALIN 150 MG: 75 CAPSULE ORAL at 13:27

## 2021-12-07 RX ADMIN — GLYCOPYRROLATE 0.6 MG: 0.2 INJECTION INTRAMUSCULAR; INTRAVENOUS at 16:45

## 2021-12-07 RX ADMIN — ROPIVACAINE HYDROCHLORIDE 20 ML: 5 INJECTION, SOLUTION EPIDURAL; INFILTRATION; PERINEURAL at 14:13

## 2021-12-07 RX ADMIN — SODIUM CHLORIDE 750 MG: 900 INJECTION, SOLUTION INTRAVENOUS at 13:52

## 2021-12-07 RX ADMIN — MIDAZOLAM HYDROCHLORIDE 2 MG: 1 INJECTION, SOLUTION INTRAMUSCULAR; INTRAVENOUS at 14:07

## 2021-12-07 RX ADMIN — ONDANSETRON 4 MG: 2 INJECTION INTRAMUSCULAR; INTRAVENOUS at 14:14

## 2021-12-07 RX ADMIN — LIDOCAINE HYDROCHLORIDE 80 MG: 20 INJECTION, SOLUTION INFILTRATION; PERINEURAL at 15:36

## 2021-12-07 RX ADMIN — FENTANYL CITRATE 50 MCG: 50 INJECTION INTRAMUSCULAR; INTRAVENOUS at 15:36

## 2021-12-07 RX ADMIN — DEXMEDETOMIDINE 10 MCG: 100 INJECTION, SOLUTION, CONCENTRATE INTRAVENOUS at 14:13

## 2021-12-07 RX ADMIN — MELOXICAM 15 MG: 7.5 TABLET ORAL at 13:26

## 2021-12-07 RX ADMIN — ROCURONIUM BROMIDE 30 MG: 10 INJECTION INTRAVENOUS at 15:36

## 2021-12-07 RX ADMIN — DEXAMETHASONE SODIUM PHOSPHATE 4 MG: 10 INJECTION, SOLUTION INTRAMUSCULAR; INTRAVENOUS at 14:13

## 2021-12-07 RX ADMIN — DEXAMETHASONE SODIUM PHOSPHATE 8 MG: 4 INJECTION, SOLUTION INTRAMUSCULAR; INTRAVENOUS at 14:14

## 2021-12-07 RX ADMIN — EPHEDRINE SULFATE 10 MG: 50 INJECTION, SOLUTION INTRAVENOUS at 15:40

## 2021-12-07 RX ADMIN — EPHEDRINE SULFATE 5 MG: 50 INJECTION, SOLUTION INTRAVENOUS at 15:50

## 2021-12-07 RX ADMIN — FENTANYL CITRATE 50 MCG: 50 INJECTION INTRAMUSCULAR; INTRAVENOUS at 16:45

## 2021-12-07 RX ADMIN — FAMOTIDINE 20 MG: 10 INJECTION INTRAVENOUS at 14:14

## 2021-12-07 RX ADMIN — NEOSTIGMINE METHYLSULFATE 4 MG: 1 INJECTION INTRAVENOUS at 16:45

## 2021-12-07 RX ADMIN — PROPOFOL 150 MG: 10 INJECTION, EMULSION INTRAVENOUS at 15:36

## 2021-12-07 RX ADMIN — SODIUM CHLORIDE, POTASSIUM CHLORIDE, SODIUM LACTATE AND CALCIUM CHLORIDE 9 ML/HR: 600; 310; 30; 20 INJECTION, SOLUTION INTRAVENOUS at 13:25

## 2021-12-07 RX ADMIN — EPHEDRINE SULFATE 5 MG: 50 INJECTION, SOLUTION INTRAVENOUS at 15:45

## 2021-12-07 RX ADMIN — ACETAMINOPHEN 1000 MG: 500 TABLET, FILM COATED ORAL at 13:27

## 2021-12-07 NOTE — H&P
Orthopedic H&P    Patient ID: Desire Hernandez is a 56 y.o. female.     Chief Complaint:  Left shoulder pain, adhesive capsulitis     History of Present Illness      Desire Hernandez presents  for surgical treatment of left shoulder pain and adhesive capsulitis.      The patient has been experiencing tightness and left shoulder pain since 05/2021.  She had an MRI in 06/2021 and she started physical therapy. She also received an injection on 06/07/2021. The patient rates her pain as a 9 out of 10, and aggravated with motion. It is localized laterally and shoots down towards her elbow. She has experienced minimal tingling and numbness in the area.      The patient is taking diclofenac    No current facility-administered medications on file prior to encounter.     Current Outpatient Medications on File Prior to Encounter   Medication Sig Dispense Refill   • PARoxetine (Paxil) 10 MG tablet Take 1 tablet by mouth Every Morning. (Patient taking differently: Take 10 mg by mouth Every Night.) 90 tablet 3     No Known Allergies     Social History            Occupational History       Employer: [employer]   Tobacco Use   • Smoking status: Never Smoker   • Smokeless tobacco: Never Used   Vaping Use   • Vaping Use: Never used   Substance and Sexual Activity   • Alcohol use: Yes       Types: 1 Glasses of wine, 1 Standard drinks or equivalent per week       Comment: occ   • Drug use: No   • Sexual activity: Defer       Partners: Male       Birth control/protection: None      Medical History        Past Medical History:   Diagnosis Date   • ADHD (attention deficit hyperactivity disorder)       back in high school   • Allergic       seasonal   • ASCUS favor dysplasia       6/4/07; 6/5/08; 6/25/10; 6/27/12; 7/29/13; 8/21/15 (HPV negative)   • Blood type, Rh negative     • Bunion 2016     surgery   • Chlamydia       as a teen   • H/O nipple discharge 9/415     Bilateral diagnostic mammogram negative   • HSV-1 (herpes  simplex virus 1) infection     • HSV-2 (herpes simplex virus 2) infection    • LGSIL (low grade squamous intraepithelial dysplasia) 2006   • Menopause     • Ovarian cyst       Left   • PONV (postoperative nausea and vomiting)     • Right shoulder pain       SCHEDULED FOR SX         Surgical History         Past Surgical History:   Procedure Laterality Date   • BUNIONECTOMY Right 2016     Dr. Durbin   • CERVICAL CONIZATION, LEEP      • COLPOSCOPY W/ BIOPSY / CURETTAGE   2006     benign   • D & C WITH SUCTION   1998     10 week SAB   • ENDOMETRIAL ABLATION   Dec. 2017   • INDUCED          years ago   • RI HYSTEROSCOPY,W/ENDOMETRIAL ABLATION N/A 2017     Procedure: HYSTEROSCOPY WITH ENDOMETRIAL ABLATION; d&c;  Surgeon: Sandie Hu DO;  Location: [facility];  Service: Obstetrics/Gynecology   • SHOULDER ARTHROSCOPY Right 2020     Procedure: Right shoulder manipulation under anesthesia with arthroscopy and extensive debridement of glenohumeral and subacromial spaces;  Surgeon: Stalin Liu MD;  Location: [facility];  Service: Orthopedics                  Family History   Problem Relation Age of Onset   • Hearing loss Father     • Hyperlipidemia Father           DX in his mid 50's   • No Known Problems Brother     • Arthritis Sister           RA   • No Known Problems Son     • Hearing loss Maternal Grandmother     • Hyperlipidemia Maternal Grandmother           DX in her late 70's   • Stroke Maternal Grandmother     • Lung cancer Maternal Grandfather     • Cancer Maternal Grandfather           lung cancer - smoker   • Lung cancer Paternal Uncle     • Cancer Paternal Uncle           lung cancer - smoker   • Breast cancer Neg Hx     • Ovarian cancer Neg Hx     • Uterine cancer Neg Hx     • Colon cancer Neg Hx     • Melanoma Neg Hx     • Colon polyps Neg Hx              Review of Systems           Objective:  Vitals:    21 1312   BP: 120/72   BP Location: Right arm    Patient Position: Lying   Pulse: 73   Resp: 10   Temp: 98.3 °F (36.8 °C)   TempSrc: Oral   SpO2: 97%   Weight: 55.7 kg (122 lb 12.8 oz)          Body mass index is 19.91 kg/m².  Physical Exam     Vital signs reviewed.   General: No acute distress, alert and oriented  Eyes: conjunctiva clear; pupils equally round and reactive  ENT: external ears and nose atraumatic; oropharynx clear  CV: no peripheral edema  Resp: normal respiratory effort  Skin: no rashes or wounds; normal turgor  Psych: mood and affect appropriate; recent and remote memory intact              Objective     Ortho Exam      Left Shoulder-                 Maximal tenderness anterior and posterior joint line as well as lateral subacromial space.                 FF-       Active - 95 degrees              Passive - 110 degrees              Strength- 4/5                 ER-      Active - 20 degrees              Passive - 25 degrees               Strength - 4/5                 IR - L5              Belly Press Strength- 4+/5                 Bear hug sign - Negative     Imaging:     MRI left shoulder June 2021  IMPRESSION:     1. Mild supraspinatus and infraspinatus tendinopathy. No rotator cuff tear.  2. No acute labral pathology.  3. Minimal edema in the rotator interval suggests mild capsulitis in the appropriate clinical setting.  4. Mild AC joint arthrosis which produces minimal mass effect on the supraspinatus outlet, but no evidence of significant  bursal inflammation.     Review of MRI left shoulder from June 2021 include review of images as well as radiology report indicates mild supraspinatus rotator cuff tendinopathy with edema the rotator interval consistent with capsulitis.     Review of prior x-rays left shoulder from office from May 2021 indicate no evidence of fracture, dislocation, or subluxation.  Assessment:        Assessment       1. Adhesive capsulitis of left shoulder             Plan:        Plan          1. Discussed treatment  options at length with patient-given persistence of pain as well as stiffness of the shoulder and failure of other conservative treatment she would like to proceed with surgical treatment at this time.   2. Plan will be for left shoulder manipulation under anesthesia, arthroscopic lysis of adhesions, possible subacromial decompression, and all associated procedures. I reviewed risks benefits and alternatives the procedure with risks including not limited to neurovascular damage, bleeding, infection, chronic pain, loss of motion, weakness, stiffness, instability, DVT, pulmonary embolus, death, stroke, complex regional pain syndrome, myocardial infarction, need for additional procedures. She understood all these had all questions answered.  Patient consented to proceed with surgery.  No guarantees were given regarding results of surgery.   3. The patient denies any history of blood clots or diabetes.      Desire Hernandez was in agreement with plan and had all questions answered.        Diagnoses and all orders for this visit:     1. Adhesive capsulitis of left shoulder (Primary)              Dictated utilizing Dragon dictation

## 2021-12-07 NOTE — ANESTHESIA PROCEDURE NOTES
Peripheral Block    Pre-sedation assessment completed: 12/7/2021 2:06 PM    Patient reassessed immediately prior to procedure    Patient location during procedure: pre-op  Start time: 12/7/2021 2:10 PM  Stop time: 12/7/2021 2:13 PM  Reason for block: at surgeon's request and post-op pain management  Performed by  CRNA: Ramírez Amin CRNA  Preanesthetic Checklist  Completed: patient identified, IV checked, site marked, risks and benefits discussed, surgical consent, monitors and equipment checked, pre-op evaluation and timeout performed  Prep:  Pt Position: supine  Sterile barriers:cap, gloves, mask and washed/disinfected hands  Prep: ChloraPrep  Patient monitoring: blood pressure monitoring, continuous pulse oximetry and EKG  Procedure    Sedation: yes  Performed under: local infiltration  Guidance:ultrasound guided    ULTRASOUND INTERPRETATION. Using ultrasound guidance a 21 G gauge needle was placed in close proximity to the nerve, at which point, under ultrasound guidance anesthetic was injected in the area of the nerve and spread of the anesthesia was seen on ultrasound in close proximity thereto.  There were no abnormalities seen on ultrasound; a digital image was taken; and the patient tolerated the procedure with no complications. Images:still images obtained, printed/placed on chart    Laterality:left  Block Type:interscalene  Injection Technique:single-shot  Needle Type:echogenic  Needle Gauge:21 G  Resistance on Injection: none    Medications Used: ropivacaine (NAROPIN) injection 0.5 %, 20 mL  Med administered at 12/7/2021 2:13 PM      Post Assessment  Injection Assessment: negative aspiration for heme, no paresthesia on injection and incremental injection  Patient Tolerance:comfortable throughout block  Complications:no

## 2021-12-07 NOTE — ANESTHESIA PREPROCEDURE EVALUATION
Anesthesia Evaluation     Patient summary reviewed and Nursing notes reviewed   history of anesthetic complications (Wakes up angry.):  NPO Solid Status: > 8 hours  NPO Liquid Status: > 6 hours           Airway   Mallampati: II  TM distance: >3 FB  No difficulty expected  Dental - normal exam     Pulmonary - negative pulmonary ROS and normal exam     ROS comment: History of covid  Cardiovascular - normal exam  Exercise tolerance: good (4-7 METS)    ECG reviewed      ROS comment: Estimated left ventricular EF = 60% Left ventricular systolic function is normal.    · Myocardial perfusion imaging indicates a normal myocardial perfusion study with no evidence of ischemia.  · Left ventricular ejection fraction is hyperdynamic (Calculated EF > 70%).  · Low risk for ischemic heart disease.  · Findings consistent with a normal ECG stress test.  · Patient exercised for 11 minutes and 47 seconds without chest pain, or ischemic ECG changes.  · Impressions are consistent with a low risk study.        Neuro/Psych  (+) dizziness/light headedness, syncope,     (-) numbness (right hand)  GI/Hepatic/Renal/Endo    (+)  GERD well controlled,      Musculoskeletal     Abdominal  - normal exam   Substance History      OB/GYN negative ob/gyn ROS         Other   arthritis,                    Anesthesia Plan    ASA 2     general with block     intravenous induction     Anesthetic plan, all risks, benefits, and alternatives have been provided, discussed and informed consent has been obtained with: patient.  Use of blood products discussed with patient  Consented to blood products.

## 2021-12-07 NOTE — OP NOTE
Date of Operation:  12/7/2021     PREOPERATIVE DIAGNOSIS:  1. Left shoulder adhesive capsulitis  2. Left shoulder subacromial bursitis    POSTOPERATIVE DIAGNOSIS:    1. Left shoulder adhesive capsulitis  2. Left shoulder subacromial bursitis    PROCEDURE PERFORMED:   1. Left shoulder arthroscopy with lysis of adhesions, manipulation under anesthesia  2. Left shoulder arthroscopic subacromial decompression     SURGEON: Stalin Liu MD     ASSISTANT:  Micah Matthews CSA-retraction, held and positioned camera, closure, dressing application     ANESTHESIA: GETA with regional block.       ESTIMATED BLOOD LOSS:  minimal     URINE OUTPUT: Not recorded.       FLUIDS: Per anesthesia.       COMPLICATIONS: None.       SPECIMENS: None.       DRAINS: None.     EXAM UNDER ANESTHESIA: Left shoulder passive forward flexion 100 degrees, passive external rotation 20 degrees, internal rotation to her side.  Following manipulation-left shoulder passive forward flexion 175 degrees, passive external rotation 50 degrees, internal rotation T12      INDICATIONS FOR PROCEDURE: Patient is a pleasant 56 y.o. who has had significant limitation and use of left shoulder as well as associated pain with failure of conservative treatments. I discussed treatment options available to the patient and patient wished to proceed with surgical treatment. I explained details of the procedure, as well as the risks, benefits, and alternatives as documented on history and physical, and the patient had all questions answered prior to signing the operative consent form. No guarantees were given in regard to results of the surgery.       DESCRIPTION OF PROCEDURE: The patient was seen, evaluated, and cleared for surgery by anesthesia. Admitted in the preoperative holding area. The operative site was marked, consent was reviewed, history and physical was updated, and preoperative labs were reviewed. A regional block was then placed per anesthesia. The patient was  then taken to the operating room and placed in supine position on beachchair table. After appropriate sedation per anesthesia, a formal timeout was completed, including confirmation of History and Physical, operative consent, surgical site, patient identification number.       Attention was then turned to manipulation under anesthesia at this point in time.  Using a short lever arm arc of motion, audible popping was noted on forward flexion manipulation, with fluid motion being able to be obtained up to 175° on passive forward flexion following this manipulation.  Short lever arm arc of motion was then used in an external rotation position with mild audible popping noted over the anterior aspect of the shoulder with improved external rotation to 50°.     At this point time, patient's head was secured with face mask, she was elevated into a seated position with a beachchair table.  All bony prominences well-padded she was secured to the table with waist strap.  Left upper extremity was then sterilely prepped and draped in standard fashion.    Attention was then turned to arthroscopic lysis of adhesions with a standard posterior portal being made with 11 blade followed by insertion of blunt trocar and cannula.  Anterior portal was then made in the rotator interval with a spinal needle using outside in technique.  Insertion of cannula anteriorly was completed at this time followed by insertion of shaver and ablation wand in alternating fashion in order to complete debridement and lysis of adhesions particularly from the anterior rotator interval as well as the significant synovitis surrounding the biceps tendon and the undersurface of the rotator cuff and the glenohumeral joint.    Attention was then turned to arthroscopic subacromial decompression with scope inserted into the subacromial space and cannula being transitioned to the subacromial space anteriorly.  Lateral portal was then made with a spinal needle using  outside in technique.  Combination of shaver as well as ablation wand was used to degrees significant subacromial bursitis, undersurface of the acromion itself, and partial release of the coracoacromial ligament.  There were noted to be significant adhesions extending down into the subdeltoid space and these were resected as well.  Once this was completed, there is no evidence of residual impingement in the subacromial space and no evidence of prominent subacromial spur.    Upper scopic instruments were removed after evacuation of fluid with suction at this time.  Attention was then turned to closure wounds with 3-0 nylon in interrupted fashion.  Wounds were dressed with Xeroform gauze, 4 x 4's, Tegaderm and patient was placed in a sling to the left upper extremity.    At the end of the procedure, all lap, needle, and sponge counts were correct x2. The patient had brisk capillary refill to all digits of the left upper extremity. Compartments were soft and easily compressible at the end of the procedure.       DISPOSITION: The patient was extubated per anesthesia and taken to the recovery room in stable condition. Will follow up in office in  2 weeks for motion check. Results discussed immediately after procedure with family and all questions were answered at that time.       REHAB: Patient will be placed on daily physical therapy with aggressive stretching program for 2 weeks.

## 2021-12-07 NOTE — ANESTHESIA POSTPROCEDURE EVALUATION
Patient: Desire Hernandez    Procedure Summary     Date: 12/07/21 Room / Location:  [facility] /  [facility]    Anesthesia Start: 1526 Anesthesia Stop: 1657    Procedures:       SHOULDER MANIPULATION (Left Shoulder)      SHOULDER ARTHROSCOPY WITH SUBACROMIAL DECOMPRESSION. (Left Shoulder) Diagnosis:       Adhesive capsulitis of left shoulder      (Adhesive capsulitis of left shoulder [M75.02])    Surgeons: Stalin Liu MD Provider: Ramírez Amin CRNA    Anesthesia Type: general with block ASA Status: 2          Anesthesia Type: general with block    Vitals  Vitals Value Taken Time   /76 12/07/21 1725   Temp 97.5 °F (36.4 °C) 12/07/21 1648   Pulse 70 12/07/21 1726   Resp 11 12/07/21 1725   SpO2 97 % 12/07/21 1727   Vitals shown include unvalidated device data.        Post Anesthesia Care and Evaluation    Patient participation: complete - patient participated  Level of consciousness: awake  Pain score: 0  Pain management: adequate  Airway patency: patent  Anesthetic complications: No anesthetic complications  PONV Status: none  Cardiovascular status: acceptable  Respiratory status: acceptable  Hydration status: acceptable

## 2021-12-07 NOTE — ANESTHESIA PROCEDURE NOTES
Airway  Urgency: elective    Date/Time: 12/7/2021 3:38 PM  Airway not difficult    General Information and Staff    Patient location during procedure: OR  CRNA: Ramírez Amin CRNA    Indications and Patient Condition  Indications for airway management: airway protection    Preoxygenated: yes  Mask difficulty assessment: 1 - vent by mask    Final Airway Details  Final airway type: endotracheal airway      Successful airway: ETT  Cuffed: yes   Successful intubation technique: direct laryngoscopy  Endotracheal tube insertion site: oral  Blade: Dominguez  Blade size: 2  ETT size (mm): 7.0  Cormack-Lehane Classification: grade I - full view of glottis  Placement verified by: chest auscultation and capnometry   Measured from: lips  ETT/EBT  to lips (cm): 22  Number of attempts at approach: 1  Assessment: lips, teeth, and gum same as pre-op and atraumatic intubation

## 2021-12-08 ENCOUNTER — TREATMENT (OUTPATIENT)
Dept: PHYSICAL THERAPY | Facility: CLINIC | Age: 56
End: 2021-12-08

## 2021-12-08 DIAGNOSIS — M25.512 CHRONIC LEFT SHOULDER PAIN: Primary | ICD-10-CM

## 2021-12-08 DIAGNOSIS — G89.29 CHRONIC LEFT SHOULDER PAIN: Primary | ICD-10-CM

## 2021-12-08 PROCEDURE — 97110 THERAPEUTIC EXERCISES: CPT | Performed by: PHYSICAL THERAPIST

## 2021-12-08 PROCEDURE — 97140 MANUAL THERAPY 1/> REGIONS: CPT | Performed by: PHYSICAL THERAPIST

## 2021-12-08 PROCEDURE — 97161 PT EVAL LOW COMPLEX 20 MIN: CPT | Performed by: PHYSICAL THERAPIST

## 2021-12-08 NOTE — PROGRESS NOTES
Physical Therapy Initial Evaluation and Plan of Care      Patient: Desire Hernandez   : 1965  Diagnosis/ICD-10 Code:  Chronic pain in right shoulder [M25.511, G89.29]  Referring practitioner: Stalin Liu MD    Subjective Evaluation    History of Present Illness  Date of onset: 2021  Date of surgery: 2021  Mechanism of injury: Insidious onset    Subjective comment: Had left shoulder manipulated under anasthesia yesterday.  Has numbness in the shoulder and arm secondary to continue effects of nerve block.  Already feels like her shoulder is less painful and is moving better.    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  Progression: improved    Social Support  Lives in: condominium  Lives with: spouse    Hand dominance: right    Treatments  Previous treatment: injection treatment and physical therapy  Patient Goals  Patient goals for therapy: increased motion, increased strength and return to sport/leisure activities         QD:  44    Objective          Active Range of Motion   Left Shoulder   Flexion: 90 degrees   External rotation 0°: 30 degrees   Internal rotation BTB: L5     Passive Range of Motion   Left Shoulder   Flexion: 160 degrees   Abduction: 80 degrees   External rotation 0°: 45 degrees           Assessment & Plan     Assessment  Impairments: abnormal or restricted ROM, activity intolerance and lacks appropriate home exercise program  Functional Limitations: carrying objects, lifting, pulling, pushing, reaching behind back and reaching overhead  Assessment details: Mrs. Hernandez is a 56 year old RHD female that presents to physical therapy s/p L JEANETTE BRYN with arthoscopy on 21.  PMH was covered and reviewed during interview.  L GHJ covered with bandage and will be removed on Friday.  AROM<PROM as to expected at  this time, considering the scalene block still has an effect.  Expect a full restoration of mobility and strength.  Prognosis: good    Goals  Plan Goals: STGs:  1.)  QuickDASH improved x 1 MCID in 4 weeks.  2.)  Have full AROM in all planes in 6 weeks.  LTGs:  1.)  Have 5/5 gross GHJ strength in 8 weeks.  2.) Return to all ADLs/iADLs without difficulty, pain or limitations in 12 weeks.    Plan  Therapy options: will be seen for skilled therapy services  Planned therapy interventions: stretching, therapeutic activities, strengthening, manual therapy, home exercise program and functional ROM exercises  Frequency: 5x week  Duration in visits: 2  Duration in weeks: 10  Treatment plan discussed with: patient  Plan details: Will reduce frequency to 1-2x/wk after the first 2 weeks of intensive physical therapy services.        Manual Therapy:    12     mins  86144;  Therapeutic Exercise:    12     mins  47334;     Neuromuscular Lon:        mins  47880;    Therapeutic Activity:          mins  50340;     Gait Training:           mins  43185;     Ultrasound:          mins  03336;    Electrical Stimulation:         mins  20174 ( );  Dry Needling          mins self-pay    Timed Treatment:   24   mins   Total Treatment:     53   mins    PT SIGNATURE: Kevin Roper, LESLY   DATE TREATMENT INITIATED: 12/8/2021    Initial Certification  Certification Period: 3/8/2022  I certify that the therapy services are furnished while this patient is under my care.  The services outlined above are required by this patient, and will be reviewed every 90 days.     PHYSICIAN: Stalin Liu MD      DATE:     Please sign and return via fax to [phone].. Thank you, [facility].

## 2021-12-09 ENCOUNTER — TREATMENT (OUTPATIENT)
Dept: PHYSICAL THERAPY | Facility: CLINIC | Age: 56
End: 2021-12-09

## 2021-12-09 DIAGNOSIS — G89.29 CHRONIC LEFT SHOULDER PAIN: Primary | ICD-10-CM

## 2021-12-09 DIAGNOSIS — M25.512 CHRONIC LEFT SHOULDER PAIN: Primary | ICD-10-CM

## 2021-12-09 PROCEDURE — 97140 MANUAL THERAPY 1/> REGIONS: CPT | Performed by: PHYSICAL THERAPIST

## 2021-12-09 PROCEDURE — 97530 THERAPEUTIC ACTIVITIES: CPT | Performed by: PHYSICAL THERAPIST

## 2021-12-09 PROCEDURE — 97110 THERAPEUTIC EXERCISES: CPT | Performed by: PHYSICAL THERAPIST

## 2021-12-09 NOTE — PROGRESS NOTES
Physical Therapy Daily Progress Note      Patient: Desire Hernandez   : 1965  Diagnosis/ICD-10 Code:  Chronic left shoulder pain [M25.512, G89.29]  Referring practitioner: Stalin Liu MD  Date of Initial Visit: Type: THERAPY  Noted: 2021  Today's Date: 2021  Patient seen for 2 sessions         Desire Hernandez reports that her shoulder is more sore and stiff today as the block has worn off.  Slept well last night after taking a pain medication prescribed by her surgeon.      Subjective     Objective   See Exercise, Manual, and Modality Logs for complete treatment.       Assessment/Plan  Focused visit on improving GHJ mobility via AAROM and AROM.         Manual Therapy:    9     mins  34124;  Therapeutic Exercise:    15     mins  17049;     Neuromuscular Lon:        mins  94618;    Therapeutic Activity:     12     mins  50532;     Gait Training:           mins  64590;     Ultrasound:          mins  73464;    Electrical Stimulation:         mins  18120 ( );  Dry Needling          mins self-pay    Timed Treatment:   36   mins   Total Treatment:     38   mins    Kevin Roper PT  Physical Therapist

## 2021-12-10 ENCOUNTER — TREATMENT (OUTPATIENT)
Dept: PHYSICAL THERAPY | Facility: CLINIC | Age: 56
End: 2021-12-10

## 2021-12-10 DIAGNOSIS — G89.29 CHRONIC LEFT SHOULDER PAIN: Primary | ICD-10-CM

## 2021-12-10 DIAGNOSIS — M25.512 CHRONIC LEFT SHOULDER PAIN: Primary | ICD-10-CM

## 2021-12-10 PROCEDURE — 97140 MANUAL THERAPY 1/> REGIONS: CPT | Performed by: PHYSICAL THERAPIST

## 2021-12-10 PROCEDURE — 97110 THERAPEUTIC EXERCISES: CPT | Performed by: PHYSICAL THERAPIST

## 2021-12-10 PROCEDURE — 97530 THERAPEUTIC ACTIVITIES: CPT | Performed by: PHYSICAL THERAPIST

## 2021-12-10 NOTE — PROGRESS NOTES
Physical Therapy Daily Progress Note    Patient: Desire Hernandez   : 1965  Diagnosis/ICD-10 Code:  Chronic left shoulder pain [M25.512, G89.29]  Referring practitioner: Stalin Liu MD  Date of Initial Visit: Type: THERAPY  Noted: 2021  Today's Date: 12/10/2021  Patient seen for 3 sessions         Desire Hernandez reports more soreness and stiffness since yesterday.      Subjective     Objective   See Exercise, Manual, and Modality Logs for complete treatment.       Assessment/Plan  Continued emphasis on improving mobility.         Manual Therapy:    10     mins  18612;  Therapeutic Exercise:    24     mins  02771;     Neuromuscular Lon:        mins  19973;    Therapeutic Activity:     9     mins  29522;     Gait Training:           mins  84587;     Ultrasound:          mins  43130;    Electrical Stimulation:         mins  72157 ( );  Dry Needling          mins self-pay    Timed Treatment:   43   mins   Total Treatment:     45   mins    Kevin Roper PT  Physical Therapist

## 2021-12-13 ENCOUNTER — TREATMENT (OUTPATIENT)
Dept: PHYSICAL THERAPY | Facility: CLINIC | Age: 56
End: 2021-12-13

## 2021-12-13 DIAGNOSIS — M25.512 CHRONIC LEFT SHOULDER PAIN: Primary | ICD-10-CM

## 2021-12-13 DIAGNOSIS — G89.29 CHRONIC LEFT SHOULDER PAIN: Primary | ICD-10-CM

## 2021-12-13 PROCEDURE — 97110 THERAPEUTIC EXERCISES: CPT | Performed by: PHYSICAL THERAPIST

## 2021-12-13 PROCEDURE — 97140 MANUAL THERAPY 1/> REGIONS: CPT | Performed by: PHYSICAL THERAPIST

## 2021-12-13 PROCEDURE — 97530 THERAPEUTIC ACTIVITIES: CPT | Performed by: PHYSICAL THERAPIST

## 2021-12-13 NOTE — PROGRESS NOTES
Physical Therapy Daily Progress Note    Patient: Desire Hernandez   : 1965  Diagnosis/ICD-10 Code:  Chronic left shoulder pain [M25.512, G89.29]  Referring practitioner: Stalin Liu MD  Date of Initial Visit: Type: THERAPY  Noted: 2021  Today's Date: 2021  Patient seen for 4 sessions         Desire Hernandez reports feeling less soreness since last visit.    Subjective     Objective   See Exercise, Manual, and Modality Logs for complete treatment.       Assessment/Plan  Mrs. Hernandez is s/p L [facility] BRYN on 21.  Continued emphasis on improving mobility in all planes.  Is slowly gaining mobility in all planes.  HBB is most limited.           Manual Therapy:    9    mins  77591;  Therapeutic Exercise:    24     mins  65644;     Neuromuscular Lon:        mins  76541;    Therapeutic Activity:     8     mins  26464;     Gait Training:           mins  04661;     Ultrasound:          mins  35666;    Electrical Stimulation:         mins  27945 ( );  Dry Needling          mins self-pay    Timed Treatment:   41   mins   Total Treatment:     44   mins    Kevin Roper PT  Physical Therapist

## 2021-12-15 ENCOUNTER — TELEPHONE (OUTPATIENT)
Dept: PHYSICAL THERAPY | Facility: CLINIC | Age: 56
End: 2021-12-15

## 2021-12-16 ENCOUNTER — TREATMENT (OUTPATIENT)
Dept: PHYSICAL THERAPY | Facility: CLINIC | Age: 56
End: 2021-12-16

## 2021-12-16 DIAGNOSIS — M25.512 CHRONIC LEFT SHOULDER PAIN: Primary | ICD-10-CM

## 2021-12-16 DIAGNOSIS — G89.29 CHRONIC LEFT SHOULDER PAIN: Primary | ICD-10-CM

## 2021-12-16 PROCEDURE — 97140 MANUAL THERAPY 1/> REGIONS: CPT | Performed by: PHYSICAL THERAPIST

## 2021-12-16 PROCEDURE — 97110 THERAPEUTIC EXERCISES: CPT | Performed by: PHYSICAL THERAPIST

## 2021-12-16 NOTE — PROGRESS NOTES
Physical Therapy Daily Treatment Note      Patient: Desire Hernandez   : 1965  Referring practitioner: Stalin Liu MD  Date of Initial Visit: Type: THERAPY  Noted: 2021  Today's Date: 2021  Patient seen for 5 sessions       Visit Diagnoses:    ICD-10-CM ICD-9-CM   1. Chronic left shoulder pain  M25.512 719.41    G89.29 338.29       Subjective   Patient reports that she has continued complaints of lateral elbow pain and radiating numbness and tingling into the thumb, index, middle fingers mainly on the dorsal aspect.  She reports that she feels quite sore intermittently along the anterior shoulder as well.  Pain continues to be worse when trying to sleep or with end range mobility.  her pain level is 5/10 upon arrival.      Objective   AROM flexion standing 95, scaption 90.  PROM ER (30 ABD) 20 degrees.        See Exercise, Manual, and Modality Logs for complete treatment.       Assessment & Plan     Assessment    Assessment details: Patient tolerates treatment well. She is able to elevate against gravity further using wand and in CKC.  She elevates further actively in supine than available in sitting.  She doesn't report a lot of pain with treatment and her pain improves post treatment.  She is still rather stiff with external rotation and focused today's session on elevation and external rotation.    Plan  Plan details: Progress ROM activities to tolerance.          Timed:         Manual Therapy:    24     mins  50039;     Therapeutic Exercise:    31     mins  09017;     Neuromuscular Lon:    0    mins  92776;    Therapeutic Activity:     0     mins  65568;     Gait Trainin     mins  53943;     Ultrasound:     0     mins  65486;    Ionto                               0    mins   38529  Self Care                       0     mins   06909  Canalith Repos    0     mins 09782      Un-Timed:  Electrical Stimulation:    0     mins  68655 ( );  Dry Needling     0     mins  self-pay  Traction     0     mins 05133      Timed Treatment:   55   mins   Total Treatment:     55   mins    Ramírez Terrazas, PT  [place] License: [id number]

## 2021-12-17 ENCOUNTER — TREATMENT (OUTPATIENT)
Dept: PHYSICAL THERAPY | Facility: CLINIC | Age: 56
End: 2021-12-17

## 2021-12-17 DIAGNOSIS — M25.512 CHRONIC LEFT SHOULDER PAIN: Primary | ICD-10-CM

## 2021-12-17 DIAGNOSIS — G89.29 CHRONIC LEFT SHOULDER PAIN: Primary | ICD-10-CM

## 2021-12-17 PROCEDURE — 97530 THERAPEUTIC ACTIVITIES: CPT | Performed by: PHYSICAL THERAPIST

## 2021-12-17 PROCEDURE — 97140 MANUAL THERAPY 1/> REGIONS: CPT | Performed by: PHYSICAL THERAPIST

## 2021-12-17 PROCEDURE — 97110 THERAPEUTIC EXERCISES: CPT | Performed by: PHYSICAL THERAPIST

## 2021-12-17 NOTE — PROGRESS NOTES
Physical Therapy Daily Progress Note    Patient: Desire Hernandez   : 1965  Diagnosis/ICD-10 Code:  Chronic left shoulder pain [M25.512, G89.29]  Referring practitioner: Stalin Liu MD  Date of Initial Visit: Type: THERAPY  Noted: 2021  Today's Date: 2021  Patient seen for 6 sessions         Desire Hernandez reports that her shoulder feels more stiff and sore today.  Does not feel that physical therapy is aggravating the shoulder.  Had difficulty sleeping last night and finally fell asleep at 4am after using ice.  Took dicoflenac this morning.    Subjective     Objective   See Exercise, Manual, and Modality Logs for complete treatment.     *(+) reproduction of L UE pain with pressure applied to inferior scapular fossa    Assessment/Plan  Mrs. Hernandez is s/p L [facility] BRYN on 21.  PROM>AAROM>AROM today.  Focused visit on improving forward elevation mobility today.  Will work in all planes at next visit.    *Updated and reviewed HEP (included in total time billed as TE below).       Manual Therapy:    9     mins  23454;  Therapeutic Exercise:    24     mins  74256;     Neuromuscular Lon:        mins  18504;    Therapeutic Activity:      9    mins  30268;     Gait Training:           mins  08562;     Ultrasound:          mins  94316;    Electrical Stimulation:         mins  95177 ( );  Dry Needling         mins self-pay    Timed Treatment:   42   mins   Total Treatment:     45   mins    Kevin Roper, PT  Physical Therapist

## 2021-12-20 ENCOUNTER — TREATMENT (OUTPATIENT)
Dept: PHYSICAL THERAPY | Facility: CLINIC | Age: 56
End: 2021-12-20

## 2021-12-20 DIAGNOSIS — G89.29 CHRONIC LEFT SHOULDER PAIN: Primary | ICD-10-CM

## 2021-12-20 DIAGNOSIS — M25.512 CHRONIC LEFT SHOULDER PAIN: Primary | ICD-10-CM

## 2021-12-20 PROCEDURE — 97140 MANUAL THERAPY 1/> REGIONS: CPT | Performed by: PHYSICAL THERAPIST

## 2021-12-20 PROCEDURE — 97110 THERAPEUTIC EXERCISES: CPT | Performed by: PHYSICAL THERAPIST

## 2021-12-20 NOTE — PROGRESS NOTES
Physical Therapy Daily Treatment Note      Patient: Desire Hernandez   : 1965  Referring practitioner: Stalin Liu MD  Date of Initial Visit: Type: THERAPY  Noted: 2021  Today's Date: 2021  Patient seen for 7 sessions       Visit Diagnoses:    ICD-10-CM ICD-9-CM   1. Chronic left shoulder pain  M25.512 719.41    G89.29 338.29       Subjective   Patient reports that she has had continued sleep interference intermittently.  She has been napping during the day due to poor overall rest.  She has continued anterior shoulder pain along the anterior portal hole.  She notes continued radiating pain into the RUE to the elbow and dorsal forearm.  her pain level is 5/10 upon arrival.      Objective     AROM seated flexion/scaption: 95  PROM Flex: 155   ABD: 155  Healing bruising along the humerus.  Incisions covered but unremarkable on inspection.    See Exercise, Manual, and Modality Logs for complete treatment.       Assessment & Plan     Assessment    Assessment details: Patient demonstrates continued limitation in AROM elevation and passively primarily in rotational planes.  She is limited by intermittent UE referral into the elbow and dorsal forearm. Post treatment, she is able to achieve 105 degrees AROM elevation in flexion plane.  She continues to demonstrate more PROM>AAROM>AROM.      Plan  Plan details: Continue to work on ROM activities. Patient has a follow up with ortho on Wednesday.          Timed:         Manual Therapy:    10     mins  20568;     Therapeutic Exercise:    39     mins  54599;     Neuromuscular Lon:    0    mins  19998;    Therapeutic Activity:     0     mins  88267;     Gait Trainin     mins  85170;     Ultrasound:     0     mins  81359;    Ionto                               0    mins   75171  Self Care                       0     mins   02690  Canalith Repos    0     mins 11149      Un-Timed:  Electrical Stimulation:    0     mins  63446 ( );  Dry  Needling     0     mins self-pay  Traction     0     mins 01646      Timed Treatment:   49   mins   Total Treatment:     49   mins    Ramírez Terrazas, PT  [place] License: [id number]

## 2021-12-21 ENCOUNTER — TREATMENT (OUTPATIENT)
Dept: PHYSICAL THERAPY | Facility: CLINIC | Age: 56
End: 2021-12-21

## 2021-12-21 DIAGNOSIS — G89.29 CHRONIC LEFT SHOULDER PAIN: Primary | ICD-10-CM

## 2021-12-21 DIAGNOSIS — M25.512 CHRONIC LEFT SHOULDER PAIN: Primary | ICD-10-CM

## 2021-12-21 PROCEDURE — 97530 THERAPEUTIC ACTIVITIES: CPT | Performed by: PHYSICAL THERAPIST

## 2021-12-21 PROCEDURE — 97140 MANUAL THERAPY 1/> REGIONS: CPT | Performed by: PHYSICAL THERAPIST

## 2021-12-21 PROCEDURE — 97110 THERAPEUTIC EXERCISES: CPT | Performed by: PHYSICAL THERAPIST

## 2021-12-21 NOTE — PROGRESS NOTES
Physical Therapy Daily Progress Note    Patient: Desire Hernandez   : 1965  Diagnosis/ICD-10 Code:  Chronic left shoulder pain [M25.512, G89.29]  Referring practitioner: Stalin Liu MD  Date of Initial Visit: Type: THERAPY  Noted: 2021  Today's Date: 2021  Patient seen for 8 sessions         Desire Hernandez reports that her shoulder is less sore today.  Feels more pain in the elbow now.    Subjective     Objective   See Exercise, Manual, and Modality Logs for complete treatment.       Assessment/Plan  Mrs. Hernandez is s/p L [facility] BRYN on 21.  Continues to demonstrate improved PROM/AAROM in flx and ER.  Hand behind back mobility is limited secondary to soreness and stiffness.      *Updated HEP with written and verbal instruction (included in total time billed as TE below).           Manual Therapy:    12     mins  46345;  Therapeutic Exercise:    24     mins  03605;     Neuromuscular Lon:        mins  47833;    Therapeutic Activity:     9     mins  92906;     Gait Training:           mins  78216;     Ultrasound:          mins  42141;    Electrical Stimulation:        mins  95700 ( );  Dry Needling          mins self-pay    Timed Treatment:   45   mins   Total Treatment:     45   mins    Kevin Roper PT  Physical Therapist

## 2021-12-22 ENCOUNTER — TRANSCRIBE ORDERS (OUTPATIENT)
Dept: ADMINISTRATIVE | Facility: HOSPITAL | Age: 56
End: 2021-12-22

## 2021-12-22 ENCOUNTER — OFFICE VISIT (OUTPATIENT)
Dept: ORTHOPEDIC SURGERY | Facility: CLINIC | Age: 56
End: 2021-12-22

## 2021-12-22 VITALS — WEIGHT: 122.8 LBS | HEIGHT: 64 IN | BODY MASS INDEX: 20.96 KG/M2

## 2021-12-22 DIAGNOSIS — Z09 STATUS POST ORTHOPEDIC SURGERY, FOLLOW-UP EXAM: ICD-10-CM

## 2021-12-22 DIAGNOSIS — M75.02 ADHESIVE CAPSULITIS OF LEFT SHOULDER: Primary | ICD-10-CM

## 2021-12-22 DIAGNOSIS — Z12.31 SCREENING MAMMOGRAM, ENCOUNTER FOR: Primary | ICD-10-CM

## 2021-12-22 PROCEDURE — 99024 POSTOP FOLLOW-UP VISIT: CPT | Performed by: ORTHOPAEDIC SURGERY

## 2021-12-22 NOTE — PROGRESS NOTES
Subjective: Status post manipulation left shoulder arthroscopic debridement     Patient ID: Desire Hernandez is a 56 y.o. female.    Chief Complaint:    History of Present Illness patient is 2 weeks status post the above-stated procedure per Dr. Liu.  She has been in physical therapy and is making improvement but still not regained full range of motion.  She is restarted the diclofenac is only taking it once a day       Social History     Occupational History     Employer: [employer]   Tobacco Use   • Smoking status: Never Smoker   • Smokeless tobacco: Never Used   Vaping Use   • Vaping Use: Never used   Substance and Sexual Activity   • Alcohol use: Yes     Alcohol/week: 1.0 standard drink     Types: 1 Glasses of wine per week     Comment: 2 drinks per week    • Drug use: Not Currently     Types: Marijuana   • Sexual activity: Defer     Partners: Male     Birth control/protection: None      Review of Systems   Musculoskeletal: Positive for arthralgias and myalgias.         Past Medical History:   Diagnosis Date   • ADHD (attention deficit hyperactivity disorder)     back in high school   • Allergic     seasonal   • ASCUS favor dysplasia     6/4/07; 6/5/08; 6/25/10; 6/27/12; 7/29/13; 8/21/15 (HPV negative)   • Blood type, Rh negative    • Bulging lumbar disc    • Bunion 2016    surgery   • Chlamydia     as a teen   • Diarrhea    • Dizziness    • Fatigue    • H/O nipple discharge 9/415    Bilateral diagnostic mammogram negative   • Hair loss    • HSV-1 (herpes simplex virus 1) infection    • HSV-2 (herpes simplex virus 2) infection 1993   • LGSIL (low grade squamous intraepithelial dysplasia) 05/26/2006   • Menopause    • Nausea    • Ovarian cyst     Left   • Palpitations    • PONV (postoperative nausea and vomiting)    • Right shoulder pain     SCHEDULED FOR SX   • Sleep disturbance    • Syncope    • Tinnitus      Past Surgical History:   Procedure Laterality Date   • BUNIONECTOMY Right 2016      [facility]   • CERVICAL CONIZATION, LEEP     • COLPOSCOPY W/ BIOPSY / CURETTAGE  2006    benign   • D & C WITH SUCTION  1998    10 week SAB   • ENDOMETRIAL ABLATION  Dec. 2017   • INDUCED       years ago   • [place] HYSTEROSCOPY,W/ENDOMETRIAL ABLATION N/A 2017    Procedure: HYSTEROSCOPY WITH ENDOMETRIAL ABLATION; d&c;  Surgeon: Sandie Hu DO;  Location: [facility] OR;  Service: Obstetrics/Gynecology   • SHOULDER ARTHROSCOPY Right 2020    Procedure: Right shoulder manipulation under anesthesia with arthroscopy and extensive debridement of glenohumeral and subacromial spaces;  Surgeon: Stalin Liu MD;  Location:  [facility] OR;  Service: Orthopedics   • SHOULDER ARTHROSCOPY Left 2021    Procedure: SHOULDER ARTHROSCOPY WITH SUBACROMIAL DECOMPRESSION.;  Surgeon: Stalin Liu MD;  Location:  [facility] OR;  Service: Orthopedics;  Laterality: Left;   • SHOULDER MANIPULATION Left 2021    Procedure: SHOULDER MANIPULATION;  Surgeon: Stalin Liu MD;  Location:  [facility] OR;  Service: Orthopedics;  Laterality: Left;     Family History   Problem Relation Age of Onset   • Hearing loss Father    • Hyperlipidemia Father         DX in his mid 50's   • Hypothyroidism Father    • Hypertension Father    • Hypothyroidism Mother    • Osteoarthritis Mother    • No Known Problems Brother    • Arthritis Sister         RA   • No Known Problems Son    • Hearing loss Maternal Grandmother    • Hyperlipidemia Maternal Grandmother         DX in her late 70's   • Stroke Maternal Grandmother    • Lung cancer Maternal Grandfather    • Cancer Maternal Grandfather         lung cancer - smoker   • Lung cancer Paternal Uncle    • Cancer Paternal Uncle         lung cancer - smoker   • Rheum arthritis Other    • Breast cancer Neg Hx    • Ovarian cancer Neg Hx    • Uterine cancer Neg Hx    • Colon cancer Neg Hx    • Melanoma Neg Hx    • Colon polyps Neg Hx    • Malig Hyperthermia Neg Hx           Objective:  There were no vitals filed for this visit.      12/22/21  1411   Weight: 55.7 kg (122 lb 12.8 oz)     Body mass index is 21.08 kg/m².        Ortho Exam   She is alert and oriented.  All wounds are benign and sutures been removed.  She can actively extend the shoulder about 120 degrees and abduct about 90 degrees past which were getting glenohumeral rotation.  He does have some bruising or ecchymosis of the upper arm following the manipulation but she is not having significant pain that she is not taken any pain pills at this time.  There is no motor or sensory deficit.    Assessment:        1. Adhesive capsulitis of left shoulder    2. Status post orthopedic surgery, follow-up exam           Plan: Advised the patient to increase the diclofenac to twice a day as long as it does not upset her stomach.  Continue physical therapy and the exercises as instructed.  Return to see Dr. Liu in 2 weeks.  Answered all questions            Work Status:    ROSA query complete.    Orders:  No orders of the defined types were placed in this encounter.      Medications:  No orders of the defined types were placed in this encounter.      Followup:  Return in about 2 weeks (around 1/5/2022).          Dictated utilizing Dragon dictation

## 2021-12-27 ENCOUNTER — TREATMENT (OUTPATIENT)
Dept: PHYSICAL THERAPY | Facility: CLINIC | Age: 56
End: 2021-12-27

## 2021-12-27 DIAGNOSIS — M25.512 CHRONIC LEFT SHOULDER PAIN: Primary | ICD-10-CM

## 2021-12-27 DIAGNOSIS — G89.29 CHRONIC LEFT SHOULDER PAIN: Primary | ICD-10-CM

## 2021-12-27 PROCEDURE — 97530 THERAPEUTIC ACTIVITIES: CPT | Performed by: PHYSICAL THERAPIST

## 2021-12-27 PROCEDURE — 97110 THERAPEUTIC EXERCISES: CPT | Performed by: PHYSICAL THERAPIST

## 2021-12-27 PROCEDURE — 97140 MANUAL THERAPY 1/> REGIONS: CPT | Performed by: PHYSICAL THERAPIST

## 2021-12-27 NOTE — PROGRESS NOTES
Physical Therapy Daily Progress Note    Patient: Desire Hernandez   : 1965  Diagnosis/ICD-10 Code:  Chronic left shoulder pain [M25.512, G89.29]  Referring practitioner: Stalin Liu MD  Date of Initial Visit: Type: THERAPY  Noted: 2021  Today's Date: 2021  Patient seen for 9 sessions         Desire Hernandez reports that her shoulder pain and stiffness have not improved since last week.  Saw an orthopaedic surgeon as a follow up last week and was instructed to continue to take anti-inflammatories.  Follows up with original surgeon on 22.      Subjective     Objective   See Exercise, Manual, and Modality Logs for complete treatment.       Assessment/Plan  Mrs. Hernandez is s/p L [facility] BRYN on 21.  Has PROM/AAROM>AROM at this time.  Continuing to focus on improving muscle power and joint mobility.  Will continue physical therapy services at 2x/wk for another month before reducing frequency.         Manual Therapy:    10     mins  18658;  Therapeutic Exercise:    14     mins  16774;     Neuromuscular Lon:        mins  43748;    Therapeutic Activity:     9     mins  02393;     Gait Training:           mins  64797;     Ultrasound:          mins  44430;    Electrical Stimulation:        mins  35793 ( );  Dry Needling          mins self-pay    Timed Treatment:   33   mins   Total Treatment:     33   mins    Kevin Roper PT  Physical Therapist

## 2021-12-30 ENCOUNTER — TREATMENT (OUTPATIENT)
Dept: PHYSICAL THERAPY | Facility: CLINIC | Age: 56
End: 2021-12-30

## 2021-12-30 DIAGNOSIS — M25.512 CHRONIC LEFT SHOULDER PAIN: Primary | ICD-10-CM

## 2021-12-30 DIAGNOSIS — G89.29 CHRONIC LEFT SHOULDER PAIN: Primary | ICD-10-CM

## 2021-12-30 PROCEDURE — 97110 THERAPEUTIC EXERCISES: CPT | Performed by: PHYSICAL THERAPIST

## 2021-12-30 PROCEDURE — 97530 THERAPEUTIC ACTIVITIES: CPT | Performed by: PHYSICAL THERAPIST

## 2021-12-30 PROCEDURE — 97140 MANUAL THERAPY 1/> REGIONS: CPT | Performed by: PHYSICAL THERAPIST

## 2022-01-04 ENCOUNTER — TREATMENT (OUTPATIENT)
Dept: PHYSICAL THERAPY | Facility: CLINIC | Age: 57
End: 2022-01-04

## 2022-01-04 DIAGNOSIS — M25.512 CHRONIC LEFT SHOULDER PAIN: Primary | ICD-10-CM

## 2022-01-04 DIAGNOSIS — G89.29 CHRONIC LEFT SHOULDER PAIN: Primary | ICD-10-CM

## 2022-01-04 PROCEDURE — 97530 THERAPEUTIC ACTIVITIES: CPT | Performed by: PHYSICAL THERAPIST

## 2022-01-04 PROCEDURE — 97110 THERAPEUTIC EXERCISES: CPT | Performed by: PHYSICAL THERAPIST

## 2022-01-04 PROCEDURE — 97140 MANUAL THERAPY 1/> REGIONS: CPT | Performed by: PHYSICAL THERAPIST

## 2022-01-04 NOTE — PROGRESS NOTES
Physical Therapy Daily Progress Note    Patient: Desire Hernandez   : 1965  Diagnosis/ICD-10 Code:  Chronic left shoulder pain [M25.512, G89.29]  Referring practitioner: Stalin Liu MD  Date of Initial Visit: Type: THERAPY  Noted: 2021  Today's Date: 2022  Patient seen for 11 sessions         Desire Hernandez reports feeling a little less pain since last visit.  Still having difficulty raising arm up.    Subjective     Objective   See Exercise, Manual, and Modality Logs for complete treatment.       Assessment/Plan  Continued emphasis on improving joint mobility via PROM and AROM techniques.  Improving slowly with PROM in all planes.  But, AROM remains limited secondary to decreased power to overcome joint stiffness.  Follows up with orthopaedic surgeon tomorrow.         Manual Therapy:    10     mins  00251;  Therapeutic Exercise:    24     mins  46514;     Neuromuscular Lon:        mins  66962;    Therapeutic Activity:     9     mins  47911;     Gait Training:           mins  85368;     Ultrasound:          mins  52051;    Electrical Stimulation:         mins  80387 ( );  Dry Needling         mins self-pay    Timed Treatment:   43   mins   Total Treatment:     43   mins    Kevin Roper PT  Physical Therapist                     Pt.'s daughter, Rey Bird, returned call with , Anali Hernandes, on line and she states the following: she tried to get appt for pt with pulmonologist and was told that the soonest would be in June; she is made aware that I will have pulmonary RN c

## 2022-01-05 ENCOUNTER — OFFICE VISIT (OUTPATIENT)
Dept: ORTHOPEDIC SURGERY | Facility: CLINIC | Age: 57
End: 2022-01-05

## 2022-01-05 VITALS — HEIGHT: 64 IN | BODY MASS INDEX: 20.96 KG/M2 | WEIGHT: 122.8 LBS

## 2022-01-05 DIAGNOSIS — M75.02 ADHESIVE CAPSULITIS OF LEFT SHOULDER: ICD-10-CM

## 2022-01-05 DIAGNOSIS — Z09 STATUS POST ORTHOPEDIC SURGERY, FOLLOW-UP EXAM: Primary | ICD-10-CM

## 2022-01-05 DIAGNOSIS — M75.52 SUBACROMIAL BURSITIS OF LEFT SHOULDER JOINT: ICD-10-CM

## 2022-01-05 PROCEDURE — 99024 POSTOP FOLLOW-UP VISIT: CPT | Performed by: ORTHOPAEDIC SURGERY

## 2022-01-05 RX ORDER — CYCLOBENZAPRINE HCL 5 MG
5 TABLET ORAL 3 TIMES DAILY PRN
Qty: 45 TABLET | Refills: 0 | Status: SHIPPED | OUTPATIENT
Start: 2022-01-05 | End: 2023-02-21

## 2022-01-05 RX ORDER — PREDNISONE 10 MG/1
TABLET ORAL
Qty: 39 TABLET | Refills: 0 | Status: SHIPPED | OUTPATIENT
Start: 2022-01-05 | End: 2022-01-17

## 2022-01-05 NOTE — PROGRESS NOTES
"The patient has consented to being recorded using ESVEN.    CC: F/u s/p left shoulder manipulation under anesthesia, arthroscopic lysis of adhesions with subacromial decompression-2021    Interval History: Patient returns to clinic stating she has been experiencing persistent pain. Her pain is exacerbated at night, causing her inability to sleep. She reports use of diclofenac 2 times daily, with moderated relief. The patient endorse attending physical therapy and working on home exercises for motion. She is unsure how well she progressed 1 week status post-op, due to her life being in a \"blur.\" Additionally, she expresses the use of steroid make her \"crazy.\"    The patient is right-hand dominate.     Exam:   Left shoulder- incisions healing well   Tolerates FF- 140,   ER- 40   Active , 4-/5 strength.    Positive sensation all distributions left hand and proximal lateral aspect arm, positive deltoid firing   Cap refill < 3 seconds, radial pulse 2+   Positive deltoid firing   Flex/extend fingers/thumb/wrist with 4+/5 strength, positive thumbs up, okay sign, cross finger adduction and abduction against resistance     Impression: s/p left shoulder manipulation under anesthesia, arthroscopic lysis of adhesions and subacromial decompression      Plan:  1.  Continue PT for work on ROM and progressing into strengthening per protocol  2.  Instructed on passive ROM exercises to be done multiple times daily at home  3.  F/u in 2 weeks to evaluate motion and progress with PT. May consider injection if limited improvement  4. Given her persistent pain I will prescribe the patient Prednisone and Flexeril.  5.  All questions answered      New Medications Ordered This Visit   Medications   • predniSONE (DELTASONE) 10 MG tablet     Si mg po daily x 3 days, then 40 mg po daily x 3 days, then 20 mg po daily x 3 days, then 10 mg po daily x 3 days     Dispense:  39 tablet     Refill:  0   • cyclobenzaprine (FLEXERIL) 5 MG " tablet     Sig: Take 1 tablet by mouth 3 (Three) Times a Day As Needed for Muscle Spasms.     Dispense:  45 tablet     Refill:  0       No orders of the defined types were placed in this encounter.        Scribed for Stalin Liu MD by Key Coleman.  1/5/2022  13:30 EST

## 2022-01-06 ENCOUNTER — TREATMENT (OUTPATIENT)
Dept: PHYSICAL THERAPY | Facility: CLINIC | Age: 57
End: 2022-01-06

## 2022-01-06 DIAGNOSIS — M25.512 CHRONIC LEFT SHOULDER PAIN: Primary | ICD-10-CM

## 2022-01-06 DIAGNOSIS — G89.29 CHRONIC LEFT SHOULDER PAIN: Primary | ICD-10-CM

## 2022-01-06 PROCEDURE — 97110 THERAPEUTIC EXERCISES: CPT | Performed by: PHYSICAL THERAPIST

## 2022-01-06 PROCEDURE — 97140 MANUAL THERAPY 1/> REGIONS: CPT | Performed by: PHYSICAL THERAPIST

## 2022-01-06 PROCEDURE — 97530 THERAPEUTIC ACTIVITIES: CPT | Performed by: PHYSICAL THERAPIST

## 2022-01-06 NOTE — PROGRESS NOTES
Physical Therapy Daily Progress Note    Patient: Desire Hernandez   : 1965  Diagnosis/ICD-10 Code:  Chronic left shoulder pain [M25.512, G89.29]  Referring practitioner: Stalin Liu MD  Date of Initial Visit: Type: THERAPY  Noted: 2021  Today's Date: 2022  Patient seen for 12 sessions         Desire Hernandez reports that her shoulder is still sore.  Was provided oral steroids to take a course of.  And, was instructed to start taking muscle relaxer to improve sleep.    Subjective     Objective   See Exercise, Manual, and Modality Logs for complete treatment.       Assessment/Plan  Continued emphasis on improving GHJ mobility in all planes.  Continues to demonstrate small improvements in mobility in all planes, passively.  Still lacking power to overcome joint stiffness, actively.           Manual Therapy:    12     mins  16434;  Therapeutic Exercise:    24     mins  51550;     Neuromuscular Lon:        mins  72854;    Therapeutic Activity:     9     mins  41187;     Gait Training:           mins  28747;     Ultrasound:          mins  59586;    Electrical Stimulation:         mins  64449 ( );  Dry Needling         mins self-pay    Timed Treatment:   45   mins   Total Treatment:     49   mins    Kevin Roper PT  Physical Therapist

## 2022-01-12 ENCOUNTER — TREATMENT (OUTPATIENT)
Dept: PHYSICAL THERAPY | Facility: CLINIC | Age: 57
End: 2022-01-12

## 2022-01-12 DIAGNOSIS — G89.29 CHRONIC LEFT SHOULDER PAIN: Primary | ICD-10-CM

## 2022-01-12 DIAGNOSIS — M25.512 CHRONIC LEFT SHOULDER PAIN: Primary | ICD-10-CM

## 2022-01-12 PROCEDURE — 97530 THERAPEUTIC ACTIVITIES: CPT | Performed by: PHYSICAL THERAPIST

## 2022-01-12 PROCEDURE — 97110 THERAPEUTIC EXERCISES: CPT | Performed by: PHYSICAL THERAPIST

## 2022-01-12 PROCEDURE — 97140 MANUAL THERAPY 1/> REGIONS: CPT | Performed by: PHYSICAL THERAPIST

## 2022-01-12 NOTE — PROGRESS NOTES
Physical Therapy Daily Progress Note    Patient: Desire Hernandez   : 1965  Diagnosis/ICD-10 Code:  Chronic left shoulder pain [M25.512, G89.29]  Referring practitioner: Stalin Liu MD  Date of Initial Visit: Type: THERAPY  Noted: 2021  Today's Date: 2022  Patient seen for 13 sessions         Desire Hernandez reports that her shoulder is feeling quite a bit better.  States that she has more mobility and is finally sleeping well.  Elbow feels painless as well.  Attributes most recent improvements to starting an oral steroid last Thursday.      Subjective     Objective   See Exercise, Manual, and Modality Logs for complete treatment.     *AROM [facility] flx/ER/HBB:  140 deg/38 deg/L4    Assessment/Plan  Mrs. Hernandez has attended physical therapy for a total of 13 visits s/p L [facility] BRYN on 21.  Has improved AROM in all planes.  PROM and AAROM are greater than AROM at this time.  But, has made a steady improvement in mobility.  I expect a full restoration of mobility and return to prior level of function.  Will start reducing frequency of visits to 1x/wk for 4 more weeks.           Manual Therapy:    15     mins  79710;  Therapeutic Exercise:    24     mins  94667;     Neuromuscular Lon:        mins  21716;    Therapeutic Activity:     9     mins  61111;     Gait Training:           mins  29318;     Ultrasound:          mins  43139;    Electrical Stimulation:        mins  95789 ( );  Dry Needling          mins self-pay    Timed Treatment:   48   mins   Total Treatment:     48   mins    Kevin Roper PT  Physical Therapist

## 2022-01-18 ENCOUNTER — HOSPITAL ENCOUNTER (OUTPATIENT)
Dept: MAMMOGRAPHY | Facility: HOSPITAL | Age: 57
Discharge: HOME | End: 2022-01-18
Admitting: NURSE PRACTITIONER

## 2022-01-18 DIAGNOSIS — Z12.31 SCREENING MAMMOGRAM, ENCOUNTER FOR: ICD-10-CM

## 2022-01-18 PROCEDURE — 77063 BREAST TOMOSYNTHESIS BI: CPT

## 2022-01-18 PROCEDURE — 77067 SCR MAMMO BI INCL CAD: CPT

## 2022-01-19 ENCOUNTER — TREATMENT (OUTPATIENT)
Dept: PHYSICAL THERAPY | Facility: CLINIC | Age: 57
End: 2022-01-19

## 2022-01-19 DIAGNOSIS — M25.512 CHRONIC LEFT SHOULDER PAIN: Primary | ICD-10-CM

## 2022-01-19 DIAGNOSIS — G89.29 CHRONIC LEFT SHOULDER PAIN: Primary | ICD-10-CM

## 2022-01-19 PROCEDURE — 97110 THERAPEUTIC EXERCISES: CPT | Performed by: PHYSICAL THERAPIST

## 2022-01-19 PROCEDURE — 97530 THERAPEUTIC ACTIVITIES: CPT | Performed by: PHYSICAL THERAPIST

## 2022-01-19 PROCEDURE — 97140 MANUAL THERAPY 1/> REGIONS: CPT | Performed by: PHYSICAL THERAPIST

## 2022-01-19 NOTE — PROGRESS NOTES
Physical Therapy Daily Progress Note    Patient: Desire Hernandez   : 1965  Diagnosis/ICD-10 Code:  Chronic left shoulder pain [M25.512, G89.29]  Referring practitioner: Stalin Liu MD  Date of Initial Visit: Type: THERAPY  Noted: 2021  Today's Date: 2022  Patient seen for 14 sessions         Desire Hernandez reports that her shoulder is no longer sore.  Just feels stiff at this time.      Subjective     Objective   See Exercise, Manual, and Modality Logs for complete treatment.       Assessment/Plan  Mrs. Hernandez is s/p L [facility] BRYN on 21.  Has improved mobility in all planes.  Has excellent tolerance to stretching and loading exercises of the upper limb.           Manual Therapy:    10     mins  84699;  Therapeutic Exercise:    24     mins  80174;     Neuromuscular Lon:        mins  04202;    Therapeutic Activity:     9     mins  47352;     Gait Training:           mins  56461;     Ultrasound:          mins  94550;    Electrical Stimulation:         mins  38912 ( );  Dry Needling          mins self-pay    Timed Treatment:   43   mins   Total Treatment:     43   mins    Kevin Roper PT  Physical Therapist

## 2022-01-26 ENCOUNTER — OFFICE VISIT (OUTPATIENT)
Dept: ORTHOPEDIC SURGERY | Facility: CLINIC | Age: 57
End: 2022-01-26

## 2022-01-26 VITALS — WEIGHT: 122.8 LBS | BODY MASS INDEX: 20.96 KG/M2 | HEIGHT: 64 IN

## 2022-01-26 DIAGNOSIS — M75.02 ADHESIVE CAPSULITIS OF LEFT SHOULDER: Primary | ICD-10-CM

## 2022-01-26 DIAGNOSIS — Z09 STATUS POST ORTHOPEDIC SURGERY, FOLLOW-UP EXAM: ICD-10-CM

## 2022-01-26 PROCEDURE — 99024 POSTOP FOLLOW-UP VISIT: CPT | Performed by: NURSE PRACTITIONER

## 2022-01-26 NOTE — PROGRESS NOTES
CC: F/u s/p left shoulder manipulation under anesthesia, arthroscopic lysis of adhesions with subacromial decompression-12/7/2021    Interval history: Desire Hernandez returns to clinic for follow-up left shoulder. Localizes pain lateral aspect of shoulder. Has completed oral steroids and flexeril which have helped with motion. Has continued with PT once weekly and home strengthening exercises.  Has just started strengthening exercises with physical therapy.    Exam:   Left shoulder- incisions healing well              Tolerates FF- 165, ER- 45              Active , 4-/5 strength   IR: L1              Positive sensation all distributions left hand and proximal lateral aspect arm, positive deltoid firing              Cap refill < 3 seconds, radial pulse 2+              Positive deltoid firing              Flex/extend fingers/thumb/wrist with 4+/5 strength, positive thumbs up, okay sign, cross finger adduction and abduction against  resistance    Assessment: status post left shoulder manipulation under anesthesia, arthroscopic lysis of adhesions with subacromial decompression    Plan:  1. Discussed plan of care with patient.  Does not wish to proceed with steroid injection of the shoulder.  Continue PT for work on ROM and progressing into strengthening per protocol.  2. Instructed on passive ROM exercises to be done multiple times daily at home.  3.  We will have her follow-up with Dr. Liu 3 weeks to reevaluate motion and strength.  All questions answered.      No orders of the defined types were placed in this encounter.

## 2022-01-27 ENCOUNTER — TREATMENT (OUTPATIENT)
Dept: PHYSICAL THERAPY | Facility: CLINIC | Age: 57
End: 2022-01-27

## 2022-01-27 DIAGNOSIS — M25.512 CHRONIC LEFT SHOULDER PAIN: Primary | ICD-10-CM

## 2022-01-27 DIAGNOSIS — G89.29 CHRONIC LEFT SHOULDER PAIN: Primary | ICD-10-CM

## 2022-01-27 PROBLEM — Z09 STATUS POST ORTHOPEDIC SURGERY, FOLLOW-UP EXAM: Status: ACTIVE | Noted: 2022-01-27

## 2022-01-27 PROCEDURE — 97110 THERAPEUTIC EXERCISES: CPT | Performed by: PHYSICAL THERAPIST

## 2022-01-27 PROCEDURE — 97530 THERAPEUTIC ACTIVITIES: CPT | Performed by: PHYSICAL THERAPIST

## 2022-01-27 PROCEDURE — 97140 MANUAL THERAPY 1/> REGIONS: CPT | Performed by: PHYSICAL THERAPIST

## 2022-01-27 NOTE — PROGRESS NOTES
Physical Therapy Daily Progress Note    Patient: Desire Hernandez   : 1965  Diagnosis/ICD-10 Code:  Chronic left shoulder pain [M25.512, G89.29]  Referring practitioner: Stalin Liu MD  Date of Initial Visit: Type: THERAPY  Noted: 2021  Today's Date: 2022  Patient seen for 15 sessions         Desire Hernandez reports that she has noticed a little bit of progress in mobility.  Still has pain down the side of the arm with excessive shoulder loading.  May consider a cortisone injection in 4 weeks if symptoms of soreness/pain do not improve.      Subjective     Objective   See Exercise, Manual, and Modality Logs for complete treatment.       Assessment/Plan  Mrs. Hernandez is s/p L GHJ BRYN on 21.  Continued to demonstrate improved mobility in all planes.  Focusing on improving mobility in all planes and power output of the GHJ mm.  Will continue at 1x/week.             Manual Therapy:    15     mins  64165;  Therapeutic Exercise:    24     mins  78031;     Neuromuscular Lon:        mins  30676;    Therapeutic Activity:     9     mins  82726;     Gait Training:           mins  42314;     Ultrasound:          mins  52574;    Electrical Stimulation:         mins  09473 ( );  Dry Needling          mins self-pay    Timed Treatment:  48   mins   Total Treatment:     48   mins    Kevin Roper PT  Physical Therapist

## 2022-02-03 ENCOUNTER — TREATMENT (OUTPATIENT)
Dept: PHYSICAL THERAPY | Facility: CLINIC | Age: 57
End: 2022-02-03

## 2022-02-03 DIAGNOSIS — G89.29 CHRONIC LEFT SHOULDER PAIN: Primary | ICD-10-CM

## 2022-02-03 DIAGNOSIS — M25.512 CHRONIC LEFT SHOULDER PAIN: Primary | ICD-10-CM

## 2022-02-03 PROCEDURE — 97140 MANUAL THERAPY 1/> REGIONS: CPT | Performed by: PHYSICAL THERAPIST

## 2022-02-03 PROCEDURE — 97110 THERAPEUTIC EXERCISES: CPT | Performed by: PHYSICAL THERAPIST

## 2022-02-03 PROCEDURE — 97530 THERAPEUTIC ACTIVITIES: CPT | Performed by: PHYSICAL THERAPIST

## 2022-02-03 NOTE — PROGRESS NOTES
Physical Therapy Daily Progress Note    Patient: Desire Hernandez   : 1965  Diagnosis/ICD-10 Code:  No primary diagnosis found.  Referring practitioner: Stalin Liu MD  Date of Initial Visit: Type: THERAPY  Noted: 2021  Today's Date: 2/3/2022  Patient seen for 16 sessions         Desire Hernandez reports that her shoulder is feeling better in terms of pain.  Does have moments of sharp pain along the front of the shoulder, but does not last long at all.  No oral medication for pain control.  Feels stiff, mainly.    Subjective     Objective   See Exercise, Manual, and Modality Logs for complete treatment.       Assessment/Plan  Focused visit on improving GHJ mobility and power output in all planes.           Manual Therapy:    14     mins  83329;  Therapeutic Exercise:    24     mins  28088;     Neuromuscular Lon:        mins  07955;    Therapeutic Activity:     9     mins  73551;     Gait Training:           mins  63376;     Ultrasound:          mins  77780;    Electrical Stimulation:         mins  57083 ( );  Dry Needling          mins self-pay    Timed Treatment:   47   mins   Total Treatment:     50   mins    Kevin Roper, PT  Physical Therapist

## 2022-02-17 ENCOUNTER — TREATMENT (OUTPATIENT)
Dept: PHYSICAL THERAPY | Facility: CLINIC | Age: 57
End: 2022-02-17

## 2022-02-17 DIAGNOSIS — M25.512 CHRONIC LEFT SHOULDER PAIN: Primary | ICD-10-CM

## 2022-02-17 DIAGNOSIS — G89.29 CHRONIC LEFT SHOULDER PAIN: Primary | ICD-10-CM

## 2022-02-17 PROCEDURE — 97530 THERAPEUTIC ACTIVITIES: CPT | Performed by: PHYSICAL THERAPIST

## 2022-02-17 PROCEDURE — 97110 THERAPEUTIC EXERCISES: CPT | Performed by: PHYSICAL THERAPIST

## 2022-02-17 PROCEDURE — 97140 MANUAL THERAPY 1/> REGIONS: CPT | Performed by: PHYSICAL THERAPIST

## 2022-02-17 NOTE — PROGRESS NOTES
Physical Therapy Daily Progress Note    Patient: Desire Hernandez   : 1965  Diagnosis/ICD-10 Code:  Chronic left shoulder pain [M25.512, G89.29]  Referring practitioner: Stalin Liu MD  Date of Initial Visit: Type: THERAPY  Noted: 2021  Today's Date: 2022  Patient seen for 17 sessions         Desire Hernandez reports that her shoulder feels the same in the past 2 weeks, as it mainly is stiff.  Has less pain overall.  Follows up with the orthopaedic surgeon next week.      Subjective     Objective   See Exercise, Manual, and Modality Logs for complete treatment.     *AROM GHJ flx/ER/HBB:  145 deg/50 deg/L2    Assessment/Plan  Mrs. Hernandez has attended physical therapy for a total of 17 visits s/p L GHJ BRYN on 21.  Has improved active and passive mobility in all planes.  Much less sensitivity to stretching overall.  Continued emphasis on improving mobility more so than strength.  Will continue at 1x/wk for 4 more weeks before discharge to [facility].           Manual Therapy:    12     mins  29279;  Therapeutic Exercise:    14     mins  31445;     Neuromuscular Lon:        mins  25679;    Therapeutic Activity:     10     mins  93912;     Gait Training:           mins  65648;     Ultrasound:          mins  15492;    Electrical Stimulation:         mins  60556 ( );  Dry Needling          mins self-pay    Timed Treatment:   36   mins   Total Treatment:     39   mins    Kevin Roper PT  Physical Therapist

## 2022-02-18 ENCOUNTER — OFFICE VISIT (OUTPATIENT)
Dept: INTERNAL MEDICINE | Facility: CLINIC | Age: 57
End: 2022-02-18

## 2022-02-18 ENCOUNTER — LAB (OUTPATIENT)
Dept: LAB | Facility: HOSPITAL | Age: 57
End: 2022-02-18

## 2022-02-18 VITALS
WEIGHT: 125 LBS | OXYGEN SATURATION: 98 % | BODY MASS INDEX: 21.34 KG/M2 | TEMPERATURE: 98.4 F | SYSTOLIC BLOOD PRESSURE: 116 MMHG | RESPIRATION RATE: 16 BRPM | DIASTOLIC BLOOD PRESSURE: 74 MMHG | HEIGHT: 64 IN | HEART RATE: 74 BPM

## 2022-02-18 DIAGNOSIS — E55.9 VITAMIN D DEFICIENCY: ICD-10-CM

## 2022-02-18 DIAGNOSIS — G89.29 CHRONIC LEFT SHOULDER PAIN: ICD-10-CM

## 2022-02-18 DIAGNOSIS — J30.89 ENVIRONMENTAL AND SEASONAL ALLERGIES: ICD-10-CM

## 2022-02-18 DIAGNOSIS — Z11.59 ENCOUNTER FOR HEPATITIS C SCREENING TEST FOR LOW RISK PATIENT: ICD-10-CM

## 2022-02-18 DIAGNOSIS — Z13.220 LIPID SCREENING: ICD-10-CM

## 2022-02-18 DIAGNOSIS — U09.9 POST-COVID SYNDROME: ICD-10-CM

## 2022-02-18 DIAGNOSIS — K21.9 GASTROESOPHAGEAL REFLUX DISEASE WITHOUT ESOPHAGITIS: Primary | ICD-10-CM

## 2022-02-18 DIAGNOSIS — Z13.1 SCREENING FOR DIABETES MELLITUS: ICD-10-CM

## 2022-02-18 DIAGNOSIS — Z13.29 THYROID DISORDER SCREENING: ICD-10-CM

## 2022-02-18 DIAGNOSIS — Z13.21 ENCOUNTER FOR VITAMIN DEFICIENCY SCREENING: ICD-10-CM

## 2022-02-18 DIAGNOSIS — M25.512 CHRONIC LEFT SHOULDER PAIN: ICD-10-CM

## 2022-02-18 DIAGNOSIS — Z13.0 SCREENING FOR BLOOD DISEASE: ICD-10-CM

## 2022-02-18 LAB
DEPRECATED RDW RBC AUTO: 40.7 FL (ref 37–54)
ERYTHROCYTE [DISTWIDTH] IN BLOOD BY AUTOMATED COUNT: 12.6 % (ref 12.3–15.4)
HCT VFR BLD AUTO: 44 % (ref 34–46.6)
HGB BLD-MCNC: 15.1 G/DL (ref 12–15.9)
MCH RBC QN AUTO: 30.6 PG (ref 26.6–33)
MCHC RBC AUTO-ENTMCNC: 34.3 G/DL (ref 31.5–35.7)
MCV RBC AUTO: 89.2 FL (ref 79–97)
PLATELET # BLD AUTO: 204 10*3/MM3 (ref 140–450)
PMV BLD AUTO: 10.4 FL (ref 6–12)
RBC # BLD AUTO: 4.93 10*6/MM3 (ref 3.77–5.28)
WBC NRBC COR # BLD: 5.96 10*3/MM3 (ref 3.4–10.8)

## 2022-02-18 PROCEDURE — 99214 OFFICE O/P EST MOD 30 MIN: CPT | Performed by: NURSE PRACTITIONER

## 2022-02-18 PROCEDURE — 82607 VITAMIN B-12: CPT | Performed by: NURSE PRACTITIONER

## 2022-02-18 PROCEDURE — 80050 GENERAL HEALTH PANEL: CPT | Performed by: NURSE PRACTITIONER

## 2022-02-18 PROCEDURE — 82746 ASSAY OF FOLIC ACID SERUM: CPT | Performed by: NURSE PRACTITIONER

## 2022-02-18 PROCEDURE — 80061 LIPID PANEL: CPT | Performed by: NURSE PRACTITIONER

## 2022-02-18 PROCEDURE — 82306 VITAMIN D 25 HYDROXY: CPT | Performed by: NURSE PRACTITIONER

## 2022-02-18 PROCEDURE — 86803 HEPATITIS C AB TEST: CPT | Performed by: NURSE PRACTITIONER

## 2022-02-18 PROCEDURE — 83036 HEMOGLOBIN GLYCOSYLATED A1C: CPT | Performed by: NURSE PRACTITIONER

## 2022-02-18 RX ORDER — FAMOTIDINE 20 MG/1
TABLET, FILM COATED ORAL
Qty: 60 TABLET | Refills: 5 | Status: SHIPPED | OUTPATIENT
Start: 2022-02-18 | End: 2022-05-05

## 2022-02-18 RX ORDER — OMEPRAZOLE 20 MG/1
20 CAPSULE, DELAYED RELEASE ORAL DAILY
Qty: 30 CAPSULE | Refills: 5 | Status: SHIPPED | OUTPATIENT
Start: 2022-02-18 | End: 2022-05-18

## 2022-02-19 LAB
25(OH)D3 SERPL-MCNC: 36.6 NG/ML (ref 30–100)
ALBUMIN SERPL-MCNC: 4.3 G/DL (ref 3.5–5.2)
ALBUMIN/GLOB SERPL: 1.6 G/DL
ALP SERPL-CCNC: 89 U/L (ref 39–117)
ALT SERPL W P-5'-P-CCNC: 11 U/L (ref 1–33)
ANION GAP SERPL CALCULATED.3IONS-SCNC: 10.8 MMOL/L (ref 5–15)
AST SERPL-CCNC: 17 U/L (ref 1–32)
BILIRUB SERPL-MCNC: 0.3 MG/DL (ref 0–1.2)
BUN SERPL-MCNC: 17 MG/DL (ref 6–20)
BUN/CREAT SERPL: 19.5 (ref 7–25)
CALCIUM SPEC-SCNC: 9.8 MG/DL (ref 8.6–10.5)
CHLORIDE SERPL-SCNC: 104 MMOL/L (ref 98–107)
CHOLEST SERPL-MCNC: 207 MG/DL (ref 0–200)
CO2 SERPL-SCNC: 28.2 MMOL/L (ref 22–29)
CREAT SERPL-MCNC: 0.87 MG/DL (ref 0.57–1)
FOLATE SERPL-MCNC: 18.1 NG/ML (ref 4.78–24.2)
GFR SERPL CREATININE-BSD FRML MDRD: 68 ML/MIN/1.73
GLOBULIN UR ELPH-MCNC: 2.7 GM/DL
GLUCOSE SERPL-MCNC: 74 MG/DL (ref 65–99)
HBA1C MFR BLD: 5.5 % (ref 4.8–5.6)
HCV AB SER DONR QL: NORMAL
HDLC SERPL-MCNC: 95 MG/DL (ref 40–60)
LDLC SERPL CALC-MCNC: 100 MG/DL (ref 0–100)
LDLC/HDLC SERPL: 1.03 {RATIO}
POTASSIUM SERPL-SCNC: 4.2 MMOL/L (ref 3.5–5.2)
PROT SERPL-MCNC: 7 G/DL (ref 6–8.5)
SODIUM SERPL-SCNC: 143 MMOL/L (ref 136–145)
TRIGL SERPL-MCNC: 69 MG/DL (ref 0–150)
TSH SERPL DL<=0.05 MIU/L-ACNC: 2.63 UIU/ML (ref 0.27–4.2)
VIT B12 BLD-MCNC: 951 PG/ML (ref 211–946)
VLDLC SERPL-MCNC: 12 MG/DL (ref 5–40)

## 2022-02-22 ENCOUNTER — TREATMENT (OUTPATIENT)
Dept: PHYSICAL THERAPY | Facility: CLINIC | Age: 57
End: 2022-02-22

## 2022-02-22 DIAGNOSIS — G89.29 CHRONIC LEFT SHOULDER PAIN: Primary | ICD-10-CM

## 2022-02-22 DIAGNOSIS — M25.512 CHRONIC LEFT SHOULDER PAIN: Primary | ICD-10-CM

## 2022-02-22 PROCEDURE — 97140 MANUAL THERAPY 1/> REGIONS: CPT | Performed by: PHYSICAL THERAPIST

## 2022-02-22 PROCEDURE — 97530 THERAPEUTIC ACTIVITIES: CPT | Performed by: PHYSICAL THERAPIST

## 2022-02-22 PROCEDURE — 97110 THERAPEUTIC EXERCISES: CPT | Performed by: PHYSICAL THERAPIST

## 2022-02-22 NOTE — PROGRESS NOTES
Physical Therapy Daily Progress Note    Patient: Desire Hernandez   : 1965  Diagnosis/ICD-10 Code:  Chronic left shoulder pain [M25.512, G89.29]  Referring practitioner: Stalin Liu MD  Date of Initial Visit: Type: THERAPY  Noted: 2021  Today's Date: 2022  Patient seen for 18 sessions         Desire Hernandez reports that she has had less stiffness and pain overall.  Not having difficulty sleeping.  Still has a catching sensation when elevating arm out to the side.  Returns to her orthopaedic surgeon tomorrow.      Subjective     Objective   See Exercise, Manual, and Modality Logs for complete treatment.       Assessment/Plan  Mrs. Hernandez s/p L GHJ BRYN on 21.  Has excellent AAROM in all planes.  Although is lacking strength to use available motions, especially into end-range forward elevation.  Will f/u with Mrs. Hernandez in 2-3 weeks as she works on overhead strength at home.         Manual Therapy:    12     mins  52442;  Therapeutic Exercise:    24     mins  65731;     Neuromuscular Lon:        mins  99116;    Therapeutic Activity:     9     mins  07527;     Gait Training:           mins  19442;     Ultrasound:          mins  01582;    Electrical Stimulation:         mins  60823 ( );  Dry Needling          mins self-pay    Timed Treatment:  45   mins   Total Treatment:     53   mins    Kevin Roper PT  Physical Therapist

## 2022-02-23 ENCOUNTER — OFFICE VISIT (OUTPATIENT)
Dept: ORTHOPEDIC SURGERY | Facility: CLINIC | Age: 57
End: 2022-02-23

## 2022-02-23 VITALS — WEIGHT: 125 LBS | BODY MASS INDEX: 21.34 KG/M2 | HEIGHT: 64 IN

## 2022-02-23 DIAGNOSIS — M75.02 ADHESIVE CAPSULITIS OF LEFT SHOULDER: Primary | ICD-10-CM

## 2022-02-23 DIAGNOSIS — Z09 STATUS POST ORTHOPEDIC SURGERY, FOLLOW-UP EXAM: ICD-10-CM

## 2022-02-23 PROCEDURE — 99024 POSTOP FOLLOW-UP VISIT: CPT | Performed by: ORTHOPAEDIC SURGERY

## 2022-03-08 DIAGNOSIS — H69.82 DYSFUNCTION OF LEFT EUSTACHIAN TUBE: Primary | ICD-10-CM

## 2022-03-09 DIAGNOSIS — K21.9 GASTROESOPHAGEAL REFLUX DISEASE, UNSPECIFIED WHETHER ESOPHAGITIS PRESENT: ICD-10-CM

## 2022-03-09 DIAGNOSIS — R07.0 THROAT BURNING: Primary | ICD-10-CM

## 2022-03-22 ENCOUNTER — OFFICE VISIT (OUTPATIENT)
Dept: GASTROENTEROLOGY | Facility: CLINIC | Age: 57
End: 2022-03-22

## 2022-03-22 VITALS
SYSTOLIC BLOOD PRESSURE: 124 MMHG | TEMPERATURE: 97.3 F | BODY MASS INDEX: 21.8 KG/M2 | DIASTOLIC BLOOD PRESSURE: 64 MMHG | WEIGHT: 127 LBS | HEART RATE: 62 BPM

## 2022-03-22 DIAGNOSIS — K21.9 GERD WITHOUT ESOPHAGITIS: ICD-10-CM

## 2022-03-22 DIAGNOSIS — Z12.11 ENCOUNTER FOR SCREENING FOR MALIGNANT NEOPLASM OF COLON: ICD-10-CM

## 2022-03-22 DIAGNOSIS — R13.19 ESOPHAGEAL DYSPHAGIA: Primary | ICD-10-CM

## 2022-03-22 DIAGNOSIS — R07.89 CHEST PAIN, ATYPICAL: ICD-10-CM

## 2022-03-22 PROCEDURE — 99214 OFFICE O/P EST MOD 30 MIN: CPT | Performed by: INTERNAL MEDICINE

## 2022-03-22 NOTE — PROGRESS NOTES
GASTROENTEROLOGY OFFICE NOTE  Desire Hernandez  [id number]  1965      Chief Complaint   Patient presents with   • Heartburn        HISTORY OF PRESENT ILLNESS:  First visit to me for this very pleasant 56-year-old white female here today with various GI complaints.    She has been noticing worsening gastroesophageal reflux disease with a burning sensation of the back of her throat despite proton pump Arava therapy.  Symptoms seem to have exacerbated following Covid infection in July.  She has had a lot of atypical type chest pain and cardiopulmonary work-up has been negative except for incidentally noted pulmonary nodules which are not felt to be clinically significant.    She has intermittent dysphagia to solids and this is been present for years she does have to chew carefully eat slowly and drink a lot of water with her meals to avoid the sensation of food getting stuck or impacted in her esophagus.    She denies odynophagia, early satiety, unexplained weight loss, melena or bright red blood per rectum.    She is not had a screening colonoscopy in the past.    PAST MEDICAL HISTORY  Past Medical History:   Diagnosis Date   • ADHD (attention deficit hyperactivity disorder)     back in high school   • Allergic     seasonal   • ASCUS favor dysplasia     6/4/07; 6/5/08; 6/25/10; 6/27/12; 7/29/13; 8/21/15 (HPV negative)   • Blood type, Rh negative    • Bulging lumbar disc    • Bunion 2016    surgery   • Chlamydia     as a teen   • Diarrhea    • Dizziness    • Fatigue    • H/O nipple discharge 9/415    Bilateral diagnostic mammogram negative   • Hair loss    • HSV-1 (herpes simplex virus 1) infection    • HSV-2 (herpes simplex virus 2) infection 1993   • LGSIL (low grade squamous intraepithelial dysplasia) 05/26/2006   • Menopause    • Nausea    • Ovarian cyst     Left   • Palpitations    • PONV (postoperative nausea and vomiting)    • Right shoulder pain     SCHEDULED FOR SX   • Sleep disturbance    • Syncope    •  Tinnitus         PAST SURGICAL HISTORY  Past Surgical History:   Procedure Laterality Date   • BUNIONECTOMY Right 2016    Dr. Durbin   • CERVICAL CONIZATION, LEEP     • COLPOSCOPY W/ BIOPSY / CURETTAGE  2006    benign   • D & C WITH SUCTION  1998    10 week SAB   • ENDOMETRIAL ABLATION  Dec. 2017   • INDUCED       years ago   • GA HYSTEROSCOPY,W/ENDOMETRIAL ABLATION N/A 2017    Procedure: HYSTEROSCOPY WITH ENDOMETRIAL ABLATION; d&c;  Surgeon: Sandie Hu DO;  Location: [facility] OR;  Service: Obstetrics/Gynecology   • SHOULDER ARTHROSCOPY Right 2020    Procedure: Right shoulder manipulation under anesthesia with arthroscopy and extensive debridement of glenohumeral and subacromial spaces;  Surgeon: Stalin Liu MD;  Location: [facility] OR;  Service: Orthopedics   • SHOULDER ARTHROSCOPY Left 2021    Procedure: SHOULDER ARTHROSCOPY WITH SUBACROMIAL DECOMPRESSION.;  Surgeon: Stalin Liu MD;  Location: [facility] OR;  Service: Orthopedics;  Laterality: Left;   • SHOULDER MANIPULATION Left 2021    Procedure: SHOULDER MANIPULATION;  Surgeon: Stalin Liu MD;  Location: [facility] OR;  Service: Orthopedics;  Laterality: Left;        MEDICATIONS:    Current Outpatient Medications:   •  famotidine (PEPCID) 20 MG tablet, Take 1 tablet by mouth twice daily OR take 2 tablets by mouth once daily as needed for heartburn, Disp: 60 tablet, Rfl: 5  •  omeprazole (priLOSEC) 20 MG capsule, Take 1 capsule by mouth Daily., Disp: 30 capsule, Rfl: 5  •  PARoxetine (Paxil) 10 MG tablet, Take 1 tablet by mouth Every Morning. (Patient taking differently: Take 10 mg by mouth Every Night.), Disp: 90 tablet, Rfl: 3  •  cyclobenzaprine (FLEXERIL) 5 MG tablet, Take 1 tablet by mouth 3 (Three) Times a Day As Needed for Muscle Spasms., Disp: 45 tablet, Rfl: 0    ALLERGIES  No Known Allergies    FAMILY HISTORY:  Family History   Problem Relation Age of Onset   • Hearing loss Father    •  Hyperlipidemia Father         DX in his mid 50's   • Hypothyroidism Father    • Hypertension Father    • Hypothyroidism Mother    • Osteoarthritis Mother    • No Known Problems Brother    • Arthritis Sister         RA   • No Known Problems Son    • Hearing loss Maternal Grandmother    • Hyperlipidemia Maternal Grandmother         DX in her late 70's   • Stroke Maternal Grandmother    • Lung cancer Maternal Grandfather    • Cancer Maternal Grandfather         lung cancer - smoker   • Lung cancer Paternal Uncle    • Cancer Paternal Uncle         lung cancer - smoker   • Rheum arthritis Other    • Breast cancer Neg Hx    • Ovarian cancer Neg Hx    • Uterine cancer Neg Hx    • Colon cancer Neg Hx    • Melanoma Neg Hx    • Colon polyps Neg Hx    • Malig Hyperthermia Neg Hx        SOCIAL HISTORY  Social History     Socioeconomic History   • Marital status:      Spouse name: Lawrence   • Number of children: 2   Tobacco Use   • Smoking status: Never Smoker   • Smokeless tobacco: Never Used   Vaping Use   • Vaping Use: Never used   Substance and Sexual Activity   • Alcohol use: Not Currently   • Drug use: Not Currently     Types: Marijuana   • Sexual activity: Defer     Partners: Male     Birth control/protection: None     Socioeconomic History:  She is  with 2 sons ages 19 and 22.  She is a non-smoker and occasionally drinks alcoholic beverages.       PHYSICAL EXAM   /64 (BP Location: Left arm, Patient Position: Sitting, Cuff Size: Adult)   Pulse 62   Temp 97.3 °F (36.3 °C) (Temporal)   Wt 57.6 kg (127 lb)   BMI 21.80 kg/m²   General: Alert and oriented x 3. In no apparent or acute distress.  and No stigmata of chronic liver disease  HEENT: Anicteric sclerae. Normal oropharynx  Neck: Supple. Without lymphadenopathy  CV: Regular rate and rhythm, S1, S2  Lungs: Clear to ausculation. Without rales, rhonchi and wheezing  Abdomen:  Soft,non-distended without palpable masses or hepatosplenomeagaly, areas of  rebound tenderness or guarding.   Extremeties: without clubbing, cyanosis or edema  Neurologic:  Alert and oriented x 3 without focal motor or sensory deficits  Rectal exam: deferred        Results Review:  I reviewed the patient's new clinical results.  Labs from February 18, 2020 revealed normal CBC, CMP, hepatitis C antibody, B12 and folate levels    ASSESSMENT  1.-Intermittent dysphagia solids.  Rule out eosinophilic esophagitis, esophageal stricture from chronic reflux.  EGD for esophageal dilation recommended.  2.-Atypical chest pain and GERD-like complaints rule out erosive esophagitis, Carpenter's esophagus,  3.-Patient at average risk for colon cancer.  Colonoscopy is recommended    PLAN  1.-Discontinue proton pump inhibitors in anticipation of upper endoscopy so as to not mask any erosive esophagitis or eosinophilia  2.-EGD for esophageal dilation and exclusion of eosinophilia, erosive esophagitis, Carpenter's esophagus, H. pylori gastritis and celiac disease  3.-Colonoscopy for routine colon cancer screening purposes  4.-Further recommendation deferred pending findings of her endoscopic studies      Ar Connor MD  3/22/2022   15:50 EDT

## 2022-03-23 DIAGNOSIS — Z20.822 ENCOUNTER FOR PREPROCEDURE SCREENING LABORATORY TESTING FOR COVID-19: Primary | ICD-10-CM

## 2022-03-23 DIAGNOSIS — Z01.812 ENCOUNTER FOR PREPROCEDURE SCREENING LABORATORY TESTING FOR COVID-19: Primary | ICD-10-CM

## 2022-03-23 PROBLEM — R13.19 ESOPHAGEAL DYSPHAGIA: Status: ACTIVE | Noted: 2022-03-23

## 2022-03-30 RX ORDER — SODIUM, POTASSIUM,MAG SULFATES 17.5-3.13G
SOLUTION, RECONSTITUTED, ORAL ORAL
Qty: 354 ML | Refills: 0 | Status: SHIPPED | OUTPATIENT
Start: 2022-03-30 | End: 2022-05-05

## 2022-04-04 ENCOUNTER — CLINICAL SUPPORT NO REQUIREMENTS (OUTPATIENT)
Dept: PREADMISSION TESTING | Facility: HOSPITAL | Age: 57
End: 2022-04-04

## 2022-04-04 DIAGNOSIS — Z01.812 ENCOUNTER FOR PREPROCEDURE SCREENING LABORATORY TESTING FOR COVID-19: ICD-10-CM

## 2022-04-04 DIAGNOSIS — Z20.822 ENCOUNTER FOR PREPROCEDURE SCREENING LABORATORY TESTING FOR COVID-19: ICD-10-CM

## 2022-04-04 LAB — SARS-COV-2 RNA PNL SPEC NAA+PROBE: NOT DETECTED

## 2022-04-04 PROCEDURE — U0004 COV-19 TEST NON-CDC HGH THRU: HCPCS

## 2022-04-04 PROCEDURE — C9803 HOPD COVID-19 SPEC COLLECT: HCPCS

## 2022-04-06 ENCOUNTER — OUTSIDE FACILITY SERVICE (OUTPATIENT)
Dept: GASTROENTEROLOGY | Facility: CLINIC | Age: 57
End: 2022-04-06

## 2022-04-06 PROCEDURE — 43249 ESOPH EGD DILATION <30 MM: CPT | Performed by: INTERNAL MEDICINE

## 2022-04-06 PROCEDURE — 45378 DIAGNOSTIC COLONOSCOPY: CPT | Performed by: INTERNAL MEDICINE

## 2022-04-06 PROCEDURE — 43239 EGD BIOPSY SINGLE/MULTIPLE: CPT | Performed by: INTERNAL MEDICINE

## 2022-04-08 ENCOUNTER — TELEPHONE (OUTPATIENT)
Dept: GASTROENTEROLOGY | Facility: CLINIC | Age: 57
End: 2022-04-08

## 2022-04-08 NOTE — TELEPHONE ENCOUNTER
I called [facility] at ([phone]) and ordered a GI cocktail for patient per Dr Connor. Patient had complaints of trouble swallowing and pain from throat to upper part of chest. Patient notified of order and address of pharmacy. Patient also instructed to call back if she has any questions or concerns.

## 2022-04-08 NOTE — TELEPHONE ENCOUNTER
Patient had EGD yesterday and called to report sore throat and difficulty swallowing water. She would like to know if anything can be done for her and if this is normal.

## 2022-04-27 ENCOUNTER — OFFICE VISIT (OUTPATIENT)
Dept: ORTHOPEDIC SURGERY | Facility: CLINIC | Age: 57
End: 2022-04-27

## 2022-04-27 VITALS — BODY MASS INDEX: 21.68 KG/M2 | WEIGHT: 127 LBS | HEIGHT: 64 IN

## 2022-04-27 DIAGNOSIS — Z09 STATUS POST ORTHOPEDIC SURGERY, FOLLOW-UP EXAM: ICD-10-CM

## 2022-04-27 DIAGNOSIS — M75.02 ADHESIVE CAPSULITIS OF LEFT SHOULDER: Primary | ICD-10-CM

## 2022-04-27 PROCEDURE — 20610 DRAIN/INJ JOINT/BURSA W/O US: CPT | Performed by: ORTHOPAEDIC SURGERY

## 2022-04-27 PROCEDURE — 99213 OFFICE O/P EST LOW 20 MIN: CPT | Performed by: ORTHOPAEDIC SURGERY

## 2022-04-27 RX ADMIN — LIDOCAINE HYDROCHLORIDE 4 ML: 10 INJECTION, SOLUTION EPIDURAL; INFILTRATION; INTRACAUDAL; PERINEURAL at 11:22

## 2022-04-27 RX ADMIN — TRIAMCINOLONE ACETONIDE 80 MG: 40 INJECTION, SUSPENSION INTRA-ARTICULAR; INTRAMUSCULAR at 11:22

## 2022-05-05 ENCOUNTER — OFFICE VISIT (OUTPATIENT)
Dept: INTERNAL MEDICINE | Facility: CLINIC | Age: 57
End: 2022-05-05

## 2022-05-05 VITALS
WEIGHT: 127 LBS | BODY MASS INDEX: 21.68 KG/M2 | OXYGEN SATURATION: 99 % | TEMPERATURE: 98.4 F | HEIGHT: 64 IN | RESPIRATION RATE: 16 BRPM | SYSTOLIC BLOOD PRESSURE: 120 MMHG | HEART RATE: 70 BPM | DIASTOLIC BLOOD PRESSURE: 76 MMHG

## 2022-05-05 DIAGNOSIS — B37.0 ORAL YEAST INFECTION: Primary | ICD-10-CM

## 2022-05-05 DIAGNOSIS — K59.09 OTHER CONSTIPATION: ICD-10-CM

## 2022-05-05 DIAGNOSIS — K21.00 GASTROESOPHAGEAL REFLUX DISEASE WITH ESOPHAGITIS WITHOUT HEMORRHAGE: ICD-10-CM

## 2022-05-05 PROCEDURE — 99214 OFFICE O/P EST MOD 30 MIN: CPT | Performed by: NURSE PRACTITIONER

## 2022-05-05 RX ORDER — FLUCONAZOLE 100 MG/1
100 TABLET ORAL DAILY
Qty: 7 TABLET | Refills: 0 | Status: SHIPPED | OUTPATIENT
Start: 2022-05-05 | End: 2022-05-12

## 2022-05-05 NOTE — THERAPY TREATMENT NOTE
Outpatient Physical Therapy Ortho Treatment Note   Alisha Soto     Patient Name: Desire Hernandez  : 1965  MRN: [id number]  Today's Date: 2019      Visit Date: 2019    Visit Dx:    ICD-10-CM ICD-9-CM   1. Adhesive capsulitis of right shoulder M75.01 726.0       Patient Active Problem List   Diagnosis   • Menopausal symptoms   • Left breast mass   • Menometrorrhagia   • Adhesive capsulitis of right shoulder        Past Medical History:   Diagnosis Date   • ADHD (attention deficit hyperactivity disorder)     back in high school   • Allergic     seasonal   • ASCUS favor dysplasia     07; 08; 6/25/10; 12; 13; 8/21/15 (HPV negative)   • Blood type, Rh negative    • Bunion     surgery   • Chlamydia     as a teen   • H/O nipple discharge     Bilateral diagnostic mammogram negative   • HSV-1 (herpes simplex virus 1) infection    • HSV-2 (herpes simplex virus 2) infection    • LGSIL (low grade squamous intraepithelial dysplasia) 2006   • Ovarian cyst     Left   • PONV (postoperative nausea and vomiting)         Past Surgical History:   Procedure Laterality Date   • BUNIONECTOMY Right 2016    Dr. Durbin   • CERVICAL CONIZATION, LEEP     • COLPOSCOPY W/ BIOPSY / CURETTAGE  2006    benign   • D&C WITH SUCTION  1998    10 week SAB   • ENDOMETRIAL ABLATION  Dec. 2017   • INDUCED       years ago   • OR HYSTEROSCOPY,W/ENDOMETRIAL ABLATION N/A 2017    Procedure: HYSTEROSCOPY WITH ENDOMETRIAL ABLATION; d&c;  Surgeon: Sandie Hu DO;  Location: [facility];  Service: Obstetrics/Gynecology       PT Ortho     Row Name 19 [id number]       Subjective Comments    Subjective Comments  Pt states she is tired of her shoulder hurting.  -      User Key  (r) = Recorded By, (t) = Taken By, (c) = Cosigned By    Initials Name Provider Type    Deny Peralta, PT Physical Therapist                      PT Assessment/Plan     Row Name 19 4042        This is a historical note converted from Hitesh. Formatting and pictures may have been removed.  Please reference Hitesh for original formatting and attached multimedia. Chief Complaint  Fx to left arm, needs ortho ref.  History of Present Illness  42-year-old male states he has a forearm fracture, happened several days ago.? Was seen at another [facility].? Lives here and presents to urgent care requesting Ortho referral.? Having minimal pain.? Currently in a splint.? No symptoms in the hand.  Review of Systems  Constitutional: negative except as stated in HPI  Eye: negative except as stated in HPI  ENT: negative except as stated in HPI  Respiratory: negative except as stated in HPI  Cardiovascular: negative except as stated in HPI  Gastrointestinal: negative except as stated in HPI  Genitourinary: negative except as stated in HPI  ?  Physical Exam  Vitals & Measurements  T:?36.8? ?C (Oral)? RR:?18? BP:?152/95?  HT:?185.00?cm? WT:?115.500?kg? BMI:?33.75?  ?  VITAL SIGNS: ?Reviewed. ? ?  GENERAL:?In no apparent distress  HEAD: ?No signs of head trauma ?  MUSCULOSKELETAL:  LUE:?Shoulder and elbow within normal limits.? Splint in place.? No motor or sensory deficits in the fingers.  Splint removed?after original films with splint in place?showed no fracture. ?Patient has no tenderness?of the forearm or wrist, no snuffbox tenderness.? Elbow has full range of motion.  NEUROLOGIC EXAM:?Alert and oriented x 3. ?No focal sensory or strength deficits. ? Speech normal. ?Follows commands  ?  ?  ?(01/11/2022 18:34 CST XR Forearm Left 2 Views)  Reason For Exam  Patient states recent left arm fracture, seen at an outside facility  ?  Radiology Report  EXAMINATION:  XR Forearm Left 2 Views  ?  INDICATION:  Patient states recent left arm fracture, seen at an outside facility  ?  Comparison: X-rays dated 1/11/2022  ?  FINDINGS:  A lucency seen through the distal radius dorsally on the lateral image  may represent a chronic deformity     PT Assessment    Assessment Comments  Pt is showing some progress with her ROM during the passive and active exercises both.  -GC        PT Plan    PT Plan Comments  Pt is to continue her HEP daily.  -GC       User Key  (r) = Recorded By, (t) = Taken By, (c) = Cosigned By    Initials Name Provider Type    Deny Peralta, PT Physical Therapist            OP Exercises     Row Name 09/16/19 1230             Subjective Comments    Subjective Comments  Pt states she is tired of her shoulder hurting.  -GC         Exercise 1    Exercise Name 1  Cane stretch in standing for FLEX-ABD-ER  -GC      Cueing 1  Verbal;Demo  -GC      Reps 1  15x each  -GC      Time 1  5 secs   -GC         Exercise 2    Exercise Name 2  IR stretch behind back with towel  -GC      Cueing 2  Verbal;Demo  -GC      Reps 2  15  -GC      Time 2  5 secs  -GC         Exercise 3    Exercise Name 3  Pulley-FLEX  -GC      Cueing 3  Verbal  -GC      Time 3  5 min  -GC         Exercise 4    Exercise Name 4  Pulley-Scaption  -GC      Cueing 4  Verbal  -GC      Time 4  5 min  -GC        User Key  (r) = Recorded By, (t) = Taken By, (c) = Cosigned By    Initials Name Provider Type    GC Deny High, PT Physical Therapist                      Manual Rx (last 36 hours)      Manual Treatments     Row Name 09/16/19 1230             Manual Rx 1    Manual Rx 1 Location  right shoulder  -GC      Manual Rx 1 Type  GH joint mobilizations  -GC      Manual Rx 1 Grade  grade II  -GC      Manual Rx 1 Duration  5 min  -GC         Manual Rx 2    Manual Rx 2 Location  right shoulder  -GC      Manual Rx 2 Type  PROM in FLEX-ABD-ER-IR  -GC      Manual Rx 2 Duration  15 min  -GC        User Key  (r) = Recorded By, (t) = Taken By, (c) = Cosigned By    Initials Name Provider Type    GC Deny High, PT Physical Therapist                             Time Calculation:   Start Time: 1230  Stop Time: 1326  Time Calculation (min): 56 min  Therapy Charges for Today     Code  rather than a nondisplaced fracture.  There is otherwise no acute fracture identified. The soft tissues are  unremarkable.  ?  ?  IMPRESSION:  Lucency in the distal radius dorsally is chronic in appearance.  Correlate for focal tenderness.  ?  Signature Line  Electronically Signed By: Kathleen Lawrence MD  Date/Time Signed: 01/11/2022 18:38 [1] (01/11/2022 18:34 CST XR Forearm Left 2 Views)  Reason For Exam  Patient states recent left arm fracture, seen at an outside facility  ?  Radiology Report  EXAMINATION:  XR Forearm Left 2 Views  ?  INDICATION:  Patient states recent left arm fracture, seen at an outside facility  ?  Comparison: X-rays dated 1/11/2022  ?  FINDINGS:  A lucency seen through the distal radius dorsally on the lateral image  may represent a chronic deformity rather than a nondisplaced fracture.  There is otherwise no acute fracture identified. The soft tissues are  unremarkable.  ?  ?  IMPRESSION:  Lucency in the distal radius dorsally is chronic in appearance.  Correlate for focal tenderness.  ?  Signature Line  Electronically Signed By: Kathleen Lawrence MD  Date/Time Signed: 01/11/2022 18:38 [2]  Assessment/Plan  1.?Trauma?T14.90XA  Discussed?radiology interpretation, no fracture.? Corresponds to negative clinical findings.? As a precaution,?splint reapplied, encouraged him to wear this for several days.? If symptoms are worsening or not improved in?1 to 2 weeks, return for repeat x-ray.? No Ortho referral sent at this point.? Patient voiced understanding.   Problem List/Past Medical History  Ongoing  Obesity  Historical  No qualifying data  Medications  amoxicillin 500 mg oral tablet, 500 mg= 1 tab(s), Oral, TID,? ?Not taking  ibuprofen 600 mg oral tablet, 600 mg= 1 tab(s), Oral, q6hr,? ?Not taking  ibuprofen 800 mg oral tablet, 800 mg= 1 tab(s), Oral, q6hr,? ?Not taking  Allergies  No Known Allergies  No Known Medication Allergies  Social History  Abuse/Neglect  No, 01/11/2022  Alcohol  Past,  01/11/2022  Employment/School  Employed, 01/11/2022  Exercise  Exercise type: None ., 01/11/2022  Home/Environment  Lives with Sober living ., 01/11/2022  Nutrition/Health  Regular, 01/11/2022  Substance Use  Past, Amphetamines, Cocaine, Marijuana, Methamphetamines, 01/11/2022  Tobacco  10 or more cigarettes (1/2 pack or more)/day in last 30 days, No, 01/11/2022  Family History  Uterine cancer: Mother.  Health Maintenance  Health Maintenance  ???Pending?(in the next year)  ??? ??OverDue  ??? ? ? ?Smoking Cessation due??01/01/22??Variable frequency  ??? ??Due?  ??? ? ? ?Alcohol Misuse Screening due??01/02/22??and every 1??year(s)  ??? ? ? ?ADL Screening due??01/11/22??and every 1??year(s)  ??? ? ? ?Tetanus Vaccine due??01/11/22??and every 10??year(s)  ??? ??Due In Future?  ??? ? ? ?Obesity Screening not due until??01/01/23??and every 1??year(s)  ???Satisfied?(in the past 1 year)  ??? ??Satisfied?  ??? ? ? ?Blood Pressure Screening on??01/11/22.??Satisfied by Clair Cano  ??? ? ? ?Body Mass Index Check on??01/11/22.??Satisfied by Clair Cano  ??? ? ? ?Influenza Vaccine on??01/11/22.??Satisfied by Clair Cano  ??? ? ? ?Obesity Screening on??01/11/22.??Satisfied by Clair Cano  ?     [1]?XR Forearm Left 2 Views; Kathleen Lawrence MD 01/11/2022 18:34 CST  [2]?XR Forearm Left 2 Views; Kathleen Lawrence MD 01/11/2022 18:34 CST   Description Service Date Service Provider Modifiers Qty    95752090280 HC PT MANUAL THERAPY EA 15 MIN 9/16/2019 Deny High, PT GP 1    91831344025 HC PT THER PROC EA 15 MIN 9/16/2019 Deny High, PT GP 1                    Deny High, PT  9/16/2019

## 2022-05-06 PROBLEM — R00.2 PALPITATIONS: Status: ACTIVE | Noted: 2022-05-06

## 2022-05-09 ENCOUNTER — HOSPITAL ENCOUNTER (OUTPATIENT)
Dept: CT IMAGING | Facility: HOSPITAL | Age: 57
Discharge: HOME | End: 2022-05-09
Admitting: INTERNAL MEDICINE

## 2022-05-09 DIAGNOSIS — R91.1 LUNG NODULE: ICD-10-CM

## 2022-05-09 PROCEDURE — 71250 CT THORAX DX C-: CPT

## 2022-05-11 RX ORDER — LIDOCAINE HYDROCHLORIDE 10 MG/ML
4 INJECTION, SOLUTION EPIDURAL; INFILTRATION; INTRACAUDAL; PERINEURAL
Status: COMPLETED | OUTPATIENT
Start: 2022-04-27 | End: 2022-04-27

## 2022-05-11 RX ORDER — TRIAMCINOLONE ACETONIDE 40 MG/ML
80 INJECTION, SUSPENSION INTRA-ARTICULAR; INTRAMUSCULAR
Status: COMPLETED | OUTPATIENT
Start: 2022-04-27 | End: 2022-04-27

## 2022-05-16 ENCOUNTER — TELEPHONE (OUTPATIENT)
Dept: INTERNAL MEDICINE | Facility: CLINIC | Age: 57
End: 2022-05-16

## 2022-05-16 NOTE — TELEPHONE ENCOUNTER
EUGENE SAYS THAT THE ANTI-BIOTIC YOU WROTE LAST  HAS NOT BEEN WORKING.  WILL YOU CALL MELISSA A NEW SCRIPT FOR DO YOU WANT TO SEE HER?    [phone]

## 2022-05-17 RX ORDER — CLOTRIMAZOLE 10 MG/1
10 LOZENGE ORAL; TOPICAL
Qty: 35 TABLET | Refills: 1 | Status: SHIPPED | OUTPATIENT
Start: 2022-05-17 | End: 2022-05-24

## 2022-05-17 RX ORDER — FLUCONAZOLE 200 MG/1
200 TABLET ORAL DAILY
Qty: 7 TABLET | Refills: 1 | Status: SHIPPED | OUTPATIENT
Start: 2022-05-17 | End: 2022-05-24

## 2022-05-17 NOTE — TELEPHONE ENCOUNTER
She was seen for thrush. I gave her diflucan x7 days and nystatin mouth wash. Ask her if she got any improvement with these at all. I'm concerned this could be something other than oral yeast if meds did not help. Ask if she is having throat pain. May need a strep swab or throat culture.

## 2022-05-18 ENCOUNTER — OFFICE VISIT (OUTPATIENT)
Dept: INTERNAL MEDICINE | Facility: CLINIC | Age: 57
End: 2022-05-18

## 2022-05-18 VITALS
HEART RATE: 56 BPM | OXYGEN SATURATION: 98 % | BODY MASS INDEX: 22.16 KG/M2 | WEIGHT: 129.8 LBS | SYSTOLIC BLOOD PRESSURE: 108 MMHG | HEIGHT: 64 IN | DIASTOLIC BLOOD PRESSURE: 64 MMHG | TEMPERATURE: 97.9 F

## 2022-05-18 DIAGNOSIS — Z01.812 BLOOD TESTS PRIOR TO TREATMENT OR PROCEDURE: Primary | ICD-10-CM

## 2022-05-18 DIAGNOSIS — J02.9 PHARYNGITIS, UNSPECIFIED ETIOLOGY: Primary | ICD-10-CM

## 2022-05-18 DIAGNOSIS — U09.9 POST-COVID SYNDROME: ICD-10-CM

## 2022-05-18 DIAGNOSIS — K21.9 GASTROESOPHAGEAL REFLUX DISEASE, UNSPECIFIED WHETHER ESOPHAGITIS PRESENT: ICD-10-CM

## 2022-05-18 PROCEDURE — 99214 OFFICE O/P EST MOD 30 MIN: CPT | Performed by: NURSE PRACTITIONER

## 2022-05-18 RX ORDER — PANTOPRAZOLE SODIUM 40 MG/1
40 TABLET, DELAYED RELEASE ORAL DAILY
Qty: 30 TABLET | Refills: 5 | Status: SHIPPED | OUTPATIENT
Start: 2022-05-18 | End: 2022-06-21

## 2022-05-18 NOTE — PROGRESS NOTES
"Subjective   Chief Complaint   Patient presents with   • Follow-up      Desire Hernandez is a 56 y.o. female.     HPI  The patient is here today for thrush and sore throat.      Thrush  The patient states her symptoms of thrush have been ongoing since 01/2022 and notes the sensation along the top portion of her mouth has been persistent since 03/2022. She states Dr. Connor obtained an EGD for further evaluation states improvement in 2 days after with use of Diflucan. She states specifically, \" it probably took about 2 days and I noticed that it lightened up. I also remember,  I was super bloaty and gassy\". The patient reported weight loss while on medication but notes upon stopping her weight has increased by 3 pounds and bloating has recurred. She is currently still taking the medicated mouth wash twice daily and notes her upcoming visit with Dr. Connor scheduled for 06/21/2022.    COVID-19  She expresses feelings of increased fatigue that she is unsure if it may be associated with having COVID in the past or menopause. The patient began implementing exercise, which consisted of light jogging and walking activity,  however she notes extreme fatigue and mild discomfort with deep breathing. She denies any shortness of breath but notes feelings of limited expansion of the lungs. The patient states, \" I just don't feel like I'm filling my lungs up when I'm breathing like I just can't get there\".     GERD  The patient states she rarely has issues with acid reflux but notes symptoms of heartburn often occur after consuming a joyce. When her symptoms occurs she reports use of medication every morning and notes she waits approximately 30 minutes before consuming anything. She states decrease in gas and bloating with use of Diflucan after 2 days.     Menopause  At this time she is interested in her options for treatment of post-menopausal symptoms and states she is not interested with hormonal  replacements. "       I have reviewed the following portions of the patient's history and confirmed they are accurate: allergies, current medications, past family history, past medical history, past social history, past surgical history, and problem list    I have personally completed the patient's review of systems.    Review of Systems   Constitutional: Positive for fatigue. Negative for activity change, appetite change, chills, diaphoresis, fever, unexpected weight gain and unexpected weight loss.   HENT: Positive for sore throat. Negative for ear discharge, ear pain, mouth sores, nosebleeds, sinus pressure and sneezing.    Eyes: Negative for pain, discharge and itching.   Respiratory: Positive for chest tightness and shortness of breath. Negative for cough and wheezing.    Cardiovascular: Negative for chest pain, palpitations and leg swelling.   Gastrointestinal: Positive for GERD and indigestion. Negative for abdominal pain, constipation, diarrhea, nausea and vomiting.   Endocrine: Negative for heat intolerance, polydipsia and polyphagia.   Genitourinary: Negative for dysuria, flank pain, frequency, hematuria and urgency.   Musculoskeletal: Positive for arthralgias and myalgias. Negative for back pain, gait problem, joint swelling, neck pain and neck stiffness.   Skin: Negative for color change, pallor and rash.   Allergic/Immunologic: Positive for environmental allergies. Negative for immunocompromised state.   Neurological: Negative for seizures, speech difficulty, weakness and numbness.   Hematological: Negative for adenopathy.   Psychiatric/Behavioral: Negative for agitation, decreased concentration, sleep disturbance, suicidal ideas and depressed mood. The patient is not nervous/anxious.        Current Outpatient Medications on File Prior to Visit   Medication Sig   • clotrimazole (MYCELEX) 10 MG ayo Take 1 tablet by mouth 5 (Five) Times a Day for 7 days.   • cyclobenzaprine (FLEXERIL) 5 MG tablet Take 1 tablet by  "mouth 3 (Three) Times a Day As Needed for Muscle Spasms.   • fluconazole (DIFLUCAN) 200 MG tablet Take 1 tablet by mouth Daily for 7 days.   • PARoxetine (Paxil) 10 MG tablet Take 1 tablet by mouth Every Morning. (Patient taking differently: Take 10 mg by mouth Every Night.)   • [DISCONTINUED] omeprazole (priLOSEC) 20 MG capsule Take 1 capsule by mouth Daily.     No current facility-administered medications on file prior to visit.       Objective   Vitals:    05/18/22 1028   BP: 108/64   Pulse: 56   Temp: 97.9 °F (36.6 °C)   TempSrc: Temporal   SpO2: 98%   Weight: 58.9 kg (129 lb 12.8 oz)   Height: 162.6 cm (64\")     Body mass index is 22.28 kg/m².    Physical Exam  Vitals reviewed.   Constitutional:       Appearance: Normal appearance. She is well-developed.   HENT:      Head: Normocephalic and atraumatic.      Nose: Nose normal.      Mouth/Throat:      Mouth: Mucous membranes are moist. No oral lesions.      Tongue: No lesions. Tongue does not deviate from midline.      Palate: No mass and lesions.      Pharynx: Posterior oropharyngeal erythema present. No pharyngeal swelling, oropharyngeal exudate or uvula swelling.      Tonsils: No tonsillar exudate or tonsillar abscesses. 1+ on the right. 1+ on the left.   Eyes:      General: Lids are normal.      Conjunctiva/sclera: Conjunctivae normal.      Pupils: Pupils are equal, round, and reactive to light.   Neck:      Thyroid: No thyromegaly.      Trachea: Trachea normal.   Pulmonary:      Effort: Pulmonary effort is normal. No respiratory distress.   Skin:     General: Skin is warm and dry.   Neurological:      Mental Status: She is alert and oriented to person, place, and time.      GCS: GCS eye subscore is 4. GCS verbal subscore is 5. GCS motor subscore is 6.   Psychiatric:         Attention and Perception: Attention normal.         Mood and Affect: Mood normal.         Speech: Speech normal.         Behavior: Behavior normal. Behavior is cooperative. "         Assessment & Plan   Problem List Items Addressed This Visit    None     Visit Diagnoses     Pharyngitis, unspecified etiology    -  Primary    Relevant Orders    POC Rapid Strep A    Gastroesophageal reflux disease, unspecified whether esophagitis present        Relevant Medications    pantoprazole (PROTONIX) 40 MG EC tablet    Post-COVID syndrome               1. Pharyngitis  -  new and stable.  - patient will continue treatment for oral thrush with Diflucan 200 mg daily for 7 days and Mycelex lozenges 5 times a day for 7 days.  - start Protonix and discontinue omeprazole to improve any GERD symptoms that could be contributing  - if no improvement she will contact office for ENT referral    2. GERD  - chronic and stable  - Start Protonix and discontinue omeprazole  - Has gastroenterology follow-up in June    3. Post Covid syndrome  - chronic and stable  - discussed with patient that the symptoms could continue for some time. She has follow-up with pulmonology soon. There was improvement in CT of chest in November  - continue to monitor symptoms.           Current Outpatient Medications:   •  clotrimazole (MYCELEX) 10 MG ayo, Take 1 tablet by mouth 5 (Five) Times a Day for 7 days., Disp: 35 tablet, Rfl: 1  •  cyclobenzaprine (FLEXERIL) 5 MG tablet, Take 1 tablet by mouth 3 (Three) Times a Day As Needed for Muscle Spasms., Disp: 45 tablet, Rfl: 0  •  fluconazole (DIFLUCAN) 200 MG tablet, Take 1 tablet by mouth Daily for 7 days., Disp: 7 tablet, Rfl: 1  •  pantoprazole (PROTONIX) 40 MG EC tablet, Take 1 tablet by mouth Daily., Disp: 30 tablet, Rfl: 5  •  PARoxetine (Paxil) 10 MG tablet, Take 1 tablet by mouth Every Morning. (Patient taking differently: Take 10 mg by mouth Every Night.), Disp: 90 tablet, Rfl: 3       Plan of care reviewed with the patient at the conclusion of today's visit.  Education was provided regarding diagnosis, management, and any prescribed or recommended OTC medications.  Patient  verbalized understanding of and agreement with management plan.     Return if symptoms worsen or fail to improve.      Transcribed from ambient dictation for ONOFRE Shah by ONOFRE Shah.  05/18/22   10:33 EDT    Patient verbalized consent to the visit recording.     Transcribed from ambient dictation for ONOFRE Shah by Anisa Mcintyre.  05/18/22   12:21 EDT    Patient verbalized consent to the visit recording.

## 2022-05-20 ENCOUNTER — CLINICAL SUPPORT NO REQUIREMENTS (OUTPATIENT)
Dept: PULMONOLOGY | Facility: CLINIC | Age: 57
End: 2022-05-20

## 2022-05-20 DIAGNOSIS — Z01.812 BLOOD TESTS PRIOR TO TREATMENT OR PROCEDURE: ICD-10-CM

## 2022-05-20 PROCEDURE — 99211 OFF/OP EST MAY X REQ PHY/QHP: CPT | Performed by: INTERNAL MEDICINE

## 2022-05-20 PROCEDURE — U0004 COV-19 TEST NON-CDC HGH THRU: HCPCS | Performed by: INTERNAL MEDICINE

## 2022-05-21 LAB — SARS-COV-2 RNA NOSE QL NAA+PROBE: NOT DETECTED

## 2022-05-23 ENCOUNTER — OFFICE VISIT (OUTPATIENT)
Dept: PULMONOLOGY | Facility: CLINIC | Age: 57
End: 2022-05-23

## 2022-05-23 VITALS
WEIGHT: 131.2 LBS | DIASTOLIC BLOOD PRESSURE: 60 MMHG | SYSTOLIC BLOOD PRESSURE: 120 MMHG | HEIGHT: 64 IN | BODY MASS INDEX: 22.4 KG/M2 | TEMPERATURE: 98 F | OXYGEN SATURATION: 97 % | HEART RATE: 88 BPM

## 2022-05-23 DIAGNOSIS — R06.02 SHORTNESS OF BREATH: Primary | ICD-10-CM

## 2022-05-23 PROCEDURE — 99214 OFFICE O/P EST MOD 30 MIN: CPT | Performed by: INTERNAL MEDICINE

## 2022-05-23 PROCEDURE — 94729 DIFFUSING CAPACITY: CPT | Performed by: INTERNAL MEDICINE

## 2022-05-23 PROCEDURE — 94726 PLETHYSMOGRAPHY LUNG VOLUMES: CPT | Performed by: INTERNAL MEDICINE

## 2022-05-23 PROCEDURE — 94010 BREATHING CAPACITY TEST: CPT | Performed by: INTERNAL MEDICINE

## 2022-05-23 RX ORDER — BUDESONIDE AND FORMOTEROL FUMARATE DIHYDRATE 160; 4.5 UG/1; UG/1
2 AEROSOL RESPIRATORY (INHALATION) 2 TIMES DAILY PRN
Qty: 1 EACH | Refills: 12 | Status: SHIPPED | OUTPATIENT
Start: 2022-05-23 | End: 2023-02-21

## 2022-05-23 NOTE — PROGRESS NOTES
CC:    Follow up SOA    HPI:    57 y/o WF w/ h/o lifetime non-smoking, allergic rhinitis, who had COVID in July 2021.   Was not hospitalized.  Has had persistent chest pain / SOA.   Previously seen by APRN in our clinic and has had some work-up.  Before that was very active.    Overall feeling better in terms of breathing, atypical chest pain, etc but still not 100% normal.  Nexium has helped some.    INTERVAL HISTORY:    Patient returns today for follow-up, she did not feel that the Breo helped very much and quit taking it.  She reports ongoing shortness of breath particularly with exertion, i.e. jogging.  Seems out of proportion than what she feels she is capable of doing.  Also notices that she has trouble being around caustic smells.  She had an EGD for ongoing reflux for 4/6/2022 with Dr. Vaughn's that showed small hiatal hernia, and a benign-appearing esophageal stenosis that was dilated.  Pathology was consistent with reflux esophagitis.    PMH:    Past Medical History:   Diagnosis Date   • ADHD (attention deficit hyperactivity disorder)     back in high school   • Allergic     seasonal   • ASCUS favor dysplasia     6/4/07; 6/5/08; 6/25/10; 6/27/12; 7/29/13; 8/21/15 (HPV negative)   • Blood type, Rh negative    • Bulging lumbar disc    • Bunion 2016    surgery   • Chlamydia     as a teen   • Diarrhea    • Dizziness    • Fatigue    • H/O nipple discharge 9/415    Bilateral diagnostic mammogram negative   • Hair loss    • HSV-1 (herpes simplex virus 1) infection    • HSV-2 (herpes simplex virus 2) infection 1993   • LGSIL (low grade squamous intraepithelial dysplasia) 05/26/2006   • Menopause    • Nausea    • Ovarian cyst     Left   • Palpitations    • PONV (postoperative nausea and vomiting)    • Right shoulder pain     SCHEDULED FOR SX   • Sleep disturbance    • Syncope    • Tinnitus      PSH:    Past Surgical History:   Procedure Laterality Date   • BUNIONECTOMY Right 2016    Dr. Durbin   • CERVICAL  CONIZATION, LEEP     • COLPOSCOPY W/ BIOPSY / CURETTAGE  2006    benign   • D & C WITH SUCTION  1998    10 week SAB   • ENDOMETRIAL ABLATION  Dec. 2017   • INDUCED       years ago   • [place] HYSTEROSCOPY,W/ENDOMETRIAL ABLATION N/A 2017    Procedure: HYSTEROSCOPY WITH ENDOMETRIAL ABLATION; d&c;  Surgeon: Sandie Hu DO;  Location: [facility] OR;  Service: Obstetrics/Gynecology   • SHOULDER ARTHROSCOPY Right 2020    Procedure: Right shoulder manipulation under anesthesia with arthroscopy and extensive debridement of glenohumeral and subacromial spaces;  Surgeon: Stalin Liu MD;  Location: [facility] OR;  Service: Orthopedics   • SHOULDER ARTHROSCOPY Left 2021    Procedure: SHOULDER ARTHROSCOPY WITH SUBACROMIAL DECOMPRESSION.;  Surgeon: Stalin Liu MD;  Location: [facility] OR;  Service: Orthopedics;  Laterality: Left;   • SHOULDER MANIPULATION Left 2021    Procedure: SHOULDER MANIPULATION;  Surgeon: Stalin Liu MD;  Location: [facility] OR;  Service: Orthopedics;  Laterality: Left;     FH:    Family History   Problem Relation Age of Onset   • Hearing loss Father    • Hyperlipidemia Father         DX in his mid 50's   • Hypothyroidism Father    • Hypertension Father    • Hypothyroidism Mother    • Osteoarthritis Mother    • No Known Problems Brother    • Arthritis Sister         RA   • No Known Problems Son    • Hearing loss Maternal Grandmother    • Hyperlipidemia Maternal Grandmother         DX in her late 70's   • Stroke Maternal Grandmother    • Lung cancer Maternal Grandfather    • Cancer Maternal Grandfather         lung cancer - smoker   • Lung cancer Paternal Uncle    • Cancer Paternal Uncle         lung cancer - smoker   • Rheum arthritis Other    • Breast cancer Neg Hx    • Ovarian cancer Neg Hx    • Uterine cancer Neg Hx    • Colon cancer Neg Hx    • Melanoma Neg Hx    • Colon polyps Neg Hx    • Malig Hyperthermia Neg Hx      SH:    Social History      Socioeconomic History   • Marital status:      Spouse name: Lawrence   • Number of children: 2   Tobacco Use   • Smoking status: Never Smoker   • Smokeless tobacco: Never Used   Vaping Use   • Vaping Use: Never used   Substance and Sexual Activity   • Alcohol use: Not Currently   • Drug use: Not Currently     Types: Marijuana   • Sexual activity: Defer     Partners: Male     Birth control/protection: None     ALLERGIES:    No Known Allergies  MEDICATIONS:      Current Outpatient Medications:   •  clotrimazole (MYCELEX) 10 MG ayo, Take 1 tablet by mouth 5 (Five) Times a Day for 7 days., Disp: 35 tablet, Rfl: 1  •  cyclobenzaprine (FLEXERIL) 5 MG tablet, Take 1 tablet by mouth 3 (Three) Times a Day As Needed for Muscle Spasms., Disp: 45 tablet, Rfl: 0  •  fluconazole (DIFLUCAN) 200 MG tablet, Take 1 tablet by mouth Daily for 7 days., Disp: 7 tablet, Rfl: 1  •  pantoprazole (PROTONIX) 40 MG EC tablet, Take 1 tablet by mouth Daily., Disp: 30 tablet, Rfl: 5  •  PARoxetine (Paxil) 10 MG tablet, Take 1 tablet by mouth Every Morning. (Patient taking differently: Take 10 mg by mouth Every Night.), Disp: 90 tablet, Rfl: 3  ROS:  Per HPI, otherwise all systems reviewed and negative.    DIAGNOSTIC DATA (Reviewed and interpreted by me unless otherwise specified):    CT Chest 11/11/21 & 9/29/21 - stable borderline mediastinal LAD and scattered sub-centimeter smooth nodules in bronchovascular distribution.  Resolved small pleural and pericardial effusions.  Overall suggestive of old granulomatous disease or sarcoid.  Malignancy much less likely given appearance and overall stability.    CT Chest 5/9/2022- no change in scattered pulmonary nodules compared to 9/29/2021, mediastinal lymph nodes are slightly smaller in appearance.  Overall improved to stable.    PFT 10/25/21 - no obstruction, no restriction, normal DLCO, small airway dysfunction    PFT 5/23/2022- no obstruction, no restriction, normal DLCO, subtle findings  related to small airway dysfunction.  Overall improved from October 2021.    Vitals:    05/23/22 1129   BP: 120/60   Pulse: 88   Temp: 98 °F (36.7 °C)   SpO2: 97%       Physical Exam   Constitutional: Oriented to person, place, and time. Appears well-developed and well-nourished.   Mouth/Throat: Oropharynx with mild cobblestone  Cardiovascular: Normal rate, regular rhythm, normal heart sounds  Pulmonary/Chest: Effort normal and breath sounds normal.  No tenderness to palpation.  No clubbing.       Assessment & Plan     1)  COVID 19 July 2021 - not hospitalized  2)  Dyspnea on exertion  3)  Allergic Rhinitis  4)  Mild Intermittent Asthma  5)  Lung Nodules    Chest pain is improved, had EGD with reflux esophagitis and benign-appearing stricture that was dilated in April 2022..  Stress test and Echo unremarkeable.      Still gets dyspnea on exertion prickly with exercise and noticed some airway irritation and mild shortness of breath around noxious smells.  Suspect she may have some component of mild intermittent asthma.  She also has allergic rhinitis and postnasal drip.  Recommend Symbicort 160 2 puffs every 12 hours as needed and 30 minutes before exercise with spacer.  Continue Flonase and antihistamine.  Add sinus irrigation prior to Flonase.  This could be related to longstanding allergies, or prior COVID exposure.    For lung nodules, my suspicion is that they are unrelated and likely have been present for some time, however, no old CT for comparison.  Have been stable now from 9/29/2021 to 5/9/2022.  Recommend repeat CT in 1 year.  Most likely old fungal infection such as histoplasmosis, less likely sarcoidosis.    RTC 1 year with full PFT with bronchodilator and CT chest without contrast    Kentrell Steele MD  Pulmonology and Critical Care Medicine  05/23/22 12:18 EDT  Electronically Signed    C.C.:  No ref. provider found, Alejandra Saez, APRN

## 2022-05-24 RX ORDER — PAROXETINE 10 MG/1
10 TABLET, FILM COATED ORAL EVERY MORNING
Qty: 90 TABLET | Refills: 3 | Status: SHIPPED | OUTPATIENT
Start: 2022-05-24 | End: 2023-02-21 | Stop reason: SDUPTHER

## 2022-06-21 ENCOUNTER — OFFICE VISIT (OUTPATIENT)
Dept: GASTROENTEROLOGY | Facility: CLINIC | Age: 57
End: 2022-06-21

## 2022-06-21 VITALS
OXYGEN SATURATION: 97 % | TEMPERATURE: 97.3 F | DIASTOLIC BLOOD PRESSURE: 64 MMHG | WEIGHT: 128 LBS | HEART RATE: 64 BPM | SYSTOLIC BLOOD PRESSURE: 94 MMHG | BODY MASS INDEX: 21.85 KG/M2 | HEIGHT: 64 IN

## 2022-06-21 DIAGNOSIS — Z12.11 ENCOUNTER FOR SCREENING FOR MALIGNANT NEOPLASM OF COLON: ICD-10-CM

## 2022-06-21 DIAGNOSIS — R13.19 ESOPHAGEAL DYSPHAGIA: Primary | ICD-10-CM

## 2022-06-21 DIAGNOSIS — R07.89 CHEST PAIN, ATYPICAL: ICD-10-CM

## 2022-06-21 PROCEDURE — 99214 OFFICE O/P EST MOD 30 MIN: CPT | Performed by: INTERNAL MEDICINE

## 2022-06-21 RX ORDER — PANTOPRAZOLE SODIUM 40 MG/1
40 TABLET, DELAYED RELEASE ORAL 2 TIMES DAILY
Qty: 60 TABLET | Refills: 11 | Status: SHIPPED | OUTPATIENT
Start: 2022-06-21

## 2022-06-21 NOTE — PROGRESS NOTES
"GASTROENTEROLOGY OFFICE NOTE  Desire Hernandez  [id number]  1965      Chief Complaint   Patient presents with   • Heartburn        HISTORY OF PRESENT ILLNESS:  Patient presents for follow-up after recent EGD performed to evaluate dysphagia to solids as well as atypical chest pain with negative cardiopulmonary work-up.    She has not been on high-dose proton pump inhibitor on a twice daily basis.  She continues to have occasional chest discomfort.  Her dysphagia to solids is \"a little \"better after recent esophageal dilation to 20 mm diameter.  She was found to have a very subtle ring at the EG junction.    She describes a burning sensation sometimes at her jaw extending down to the sternal notch    She denies odynophagia, early satiety or unexplained weight loss.    Her recent colonoscopy was unremarkable and she is not due for another one for 10 years.    PAST MEDICAL HISTORY  Past Medical History:   Diagnosis Date   • ADHD (attention deficit hyperactivity disorder)     back in high school   • Allergic     seasonal   • ASCUS favor dysplasia     6/4/07; 6/5/08; 6/25/10; 6/27/12; 7/29/13; 8/21/15 (HPV negative)   • Blood type, Rh negative    • Bulging lumbar disc    • Bunion 2016    surgery   • Chlamydia     as a teen   • Diarrhea    • Dizziness    • Fatigue    • H/O nipple discharge 9/415    Bilateral diagnostic mammogram negative   • Hair loss    • HSV-1 (herpes simplex virus 1) infection    • HSV-2 (herpes simplex virus 2) infection 1993   • LGSIL (low grade squamous intraepithelial dysplasia) 05/26/2006   • Menopause    • Nausea    • Ovarian cyst     Left   • Palpitations    • PONV (postoperative nausea and vomiting)    • Right shoulder pain     SCHEDULED FOR SX   • Sleep disturbance    • Syncope    • Tinnitus         PAST SURGICAL HISTORY  Past Surgical History:   Procedure Laterality Date   • BUNIONECTOMY Right 2016    Dr. Durbin   • CERVICAL CONIZATION, LEEP  1996   • COLPOSCOPY W/ BIOPSY / CURETTAGE  " 2006    benign   • D & C WITH SUCTION  1998    10 week SAB   • ENDOMETRIAL ABLATION  Dec. 2017   • INDUCED       years ago   • [place] HYSTEROSCOPY,W/ENDOMETRIAL ABLATION N/A 2017    Procedure: HYSTEROSCOPY WITH ENDOMETRIAL ABLATION; d&c;  Surgeon: Sandie Hu DO;  Location: [facility] OR;  Service: Obstetrics/Gynecology   • SHOULDER ARTHROSCOPY Right 2020    Procedure: Right shoulder manipulation under anesthesia with arthroscopy and extensive debridement of glenohumeral and subacromial spaces;  Surgeon: Stalin Liu MD;  Location: [facility] OR;  Service: Orthopedics   • SHOULDER ARTHROSCOPY Left 2021    Procedure: SHOULDER ARTHROSCOPY WITH SUBACROMIAL DECOMPRESSION.;  Surgeon: Stalin Liu MD;  Location: [facility] OR;  Service: Orthopedics;  Laterality: Left;   • SHOULDER MANIPULATION Left 2021    Procedure: SHOULDER MANIPULATION;  Surgeon: Stalni Liu MD;  Location: [facility] OR;  Service: Orthopedics;  Laterality: Left;        MEDICATIONS:    Current Outpatient Medications:   •  budesonide-formoterol (Symbicort) 160-4.5 MCG/ACT inhaler, Inhale 2 puffs 2 (Two) Times a Day As Needed (cough, shortness of breath, wheezing, or 30 min prior to exercise)., Disp: 1 each, Rfl: 12  •  cyclobenzaprine (FLEXERIL) 5 MG tablet, Take 1 tablet by mouth 3 (Three) Times a Day As Needed for Muscle Spasms., Disp: 45 tablet, Rfl: 0  •  pantoprazole (PROTONIX) 40 MG EC tablet, Take 1 tablet by mouth Daily., Disp: 30 tablet, Rfl: 5  •  PARoxetine (Paxil) 10 MG tablet, Take 1 tablet by mouth Every Morning., Disp: 90 tablet, Rfl: 3    ALLERGIES  No Known Allergies    FAMILY HISTORY:  Family History   Problem Relation Age of Onset   • Hearing loss Father    • Hyperlipidemia Father         DX in his mid 50's   • Hypothyroidism Father    • Hypertension Father    • Hypothyroidism Mother    • Osteoarthritis Mother    • No Known Problems Brother    • Arthritis Sister         RA   • No Known  "Problems Son    • Hearing loss Maternal Grandmother    • Hyperlipidemia Maternal Grandmother         DX in her late 70's   • Stroke Maternal Grandmother    • Lung cancer Maternal Grandfather    • Cancer Maternal Grandfather         lung cancer - smoker   • Lung cancer Paternal Uncle    • Cancer Paternal Uncle         lung cancer - smoker   • Rheum arthritis Other    • Breast cancer Neg Hx    • Ovarian cancer Neg Hx    • Uterine cancer Neg Hx    • Colon cancer Neg Hx    • Melanoma Neg Hx    • Colon polyps Neg Hx    • Malig Hyperthermia Neg Hx        SOCIAL HISTORY  Social History     Socioeconomic History   • Marital status:      Spouse name: Lawrence   • Number of children: 2   Tobacco Use   • Smoking status: Never Smoker   • Smokeless tobacco: Never Used   Vaping Use   • Vaping Use: Never used   Substance and Sexual Activity   • Alcohol use: Not Currently   • Drug use: Not Currently     Types: Marijuana   • Sexual activity: Defer     Partners: Male     Birth control/protection: None     Socioeconomic History: She is  with 2 sons ages 19 and 22.  She is a non-smoker and occasionally drinks alcoholic beverages.         PHYSICAL EXAM   BP 94/64 (BP Location: Left arm, Patient Position: Sitting, Cuff Size: Adult)   Pulse 64   Temp 97.3 °F (36.3 °C) (Infrared)   Ht 162.6 cm (64\")   Wt 58.1 kg (128 lb)   LMP  (LMP Unknown)   SpO2 97%   BMI 21.97 kg/m²   General: Pleasant female no apparent or acute distress  HEENT: Wearing facemask.  Anicteric sclera  Neck: Without observed rigidity  Lungs: Without labored respirations noted.  No audible wheezing  Extremeties: Moving extremities normally  Neurologic: Normal affect and cognition.  Alert and oriented      ASSESSMENT  1.-Atypical chest pain of unclear etiology suspected to be reflux related particularly burning sensation that she notices at her jaw.  I would recommend high-dose proton pump inhibitor therapy for minimum of 8weeks.  2.-  Subtle esophageal " stricture dilated to 20 mm with minimal improvement of dysphagia to solids.  No further intervention  3.-  Patient up-to-date on her colon cancer screening.  Repeat colonoscopy recommended in 2032    PLAN  1.-  Pantoprazole 40 mg p.o. 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

## 2022-07-11 ENCOUNTER — TELEPHONE (OUTPATIENT)
Dept: INTERNAL MEDICINE | Facility: CLINIC | Age: 57
End: 2022-07-11

## 2022-07-11 NOTE — TELEPHONE ENCOUNTER
Caller: Desire Hernandez    Relationship: Self    Best call back number: [phone]    What form or medical record are you requesting: DEFERRAL FOR COVID VACCINE    Additional notes:  PT CALLED STATED THAT SHE HAS CAME BY 7/1 TO DROP OFF FORM AND IS CALLING TO CHECK STATUS FOR FORM.

## 2022-07-11 NOTE — TELEPHONE ENCOUNTER
The only reason I can sign a covid deferral or exemption letter is if patient had a severe reaction to flu vaccine or past covid vaccine. Advise her to contact pulmonology and see if they will be willing to sign this. Her copy of form is at .

## 2022-08-04 ENCOUNTER — TELEPHONE (OUTPATIENT)
Dept: GASTROENTEROLOGY | Facility: CLINIC | Age: 57
End: 2022-08-04

## 2022-08-04 NOTE — TELEPHONE ENCOUNTER
Patient called to request exemption form to be completed for the Covid vaccine. She is an employee of [employer] and last had the form completed by Dr. Osei Forrest in sports medicine on 9/14/21 (this is scanned in to the media folder in her chart if you'd like to look at that one). She stated that employee [employer] [facility] [employer] needed this as soon as possible. Would you be willing to complete this form for her? If so, I can get the blank form for you and can send it over to [employer] once it's completed. Thanks!

## 2022-09-07 ENCOUNTER — OFFICE VISIT (OUTPATIENT)
Dept: GASTROENTEROLOGY | Facility: CLINIC | Age: 57
End: 2022-09-07

## 2022-09-07 VITALS
HEIGHT: 64 IN | HEART RATE: 65 BPM | DIASTOLIC BLOOD PRESSURE: 70 MMHG | BODY MASS INDEX: 21.68 KG/M2 | SYSTOLIC BLOOD PRESSURE: 116 MMHG | WEIGHT: 127 LBS | OXYGEN SATURATION: 99 % | TEMPERATURE: 97.1 F

## 2022-09-07 DIAGNOSIS — R13.19 ESOPHAGEAL DYSPHAGIA: Primary | ICD-10-CM

## 2022-09-07 DIAGNOSIS — R07.89 CHEST PAIN, ATYPICAL: ICD-10-CM

## 2022-09-07 DIAGNOSIS — K22.2 ESOPHAGEAL STRICTURE: ICD-10-CM

## 2022-09-07 PROCEDURE — 99214 OFFICE O/P EST MOD 30 MIN: CPT | Performed by: INTERNAL MEDICINE

## 2022-09-07 NOTE — PROGRESS NOTES
GASTROENTEROLOGY OFFICE NOTE  Desire Hernandez  [id number]  1965      Chief Complaint   Patient presents with   • Difficulty Swallowing   • Atypical GERD symptoms        HISTORY OF PRESENT ILLNESS:  Patient presents for follow-up of atypical GERD type complaints.  She was having burning sensation at the sternal notch/neck area and occasional chest discomfort.  She had improvement of dysphagia to solids following esophageal dilation to 20 mm.  She did have a small esophageal ring on her recent EGD.    Only last saw her we recommended pantoprazole 40 mg p.o. twice daily for minimum of 8 weeks.  Chest pain/discomfort as well as a burning sensation in her neck resolved.  She then discontinued the twice daily dosing and went back to once a day dosing and after close to 2 weeks of dropping the dose down and particularly following an evening where she had an alcoholic beverage her symptoms returned and therefore she does feel that twice daily dosing did not tireless all of her complaints.    She is here today without any other specific new complaints    PAST MEDICAL HISTORY  Past Medical History:   Diagnosis Date   • ADHD (attention deficit hyperactivity disorder)     back in high school   • Allergic     seasonal   • ASCUS favor dysplasia     6/4/07; 6/5/08; 6/25/10; 6/27/12; 7/29/13; 8/21/15 (HPV negative)   • Blood type, Rh negative    • Bulging lumbar disc    • Bunion 2016    surgery   • Chlamydia     as a teen   • Diarrhea    • Dizziness    • Fatigue    • H/O nipple discharge 9/415    Bilateral diagnostic mammogram negative   • Hair loss    • HSV-1 (herpes simplex virus 1) infection    • HSV-2 (herpes simplex virus 2) infection 1993   • LGSIL (low grade squamous intraepithelial dysplasia) 05/26/2006   • Menopause    • Nausea    • Ovarian cyst     Left   • Palpitations    • PONV (postoperative nausea and vomiting)    • Right shoulder pain     SCHEDULED FOR SX   • Sleep disturbance    • Syncope    • Tinnitus          PAST SURGICAL HISTORY  Past Surgical History:   Procedure Laterality Date   • BUNIONECTOMY Right 2016    Dr. Durbin   • CERVICAL CONIZATION, LEEP     • COLPOSCOPY W/ BIOPSY / CURETTAGE  2006    benign   • D & C WITH SUCTION  1998    10 week SAB   • ENDOMETRIAL ABLATION  Dec. 2017   • INDUCED       years ago   • [place] HYSTEROSCOPY,W/ENDOMETRIAL ABLATION N/A 2017    Procedure: HYSTEROSCOPY WITH ENDOMETRIAL ABLATION; d&c;  Surgeon: Sandie Hu DO;  Location: [facility] OR;  Service: Obstetrics/Gynecology   • SHOULDER ARTHROSCOPY Right 2020    Procedure: Right shoulder manipulation under anesthesia with arthroscopy and extensive debridement of glenohumeral and subacromial spaces;  Surgeon: Stalin Liu MD;  Location: [facility] OR;  Service: Orthopedics   • SHOULDER ARTHROSCOPY Left 2021    Procedure: SHOULDER ARTHROSCOPY WITH SUBACROMIAL DECOMPRESSION.;  Surgeon: Stalin Liu MD;  Location: [facility] OR;  Service: Orthopedics;  Laterality: Left;   • SHOULDER MANIPULATION Left 2021    Procedure: SHOULDER MANIPULATION;  Surgeon: Stalin Liu MD;  Location: [facility] OR;  Service: Orthopedics;  Laterality: Left;        MEDICATIONS:    Current Outpatient Medications:   •  cyclobenzaprine (FLEXERIL) 5 MG tablet, Take 1 tablet by mouth 3 (Three) Times a Day As Needed for Muscle Spasms., Disp: 45 tablet, Rfl: 0  •  pantoprazole (PROTONIX) 40 MG EC tablet, Take 1 tablet by mouth 2 (Two) Times a Day., Disp: 60 tablet, Rfl: 11  •  PARoxetine (Paxil) 10 MG tablet, Take 1 tablet by mouth Every Morning., Disp: 90 tablet, Rfl: 3  •  budesonide-formoterol (Symbicort) 160-4.5 MCG/ACT inhaler, Inhale 2 puffs 2 (Two) Times a Day As Needed (cough, shortness of breath, wheezing, or 30 min prior to exercise)., Disp: 1 each, Rfl: 12    ALLERGIES  No Known Allergies    FAMILY HISTORY:  Family History   Problem Relation Age of Onset   • Hearing loss Father    • Hyperlipidemia Father      "    DX in his mid 50's   • Hypothyroidism Father    • Hypertension Father    • Hypothyroidism Mother    • Osteoarthritis Mother    • No Known Problems Brother    • Arthritis Sister         RA   • No Known Problems Son    • Hearing loss Maternal Grandmother    • Hyperlipidemia Maternal Grandmother         DX in her late 70's   • Stroke Maternal Grandmother    • Lung cancer Maternal Grandfather    • Cancer Maternal Grandfather         lung cancer - smoker   • Lung cancer Paternal Uncle    • Cancer Paternal Uncle         lung cancer - smoker   • Rheum arthritis Other    • Breast cancer Neg Hx    • Ovarian cancer Neg Hx    • Uterine cancer Neg Hx    • Colon cancer Neg Hx    • Melanoma Neg Hx    • Colon polyps Neg Hx    • Malig Hyperthermia Neg Hx        SOCIAL HISTORY  Social History     Socioeconomic History   • Marital status:      Spouse name: Lawrence   • Number of children: 2   Tobacco Use   • Smoking status: Never Smoker   • Smokeless tobacco: Never Used   Vaping Use   • Vaping Use: Never used   Substance and Sexual Activity   • Alcohol use: Not Currently   • Drug use: Not Currently     Types: Marijuana   • Sexual activity: Defer     Partners: Male     Birth control/protection: None     Socioeconomic History:  .  2 sons ages 19 and 22.  Non-smoker.  Drinks alcoholic beverages in social moderation       PHYSICAL EXAM   /70 (BP Location: Left arm, Patient Position: Sitting, Cuff Size: Adult)   Pulse 65   Temp 97.1 °F (36.2 °C) (Infrared)   Ht 162.6 cm (64\")   Wt 57.6 kg (127 lb)   SpO2 99%   BMI 21.80 kg/m²   General: Pleasant white female no apparent or acute distress  HEENT: Anicteric sclera  Neck: Without observed rigidity  Lungs: Grossly normal respiration without labored breathing observed  Extremeties: Moving extremities normally  Neurologic: Normal cognition and affect.  Alert and oriented      ASSESSMENT  1.-  Atypical GERD complaints presenting as chest pain and burning sensation at " the level of her neck resolved with twice daily dosing of PPI.  He is encouraged to titrate her PPI use the lowest dose necessary for control of her symptoms and may benefit from twice daily dosing for brief periods of time when symptoms exacerbate.  We reviewed long-term GERD options including Nissen fundoplication and the availability of endoscopic therapies for reflux.  2.-  Esophageal stricture dilated to 20 mm  3.-  Patient up-to-date on colon cancer screening with repeat colonoscopy recommended in 2032    PLAN  1.-  Continue pantoprazole 40 mg p.o. once to twice daily as needed for good control of her symptoms.  I suspect that she might just need to take twice daily dosing for brief periods of time when symptoms exacerbate and then drop back down to once a day dosing  2.-  Repeat esophageal dilation as needed for recurrent dysphagia solids  3.-  Screening colonoscopy due in 2032      Ar Connor MD  9/8/2022   13:01 EDT

## 2022-09-09 PROBLEM — K22.2 ESOPHAGEAL STRICTURE: Status: ACTIVE | Noted: 2022-09-09

## 2022-11-29 NOTE — TELEPHONE ENCOUNTER
If her pain is that severe I do not have an explanation for it; I will arrange a scan to look for possible rib fracture from all her coughing from her covid infection.  Length To Time In Minutes Device Was In Place: 10

## 2022-12-15 ENCOUNTER — TELEPHONE (OUTPATIENT)
Dept: INTERNAL MEDICINE | Facility: CLINIC | Age: 57
End: 2022-12-15

## 2022-12-15 NOTE — TELEPHONE ENCOUNTER
Caller: Desire Hernandez    Relationship: Self    Best call back number: [phone]     What medication are you requesting: TIZANADINE 2 MG TABLET EVERY 8 HOURS AS NEEDED    What are your current symptoms: MUSCLE SPASMS    How long have you been experiencing symptoms:  2 DAYS    Have you had these symptoms before:    [x] Yes  [] No    Have you been treated for these symptoms before:   [x] Yes  [] No    If a prescription is needed, what is your preferred pharmacy and phone number:  [facility] [address]  [phone]    Additional notes: PATIENT HAS CALLED REQUESTING A NEW PRESCRIPTION ON ABOVE MEDICATION. PATIENT STATES PRESCRIPTION WAS PREVIOUSLY WRITTEN BY PREVIOUS PHYSICIAN DR. VICTORIA WITH [facility] IN [place]. PATIENT IS REQUESTING A CALL BACK EITHER WAY IF PRESCRIPTION CAN BE FILLED.

## 2022-12-16 RX ORDER — TIZANIDINE 2 MG/1
2 TABLET ORAL EVERY 8 HOURS PRN
Qty: 30 TABLET | Refills: 2 | Status: SHIPPED | OUTPATIENT
Start: 2022-12-16

## 2023-01-25 ENCOUNTER — PRIOR AUTHORIZATION (OUTPATIENT)
Dept: GASTROENTEROLOGY | Facility: CLINIC | Age: 58
End: 2023-01-25
Payer: COMMERCIAL

## 2023-02-21 ENCOUNTER — OFFICE VISIT (OUTPATIENT)
Dept: OBSTETRICS AND GYNECOLOGY | Facility: CLINIC | Age: 58
End: 2023-02-21
Payer: COMMERCIAL

## 2023-02-21 VITALS
DIASTOLIC BLOOD PRESSURE: 64 MMHG | BODY MASS INDEX: 21.99 KG/M2 | HEIGHT: 64 IN | WEIGHT: 128.8 LBS | SYSTOLIC BLOOD PRESSURE: 110 MMHG

## 2023-02-21 DIAGNOSIS — Z01.419 ROUTINE GYNECOLOGICAL EXAMINATION: ICD-10-CM

## 2023-02-21 DIAGNOSIS — Z11.51 SCREENING FOR HUMAN PAPILLOMAVIRUS: ICD-10-CM

## 2023-02-21 DIAGNOSIS — Z01.419 PAP SMEAR, LOW-RISK: Primary | ICD-10-CM

## 2023-02-21 PROCEDURE — 81002 URINALYSIS NONAUTO W/O SCOPE: CPT | Performed by: OBSTETRICS & GYNECOLOGY

## 2023-02-21 PROCEDURE — 99396 PREV VISIT EST AGE 40-64: CPT | Performed by: OBSTETRICS & GYNECOLOGY

## 2023-02-21 RX ORDER — CLOTRIMAZOLE 10 MG/1
LOZENGE ORAL; TOPICAL
COMMUNITY

## 2023-02-21 RX ORDER — FLUCONAZOLE 100 MG/1
TABLET ORAL
COMMUNITY

## 2023-02-21 RX ORDER — PAROXETINE 10 MG/1
10 TABLET, FILM COATED ORAL EVERY MORNING
Qty: 90 TABLET | Refills: 3 | Status: SHIPPED | OUTPATIENT
Start: 2023-02-21

## 2023-02-21 RX ORDER — FLUCONAZOLE 200 MG/1
TABLET ORAL
COMMUNITY

## 2023-02-21 RX ORDER — ESTRADIOL 0.1 MG/G
1 CREAM VAGINAL 2 TIMES WEEKLY
Qty: 45 G | Refills: 6 | Status: SHIPPED | OUTPATIENT
Start: 2023-02-23

## 2023-02-21 RX ORDER — OMEPRAZOLE 20 MG/1
CAPSULE, DELAYED RELEASE ORAL
COMMUNITY

## 2023-02-21 NOTE — PROGRESS NOTES
GYN Annual Exam     CC- Here for annual exam.     Desire Hernandez is a 57 y.o. female who presents for annual well woman exam. S/p endometrial ablation 2017. No vaginal bleeding. Using Paxil 10mg qam to control vasomotor sx. Pt still getting intermittent vasomotor symptoms. Patient also complaining of painful intercourse. Interested in treatment. MMG done 2022.    OB History        3    Para   2    Term   2            AB   1    Living   2       SAB   1    IAB        Ectopic        Molar        Multiple        Live Births   2                Current contraception: tubal ligation  History of abnormal Pap smear: no  History of abnormal mammogram: Had DX MMG and left breast US due to palpable mass and pain but imaging normal  Family history of uterine, colon or ovarian cancer: no  Family history of breast cancer: no    Health Maintenance   Topic Date Due   • COVID-19 Vaccine (1) Never done   • ZOSTER VACCINE (1 of 2) Never done   • ANNUAL PHYSICAL  2021   • Annual Gynecologic Pelvic and Breast Exam  2022   • MAMMOGRAM  2024   • PAP SMEAR  2024   • TDAP/TD VACCINES (2 - Td or Tdap) 2027   • COLORECTAL CANCER SCREENING  12/10/2031   • HEPATITIS C SCREENING  Completed   • INFLUENZA VACCINE  Completed   • Pneumococcal Vaccine 0-64  Aged Out       Past Medical History:   Diagnosis Date   • ADHD (attention deficit hyperactivity disorder)     back in high school   • Allergic     seasonal   • ASCUS favor dysplasia     07; 08; 6/25/10; 12; 13; 8/21/15 (HPV negative)   • Blood type, Rh negative    • Bulging lumbar disc    • Bunion 2016    surgery   • Chlamydia     as a teen   • Diarrhea    • Dizziness    • Fatigue    • H/O nipple discharge     Bilateral diagnostic mammogram negative   • Hair loss    • HSV-1 (herpes simplex virus 1) infection    • HSV-2 (herpes simplex virus 2) infection    • LGSIL (low grade squamous intraepithelial dysplasia) 2006    • Menopause    • Nausea    • Ovarian cyst     Left   • Palpitations    • PONV (postoperative nausea and vomiting)    • Right shoulder pain     SCHEDULED FOR SX   • Sleep disturbance    • Syncope    • Tinnitus        Past Surgical History:   Procedure Laterality Date   • BUNIONECTOMY Right 2016    Dr. Durbin   • CERVICAL CONIZATION, LEEP     • COLPOSCOPY W/ BIOPSY / CURETTAGE  2006    benign   • D & C WITH SUCTION  1998    10 week SAB   • ENDOMETRIAL ABLATION  Dec. 2017   • INDUCED       years ago   • NE HYSTEROSCOPY ENDOMETRIAL ABLATION N/A 2017    Procedure: HYSTEROSCOPY WITH ENDOMETRIAL ABLATION; d&c;  Surgeon: Sandie Hu DO;  Location: [facility];  Service: Obstetrics/Gynecology   • SHOULDER ARTHROSCOPY Right 2020    Procedure: Right shoulder manipulation under anesthesia with arthroscopy and extensive debridement of glenohumeral and subacromial spaces;  Surgeon: Stalin Liu MD;  Location: [facility] OR;  Service: Orthopedics   • SHOULDER ARTHROSCOPY Left 2021    Procedure: SHOULDER ARTHROSCOPY WITH SUBACROMIAL DECOMPRESSION.;  Surgeon: Stalin Liu MD;  Location: [facility] OR;  Service: Orthopedics;  Laterality: Left;   • SHOULDER MANIPULATION Left 2021    Procedure: SHOULDER MANIPULATION;  Surgeon: Stalin Liu MD;  Location: [facility] OR;  Service: Orthopedics;  Laterality: Left;         Current Outpatient Medications:   •  PARoxetine (Paxil) 10 MG tablet, Take 1 tablet by mouth Every Morning., Disp: 90 tablet, Rfl: 3  •  clotrimazole (MYCELEX) 10 MG ayo, clotrimazole 10 mg ayo  DISSOLVE 1 TABLET BY MOUTH FIVE TIMES DAILY FOR 7 DAYS, Disp: , Rfl:   •  [START ON 2023] estradiol (ESTRACE) 0.1 MG/GM vaginal cream, Insert 1 g into the vagina 2 (Two) Times a Week. Insert 1gram intravaginally qhs and then 2x per week, Disp: 45 g, Rfl: 6  •  fluconazole (DIFLUCAN) 100 MG tablet, fluconazole 100 mg tablet  TAKE 1 TABLET BY MOUTH ONCE DAILY FOR 7  DAYS, Disp: , Rfl:   •  fluconazole (DIFLUCAN) 200 MG tablet, fluconazole 200 mg tablet  TAKE 1 TABLET BY MOUTH ONCE DAILY FOR 7 DAYS, Disp: , Rfl:   •  nystatin (MYCOSTATIN) 100,000 unit/mL suspension, nystatin 100,000 unit/mL oral suspension  SWISH & SWALLOW 5 ML BY MOUTH 4 TIMES DAILY, Disp: , Rfl:   •  omeprazole (priLOSEC) 20 MG capsule, omeprazole 20 mg capsule,delayed release  TAKE 1 CAPSULE BY MOUTH ONCE DAILY, Disp: , Rfl:   •  pantoprazole (PROTONIX) 40 MG EC tablet, Take 1 tablet by mouth 2 (Two) Times a Day., Disp: 60 tablet, Rfl: 11  •  tiZANidine (ZANAFLEX) 2 MG tablet, Take 1 tablet by mouth Every 8 (Eight) Hours As Needed for Muscle Spasms., Disp: 30 tablet, Rfl: 2    No Known Allergies    Social History     Tobacco Use   • Smoking status: Never   • Smokeless tobacco: Never   Vaping Use   • Vaping Use: Never used   Substance Use Topics   • Alcohol use: Not Currently   • Drug use: Not Currently     Types: Marijuana       Family History   Problem Relation Age of Onset   • Hearing loss Father    • Hyperlipidemia Father         DX in his mid 50's   • Hypothyroidism Father    • Hypertension Father    • Hypothyroidism Mother    • Osteoarthritis Mother    • No Known Problems Brother    • Arthritis Sister         RA   • No Known Problems Son    • Hearing loss Maternal Grandmother    • Hyperlipidemia Maternal Grandmother         DX in her late 70's   • Stroke Maternal Grandmother    • Lung cancer Maternal Grandfather    • Cancer Maternal Grandfather         lung cancer - smoker   • Lung cancer Paternal Uncle    • Cancer Paternal Uncle         lung cancer - smoker   • Rheum arthritis Other    • Breast cancer Neg Hx    • Ovarian cancer Neg Hx    • Uterine cancer Neg Hx    • Colon cancer Neg Hx    • Melanoma Neg Hx    • Colon polyps Neg Hx    • Malig Hyperthermia Neg Hx        Review of Systems   Constitutional: Negative for appetite change, chills, fatigue, fever and unexpected weight change.  "  Gastrointestinal: Negative for abdominal distention, abdominal pain, anal bleeding, blood in stool, constipation, diarrhea, nausea and vomiting.   Genitourinary: Positive for dyspareunia. Negative for dysuria, menstrual problem, pelvic pain, vaginal bleeding, vaginal discharge and vaginal pain.       /64   Ht 162.6 cm (64\")   Wt 58.4 kg (128 lb 12.8 oz)   BMI 22.11 kg/m²     Physical Exam  Vitals signs reviewed.   Constitutional:       Appearance: She is well-developed.   HENT:      Mouth/Throat:      Dentition: Normal dentition. No dental caries.   Cardiovascular:      Rate and Rhythm: Normal rate and regular rhythm.      Heart sounds: Normal heart sounds.   Pulmonary:      Effort: Pulmonary effort is normal. No respiratory distress.      Breath sounds: Normal breath sounds. No stridor. No wheezing.   Chest:      Breasts:         Right: No inverted nipple, mass, nipple discharge, skin change or tenderness.         Left: No inverted nipple, mass, nipple discharge, skin change or tenderness.   Abdominal:      General: There is no distension.      Palpations: Abdomen is soft. There is no mass.      Tenderness: There is no abdominal tenderness.   Genitourinary:     Labia:         Right: No rash, tenderness or lesion.         Left: No rash, tenderness or lesion.       Vagina: No vaginal discharge, tenderness or bleeding.      Cervix: No cervical motion tenderness, discharge or friability.      Uterus: Not deviated, not enlarged, not fixed and not tender.       Adnexa:         Right: No mass, tenderness or fullness.          Left: No mass, tenderness or fullness.     Musculoskeletal: Normal range of motion.         General: No tenderness.   Skin:     General: Skin is warm.      Findings: No erythema or rash.   Neurological:      Mental Status: She is alert and oriented to person, place, and time.      Cranial Nerves: No cranial nerve deficit.      Coordination: Coordination normal.   Psychiatric:         " Behavior: Behavior normal.         Thought Content: Thought content normal.         Judgment: Judgment normal.            Assessment/Plan    1) GYN HM: Check pap smear. SBE demonstrated and encouraged. Last MMG 1/2022.  2) /vasomotor sx: No PMPB s/p endometrial ablation. Pt is still taking Paxil 10mg po qam.   3) Bone health - Weight bearing exercise, dietary calcium recommendations and vitamin D reviewed.   4) Diet and Exercise discussed  5) Smoking Status: nonsmoker  6) Social: moved to [place]  7) Dyspareunia: Rx Estrace cream  8) Follow up prn and 1 year       Diagnoses and all orders for this visit:    Pap smear, low-risk  -     IGP, Apt HPV,rfx 16 / 18,45    Routine gynecological examination  -     POC Urinalysis Dipstick  -     IGP, Apt HPV,rfx 16 / 18,45  -     Mammo Screening Bilateral With CAD; Future    Screening for human papillomavirus  -     IGP, Apt HPV,rfx 16 / 18,45    Other orders  -     clotrimazole (MYCELEX) 10 MG ayo; clotrimazole 10 mg ayo   DISSOLVE 1 TABLET BY MOUTH FIVE TIMES DAILY FOR 7 DAYS  -     fluconazole (DIFLUCAN) 100 MG tablet; fluconazole 100 mg tablet   TAKE 1 TABLET BY MOUTH ONCE DAILY FOR 7 DAYS  -     fluconazole (DIFLUCAN) 200 MG tablet; fluconazole 200 mg tablet   TAKE 1 TABLET BY MOUTH ONCE DAILY FOR 7 DAYS  -     nystatin (MYCOSTATIN) 100,000 unit/mL suspension; nystatin 100,000 unit/mL oral suspension   SWISH & SWALLOW 5 ML BY MOUTH 4 TIMES DAILY  -     omeprazole (priLOSEC) 20 MG capsule; omeprazole 20 mg capsule,delayed release   TAKE 1 CAPSULE BY MOUTH ONCE DAILY  -     PARoxetine (Paxil) 10 MG tablet; Take 1 tablet by mouth Every Morning.  -     estradiol (ESTRACE) 0.1 MG/GM vaginal cream; Insert 1 g into the vagina 2 (Two) Times a Week. Insert 1gram intravaginally qhs and then 2x per week        Sandie Hu,   2/21/2023  13:35 EST

## 2023-02-24 ENCOUNTER — TRANSCRIBE ORDERS (OUTPATIENT)
Dept: ADMINISTRATIVE | Facility: HOSPITAL | Age: 58
End: 2023-02-24
Payer: COMMERCIAL

## 2023-02-24 DIAGNOSIS — Z12.31 VISIT FOR SCREENING MAMMOGRAM: Primary | ICD-10-CM

## 2023-02-27 LAB
CYTOLOGIST CVX/VAG CYTO: NORMAL
CYTOLOGY CVX/VAG DOC CYTO: NORMAL
CYTOLOGY CVX/VAG DOC THIN PREP: NORMAL
DX ICD CODE: NORMAL
HIV 1 & 2 AB SER-IMP: NORMAL
HPV I/H RISK 4 DNA CVX QL PROBE+SIG AMP: NEGATIVE
OTHER STN SPEC: NORMAL
STAT OF ADQ CVX/VAG CYTO-IMP: NORMAL

## 2023-05-09 ENCOUNTER — HOSPITAL ENCOUNTER (OUTPATIENT)
Dept: MAMMOGRAPHY | Facility: HOSPITAL | Age: 58
Discharge: HOME | End: 2023-05-09
Admitting: OBSTETRICS & GYNECOLOGY
Payer: COMMERCIAL

## 2023-05-09 DIAGNOSIS — Z12.31 VISIT FOR SCREENING MAMMOGRAM: ICD-10-CM

## 2023-05-09 PROCEDURE — 77063 BREAST TOMOSYNTHESIS BI: CPT

## 2023-05-09 PROCEDURE — 77067 SCR MAMMO BI INCL CAD: CPT

## 2023-05-15 ENCOUNTER — HOSPITAL ENCOUNTER (OUTPATIENT)
Dept: CT IMAGING | Facility: HOSPITAL | Age: 58
Discharge: HOME | End: 2023-05-15
Admitting: INTERNAL MEDICINE
Payer: COMMERCIAL

## 2023-05-15 DIAGNOSIS — R06.02 SHORTNESS OF BREATH: ICD-10-CM

## 2023-05-15 PROCEDURE — 71250 CT THORAX DX C-: CPT

## 2023-06-06 ENCOUNTER — LAB (OUTPATIENT)
Dept: LAB | Facility: HOSPITAL | Age: 58
End: 2023-06-06
Payer: COMMERCIAL

## 2023-06-06 PROBLEM — R91.8 LUNG NODULES: Status: ACTIVE | Noted: 2023-06-06

## 2023-06-06 PROCEDURE — 80050 GENERAL HEALTH PANEL: CPT | Performed by: NURSE PRACTITIONER

## 2023-06-06 PROCEDURE — 83036 HEMOGLOBIN GLYCOSYLATED A1C: CPT | Performed by: NURSE PRACTITIONER

## 2023-06-06 PROCEDURE — 82607 VITAMIN B-12: CPT | Performed by: NURSE PRACTITIONER

## 2023-06-06 PROCEDURE — 82306 VITAMIN D 25 HYDROXY: CPT | Performed by: NURSE PRACTITIONER

## 2023-06-06 PROCEDURE — 80061 LIPID PANEL: CPT | Performed by: NURSE PRACTITIONER

## 2023-06-06 PROCEDURE — 82746 ASSAY OF FOLIC ACID SERUM: CPT | Performed by: NURSE PRACTITIONER

## 2023-08-14 RX ORDER — PANTOPRAZOLE SODIUM 40 MG/1
TABLET, DELAYED RELEASE ORAL
Qty: 60 TABLET | Refills: 0 | Status: SHIPPED | OUTPATIENT
Start: 2023-08-14

## 2023-08-14 RX ORDER — PANTOPRAZOLE SODIUM 40 MG/1
40 TABLET, DELAYED RELEASE ORAL 2 TIMES DAILY
Qty: 60 TABLET | Refills: 11 | Status: SHIPPED | OUTPATIENT
Start: 2023-08-14

## 2023-08-21 NOTE — PROGRESS NOTES
Physical Therapy Daily Progress Note    Patient: Desire Hernandez   : 1965  Diagnosis/ICD-10 Code:  Chronic left shoulder pain [M25.512, G89.29]  Referring practitioner: Stalin Liu MD  Date of Initial Visit: Type: THERAPY  Noted: 2021  Today's Date: 2021  Patient seen for 10 sessions         Desire Hernandez reports having less pain since last visit as she has been taking her pain medication consistently.  Was feeling discouraged at her last visit, but feels better about her progress now.    Subjective     Objective   See Exercise, Manual, and Modality Logs for complete treatment.       Assessment/Plan  Continued emphasis on improving GHJ mobility in all planes.  Has improved ER and aBd PROM.             Manual Therapy:    10     mins  75523;  Therapeutic Exercise:    24     mins  92853;     Neuromuscular Lon:        mins  92413;    Therapeutic Activity:     8     mins  16639;     Gait Training:           mins  38615;     Ultrasound:          mins  75284;    Electrical Stimulation:         mins  14812 ( );  Dry Needling          mins self-pay    Timed Treatment:   42   mins   Total Treatment:     42   mins    Kevin Roper PT  Physical Therapist                     emil

## 2024-05-20 RX ORDER — PAROXETINE 10 MG/1
10 TABLET, FILM COATED ORAL EVERY MORNING
Qty: 30 TABLET | Refills: 0 | Status: SHIPPED | OUTPATIENT
Start: 2024-05-20

## 2024-05-20 NOTE — TELEPHONE ENCOUNTER
Caller: Mary Desire Purnima    Relationship: Self    Best call back number: [phone]    Requested Prescriptions:   Requested Prescriptions     Pending Prescriptions Disp Refills    PARoxetine (Paxil) 10 MG tablet 90 tablet 3     Sig: Take 1 tablet by mouth Every Morning.        Pharmacy where request should be sent: [facility] [address] [place] [address] [phone] [facility] [phone]      Last office visit with prescribing clinician: 6/6/2023   Last telemedicine visit with prescribing clinician: Visit date not found   Next office visit with prescribing clinician: 6/18/2024     Additional details provided by patient: PT HAS CALLED AGAIN FOR THIS TO BE REFILLED. PATIENT STATES [facility]CCA HAS NOT FILLED THIS BEFORE BUT THE DR THAT HAS IS NOW GONE FROM [facility]. PLEASE CALL HER TODAY AND LET HER KNOW IF THIS CAN BE FILLED ASAP    SHE HAS 3 DAYS LEFT    Does the patient have less than a 3 day supply:  [x] Yes  [] No    Would you like a call back once the refill request has been completed: [x] Yes [] No    If the office needs to give you a call back, can they leave a voicemail: [] Yes [] No    Tran Wilkerson Rep   05/20/24 10:05 EDT

## 2024-06-14 ENCOUNTER — TRANSCRIBE ORDERS (OUTPATIENT)
Dept: ADMINISTRATIVE | Facility: HOSPITAL | Age: 59
End: 2024-06-14
Payer: COMMERCIAL

## 2024-06-14 DIAGNOSIS — Z12.31 VISIT FOR SCREENING MAMMOGRAM: Primary | ICD-10-CM

## 2024-06-18 ENCOUNTER — OFFICE VISIT (OUTPATIENT)
Dept: INTERNAL MEDICINE | Facility: CLINIC | Age: 59
End: 2024-06-18
Payer: COMMERCIAL

## 2024-06-18 VITALS
RESPIRATION RATE: 20 BRPM | WEIGHT: 125.8 LBS | OXYGEN SATURATION: 98 % | TEMPERATURE: 98.3 F | BODY MASS INDEX: 21.48 KG/M2 | HEART RATE: 68 BPM | HEIGHT: 64 IN | DIASTOLIC BLOOD PRESSURE: 74 MMHG | SYSTOLIC BLOOD PRESSURE: 122 MMHG

## 2024-06-18 DIAGNOSIS — Z00.00 WELLNESS EXAMINATION: Primary | ICD-10-CM

## 2024-06-18 DIAGNOSIS — N95.1 MENOPAUSAL SYMPTOM: ICD-10-CM

## 2024-06-18 DIAGNOSIS — F41.8 DEPRESSION WITH ANXIETY: ICD-10-CM

## 2024-06-18 DIAGNOSIS — K21.9 GASTROESOPHAGEAL REFLUX DISEASE WITHOUT ESOPHAGITIS: ICD-10-CM

## 2024-06-18 PROCEDURE — 99396 PREV VISIT EST AGE 40-64: CPT | Performed by: NURSE PRACTITIONER

## 2024-06-18 NOTE — PROGRESS NOTES
Subjective   Chief Complaint   Patient presents with    Annual Exam       Desire Hernandez is a 59 y.o. female here today for annual exam.    History of Present Illness  The patient presents for a wellness exam.    The patient reports a general feeling of wellness compared to the previous year. She is attempting to resume her exercise regimen due to a history of bulging discs in her back, for which she sees a chiropractor. Weight-lifting exercises exacerbate her condition, leading her to limit her lifting to 3 pounds. She experiences knee pain following squats, which she manages through stretching exercises. Her dietary intake is inconsistent, and she takes a daily vitamin supplement, which she reports as beneficial for her bowel movements. Probiotics cause discomfort and gassiness. She denies any urinary issues, but reports frequent urination and a sensation of her bladder being the size of a walnut. Her bowel movements are influenced by her diet, but she experiences constipation if she does not take her vitamins. Her mammogram and colonoscopy are current, with her mammogram scheduled for 06/25/2024. Her last dental visit was in 04/2024, and she undergoes biannual dental check-ups. She is due for an eye exam in 07/2024 and a dermatology appointment in 08/2024.    The patient expresses a desire to be referred to a therapist to discuss her symptoms of depression, anxiety, and menopausal symptoms. The patient's symptoms are exacerbated by weight lifting.    The patient consumes alcohol socially, approximately 4 drinks per week, which she finds relaxing. She reports increased food intake due to alcohol consumption.    The patient takes Protonix twice daily, which she reports as beneficial for her GERD symptoms. She reduced her intake to once daily for several months, but resumed it after increasing her alcohol intake. The patient's symptoms are exacerbated by alcohol consumption.    The patient is due for a  follow-up on her pulmonary nodules. She underwent a CT scan in 05/2023, which showed no changes. She denies experiencing shortness of breath.    Overall healthy:  Yes, does not feel as healthy as last year. Has back pain and not able to exercise as much as before.   Regular exercise:  Yes, trying to do more isolated exercises with arms.   Diet is well balanced:  Yes  Vitamin Supplement:  Yes, multi  Alcohol intake:  Mild to moderate  Tobacco use:  No  Cardiovascular risk is low:  Low  Menstrual cycle regular:   n/a, post menopausal  PAP:  Up to date and Normal  :  No urinary issues  Mammogram:  Up to date and No FH  Colonoscopy:  Up to date and No FH  GI:  Normal BM  Regular dental exam:  Up to date  Regular eye exam:  Up to date  Immunizations up to date:  Up to date  Wear a seatbelt regularly:  Yes  Wear sunscreen regularly when outdoors:  Yes    The 10-year ASCVD risk score (Hugo DELEON, et al., 2019) is: 1%    Values used to calculate the score:      Age: 59 years      Sex: Female      Is Non- : No      Diabetic: No      Tobacco smoker: No      Systolic Blood Pressure: 100 mmHg      Is BP treated: No      HDL Cholesterol: 79 mg/dL      Total Cholesterol: 184 mg/dL     Past Medical History:   Diagnosis Date    ADHD (attention deficit hyperactivity disorder)     back in high school    Allergic     seasonal    ASCUS favor dysplasia     6/4/07; 6/5/08; 6/25/10; 6/27/12; 7/29/13; 8/21/15 (HPV negative)    Blood type, Rh negative     Bulging lumbar disc     Bunion 2016    surgery    Chlamydia     as a teen    Diarrhea     Dizziness     Fatigue     GERD (gastroesophageal reflux disease) March 2022    H/O nipple discharge 9/415    Bilateral diagnostic mammogram negative    Hair loss     HSV-1 (herpes simplex virus 1) infection     HSV-2 (herpes simplex virus 2) infection 1993    LGSIL (low grade squamous intraepithelial dysplasia) 05/26/2006    Menopause     Nausea     Ovarian cyst     Left     Palpitations     PONV (postoperative nausea and vomiting)     Right shoulder pain     SCHEDULED FOR SX    Seizures     Sleep disturbance     Syncope     Tinnitus      Past Surgical History:   Procedure Laterality Date    BUNIONECTOMY Right 2016    Dr. Durbin    CERVICAL CONIZATION, LEEP      COLONOSCOPY  2022    COLPOSCOPY W/ BIOPSY / CURETTAGE  2006    benign    D & C WITH SUCTION  1998    10 week SAB    ENDOMETRIAL ABLATION  2017    INDUCED       years ago    IN HYSTEROSCOPY ENDOMETRIAL ABLATION N/A 2017    Procedure: HYSTEROSCOPY WITH ENDOMETRIAL ABLATION; d&c;  Surgeon: Sandie Hu DO;  Location: [facility];  Service: Obstetrics/Gynecology    SHOULDER ARTHROSCOPY Right 2020    Procedure: Right shoulder manipulation under anesthesia with arthroscopy and extensive debridement of glenohumeral and subacromial spaces;  Surgeon: Stalin Liu MD;  Location: [facility];  Service: Orthopedics    SHOULDER ARTHROSCOPY Left 2021    Procedure: SHOULDER ARTHROSCOPY WITH SUBACROMIAL DECOMPRESSION.;  Surgeon: Stalin Liu MD;  Location: [facility];  Service: Orthopedics;  Laterality: Left;    SHOULDER MANIPULATION Left 2021    Procedure: SHOULDER MANIPULATION;  Surgeon: Stalin Liu MD;  Location: [facility];  Service: Orthopedics;  Laterality: Left;     Family History   Problem Relation Age of Onset    Hearing loss Father     Hyperlipidemia Father         DX in his mid 50's    Hypothyroidism Father     Hypertension Father     Hypothyroidism Mother     Osteoarthritis Mother     Arthritis Mother     Hyperlipidemia Mother     No Known Problems Brother     Arthritis Sister         RA    No Known Problems Son     COPD Paternal Grandfather     Hearing loss Paternal Grandmother     Hyperlipidemia Paternal Grandmother     Hearing loss Maternal Grandmother     Hyperlipidemia Maternal Grandmother         DX in her late 70's    Stroke Maternal Grandmother      "Vision loss Maternal Grandmother         Macular degeneration    Lung cancer Maternal Grandfather     Cancer Maternal Grandfather         lung cancer - smoker    Lung cancer Paternal Uncle     Cancer Paternal Uncle         lung cancer - smoker    Rheum arthritis Other     Breast cancer Neg Hx     Ovarian cancer Neg Hx     Uterine cancer Neg Hx     Colon cancer Neg Hx     Melanoma Neg Hx     Colon polyps Neg Hx     Malig Hyperthermia Neg Hx      Social History     Tobacco Use   Smoking Status Never   Smokeless Tobacco Never      Social History     Substance and Sexual Activity   Alcohol Use Yes    Alcohol/week: 2.0 standard drinks of alcohol    Types: 2 Drinks containing 0.5 oz of alcohol per week    Comment: socially      No Known Allergies    Review of Systems  Pertinent items are noted in HPI.     Current Outpatient Medications on File Prior to Visit   Medication Sig    pantoprazole (PROTONIX) 40 MG EC tablet Take 1 tablet by mouth twice daily    PARoxetine (Paxil) 10 MG tablet Take 1 tablet by mouth Every Morning. Needs appt for further refills    tiZANidine (ZANAFLEX) 2 MG tablet Take 1 tablet by mouth Every 8 (Eight) Hours As Needed for Muscle Spasms.     No current facility-administered medications on file prior to visit.       Objective   Vitals:    06/18/24 0945   BP: 122/74   Pulse: 68   Resp: 20   Temp: 98.3 °F (36.8 °C)   SpO2: 98%   Weight: 57.1 kg (125 lb 12.8 oz)   Height: 162.6 cm (64.02\")   PainSc: 0-No pain     Body mass index is 21.58 kg/m².    Physical Exam  Vitals reviewed.   Constitutional:       Appearance: Normal appearance. She is well-developed.   HENT:      Head: Normocephalic and atraumatic.      Right Ear: Hearing, tympanic membrane, ear canal and external ear normal.      Left Ear: Hearing, tympanic membrane, ear canal and external ear normal.      Nose: Nose normal.      Mouth/Throat:      Lips: Pink.      Mouth: Mucous membranes are moist.      Pharynx: Oropharynx is clear. Uvula " midline.   Eyes:      General: Lids are normal.      Extraocular Movements: Extraocular movements intact.      Conjunctiva/sclera: Conjunctivae normal.      Pupils: Pupils are equal, round, and reactive to light.   Neck:      Thyroid: No thyromegaly.      Trachea: Trachea normal.   Cardiovascular:      Rate and Rhythm: Normal rate and regular rhythm.      Pulses:           Carotid pulses are 2+ on the right side and 2+ on the left side.     Heart sounds: Normal heart sounds.   Pulmonary:      Effort: Pulmonary effort is normal. No respiratory distress.      Breath sounds: Normal breath sounds.   Abdominal:      General: Bowel sounds are normal.      Palpations: Abdomen is soft.      Tenderness: There is no abdominal tenderness.   Skin:     General: Skin is warm and dry.      Capillary Refill: Capillary refill takes 2 to 3 seconds.   Neurological:      Mental Status: She is alert and oriented to person, place, and time.      GCS: GCS eye subscore is 4. GCS verbal subscore is 5. GCS motor subscore is 6.   Psychiatric:         Attention and Perception: Attention normal.         Mood and Affect: Mood normal.         Speech: Speech normal.         Behavior: Behavior normal. Behavior is cooperative.         Thought Content: Thought content normal.         Results  Laboratory Studies  A1c was 5.6.    Imaging  CT scan showed pulmonary nodules measuring 4 mm to 5 mm.    BMI is within normal parameters. No other follow-up for BMI required.        Assessment & Plan     Current Outpatient Medications:     pantoprazole (PROTONIX) 40 MG EC tablet, Take 1 tablet by mouth twice daily, Disp: 60 tablet, Rfl: 0    PARoxetine (Paxil) 10 MG tablet, Take 1 tablet by mouth Every Morning. Needs appt for further refills, Disp: 30 tablet, Rfl: 0    tiZANidine (ZANAFLEX) 2 MG tablet, Take 1 tablet by mouth Every 8 (Eight) Hours As Needed for Muscle Spasms., Disp: 30 tablet, Rfl: 2    Problem List Items Addressed This Visit    None  Visit  Diagnoses       Wellness examination    -  Primary    Gastroesophageal reflux disease without esophagitis        Depression with anxiety        Relevant Orders    Ambulatory Referral to Behavioral Health    Menopausal symptom                Assessment & Plan  1. Wellness examination.  Patient will continue to eat a well-balanced diet, drink adequate amount of water, exercise at least 3 times weekly, and get adequate rest.  Suggested a multivitamin daily.  Discussed future preventative screenings and immunizations.    2. Gastroesophageal Reflux Disease (GERD).  The patient's condition is chronic and stable. The patient is advised to persist with the Protonix regimen.    3. Depression accompanied by anxiety and menopausal symptoms.  The patient is to continue with the Paxil regimen. A referral to behavioral health for therapy will be made.           Counseling was given to patient for the following topics: appropriate exercise, healthy eating habits, disease prevention, risk factors for cancer, importance of self breast exam and breast health, importance of immunizations, including risks and benefits, bone health, sun safety, and seatbelt use.      Plan of care reviewed with the patient at the conclusion of today's visit.  Education was provided regarding diagnosis, management, and any prescribed or recommended OTC medications.  Patient verbalized understanding of and agreement with management plan.     Return in about 1 year (around 6/18/2025) for Annual.      Transcribed from ambient dictation for ONOFRE Shah by ONOFRE Shah.  06/18/24   10:08 EDT    Patient or patient representative verbalized consent for the use of Ambient Listening during the visit with  ONOFRE Shah for chart documentation. 7/7/2024  20:47 EDT

## 2024-06-20 ENCOUNTER — TELEPHONE (OUTPATIENT)
Dept: INTERNAL MEDICINE | Facility: CLINIC | Age: 59
End: 2024-06-20
Payer: COMMERCIAL

## 2024-06-21 ENCOUNTER — TELEPHONE (OUTPATIENT)
Dept: GASTROENTEROLOGY | Facility: CLINIC | Age: 59
End: 2024-06-21

## 2024-06-21 ENCOUNTER — OFFICE VISIT (OUTPATIENT)
Dept: INTERNAL MEDICINE | Facility: CLINIC | Age: 59
End: 2024-06-21
Payer: COMMERCIAL

## 2024-06-21 ENCOUNTER — HOSPITAL ENCOUNTER (OUTPATIENT)
Facility: HOSPITAL | Age: 59
Discharge: HOME | End: 2024-06-21
Admitting: NURSE PRACTITIONER
Payer: COMMERCIAL

## 2024-06-21 VITALS
TEMPERATURE: 98.2 F | DIASTOLIC BLOOD PRESSURE: 68 MMHG | HEIGHT: 64 IN | RESPIRATION RATE: 16 BRPM | OXYGEN SATURATION: 97 % | HEART RATE: 72 BPM | WEIGHT: 125 LBS | BODY MASS INDEX: 21.34 KG/M2 | SYSTOLIC BLOOD PRESSURE: 100 MMHG

## 2024-06-21 DIAGNOSIS — R10.30 LOWER ABDOMINAL PAIN: Primary | ICD-10-CM

## 2024-06-21 DIAGNOSIS — K57.32 SIGMOID DIVERTICULITIS: Primary | ICD-10-CM

## 2024-06-21 DIAGNOSIS — R31.29 OTHER MICROSCOPIC HEMATURIA: ICD-10-CM

## 2024-06-21 DIAGNOSIS — N83.209 CYST OF OVARY, UNSPECIFIED LATERALITY: ICD-10-CM

## 2024-06-21 DIAGNOSIS — R10.30 LOWER ABDOMINAL PAIN: ICD-10-CM

## 2024-06-21 LAB
BILIRUB BLD-MCNC: NEGATIVE MG/DL
CLARITY, POC: CLEAR
COLOR UR: YELLOW
EXPIRATION DATE: ABNORMAL
GLUCOSE UR STRIP-MCNC: NEGATIVE MG/DL
KETONES UR QL: NEGATIVE
LEUKOCYTE EST, POC: NEGATIVE
Lab: ABNORMAL
NITRITE UR-MCNC: NEGATIVE MG/ML
PH UR: 6 [PH] (ref 5–8)
PROT UR STRIP-MCNC: NEGATIVE MG/DL
RBC # UR STRIP: ABNORMAL /UL
SP GR UR: 1.01 (ref 1–1.03)
UROBILINOGEN UR QL: NORMAL

## 2024-06-21 PROCEDURE — 81003 URINALYSIS AUTO W/O SCOPE: CPT | Performed by: NURSE PRACTITIONER

## 2024-06-21 PROCEDURE — 99213 OFFICE O/P EST LOW 20 MIN: CPT | Performed by: NURSE PRACTITIONER

## 2024-06-21 PROCEDURE — 25510000001 IOPAMIDOL 61 % SOLUTION: Performed by: NURSE PRACTITIONER

## 2024-06-21 PROCEDURE — 74177 CT ABD & PELVIS W/CONTRAST: CPT

## 2024-06-21 RX ORDER — CIPROFLOXACIN 500 MG/1
500 TABLET, FILM COATED ORAL 2 TIMES DAILY
Qty: 20 TABLET | Refills: 0 | Status: SHIPPED | OUTPATIENT
Start: 2024-06-21 | End: 2024-07-01

## 2024-06-21 RX ADMIN — IOPAMIDOL 100 ML: 612 INJECTION, SOLUTION INTRAVENOUS at 15:55

## 2024-06-21 NOTE — PROGRESS NOTES
"Chief Complaint   Patient presents with    Abdominal Pain     Lower abd pain x 2 days  Dull and sharp        HPI  Desire Hernandez is a 59 y.o. female presents for lower abdominal pain.  This started about 2 days ago.  No fever.  She feels pressure in her lower abdomen.  She states the pain is constant.  Pain is 3/10 at this time while lying on exam table.  Frequently she gets really sharp pain which she states is a 7/10.  She denies any pain in her flank area.  Voiding well.  No N/V/D.  Feels bloated.  Never had pain like this before.  Had her pap on Friday and has to have it repeated because they didn't get enough specimen.    The following portions of the patient's history were reviewed and updated as appropriate: allergies, current medications, past family history, past medical history, past social history, past surgical history, and problem list.    Subjective  Review of Systems   Constitutional:  Negative for activity change, appetite change and fatigue.   HENT:  Negative for congestion.    Respiratory:  Negative for cough and shortness of breath.    Cardiovascular:  Negative for chest pain and leg swelling.   Gastrointestinal:  Positive for abdominal distention and abdominal pain (lower). Negative for diarrhea, nausea, vomiting and indigestion.   Neurological:  Negative for dizziness, weakness and confusion.   Psychiatric/Behavioral:  Negative for behavioral problems and decreased concentration.        Objective  Visit Vitals  /68 (BP Location: Left arm, Patient Position: Sitting, Cuff Size: Adult)   Pulse 72   Temp 98.2 °F (36.8 °C) (Tympanic)   Resp 16   Ht 162.6 cm (64\")   Wt 56.7 kg (125 lb)   SpO2 97%   BMI 21.46 kg/m²        Physical Exam  Vitals and nursing note reviewed.   HENT:      Head: Normocephalic.   Eyes:      Pupils: Pupils are equal, round, and reactive to light.   Cardiovascular:      Rate and Rhythm: Normal rate and regular rhythm.      Pulses: Normal pulses.      Heart sounds: " Normal heart sounds.   Pulmonary:      Effort: Pulmonary effort is normal. No respiratory distress.      Breath sounds: Normal breath sounds.   Abdominal:      General: Bowel sounds are normal. There is distension.      Palpations: Abdomen is soft.      Tenderness: There is abdominal tenderness. There is no guarding or rebound.       Skin:     General: Skin is warm and dry.      Capillary Refill: Capillary refill takes less than 2 seconds.   Neurological:      General: No focal deficit present.      Mental Status: She is alert and oriented to person, place, and time.      Gait: Gait is intact.   Psychiatric:         Attention and Perception: Attention normal.         Mood and Affect: Mood normal.         Behavior: Behavior normal.          Procedures       Results for orders placed or performed in visit on 06/21/24   POCT urinalysis dipstick, automated    Specimen: Urine   Result Value Ref Range    Color Yellow Yellow, Straw, Dark Yellow, Marivel    Clarity, UA Clear Clear    Specific Gravity  1.015 1.005 - 1.030    pH, Urine 6.0 5.0 - 8.0    Leukocytes Negative Negative    Nitrite, UA Negative Negative    Protein, POC Negative Negative mg/dL    Glucose, UA Negative Negative mg/dL    Ketones, UA Negative Negative    Urobilinogen, UA Normal Normal, 0.2 E.U./dL    Bilirubin Negative Negative    Blood, UA Moderate (A) Negative    Lot Number [id number]     Expiration Date 01.14.2025           Assessment and Plan  Diagnoses and all orders for this visit:    1. Lower abdominal pain (Primary)  -     POCT urinalysis dipstick, automated  -     CT Abdomen Pelvis With Contrast; Future    2. Other microscopic hematuria  -     CT Abdomen Pelvis With Contrast; Future      Urine with blood  STAT CT of abd/pelvis due to ongoing pain    Return if symptoms worsen or fail to improve.        ONOFRE Samano

## 2024-06-21 NOTE — TELEPHONE ENCOUNTER
Name: Desire Hernandez    Relationship: Self    Best Callback Number: [phone]    Patient would like to Schedule a Follow-Up. Unable to schedule within the 3 week timeframe.     Patient is having symptoms of SEVERE LOWER ABDOMINAL PAIN. Please call patient.

## 2024-06-25 ENCOUNTER — HOSPITAL ENCOUNTER (OUTPATIENT)
Facility: HOSPITAL | Age: 59
Discharge: HOME | End: 2024-06-25
Admitting: NURSE PRACTITIONER
Payer: COMMERCIAL

## 2024-06-25 DIAGNOSIS — Z13.220 LIPID SCREENING: ICD-10-CM

## 2024-06-25 DIAGNOSIS — Z00.00 WELLNESS EXAMINATION: Primary | ICD-10-CM

## 2024-06-25 DIAGNOSIS — E55.9 VITAMIN D DEFICIENCY: ICD-10-CM

## 2024-06-25 DIAGNOSIS — Z13.0 SCREENING FOR BLOOD DISEASE: ICD-10-CM

## 2024-06-25 DIAGNOSIS — Z12.31 VISIT FOR SCREENING MAMMOGRAM: ICD-10-CM

## 2024-06-25 DIAGNOSIS — Z13.29 THYROID DISORDER SCREENING: ICD-10-CM

## 2024-06-25 DIAGNOSIS — Z13.1 SCREENING FOR DIABETES MELLITUS: ICD-10-CM

## 2024-06-25 DIAGNOSIS — Z13.21 ENCOUNTER FOR VITAMIN DEFICIENCY SCREENING: ICD-10-CM

## 2024-06-25 PROCEDURE — 77063 BREAST TOMOSYNTHESIS BI: CPT

## 2024-06-25 PROCEDURE — 77067 SCR MAMMO BI INCL CAD: CPT

## 2024-07-11 ENCOUNTER — OFFICE VISIT (OUTPATIENT)
Dept: GASTROENTEROLOGY | Facility: CLINIC | Age: 59
End: 2024-07-11
Payer: COMMERCIAL

## 2024-07-11 VITALS
HEART RATE: 70 BPM | BODY MASS INDEX: 20.96 KG/M2 | SYSTOLIC BLOOD PRESSURE: 112 MMHG | WEIGHT: 122.8 LBS | HEIGHT: 64 IN | DIASTOLIC BLOOD PRESSURE: 75 MMHG | TEMPERATURE: 97.3 F

## 2024-07-11 DIAGNOSIS — K21.9 GASTROESOPHAGEAL REFLUX DISEASE WITHOUT ESOPHAGITIS: ICD-10-CM

## 2024-07-11 DIAGNOSIS — R14.0 BLOATING: ICD-10-CM

## 2024-07-11 DIAGNOSIS — K58.1 IRRITABLE BOWEL SYNDROME WITH CONSTIPATION: Primary | ICD-10-CM

## 2024-07-11 DIAGNOSIS — K57.92 DIVERTICULITIS: ICD-10-CM

## 2024-07-11 PROCEDURE — 99213 OFFICE O/P EST LOW 20 MIN: CPT

## 2024-07-11 RX ORDER — PRASTERONE 6.5 MG/1
INSERT VAGINAL
COMMUNITY
Start: 2024-06-22

## 2024-07-11 NOTE — PATIENT INSTRUCTIONS
Recommend bowel cleanse with Miralax, which is over the counter. Dissolve 5 capfuls into 32 ounces of sugar-free gatorade/powerade and drink 8 ounces every 30 minutes until gone. You can take daily to maintain soft, formed bowel movements.    Recommend 25-30 grams fiber daily once you are having less symptoms. fibercon tablets to supplement as needed.   Also recommend 64-80 ounces of water daily, with ideally half of that containing electrolytes.  Send off the C-FRANKLIN home test.

## 2024-07-11 NOTE — PROGRESS NOTES
Office Note     Name: Desire Hernandez    : 1965     MRN: [id number]     Chief Complaint  Severe abdominal pain    Subjective     History of Present Illness:  Desire Hernandez is a 59 y.o. female who presents today for further evaluation of severe lower abdominal pain.  Reports pain is often sharp with pressure.  She also endorses bloating.  Denies having pain like this in the past.  She was evaluated by PCP on , and CT abdomen pelvis suggested very mild mid sigmoid diverticulitis, without any visualized abscess.  He was also found to have 3 cm cyst on her LLQ concerning for ovarian cyst, however transvaginal ultrasound showed normal ovaries.  She was prescribed antibiotic, which she has completed with improvement in her symptoms.      She reports that she has had some degree of consistent LLQ pain for most of her life.  She endorses long history of GERD and bloating, and is previously seen Dr. Connor.  EGD significant for benign-appearing stenosis, which was dilated.  She notes that her bloating and abdominal pain is worse with intake of high-fiber, specifically oatmeal, so she has been trying to reduce the amount of fiber she is eating, but continues to have abdominal pain and bloating.  She also endorses constipation, with stools resembling 1 on BSC.    Past Medical History:   Past Medical History:   Diagnosis Date    ADHD (attention deficit hyperactivity disorder)     back in high school    Allergic     seasonal    ASCUS favor dysplasia     07; 08; 6/25/10; 12; 13; 8/21/15 (HPV negative)    Blood type, Rh negative     Bulging lumbar disc     Bunion     surgery    Chlamydia     as a teen    Diarrhea     Dizziness     Fatigue     GERD (gastroesophageal reflux disease) 2022    H/O nipple discharge     Bilateral diagnostic mammogram negative    Hair loss     HSV-1 (herpes simplex virus 1) infection     HSV-2 (herpes simplex virus 2) infection      LGSIL (low grade squamous intraepithelial dysplasia) 2006    Menopause     Nausea     Ovarian cyst     Left    Palpitations     PONV (postoperative nausea and vomiting)     Right shoulder pain     SCHEDULED FOR SX    Seizures     Sleep disturbance     Syncope     Tinnitus        Past Surgical History:   Past Surgical History:   Procedure Laterality Date    BUNIONECTOMY Right 2016    Dr. Durbin    CERVICAL CONIZATION, LEEP      COLONOSCOPY  2022    COLPOSCOPY W/ BIOPSY / CURETTAGE  2006    benign    D & C WITH SUCTION  1998    10 week SAB    ENDOMETRIAL ABLATION  2017    INDUCED       years ago    MS HYSTEROSCOPY ENDOMETRIAL ABLATION N/A 2017    Procedure: HYSTEROSCOPY WITH ENDOMETRIAL ABLATION; d&c;  Surgeon: Sandie Hu DO;  Location: [facility];  Service: Obstetrics/Gynecology    SHOULDER ARTHROSCOPY Right 2020    Procedure: Right shoulder manipulation under anesthesia with arthroscopy and extensive debridement of glenohumeral and subacromial spaces;  Surgeon: Stalin Liu MD;  Location: [facility];  Service: Orthopedics    SHOULDER ARTHROSCOPY Left 2021    Procedure: SHOULDER ARTHROSCOPY WITH SUBACROMIAL DECOMPRESSION.;  Surgeon: Stalin Liu MD;  Location: [facility];  Service: Orthopedics;  Laterality: Left;    SHOULDER MANIPULATION Left 2021    Procedure: SHOULDER MANIPULATION;  Surgeon: Stalin Liu MD;  Location: [facility];  Service: Orthopedics;  Laterality: Left;       Immunizations:   Immunization History   Administered Date(s) Administered    Fluzone (or Fluarix & Flulaval for VFC) >6mos 2021    Influenza, Unspecified 10/01/2020, 2022    Tdap 2017        Medications:     Current Outpatient Medications:     pantoprazole (PROTONIX) 40 MG EC tablet, Take 1 tablet by mouth twice daily, Disp: 60 tablet, Rfl: 0    PARoxetine (Paxil) 10 MG tablet, Take 1 tablet by mouth Every Morning. Needs appt for further  refills, Disp: 30 tablet, Rfl: 0    tiZANidine (ZANAFLEX) 2 MG tablet, Take 1 tablet by mouth Every 8 (Eight) Hours As Needed for Muscle Spasms., Disp: 30 tablet, Rfl: 2    Allergies:   No Known Allergies    Family History:   Family History   Problem Relation Age of Onset    Hearing loss Father     Hyperlipidemia Father         DX in his mid 50's    Hypothyroidism Father     Hypertension Father     Hypothyroidism Mother     Osteoarthritis Mother     Arthritis Mother     Hyperlipidemia Mother     No Known Problems Brother     Arthritis Sister         RA    No Known Problems Son     COPD Paternal Grandfather     Hearing loss Paternal Grandmother     Hyperlipidemia Paternal Grandmother     Hearing loss Maternal Grandmother     Hyperlipidemia Maternal Grandmother         DX in her late 70's    Stroke Maternal Grandmother     Vision loss Maternal Grandmother         Macular degeneration    Lung cancer Maternal Grandfather     Cancer Maternal Grandfather         lung cancer - smoker    Lung cancer Paternal Uncle     Cancer Paternal Uncle         lung cancer - smoker    Rheum arthritis Other     Breast cancer Neg Hx     Ovarian cancer Neg Hx     Uterine cancer Neg Hx     Colon cancer Neg Hx     Melanoma Neg Hx     Colon polyps Neg Hx     Malig Hyperthermia Neg Hx        Social History:   Social History     Socioeconomic History    Marital status:      Spouse name: Lawrence    Number of children: 2   Tobacco Use    Smoking status: Never    Smokeless tobacco: Never   Vaping Use    Vaping status: Never Used   Substance and Sexual Activity    Alcohol use: Yes     Alcohol/week: 2.0 standard drinks of alcohol     Types: 2 Drinks containing 0.5 oz of alcohol per week     Comment: socially    Drug use: Not Currently     Types: Marijuana    Sexual activity: Defer     Partners: Male     Birth control/protection: None         Objective     Vital Signs  There were no vitals taken for this visit.  Estimated body mass index is 21.46  "kg/m² as calculated from the following:    Height as of 6/21/24: 162.6 cm (64\").    Weight as of 6/21/24: 56.7 kg (125 lb).    BMI is within normal parameters. No other follow-up for BMI required.      Physical Exam  Vitals reviewed.   Constitutional:       General: She is awake.   Cardiovascular:      Rate and Rhythm: Normal rate.   Pulmonary:      Effort: Pulmonary effort is normal.   Abdominal:      Palpations: Abdomen is soft.      Tenderness: There is no abdominal tenderness.   Skin:     General: Skin is warm and dry.   Neurological:      Mental Status: She is alert.          Assessment and Plan     Assessment & Plan  Irritable bowel syndrome with constipation  Recommend 25-30 g of fiber daily.  May use fiber supplement as needed.  Recommend 64-80 ounces of water daily, with ideally half of that containing electrolytes.  Recommend bowel cleanse with MiraLAX, followed by regular use, to maintain soft, formed stools daily, and avoid constipation.  If no improvement with MiraLAX, may require medication.    Gastroesophageal reflux disease without esophagitis  Reports reflux symptoms are currently well-managed.  Continue Protonix 40 mg daily.    Bloating  Discussed implementing low FODMAP diet.  Home test for C-FRANKLIN provided to patient.    Diverticulitis  Recent very mild case of diverticulitis, which is since resolved.  Discussed dietary recommendations with patient to follow during acute exacerbation, including low fiber low roughage.  May require liquids/full liquids when symptomatic.  Otherwise, recommend high-fiber diet for management of diverticulosis, and to help prevent recurrence of diverticulitis.                   Follow Up  Follow-up in 2 to 3 months    ONOFRE Herrera  RACIEL GASTRO [facility]0  [facility] GASTROENTEROLOGY  [address] [place] [id number]    "

## 2024-09-06 RX ORDER — PANTOPRAZOLE SODIUM 40 MG/1
40 TABLET, DELAYED RELEASE ORAL 2 TIMES DAILY
Qty: 60 TABLET | Refills: 0 | Status: SHIPPED | OUTPATIENT
Start: 2024-09-06

## 2024-10-15 ENCOUNTER — OFFICE VISIT (OUTPATIENT)
Dept: GASTROENTEROLOGY | Facility: CLINIC | Age: 59
End: 2024-10-15
Payer: COMMERCIAL

## 2024-10-15 VITALS
HEART RATE: 61 BPM | SYSTOLIC BLOOD PRESSURE: 98 MMHG | BODY MASS INDEX: 20.91 KG/M2 | DIASTOLIC BLOOD PRESSURE: 69 MMHG | TEMPERATURE: 96.9 F | WEIGHT: 121.8 LBS

## 2024-10-15 DIAGNOSIS — K57.92 DIVERTICULITIS: ICD-10-CM

## 2024-10-15 DIAGNOSIS — K58.1 IRRITABLE BOWEL SYNDROME WITH CONSTIPATION: Primary | ICD-10-CM

## 2024-10-15 PROCEDURE — 99213 OFFICE O/P EST LOW 20 MIN: CPT

## 2024-10-15 RX ORDER — LUBIPROSTONE 24 UG/1
24 CAPSULE ORAL 2 TIMES DAILY WITH MEALS
Qty: 60 CAPSULE | Refills: 11 | Status: SHIPPED | OUTPATIENT
Start: 2024-10-15

## 2024-10-15 NOTE — PATIENT INSTRUCTIONS
Recommend bowel cleanse with Miralax, which is over the counter. Dissolve 5 capfuls into 32 ounces of sugar-free gatorade/powerade and drink 8 ounces every 30 minutes until gone. Follow this with regular use of Miralax, titrating to achieve/maintain soft/formed bowel movements. Miralax is safe to use long-term. You can also repeat the bowel cleanse as needed.   Recommend 25-30 grams of fiber daily. May use Fibercon tablets to supplement as needed. Fibercon is less likely to cause gas/bloating.   Drink 64-80 ounces of water daily, with ideally half of that containing electrolytes (sugar free gatorade/powerade, electrolyte tablets)  Exercise is helpful for maintaining healthy bowel habits.

## 2024-10-15 NOTE — PROGRESS NOTES
Office Note     Name: Desire Hernandez    : 1965     MRN: [id number]     Chief Complaint  No chief complaint on file.    Subjective     History of Present Illness:  Desire Hernandez is a 59 y.o. female who presents today for follow-up of severe lower abdominal pain.  Reports pain is often sharp with pressure.  She also endorses bloating.  Denies having pain like this in the past.  She was evaluated by PCP on , and CT abdomen pelvis suggested very mild mid sigmoid diverticulitis, without any visualized abscess.  He was also found to have 3 cm cyst on her LLQ concerning for ovarian cyst, however transvaginal ultrasound showed normal ovaries.  She was prescribed antibiotic, which she has completed with improvement in her symptoms.       She reports that she has had some degree of consistent LLQ pain for most of her life.  She endorses long history of GERD and bloating, and is previously seen Dr. Connor.  EGD significant for benign-appearing stenosis, which was dilated.  She notes that her bloating and abdominal pain is worse with intake of high-fiber, specifically oatmeal, so she has been trying to reduce the amount of fiber she is eating, but continues to have abdominal pain and bloating.  She also endorses constipation, with stools resembling 1 on BSC.    At her last appointment 2024, she was recommended to initiate low FODMAP diet and was given a CSID test, which was not completed. She has had some unintentional weight loss recently due to avoidance of many foods, for fear that they will cause diverticulitis.  She is having a bowel movement every 2-3 days. Reports she did not do the bowel cleanse previously recommended and is very reluctant to take any medications.     Past Medical History:   Past Medical History:   Diagnosis Date    ADHD (attention deficit hyperactivity disorder)     back in high school    Allergic     seasonal    ASCUS favor dysplasia     07; 08;  6/25/10; 12; 13; 8/21/15 (HPV negative)    Blood type, Rh negative     Bulging lumbar disc     Bunion     surgery    Chlamydia     as a teen    Diarrhea     Dizziness     Fatigue     GERD (gastroesophageal reflux disease) 2022    H/O nipple discharge 9/415    Bilateral diagnostic mammogram negative    Hair loss     HSV-1 (herpes simplex virus 1) infection     HSV-2 (herpes simplex virus 2) infection     LGSIL (low grade squamous intraepithelial dysplasia) 2006    Menopause     Nausea     Ovarian cyst     Left    Palpitations     PONV (postoperative nausea and vomiting)     Right shoulder pain     SCHEDULED FOR SX    Seizures     Sleep disturbance     Syncope     Tinnitus        Past Surgical History:   Past Surgical History:   Procedure Laterality Date    BUNIONECTOMY Right 2016    Dr. Durbin    CERVICAL CONIZATION, LEEP      COLONOSCOPY  2022    COLPOSCOPY W/ BIOPSY / CURETTAGE  2006    benign    D & C WITH SUCTION  1998    10 week SAB    ENDOMETRIAL ABLATION  2017    INDUCED       years ago    [place]STER[place] ENDOMETRIAL ABLATION N/A 2017    Procedure: HYSTEROSCOPY WITH ENDOMETRIAL ABLATION; d&c;  Surgeon: Sandie Hu DO;  Location: [facility];  Service: Obstetrics/Gynecology    SHOULDER ARTHROSCOPY Right 2020    Procedure: Right shoulder manipulation under anesthesia with arthroscopy and extensive debridement of glenohumeral and subacromial spaces;  Surgeon: Stalin Liu MD;  Location: [facility] OR;  Service: Orthopedics    SHOULDER ARTHROSCOPY Left 2021    Procedure: SHOULDER ARTHROSCOPY WITH SUBACROMIAL DECOMPRESSION.;  Surgeon: Stalin Liu MD;  Location: [facility] OR;  Service: Orthopedics;  Laterality: Left;    SHOULDER MANIPULATION Left 2021    Procedure: SHOULDER MANIPULATION;  Surgeon: Stalin Liu MD;  Location: [facility] OR;  Service: Orthopedics;  Laterality: Left;       Immunizations:   Immunization  History   Administered Date(s) Administered    Fluzone (or Fluarix & Flulaval for VFC) >6mos 11/18/2021    Influenza, Unspecified 10/01/2020, 11/22/2022    Tdap 09/29/2017        Medications:     Current Outpatient Medications:     pantoprazole (PROTONIX) 40 MG EC tablet, Take 1 tablet by mouth 2 (Two) Times a Day., Disp: 60 tablet, Rfl: 0    PARoxetine (Paxil) 10 MG tablet, Take 1 tablet by mouth Every Morning. Needs appt for further refills, Disp: 30 tablet, Rfl: 0    tiZANidine (ZANAFLEX) 2 MG tablet, Take 1 tablet by mouth Every 8 (Eight) Hours As Needed for Muscle Spasms., Disp: 30 tablet, Rfl: 2    Intrarosa 6.5 MG insert, INSERT 1 SUPPOSITORY VAGINALLY AT BEDTIME (Patient not taking: Reported on 10/15/2024), Disp: , Rfl:     Allergies:   No Known Allergies    Family History:   Family History   Problem Relation Age of Onset    Hearing loss Father     Hyperlipidemia Father         DX in his mid 50's    Hypothyroidism Father     Hypertension Father     Hypothyroidism Mother     Osteoarthritis Mother     Arthritis Mother     Hyperlipidemia Mother     No Known Problems Brother     Arthritis Sister         RA    No Known Problems Son     COPD Paternal Grandfather     Hearing loss Paternal Grandmother     Hyperlipidemia Paternal Grandmother     Hearing loss Maternal Grandmother     Hyperlipidemia Maternal Grandmother         DX in her late 70's    Stroke Maternal Grandmother     Vision loss Maternal Grandmother         Macular degeneration    Lung cancer Maternal Grandfather     Cancer Maternal Grandfather         lung cancer - smoker    Lung cancer Paternal Uncle     Cancer Paternal Uncle         lung cancer - smoker    Rheum arthritis Other     Breast cancer Neg Hx     Ovarian cancer Neg Hx     Uterine cancer Neg Hx     Colon cancer Neg Hx     Melanoma Neg Hx     Colon polyps Neg Hx     Malig Hyperthermia Neg Hx        Social History:   Social History     Socioeconomic History    Marital status:      Spouse  "name: Lawrence    Number of children: 2   Tobacco Use    Smoking status: Never    Smokeless tobacco: Never   Vaping Use    Vaping status: Never Used   Substance and Sexual Activity    Alcohol use: Yes     Alcohol/week: 2.0 standard drinks of alcohol     Types: 2 Drinks containing 0.5 oz of alcohol per week     Comment: socially    Drug use: Not Currently     Types: Marijuana    Sexual activity: Defer     Partners: Male     Birth control/protection: None         Objective     Vital Signs  BP 98/69 (BP Location: Left arm, Patient Position: Sitting, Cuff Size: Adult)   Pulse 61   Temp 96.9 °F (36.1 °C) (Temporal)   Wt 55.2 kg (121 lb 12.8 oz)   BMI 20.91 kg/m²   Estimated body mass index is 20.91 kg/m² as calculated from the following:    Height as of 7/11/24: 162.6 cm (64\").    Weight as of this encounter: 55.2 kg (121 lb 12.8 oz).    BMI is within normal parameters. No other follow-up for BMI required.      Physical Exam  Vitals reviewed.   Constitutional:       General: She is awake.   Cardiovascular:      Rate and Rhythm: Normal rate.   Pulmonary:      Effort: Pulmonary effort is normal.   Abdominal:      Palpations: Abdomen is soft.      Tenderness: There is no abdominal tenderness.   Skin:     General: Skin is warm and dry.   Neurological:      Mental Status: She is alert.          Assessment and Plan     Assessment & Plan  Irritable bowel syndrome with constipation  Suspect low fiber intake is contributing to constipation/bloating. She reports worsening symptoms with fiber so I suspect she will need to use something on a regular basis to avoid constipation and recurrent diverticulitis. She is reluctant to take medication, however. I did send prescription for amitiza twice daily to her pharmacy and recommend that she complete the miralax bowel cleanse prior to initiating.   Recommend fibercon tablets to supplement, for reduced side effects.     Diverticulitis  Avoid constipation. Continue to follow guidance for " constipation.              Follow Up  As needed    ONOFRE Herrera  [facility] [address]  [facility] GASTROENTEROLOGY  [address] [address]

## 2024-11-04 NOTE — TELEPHONE ENCOUNTER
Rx Refill Note  Requested Prescriptions     Pending Prescriptions Disp Refills    pantoprazole (PROTONIX) 40 MG EC tablet [Pharmacy Med Name: Pantoprazole Sodium 40 MG Oral Tablet Delayed Release] 60 tablet 0     Sig: Take 1 tablet by mouth twice daily      Last office visit with prescribing clinician: 10/15/2024   Last telemedicine visit with prescribing clinician: Visit date not found   Next office visit with prescribing clinician: Visit date not found                         Would you like a call back once the refill request has been completed: [] Yes [] No    If the office needs to give you a call back, can they leave a voicemail: [] Yes [] No    Anastasiia Rios LPN  11/04/24, 08:36 EST

## 2024-11-05 RX ORDER — PANTOPRAZOLE SODIUM 40 MG/1
40 TABLET, DELAYED RELEASE ORAL 2 TIMES DAILY
Qty: 60 TABLET | Refills: 0 | Status: SHIPPED | OUTPATIENT
Start: 2024-11-05

## 2024-12-06 ENCOUNTER — FLU SHOT (OUTPATIENT)
Dept: INTERNAL MEDICINE | Facility: CLINIC | Age: 59
End: 2024-12-06
Payer: COMMERCIAL

## 2024-12-06 ENCOUNTER — LAB (OUTPATIENT)
Dept: INTERNAL MEDICINE | Facility: CLINIC | Age: 59
End: 2024-12-06
Payer: COMMERCIAL

## 2024-12-06 DIAGNOSIS — Z13.0 SCREENING FOR BLOOD DISEASE: ICD-10-CM

## 2024-12-06 DIAGNOSIS — Z13.21 ENCOUNTER FOR VITAMIN DEFICIENCY SCREENING: ICD-10-CM

## 2024-12-06 DIAGNOSIS — Z13.29 THYROID DISORDER SCREENING: ICD-10-CM

## 2024-12-06 DIAGNOSIS — E55.9 VITAMIN D DEFICIENCY: ICD-10-CM

## 2024-12-06 DIAGNOSIS — Z13.220 LIPID SCREENING: ICD-10-CM

## 2024-12-06 DIAGNOSIS — Z00.00 WELLNESS EXAMINATION: ICD-10-CM

## 2024-12-06 DIAGNOSIS — Z13.1 SCREENING FOR DIABETES MELLITUS: ICD-10-CM

## 2024-12-06 DIAGNOSIS — Z23 NEEDS FLU SHOT: Primary | ICD-10-CM

## 2024-12-06 LAB
25(OH)D3 SERPL-MCNC: 30.7 NG/ML (ref 30–100)
ALBUMIN SERPL-MCNC: 3.9 G/DL (ref 3.5–5.2)
ALBUMIN/GLOB SERPL: 1.1 G/DL
ALP SERPL-CCNC: 72 U/L (ref 39–117)
ALT SERPL W P-5'-P-CCNC: 10 U/L (ref 1–33)
ANION GAP SERPL CALCULATED.3IONS-SCNC: 9.4 MMOL/L (ref 5–15)
AST SERPL-CCNC: 14 U/L (ref 1–32)
BILIRUB SERPL-MCNC: 0.5 MG/DL (ref 0–1.2)
BUN SERPL-MCNC: 18 MG/DL (ref 6–20)
BUN/CREAT SERPL: 17.3 (ref 7–25)
CALCIUM SPEC-SCNC: 9.9 MG/DL (ref 8.6–10.5)
CHLORIDE SERPL-SCNC: 106 MMOL/L (ref 98–107)
CHOLEST SERPL-MCNC: 225 MG/DL (ref 0–200)
CO2 SERPL-SCNC: 25.6 MMOL/L (ref 22–29)
CREAT SERPL-MCNC: 1.04 MG/DL (ref 0.57–1)
EGFRCR SERPLBLD CKD-EPI 2021: 62.8 ML/MIN/1.73
FOLATE SERPL-MCNC: 5.54 NG/ML (ref 4.78–24.2)
GLOBULIN UR ELPH-MCNC: 3.4 GM/DL
GLUCOSE SERPL-MCNC: 83 MG/DL (ref 65–99)
HBA1C MFR BLD: 5.4 % (ref 4.8–5.6)
HDLC SERPL-MCNC: 81 MG/DL (ref 40–60)
LDLC SERPL CALC-MCNC: 137 MG/DL (ref 0–100)
LDLC/HDLC SERPL: 1.67 {RATIO}
POTASSIUM SERPL-SCNC: 4.7 MMOL/L (ref 3.5–5.2)
PROT SERPL-MCNC: 7.3 G/DL (ref 6–8.5)
SODIUM SERPL-SCNC: 141 MMOL/L (ref 136–145)
TRIGL SERPL-MCNC: 43 MG/DL (ref 0–150)
TSH SERPL DL<=0.05 MIU/L-ACNC: 2.68 UIU/ML (ref 0.27–4.2)
VIT B12 BLD-MCNC: 617 PG/ML (ref 211–946)
VLDLC SERPL-MCNC: 7 MG/DL (ref 5–40)

## 2024-12-06 PROCEDURE — 90656 IIV3 VACC NO PRSV 0.5 ML IM: CPT | Performed by: NURSE PRACTITIONER

## 2024-12-06 PROCEDURE — 82306 VITAMIN D 25 HYDROXY: CPT | Performed by: NURSE PRACTITIONER

## 2024-12-06 PROCEDURE — 80061 LIPID PANEL: CPT | Performed by: NURSE PRACTITIONER

## 2024-12-06 PROCEDURE — 82746 ASSAY OF FOLIC ACID SERUM: CPT | Performed by: NURSE PRACTITIONER

## 2024-12-06 PROCEDURE — 83036 HEMOGLOBIN GLYCOSYLATED A1C: CPT | Performed by: NURSE PRACTITIONER

## 2024-12-06 PROCEDURE — 36415 COLL VENOUS BLD VENIPUNCTURE: CPT | Performed by: NURSE PRACTITIONER

## 2024-12-06 PROCEDURE — 90471 IMMUNIZATION ADMIN: CPT | Performed by: NURSE PRACTITIONER

## 2024-12-06 PROCEDURE — 80050 GENERAL HEALTH PANEL: CPT | Performed by: NURSE PRACTITIONER

## 2024-12-06 PROCEDURE — 82607 VITAMIN B-12: CPT | Performed by: NURSE PRACTITIONER

## 2024-12-07 LAB
DEPRECATED RDW RBC AUTO: 39.6 FL (ref 37–54)
ERYTHROCYTE [DISTWIDTH] IN BLOOD BY AUTOMATED COUNT: 12.3 % (ref 12.3–15.4)
HCT VFR BLD AUTO: 42.3 % (ref 34–46.6)
HGB BLD-MCNC: 14.5 G/DL (ref 12–15.9)
MCH RBC QN AUTO: 30.4 PG (ref 26.6–33)
MCHC RBC AUTO-ENTMCNC: 34.3 G/DL (ref 31.5–35.7)
MCV RBC AUTO: 88.7 FL (ref 79–97)
PLATELET # BLD AUTO: 195 10*3/MM3 (ref 140–450)
PMV BLD AUTO: 9.7 FL (ref 6–12)
RBC # BLD AUTO: 4.77 10*6/MM3 (ref 3.77–5.28)
WBC NRBC COR # BLD AUTO: 5.84 10*3/MM3 (ref 3.4–10.8)

## 2024-12-30 NOTE — TELEPHONE ENCOUNTER
Rx Refill Note  Requested Prescriptions     Pending Prescriptions Disp Refills    pantoprazole (PROTONIX) 40 MG EC tablet 60 tablet 0     Sig: Take 1 tablet by mouth 2 (Two) Times a Day.      Last office visit with prescribing clinician: 10/15/2024   Last telemedicine visit with prescribing clinician: Visit date not found   Next office visit with prescribing clinician: Visit date not found                         Would you like a call back once the refill request has been completed: [] Yes [] No    If the office needs to give you a call back, can they leave a voicemail: [] Yes [] No    Anastasiia Rios LPN  12/30/24, 08:32 EST

## 2025-01-02 NOTE — TELEPHONE ENCOUNTER
PT IS WANTING TO SPEAK TO .  HAS HAD TROUBLE SEVERAL TIMES WITH NO RESPONSE TO [employer] MESSAGES.   [phone] (home)

## 2025-01-03 RX ORDER — PANTOPRAZOLE SODIUM 40 MG/1
40 TABLET, DELAYED RELEASE ORAL 2 TIMES DAILY
Qty: 60 TABLET | Refills: 0 | Status: SHIPPED | OUTPATIENT
Start: 2025-01-03

## 2025-01-03 NOTE — TELEPHONE ENCOUNTER
Rx Refill Note  Requested Prescriptions     Pending Prescriptions Disp Refills    pantoprazole (PROTONIX) 40 MG EC tablet 60 tablet 0     Sig: Take 1 tablet by mouth 2 (Two) Times a Day.      Last office visit with prescribing clinician: 10/15/2024   Last telemedicine visit with prescribing clinician: Visit date not found   Next office visit with prescribing clinician: 1/1/2025                         Would you like a call back once the refill request has been completed: [] Yes [] No    If the office needs to give you a call back, can they leave a voicemail: [] Yes [] No    Anastasiia Rios LPN  01/03/25, 10:54 EST

## 2025-02-20 NOTE — TELEPHONE ENCOUNTER
Rx Refill Note  Requested Prescriptions     Pending Prescriptions Disp Refills    pantoprazole (PROTONIX) 40 MG EC tablet [Pharmacy Med Name: Pantoprazole Sodium 40 MG Oral Tablet Delayed Release] 60 tablet 0     Sig: Take 1 tablet by mouth twice daily      Last office visit with prescribing clinician: 10/15/2024   Last telemedicine visit with prescribing clinician: Visit date not found   Next office visit with prescribing clinician: Visit date not found                         Would you like a call back once the refill request has been completed: [] Yes [] No    If the office needs to give you a call back, can they leave a voicemail: [] Yes [] No    Ale Villanueva MA  02/20/25, 09:59 EST

## 2025-02-21 RX ORDER — PANTOPRAZOLE SODIUM 40 MG/1
40 TABLET, DELAYED RELEASE ORAL 2 TIMES DAILY
Qty: 60 TABLET | Refills: 0 | Status: SHIPPED | OUTPATIENT
Start: 2025-02-21

## 2025-03-07 ENCOUNTER — TELEPHONE (OUTPATIENT)
Dept: GASTROENTEROLOGY | Facility: CLINIC | Age: 60
End: 2025-03-07

## 2025-03-07 NOTE — TELEPHONE ENCOUNTER
Hub staff attempted to follow warm transfer process and was unsuccessful     Caller: Desire Hernandez    Relationship to patient: Self    Best call back number: [phone]     Patient is needing: TO SPEAK WITH ZAIN ABOUT ESOPHAGITIS BOTHERING HER. WANTS TO KNOW IF THERE IS SOMETHING THAT CAN BE CALLED IN TO EASE SYMPTOMS UNTIL SHE CAN GET IN OFFICE.

## 2025-03-12 ENCOUNTER — OFFICE VISIT (OUTPATIENT)
Dept: GASTROENTEROLOGY | Facility: CLINIC | Age: 60
End: 2025-03-12
Payer: COMMERCIAL

## 2025-03-12 VITALS
DIASTOLIC BLOOD PRESSURE: 71 MMHG | SYSTOLIC BLOOD PRESSURE: 110 MMHG | WEIGHT: 119 LBS | BODY MASS INDEX: 20.43 KG/M2 | HEART RATE: 68 BPM

## 2025-03-12 DIAGNOSIS — R13.10 ODYNOPHAGIA: Primary | ICD-10-CM

## 2025-03-12 DIAGNOSIS — R13.19 ESOPHAGEAL DYSPHAGIA: ICD-10-CM

## 2025-03-12 DIAGNOSIS — R12 HEARTBURN: ICD-10-CM

## 2025-03-12 DIAGNOSIS — R10.13 EPIGASTRIC PAIN: ICD-10-CM

## 2025-03-12 NOTE — PROGRESS NOTES
GASTROENTEROLOGY OFFICE NOTE  Desire Hernandez  [id number]  1965      Chief Complaint   Patient presents with    Abdominal pain, Burning in Esophagus        HISTORY OF PRESENT ILLNESS:  History of Present Illness  The patient is a 59-year-old female who presents for evaluation of burning in her mouth and esophagus, chest burning and pain, and sharp abdominal pain that spreads outward. She reports pain with eating or drinking.    She has been experiencing a progressive worsening of symptoms since the beginning of 02/2025, which have escalated to a daily occurrence since the start of 03/2025. These symptoms include a burning sensation in her mouth, esophagus, and chest, accompanied by sharp abdominal pain that radiates outward.     She also reports difficulty swallowing, even with water, necessitating careful chewing, slow eating, and increased fluid intake. Her diet has been limited to soft foods due to the discomfort caused by water consumption.     She does not recall any recent antibiotic use or history of pneumonia or urinary tract infection (UTI). She is not on any immunosuppressive medications.     Despite being on Protonix twice daily for acid reflux, she continues to experience significant upper abdominal burning, which intensifies postprandially. She also reports persistent sharp pain in the left upper quadrant, regardless of food intake, but notes an exacerbation of this pain with drinking. She expresses concern over these symptoms, which have been present for the past month.     She has a past medical history of diverticulitis, diagnosed in 06/2024, for which she was treated with antibiotics.      PAST MEDICAL HISTORY  Past Medical History:    ADHD (attention deficit hyperactivity disorder)    back in high school    Allergic    seasonal    ASCUS favor dysplasia    6/4/07; 6/5/08; 6/25/10; 6/27/12; 7/29/13; 8/21/15 (HPV negative)    Blood type, Rh negative    Bulging lumbar disc    Bunion     surgery    Chlamydia    as a teen    Diarrhea    Dizziness    Fatigue    GERD (gastroesophageal reflux disease)    H/O nipple discharge    Bilateral diagnostic mammogram negative    Hair loss    HSV-1 (herpes simplex virus 1) infection    HSV-2 (herpes simplex virus 2) infection    LGSIL (low grade squamous intraepithelial dysplasia)    Menopause    Nausea    Ovarian cyst    Left    Palpitations    PONV (postoperative nausea and vomiting)    Right shoulder pain    SCHEDULED FOR SX    Seizures    Sleep disturbance    Syncope    Tinnitus        PAST SURGICAL HISTORY  Past Surgical History:    BUNIONECTOMY    Dr. Durbin    CERVICAL CONIZATION, LEEP    COLONOSCOPY    COLPOSCOPY W/ BIOPSY / CURETTAGE    benign    D & C WITH SUCTION    10 week SAB    ENDOMETRIAL ABLATION    INDUCED     years ago    [place] HYSTEROSCOPY ENDOMETRIAL ABLATION    Procedure: HYSTEROSCOPY WITH ENDOMETRIAL ABLATION; d&c;  Surgeon: Sandie Hu DO;  Location: [facility] OR;  Service: Obstetrics/Gynecology    SHOULDER ARTHROSCOPY    Procedure: Right shoulder manipulation under anesthesia with arthroscopy and extensive debridement of glenohumeral and subacromial spaces;  Surgeon: Stalin Liu MD;  Location: [facility] OR;  Service: Orthopedics    SHOULDER ARTHROSCOPY    Procedure: SHOULDER ARTHROSCOPY WITH SUBACROMIAL DECOMPRESSION.;  Surgeon: Stalin Liu MD;  Location: [facility] OR;  Service: Orthopedics;  Laterality: Left;    SHOULDER MANIPULATION    Procedure: SHOULDER MANIPULATION;  Surgeon: Stalin Liu MD;  Location: [facility] OR;  Service: Orthopedics;  Laterality: Left;    UPPER GASTROINTESTINAL ENDOSCOPY        MEDICATIONS:    Current Outpatient Medications:     Intrarosa 6.5 MG insert, INSERT 1 SUPPOSITORY VAGINALLY AT BEDTIME (Patient not taking: Reported on 10/15/2024), Disp: , Rfl:     pantoprazole (PROTONIX) 40 MG EC tablet, Take 1 tablet by mouth twice daily, Disp: 60 tablet, Rfl: 0    PARoxetine (Paxil) 10 MG tablet,  Take 1 tablet by mouth Every Morning. Needs appt for further refills, Disp: 30 tablet, Rfl: 0    tiZANidine (ZANAFLEX) 2 MG tablet, Take 1 tablet by mouth Every 8 (Eight) Hours As Needed for Muscle Spasms., Disp: 30 tablet, Rfl: 2    ALLERGIES  has no known allergies.    FAMILY HISTORY:  Cancer-related family history includes Cancer in her maternal grandfather and paternal uncle; Lung cancer in her maternal grandfather and paternal uncle. There is no history of Breast cancer, Ovarian cancer, Uterine cancer, Colon cancer, or Melanoma.  Colon Cancer-related family history is negative for Colon cancer and Colon polyps.    SOCIAL HISTORY  She  reports that she has never smoked. She has never used smokeless tobacco. She reports current alcohol use of about 2.0 standard drinks of alcohol per week. She reports that she does not currently use drugs after having used the following drugs: Marijuana.   . 2 sons ages 22 and 25. Non-smoker. Drinks alcoholic beverages in social moderation [employer].      PHYSICAL EXAM   /71 (BP Location: Right arm, Patient Position: Sitting, Cuff Size: Large Adult)   Pulse 68   Wt 54 kg (119 lb)   BMI 20.43 kg/m²   General: Alert and oriented x 3. In no apparent or acute distress.  and No stigmata of chronic liver disease  HEENT: Anicteric sclerae. Normal oropharynx  Neck: Supple. Without lymphadenopathy  CV: Regular rate and rhythm, S1, S2  Lungs: Clear to ausculation. Without rales, rhonchi and wheezing  Abdomen:  Soft,non-distended without palpable masses or hepatosplenomeagaly, areas of rebound tenderness or guarding.   She has some equivocal trigger points in the upper abdomen  Extremeties: without clubbing, cyanosis or edema  Neurologic:  Alert and oriented x 3 without focal motor or sensory deficits  Rectal exam: deferred         Results Review:  I reviewed the patient's new clinical results.  Largely unremarkable December 6, 2024 labs  reviewed      ASSESSMENT/PLAN  Assessment & Plan  1. Odynophagia.  She reports burning in her mouth, esophagus, and chest, along with sharp abdominal pain that worsens with eating or drinking. Differential diagnoses include pill esophagitis, Candida esophagitis, herpetic esophagitis, CMV esophagitis, and severe acid reflux. She is currently on Protonix twice a day but continues to experience significant symptoms. An upper endoscopy will be scheduled to investigate the cause of her symptoms. If esophagitis is found, appropriate treatment will be initiated. If the endoscopy is normal, functional dyspepsia will be considered, and a different acid inhibitor, such as Voquezna, or a tricyclic antidepressant like amitriptyline may be prescribed to manage the symptoms.    2. Epigastric pain.  She experiences sharp pain in the epigastric region and left upper quadrant, which worsens with eating or drinking. Differential diagnoses include myofascial pain and functional heartburn. The upper endoscopy will help determine if there is an underlying esophageal condition contributing to the pain. If the endoscopy is normal, treatment for functional dyspepsia will be considered.    3.  Intermittent dysphagia to solids  Likely peptic stricture.  Anticipate esophageal dilation at time of her upper endoscopy    4.  Suspected myofascial abdominal pain  Consider trial of amitriptyline/Elavil pending findings of tomorrow's upper endoscopy      Ar Connor MD  3/12/2025   15:23 EDT      Patient or patient representative verbalized consent for the use of Ambient Listening during the visit with  Ar Connor MD for chart documentation. 3/12/2025  17:15 EDT

## 2025-03-12 NOTE — TELEPHONE ENCOUNTER
Pt states that she is having Constant sharp Pain and burning in esophagus where stomach meets (middle of chest) all the time it does not stop. Only eating soft foods but still hurts extremely bad. Even water hurts.

## 2025-03-13 ENCOUNTER — TELEPHONE (OUTPATIENT)
Dept: GASTROENTEROLOGY | Facility: CLINIC | Age: 60
End: 2025-03-13

## 2025-03-13 ENCOUNTER — OUTSIDE FACILITY SERVICE (OUTPATIENT)
Dept: GASTROENTEROLOGY | Facility: CLINIC | Age: 60
End: 2025-03-13
Payer: COMMERCIAL

## 2025-03-13 PROCEDURE — 43239 EGD BIOPSY SINGLE/MULTIPLE: CPT | Performed by: INTERNAL MEDICINE

## 2025-03-13 PROCEDURE — 88305 TISSUE EXAM BY PATHOLOGIST: CPT | Performed by: INTERNAL MEDICINE

## 2025-03-13 PROCEDURE — 43249 ESOPH EGD DILATION <30 MM: CPT | Performed by: INTERNAL MEDICINE

## 2025-03-13 RX ORDER — VONOPRAZAN FUMARATE 26.72 MG/1
20 TABLET ORAL DAILY
Qty: 20 TABLET | Refills: 0 | COMMUNITY
Start: 2025-03-13 | End: 2025-03-13

## 2025-03-13 RX ORDER — VONOPRAZAN FUMARATE 26.72 MG/1
20 TABLET ORAL DAILY
Qty: 20 TABLET | Refills: 0 | COMMUNITY
Start: 2025-03-13

## 2025-03-13 NOTE — TELEPHONE ENCOUNTER
Hub staff attempted to follow warm transfer process and was unsuccessful     Caller: Desire Hernandez    Relationship to patient: Self    Best call back number: [phone]    Patient is needing: PT HAD SCOPE TODAY AND WAS PRESCRIBED A MEDICATION CALLED VOQUEZNA. PT IS CALLING BC SHE DOES NOT KNOW HOW TO TAKE THE MEDICATION. PT NOT SURE IF SHE NEEDS TO TAKE IT ONCE PER DAY FOR THE 20 DAY SUPPLY THAT SHE WAS GIVEN. PLEASE CONTACT PT TO ASSIST.

## 2025-03-14 ENCOUNTER — LAB REQUISITION (OUTPATIENT)
Dept: LAB | Facility: HOSPITAL | Age: 60
End: 2025-03-14
Payer: COMMERCIAL

## 2025-03-14 DIAGNOSIS — R13.10 DYSPHAGIA, UNSPECIFIED: ICD-10-CM

## 2025-03-14 DIAGNOSIS — K31.7 POLYP OF STOMACH AND DUODENUM: ICD-10-CM

## 2025-03-14 DIAGNOSIS — K44.9 DIAPHRAGMATIC HERNIA WITHOUT OBSTRUCTION OR GANGRENE: ICD-10-CM

## 2025-03-14 DIAGNOSIS — R10.13 EPIGASTRIC PAIN: ICD-10-CM

## 2025-03-17 LAB
CYTO UR: NORMAL
LAB AP CASE REPORT: NORMAL
LAB AP CLINICAL INFORMATION: NORMAL
PATH REPORT.FINAL DX SPEC: NORMAL
PATH REPORT.GROSS SPEC: NORMAL

## 2025-03-23 ENCOUNTER — RESULTS FOLLOW-UP (OUTPATIENT)
Dept: LAB | Facility: HOSPITAL | Age: 60
End: 2025-03-23
Payer: COMMERCIAL

## 2025-03-23 NOTE — LETTER
March 23, 2025    Desire Hernandez  [address] [place] [address]        Dear Ms. Chapman:  This letter is in regards to the biopsy report of your EGD of March 13, 2025.    Biopsies from the esophagus, stomach and duodenum were unremarkable. Specifically, esophageal biopsies were negative for eosinophilic esophagitis or Carpenter's esophagus, Gastric (stomach) biopsies were negative for H. pylori. Duodenal biopsies were negative for Celiac disease.    As we discussed, it may very well be that your symptoms are not reflux related.  We have recommended a trial of Voquezna for better acid suppression and a trial of amitriptyline/Elavil for functional heartburn (which is not acid related).  Lets give this regimen a while to work.    I do hope this letter finds you well. Please call me with any questions or concerns you may have.  I see that you have a follow-up appointment with me on 14 May.  Please keep that appointment.    Sincerely,  Jose Connor M.D.    CC: ONOFRE Pierce

## 2025-03-23 NOTE — TELEPHONE ENCOUNTER
March 23, 2025    Desire Hernandez  [address] [place] [address]        Dear Ms. Chapman:  This letter is in regards to the biopsy report of your EGD of March 13, 2025.    Biopsies from the esophagus, stomach and duodenum were unremarkable. Specifically, esophageal biopsies were negative for eosinophilic esophagitis or Carpenter's esophagus, Gastric (stomach) biopsies were negative for H. pylori. Duodenal biopsies were negative for Celiac disease.    As we discussed, it may very well be that your symptoms are not reflux related.  We have recommended a trial of Voquezna for better acid suppression and a trial of amitriptyline/Elavil for functional heartburn (which is not acid related).  Lets give this regimen a while to work.    I do hope this letter finds you well. Please call me with any questions or concerns you may have.  I see that you have a follow-up appointment with me on 14 May.  Please keep that appointment.    Sincerely,  Jose Connor M.D.    CC: ONOFRE Pierce

## 2025-03-25 ENCOUNTER — TELEPHONE (OUTPATIENT)
Dept: GASTROENTEROLOGY | Facility: CLINIC | Age: 60
End: 2025-03-25
Payer: COMMERCIAL

## 2025-03-25 NOTE — TELEPHONE ENCOUNTER
LM for pt to call back to schedule scope.     Ms Mary Salas called requesting a refill for Vojohnza to be sent to [facility]. Thanks

## 2025-03-26 ENCOUNTER — PRIOR AUTHORIZATION (OUTPATIENT)
Dept: GASTROENTEROLOGY | Facility: CLINIC | Age: 60
End: 2025-03-26
Payer: COMMERCIAL

## 2025-03-26 RX ORDER — VONOPRAZAN FUMARATE 26.72 MG/1
20 TABLET ORAL DAILY
Qty: 30 TABLET | Refills: 11 | Status: SHIPPED | OUTPATIENT
Start: 2025-03-26

## 2025-03-26 NOTE — TELEPHONE ENCOUNTER
A Prior Authorization has been started for Voquezna through [employer].     Approved today by [employer] 2017  PA Case: [id number], Status: Approved, Coverage Starts on: 3/26/2025 12:00:00 AM, Coverage Ends on: 4/23/2025 12:00:00 AM.

## 2025-04-29 RX ORDER — VONOPRAZAN FUMARATE 13.36 MG/1
10 TABLET ORAL DAILY
Qty: 15 TABLET | Refills: 0 | COMMUNITY
Start: 2025-04-29

## 2025-05-08 ENCOUNTER — TELEPHONE (OUTPATIENT)
Dept: GASTROENTEROLOGY | Facility: CLINIC | Age: 60
End: 2025-05-08
Payer: COMMERCIAL

## 2025-05-08 RX ORDER — VONOPRAZAN FUMARATE 13.36 MG/1
10 TABLET ORAL DAILY
Qty: 30 TABLET | Refills: 11 | Status: SHIPPED | OUTPATIENT
Start: 2025-05-08

## 2025-05-08 NOTE — TELEPHONE ENCOUNTER
Voquezna 10 mg sent to [facility]. Pt states they work well also. Will present to the office for sample .

## 2025-05-23 RX ORDER — TIZANIDINE 2 MG/1
2 TABLET ORAL EVERY 8 HOURS PRN
Qty: 30 TABLET | Refills: 2 | Status: SHIPPED | OUTPATIENT
Start: 2025-05-23

## 2025-06-06 ENCOUNTER — PRIOR AUTHORIZATION (OUTPATIENT)
Dept: GASTROENTEROLOGY | Facility: CLINIC | Age: 60
End: 2025-06-06
Payer: COMMERCIAL

## 2025-06-06 NOTE — TELEPHONE ENCOUNTER
A Prior Authorization has been started for Voquezna.    Approved today by [employer] 2017 [employer]  Request Reference Number: [id number]. VOQUEZNA TAB 10MG is approved through 03/06/2026

## 2025-06-11 RX ORDER — VONOPRAZAN FUMARATE 13.36 MG/1
10 TABLET ORAL DAILY
Qty: 30 TABLET | Refills: 11 | Status: SHIPPED | OUTPATIENT
Start: 2025-06-11

## 2025-06-11 RX ORDER — VONOPRAZAN FUMARATE 26.72 MG/1
20 TABLET ORAL DAILY
Qty: 20 TABLET | Refills: 0 | COMMUNITY
Start: 2025-06-11

## 2025-08-11 ENCOUNTER — TRANSCRIBE ORDERS (OUTPATIENT)
Dept: LAB | Facility: HOSPITAL | Age: 60
End: 2025-08-11
Payer: COMMERCIAL

## 2025-08-11 ENCOUNTER — LAB (OUTPATIENT)
Dept: LAB | Facility: HOSPITAL | Age: 60
End: 2025-08-11
Payer: COMMERCIAL

## 2025-08-11 DIAGNOSIS — N94.9 DISEASE OF FEMALE GENITAL ORGANS: Primary | ICD-10-CM

## 2025-08-11 DIAGNOSIS — N94.9 DISEASE OF FEMALE GENITAL ORGANS: ICD-10-CM

## 2025-08-11 LAB — CANCER AG125 SERPL QL: 4.1 U/ML (ref 0–38.1)

## 2025-08-11 PROCEDURE — 86304 IMMUNOASSAY TUMOR CA 125: CPT

## 2025-08-11 PROCEDURE — 36415 COLL VENOUS BLD VENIPUNCTURE: CPT

## 2025-08-12 ENCOUNTER — TRANSCRIBE ORDERS (OUTPATIENT)
Dept: ADMINISTRATIVE | Facility: HOSPITAL | Age: 60
End: 2025-08-12
Payer: COMMERCIAL

## 2025-08-12 DIAGNOSIS — Z12.31 VISIT FOR SCREENING MAMMOGRAM: Primary | ICD-10-CM

## 2025-08-27 ENCOUNTER — HOSPITAL ENCOUNTER (OUTPATIENT)
Dept: MAMMOGRAPHY | Facility: HOSPITAL | Age: 60
Discharge: HOME | End: 2025-08-27
Admitting: NURSE PRACTITIONER
Payer: COMMERCIAL

## 2025-08-27 DIAGNOSIS — Z12.31 VISIT FOR SCREENING MAMMOGRAM: ICD-10-CM

## 2025-08-27 PROCEDURE — 77067 SCR MAMMO BI INCL CAD: CPT

## 2025-08-27 PROCEDURE — 77063 BREAST TOMOSYNTHESIS BI: CPT

## (undated) DEVICE — 4.5 FULL RADIUS BONECUTTER BLADES,                                    YELLOW, 8000 MAXIMUM RPM, PACKAGED 6                                    PER BOX, STERILE

## (undated) DEVICE — Device

## (undated) DEVICE — SUT ETHLN 3/0 PS2 18 IN 1669H

## (undated) DEVICE — DRSNG GZ CURAD XEROFORM NONADHS 5X9IN STRL

## (undated) DEVICE — 3M™ STERI-DRAPE™ U-DRAPE 1015: Brand: STERI-DRAPE™

## (undated) DEVICE — GLV SURG NEOLON 2G PF LF 7.5 STRL

## (undated) DEVICE — ST TB GOFLO STRL

## (undated) DEVICE — DECANT BG O JET

## (undated) DEVICE — 1010 S-DRAPE TOWEL DRAPE 10/BX: Brand: STERI-DRAPE™

## (undated) DEVICE — PROB ABL ENDOMTRL NOVASURE/G1 W/SURESND BIP

## (undated) DEVICE — NDL HYPO SFTY GLD 22G 1 1/2IN

## (undated) DEVICE — CANNULA THREADED FLEX 5.5 X 72MM: Brand: CLEAR-TRAC

## (undated) DEVICE — SYR LUERLOK 20CC BX/50

## (undated) DEVICE — GLV SURG SENSICARE W/ALOE PF LF 7.5 STRL

## (undated) DEVICE — GLV SURG SENSICARE PI LF PF 7.0

## (undated) DEVICE — PK SHLDR ARTHSCP 90

## (undated) DEVICE — DRSNG SURESITE WNDW 4X4.5

## (undated) DEVICE — T-MAX DISPOSABLE FACE MASK 8 PER BOX

## (undated) DEVICE — SYR LUERLOK 50ML

## (undated) DEVICE — WEREWOLF FLOW 90 COBLATION WAND: Brand: COBLATION

## (undated) DEVICE — GLV SURG SENSICARE PI PF LF 7 GRN STRL

## (undated) DEVICE — CLEAR-TRAC DISPOSABLE STOPCOCK: Brand: CLEAR-TRAC

## (undated) DEVICE — SPNG GZ WOVN 4X4IN 12PLY 10/BX STRL

## (undated) DEVICE — SOL ISO/ALC RUB 70PCT 4OZ

## (undated) DEVICE — APPL CHLORAPREP HI/LITE 26ML ORNG

## (undated) DEVICE — CYSTO/BLADDER IRRIGATION SET, REGULATING CLAMP

## (undated) DEVICE — PAD SANI MAXI W/ADHS SNG WRP 11IN

## (undated) DEVICE — DECANTER: Brand: UNBRANDED

## (undated) DEVICE — GLV SURG SENSICARE ORTHO PF LF 7 STRL

## (undated) DEVICE — LAG PERI GYN: Brand: MEDLINE INDUSTRIES, INC.

## (undated) DEVICE — APPL CHLORAPREP W/TINT 26ML ORNG

## (undated) DEVICE — GLV SURG SENSICARE MICRO PF LF 6 STRL

## (undated) DEVICE — SHOULDER STABILIZATION KIT,                                    DISPOSABLE 12 PER BOX

## (undated) DEVICE — TOWEL,OR,DSP,ST,BLUE,STD,4/PK,20PK/CS: Brand: MEDLINE